# Patient Record
Sex: MALE | Race: WHITE | NOT HISPANIC OR LATINO | Employment: OTHER | URBAN - METROPOLITAN AREA
[De-identification: names, ages, dates, MRNs, and addresses within clinical notes are randomized per-mention and may not be internally consistent; named-entity substitution may affect disease eponyms.]

---

## 2017-01-18 ENCOUNTER — ALLSCRIPTS OFFICE VISIT (OUTPATIENT)
Dept: OTHER | Facility: OTHER | Age: 82
End: 2017-01-18

## 2017-01-18 DIAGNOSIS — R41.3 OTHER AMNESIA: ICD-10-CM

## 2017-01-18 DIAGNOSIS — E03.9 HYPOTHYROIDISM: ICD-10-CM

## 2017-04-04 DIAGNOSIS — I48.91 ATRIAL FIBRILLATION (HCC): ICD-10-CM

## 2017-06-21 ENCOUNTER — ALLSCRIPTS OFFICE VISIT (OUTPATIENT)
Dept: OTHER | Facility: OTHER | Age: 82
End: 2017-06-21

## 2017-10-11 DIAGNOSIS — Z95.1 PRESENCE OF AORTOCORONARY BYPASS GRAFT: ICD-10-CM

## 2017-10-11 DIAGNOSIS — I48.91 ATRIAL FIBRILLATION (HCC): ICD-10-CM

## 2017-10-11 DIAGNOSIS — Q23.1 CONGENITAL INSUFFICIENCY OF AORTIC VALVE: ICD-10-CM

## 2017-10-19 ENCOUNTER — GENERIC CONVERSION - ENCOUNTER (OUTPATIENT)
Dept: OTHER | Facility: OTHER | Age: 82
End: 2017-10-19

## 2017-10-19 ENCOUNTER — TRANSCRIBE ORDERS (OUTPATIENT)
Dept: ADMINISTRATIVE | Facility: HOSPITAL | Age: 82
End: 2017-10-19

## 2017-10-19 ENCOUNTER — ALLSCRIPTS OFFICE VISIT (OUTPATIENT)
Dept: OTHER | Facility: OTHER | Age: 82
End: 2017-10-19

## 2017-10-19 DIAGNOSIS — I48.91 ATRIAL FIBRILLATION, UNSPECIFIED TYPE (HCC): Primary | ICD-10-CM

## 2017-10-31 ENCOUNTER — GENERIC CONVERSION - ENCOUNTER (OUTPATIENT)
Dept: OTHER | Facility: OTHER | Age: 82
End: 2017-10-31

## 2017-10-31 ENCOUNTER — HOSPITAL ENCOUNTER (OUTPATIENT)
Dept: NON INVASIVE DIAGNOSTICS | Facility: CLINIC | Age: 82
Discharge: HOME | End: 2017-10-31
Payer: COMMERCIAL

## 2017-10-31 DIAGNOSIS — Q23.1 CONGENITAL INSUFFICIENCY OF AORTIC VALVE: ICD-10-CM

## 2017-10-31 PROCEDURE — 93306 TTE W/DOPPLER COMPLETE: CPT

## 2017-11-01 DIAGNOSIS — I10 ESSENTIAL (PRIMARY) HYPERTENSION: ICD-10-CM

## 2017-11-01 DIAGNOSIS — I48.91 ATRIAL FIBRILLATION (HCC): ICD-10-CM

## 2017-11-01 DIAGNOSIS — E03.9 HYPOTHYROIDISM: ICD-10-CM

## 2017-11-01 DIAGNOSIS — I50.9 HEART FAILURE (HCC): ICD-10-CM

## 2017-11-01 DIAGNOSIS — Z95.1 PRESENCE OF AORTOCORONARY BYPASS GRAFT: ICD-10-CM

## 2017-11-01 DIAGNOSIS — C66.1 MALIGNANT NEOPLASM OF RIGHT URETER (HCC): ICD-10-CM

## 2017-11-29 ENCOUNTER — GENERIC CONVERSION - ENCOUNTER (OUTPATIENT)
Dept: OTHER | Facility: OTHER | Age: 82
End: 2017-11-29

## 2017-11-29 ENCOUNTER — TRANSCRIBE ORDERS (OUTPATIENT)
Dept: ADMINISTRATIVE | Facility: HOSPITAL | Age: 82
End: 2017-11-29

## 2017-11-29 DIAGNOSIS — I11.0 BENIGN HYPERTENSIVE HEART DISEASE WITH CONGESTIVE HEART FAILURE (HCC): Primary | ICD-10-CM

## 2017-12-01 ENCOUNTER — GENERIC CONVERSION - ENCOUNTER (OUTPATIENT)
Dept: OTHER | Facility: OTHER | Age: 82
End: 2017-12-01

## 2017-12-07 ENCOUNTER — GENERIC CONVERSION - ENCOUNTER (OUTPATIENT)
Dept: CARDIOLOGY CLINIC | Facility: CLINIC | Age: 82
End: 2017-12-07

## 2017-12-07 ENCOUNTER — HOSPITAL ENCOUNTER (OUTPATIENT)
Dept: PULMONOLOGY | Facility: HOSPITAL | Age: 82
Discharge: HOME | End: 2017-12-07
Attending: INTERNAL MEDICINE
Payer: COMMERCIAL

## 2017-12-07 VITALS — OXYGEN SATURATION: 98 %

## 2017-12-07 DIAGNOSIS — I11.0 BENIGN HYPERTENSIVE HEART DISEASE WITH CONGESTIVE HEART FAILURE (HCC): ICD-10-CM

## 2017-12-07 PROCEDURE — 94729 DIFFUSING CAPACITY: CPT

## 2017-12-07 PROCEDURE — 94726 PLETHYSMOGRAPHY LUNG VOLUMES: CPT

## 2017-12-07 PROCEDURE — 94760 N-INVAS EAR/PLS OXIMETRY 1: CPT

## 2017-12-07 PROCEDURE — 94060 EVALUATION OF WHEEZING: CPT

## 2017-12-07 RX ORDER — ALBUTEROL SULFATE 2.5 MG/3ML
2.5 SOLUTION RESPIRATORY (INHALATION) ONCE
Status: COMPLETED | OUTPATIENT
Start: 2017-12-07 | End: 2017-12-07

## 2017-12-07 RX ADMIN — ALBUTEROL SULFATE 2.5 MG: 2.5 SOLUTION RESPIRATORY (INHALATION) at 13:59

## 2017-12-13 ENCOUNTER — ALLSCRIPTS OFFICE VISIT (OUTPATIENT)
Dept: OTHER | Facility: OTHER | Age: 82
End: 2017-12-13

## 2017-12-13 DIAGNOSIS — J98.4 OTHER DISORDERS OF LUNG (CODE): ICD-10-CM

## 2018-01-09 LAB
INR PPP: 2.3 (ref 0.86–1.16)

## 2018-01-12 VITALS — HEART RATE: 70 BPM | OXYGEN SATURATION: 96 % | WEIGHT: 228 LBS

## 2018-01-13 VITALS
WEIGHT: 228.03 LBS | HEIGHT: 71 IN | SYSTOLIC BLOOD PRESSURE: 132 MMHG | BODY MASS INDEX: 31.92 KG/M2 | RESPIRATION RATE: 16 BRPM | DIASTOLIC BLOOD PRESSURE: 70 MMHG | HEART RATE: 75 BPM | OXYGEN SATURATION: 97 %

## 2018-01-22 ENCOUNTER — ANTICOAG VISIT (OUTPATIENT)
Dept: INTERNAL MEDICINE CLINIC | Facility: CLINIC | Age: 83
End: 2018-01-22

## 2018-01-22 VITALS
DIASTOLIC BLOOD PRESSURE: 70 MMHG | HEART RATE: 62 BPM | BODY MASS INDEX: 32.06 KG/M2 | WEIGHT: 229 LBS | HEIGHT: 71 IN | SYSTOLIC BLOOD PRESSURE: 130 MMHG

## 2018-01-22 VITALS
WEIGHT: 228.5 LBS | SYSTOLIC BLOOD PRESSURE: 140 MMHG | BODY MASS INDEX: 31.99 KG/M2 | DIASTOLIC BLOOD PRESSURE: 90 MMHG | OXYGEN SATURATION: 97 % | HEIGHT: 71 IN | HEART RATE: 63 BPM

## 2018-01-22 VITALS
SYSTOLIC BLOOD PRESSURE: 144 MMHG | HEART RATE: 64 BPM | WEIGHT: 232.06 LBS | HEIGHT: 71 IN | OXYGEN SATURATION: 95 % | DIASTOLIC BLOOD PRESSURE: 72 MMHG | BODY MASS INDEX: 32.49 KG/M2

## 2018-01-22 VITALS
WEIGHT: 225 LBS | SYSTOLIC BLOOD PRESSURE: 116 MMHG | HEIGHT: 71 IN | OXYGEN SATURATION: 98 % | HEART RATE: 70 BPM | BODY MASS INDEX: 31.5 KG/M2 | DIASTOLIC BLOOD PRESSURE: 60 MMHG

## 2018-01-22 DIAGNOSIS — I48.91 ATRIAL FIBRILLATION, UNSPECIFIED TYPE (HCC): ICD-10-CM

## 2018-02-06 ENCOUNTER — ANTICOAG VISIT (OUTPATIENT)
Dept: INTERNAL MEDICINE CLINIC | Facility: CLINIC | Age: 83
End: 2018-02-06

## 2018-02-06 DIAGNOSIS — I48.91 ATRIAL FIBRILLATION, UNSPECIFIED TYPE (HCC): ICD-10-CM

## 2018-02-12 ENCOUNTER — ANTICOAG VISIT (OUTPATIENT)
Dept: INTERNAL MEDICINE CLINIC | Facility: CLINIC | Age: 83
End: 2018-02-12

## 2018-02-12 ENCOUNTER — TELEPHONE (OUTPATIENT)
Dept: INTERNAL MEDICINE CLINIC | Facility: CLINIC | Age: 83
End: 2018-02-12

## 2018-02-12 DIAGNOSIS — I48.91 ATRIAL FIBRILLATION, UNSPECIFIED TYPE (HCC): ICD-10-CM

## 2018-02-12 DIAGNOSIS — I48.91 ATRIAL FIBRILLATION, UNSPECIFIED TYPE (HCC): Primary | ICD-10-CM

## 2018-02-19 ENCOUNTER — ANTICOAG VISIT (OUTPATIENT)
Dept: INTERNAL MEDICINE CLINIC | Facility: CLINIC | Age: 83
End: 2018-02-19

## 2018-02-19 DIAGNOSIS — I48.91 ATRIAL FIBRILLATION, UNSPECIFIED TYPE (HCC): ICD-10-CM

## 2018-02-19 DIAGNOSIS — I48.91 ATRIAL FIBRILLATION, UNSPECIFIED TYPE (HCC): Primary | ICD-10-CM

## 2018-02-20 ENCOUNTER — ANTICOAG VISIT (OUTPATIENT)
Dept: INTERNAL MEDICINE CLINIC | Facility: CLINIC | Age: 83
End: 2018-02-20

## 2018-02-20 DIAGNOSIS — I48.91 ATRIAL FIBRILLATION, UNSPECIFIED TYPE (HCC): ICD-10-CM

## 2018-02-20 LAB — INR PPP: 2.8 (ref 0.86–1.16)

## 2018-03-01 DIAGNOSIS — I25.10 ATHEROSCLEROTIC HEART DISEASE OF NATIVE CORONARY ARTERY WITHOUT ANGINA PECTORIS: ICD-10-CM

## 2018-03-01 DIAGNOSIS — I10 ESSENTIAL (PRIMARY) HYPERTENSION: ICD-10-CM

## 2018-03-01 DIAGNOSIS — E03.9 HYPOTHYROIDISM: ICD-10-CM

## 2018-04-03 ENCOUNTER — ANTICOAG VISIT (OUTPATIENT)
Dept: INTERNAL MEDICINE CLINIC | Facility: CLINIC | Age: 83
End: 2018-04-03

## 2018-04-03 DIAGNOSIS — I48.91 ATRIAL FIBRILLATION, UNSPECIFIED TYPE (HCC): ICD-10-CM

## 2018-04-03 LAB — INR PPP: 2.8 (ref 0.86–1.16)

## 2018-04-10 ENCOUNTER — TELEPHONE (OUTPATIENT)
Dept: FAMILY MEDICINE | Facility: CLINIC | Age: 82
End: 2018-04-10

## 2018-05-15 ENCOUNTER — ANTICOAG VISIT (OUTPATIENT)
Dept: INTERNAL MEDICINE CLINIC | Facility: CLINIC | Age: 83
End: 2018-05-15

## 2018-05-15 DIAGNOSIS — I48.91 ATRIAL FIBRILLATION, UNSPECIFIED TYPE (HCC): ICD-10-CM

## 2018-05-15 LAB — INTERNATIONAL NORMALIZATION RATIO, POC: 2.4

## 2018-05-21 ENCOUNTER — TELEPHONE (OUTPATIENT)
Dept: INTERNAL MEDICINE CLINIC | Facility: CLINIC | Age: 83
End: 2018-05-21

## 2018-05-21 DIAGNOSIS — I25.10 CORONARY ARTERY DISEASE INVOLVING NATIVE CORONARY ARTERY OF NATIVE HEART WITHOUT ANGINA PECTORIS: Primary | ICD-10-CM

## 2018-05-21 RX ORDER — ATORVASTATIN CALCIUM 40 MG/1
40 TABLET, FILM COATED ORAL DAILY
Qty: 10 TABLET | Refills: 0 | Status: SHIPPED | OUTPATIENT
Start: 2018-05-21 | End: 2019-03-11 | Stop reason: SDUPTHER

## 2018-05-21 RX ORDER — ATORVASTATIN CALCIUM 40 MG/1
1 TABLET, FILM COATED ORAL DAILY
COMMUNITY
Start: 2016-10-12 | End: 2018-05-21 | Stop reason: SDUPTHER

## 2018-05-21 NOTE — TELEPHONE ENCOUNTER
Pt's son called in requesting a 10 day supply of atorvastatin/lipitor be sent to his retail pharmacy as they sent the request via [place] however they have not received it yet and he will run out    Once ordered please send back so we can call the son to let him know

## 2018-06-12 ENCOUNTER — ANTICOAG VISIT (OUTPATIENT)
Dept: INTERNAL MEDICINE CLINIC | Facility: CLINIC | Age: 83
End: 2018-06-12

## 2018-06-12 DIAGNOSIS — I48.91 ATRIAL FIBRILLATION, UNSPECIFIED TYPE (HCC): ICD-10-CM

## 2018-06-13 ENCOUNTER — OFFICE VISIT (OUTPATIENT)
Dept: INTERNAL MEDICINE CLINIC | Facility: SKILLED NURSING FACILITY | Age: 83
End: 2018-06-13
Payer: COMMERCIAL

## 2018-06-13 VITALS
HEART RATE: 66 BPM | DIASTOLIC BLOOD PRESSURE: 78 MMHG | BODY MASS INDEX: 33.56 KG/M2 | OXYGEN SATURATION: 97 % | SYSTOLIC BLOOD PRESSURE: 134 MMHG | WEIGHT: 240.6 LBS

## 2018-06-13 DIAGNOSIS — E03.9 HYPOTHYROIDISM, UNSPECIFIED TYPE: ICD-10-CM

## 2018-06-13 DIAGNOSIS — I50.9 CONGESTIVE HEART FAILURE, UNSPECIFIED HF CHRONICITY, UNSPECIFIED HEART FAILURE TYPE (HCC): Primary | ICD-10-CM

## 2018-06-13 DIAGNOSIS — I50.9 CHRONIC CONGESTIVE HEART FAILURE, UNSPECIFIED HEART FAILURE TYPE (HCC): Primary | ICD-10-CM

## 2018-06-13 DIAGNOSIS — I10 HYPERTENSION, ESSENTIAL, BENIGN: Primary | ICD-10-CM

## 2018-06-13 DIAGNOSIS — I48.91 ATRIAL FIBRILLATION, UNSPECIFIED TYPE (HCC): ICD-10-CM

## 2018-06-13 DIAGNOSIS — Q23.1 BICUSPID AORTIC VALVE: ICD-10-CM

## 2018-06-13 DIAGNOSIS — I25.10 CORONARY ARTERY DISEASE INVOLVING NATIVE CORONARY ARTERY OF NATIVE HEART WITHOUT ANGINA PECTORIS: ICD-10-CM

## 2018-06-13 DIAGNOSIS — I10 BENIGN ESSENTIAL HYPERTENSION: ICD-10-CM

## 2018-06-13 PROCEDURE — 99214 OFFICE O/P EST MOD 30 MIN: CPT | Performed by: INTERNAL MEDICINE

## 2018-06-13 PROCEDURE — 1101F PT FALLS ASSESS-DOCD LE1/YR: CPT | Performed by: INTERNAL MEDICINE

## 2018-06-13 RX ORDER — WARFARIN SODIUM 2.5 MG/1
TABLET ORAL
COMMUNITY
Start: 2016-10-12 | End: 2019-03-27 | Stop reason: SDUPTHER

## 2018-06-13 RX ORDER — LEVOTHYROXINE SODIUM 0.07 MG/1
1 TABLET ORAL
COMMUNITY
Start: 2016-10-12 | End: 2018-07-07 | Stop reason: SDUPTHER

## 2018-06-13 RX ORDER — CARVEDILOL 25 MG/1
1 TABLET ORAL 2 TIMES DAILY
COMMUNITY
Start: 2016-10-12 | End: 2019-03-11 | Stop reason: SDUPTHER

## 2018-06-13 RX ORDER — LATANOPROST 50 UG/ML
1 SOLUTION/ DROPS OPHTHALMIC
COMMUNITY
Start: 2016-10-12

## 2018-06-13 RX ORDER — POTASSIUM CHLORIDE 20 MEQ/1
TABLET, EXTENDED RELEASE ORAL
COMMUNITY
Start: 2017-10-19 | End: 2018-06-13 | Stop reason: SDUPTHER

## 2018-06-13 RX ORDER — BENAZEPRIL HYDROCHLORIDE 40 MG/1
TABLET, FILM COATED ORAL
Qty: 90 TABLET | Refills: 3 | Status: SHIPPED | OUTPATIENT
Start: 2018-06-13 | End: 2019-03-27 | Stop reason: SDUPTHER

## 2018-06-13 RX ORDER — TORSEMIDE 20 MG/1
1 TABLET ORAL EVERY OTHER DAY
COMMUNITY
Start: 2017-10-31 | End: 2018-08-08 | Stop reason: SDUPTHER

## 2018-06-13 RX ORDER — CARVEDILOL 25 MG/1
TABLET ORAL
Qty: 180 TABLET | Refills: 3 | Status: SHIPPED | OUTPATIENT
Start: 2018-06-13 | End: 2019-03-27 | Stop reason: SDUPTHER

## 2018-06-13 RX ORDER — AMLODIPINE BESYLATE 5 MG/1
1 TABLET ORAL DAILY
COMMUNITY
Start: 2016-10-12 | End: 2019-08-15 | Stop reason: SDUPTHER

## 2018-06-13 RX ORDER — WARFARIN SODIUM 2.5 MG/1
TABLET ORAL
Qty: 110 TABLET | Refills: 1 | Status: SHIPPED | OUTPATIENT
Start: 2018-06-13 | End: 2018-12-25 | Stop reason: SDUPTHER

## 2018-06-13 RX ORDER — BENAZEPRIL HYDROCHLORIDE 40 MG/1
1 TABLET, FILM COATED ORAL DAILY
COMMUNITY
Start: 2016-10-12 | End: 2019-03-11 | Stop reason: SDUPTHER

## 2018-06-13 RX ORDER — POTASSIUM CHLORIDE 20 MEQ/1
20 TABLET, EXTENDED RELEASE ORAL DAILY
Qty: 7 TABLET | Refills: 0 | Status: SHIPPED | OUTPATIENT
Start: 2018-06-13 | End: 2018-08-08 | Stop reason: SDUPTHER

## 2018-06-13 RX ORDER — FINASTERIDE 5 MG/1
1 TABLET, FILM COATED ORAL DAILY
COMMUNITY
Start: 2016-10-12 | End: 2020-06-02 | Stop reason: SDUPTHER

## 2018-06-13 NOTE — PROGRESS NOTES
Assessment/Plan:    Increase torsemide to daily given increase in his weight and SOB  Labs before next visit in 3 months  He will soak his ears with peroxide as he has done in the past       Problem List Items Addressed This Visit     Atrial fibrillation ([address] ) [I48 91]    Relevant Medications    carvedilol (COREG) 25 mg tablet    amLODIPine (NORVASC) 5 mg tablet    Coronary artery disease    Relevant Medications    carvedilol (COREG) 25 mg tablet    amLODIPine (NORVASC) 5 mg tablet    Benign essential hypertension    Relevant Medications    benazepril (LOTENSIN) 40 MG tablet    carvedilol (COREG) 25 mg tablet    amLODIPine (NORVASC) 5 mg tablet    torsemide (DEMADEX) 20 mg tablet    Other Relevant Orders    Basic metabolic panel    Bicuspid aortic valve    Relevant Medications    carvedilol (COREG) 25 mg tablet    amLODIPine (NORVASC) 5 mg tablet    CHF (congestive heart failure) (HCC) - Primary    Relevant Medications    carvedilol (COREG) 25 mg tablet    amLODIPine (NORVASC) 5 mg tablet    Other Relevant Orders    Basic metabolic panel    Hypothyroidism    Relevant Medications    carvedilol (COREG) 25 mg tablet    levothyroxine 75 mcg tablet    Other Relevant Orders    TSH, 3rd generation with Free T4 reflex            Subjective:      Patient ID: Jn Lenz is a 80 y o  male  HPI  Here with his daughter  She has noticed increased memory loss/forgetfulness  SOB seems worse as well  He is taking torsemide every other day  Weight at home 234lbs last night  Recent labs- INR has been therapeutic, cbc cmp lipids tsh normal  Issues with insurance so has been unable to see cardiology    The following portions of the patient's history were reviewed and updated as appropriate: allergies, current medications, past medical history, past social history, past surgical history and problem list     Review of Systems   Constitutional: Negative for fatigue, fever and unexpected weight change     HENT: Negative for ear pain, hearing loss, sinus pain, sinus pressure and sore throat  Respiratory: Positive for shortness of breath  Negative for cough and wheezing  Cardiovascular: Positive for leg swelling  Negative for chest pain and palpitations  Gastrointestinal: Negative for abdominal pain, constipation, diarrhea, nausea and vomiting  Musculoskeletal: Negative for arthralgias and myalgias  Neurological: Negative for dizziness and headaches  Poor memory         Objective:      /78   Pulse 66   Wt 109 kg (240 lb 9 6 oz)   SpO2 97%   BMI 33 56 kg/m²          Physical Exam   Constitutional: He is oriented to person, place, and time  He appears well-developed and well-nourished  HENT:   Head: Normocephalic and atraumatic  Right Ear: External ear normal    Left Ear: External ear normal    Mouth/Throat: Oropharynx is clear and moist    Cerumen AU   Eyes: Conjunctivae are normal    Neck: Neck supple  Cardiovascular: Normal rate, regular rhythm and normal heart sounds  No murmur heard  Pulmonary/Chest: Effort normal and breath sounds normal  No respiratory distress  He has no wheezes  He has no rales  Abdominal: Soft  Bowel sounds are normal  He exhibits no distension and no mass  There is no tenderness  There is no rebound and no guarding  Musculoskeletal: Normal range of motion  He exhibits edema (trace pretibial b/l right >left)  Neurological: He is alert and oriented to person, place, and time  Skin: Skin is warm and dry  Psychiatric: He has a normal mood and affect   His behavior is normal  Judgment and thought content normal

## 2018-06-18 ENCOUNTER — ANTICOAG VISIT (OUTPATIENT)
Dept: INTERNAL MEDICINE CLINIC | Facility: CLINIC | Age: 83
End: 2018-06-18

## 2018-06-18 DIAGNOSIS — I48.91 ATRIAL FIBRILLATION, UNSPECIFIED TYPE (HCC): ICD-10-CM

## 2018-06-25 ENCOUNTER — ANTICOAG VISIT (OUTPATIENT)
Dept: INTERNAL MEDICINE CLINIC | Facility: CLINIC | Age: 83
End: 2018-06-25

## 2018-06-25 DIAGNOSIS — I48.91 ATRIAL FIBRILLATION, UNSPECIFIED TYPE (HCC): ICD-10-CM

## 2018-06-26 LAB — INR PPP: 2.4 (ref 0.86–1.17)

## 2018-06-27 ENCOUNTER — ANTICOAG VISIT (OUTPATIENT)
Dept: INTERNAL MEDICINE CLINIC | Facility: CLINIC | Age: 83
End: 2018-06-27

## 2018-06-27 DIAGNOSIS — I48.91 ATRIAL FIBRILLATION, UNSPECIFIED TYPE (HCC): ICD-10-CM

## 2018-07-07 DIAGNOSIS — E03.9 HYPOTHYROIDISM, UNSPECIFIED TYPE: Primary | ICD-10-CM

## 2018-07-08 RX ORDER — LEVOTHYROXINE SODIUM 0.07 MG/1
TABLET ORAL
Qty: 90 TABLET | Refills: 3 | Status: SHIPPED | OUTPATIENT
Start: 2018-07-08 | End: 2019-03-11 | Stop reason: SDUPTHER

## 2018-07-24 ENCOUNTER — ANTICOAG VISIT (OUTPATIENT)
Dept: INTERNAL MEDICINE CLINIC | Facility: CLINIC | Age: 83
End: 2018-07-24

## 2018-07-24 DIAGNOSIS — I48.91 ATRIAL FIBRILLATION, UNSPECIFIED TYPE (HCC): ICD-10-CM

## 2018-07-31 ENCOUNTER — ANTICOAG VISIT (OUTPATIENT)
Dept: INTERNAL MEDICINE CLINIC | Facility: CLINIC | Age: 83
End: 2018-07-31

## 2018-07-31 DIAGNOSIS — I48.91 ATRIAL FIBRILLATION, UNSPECIFIED TYPE (HCC): ICD-10-CM

## 2018-08-08 ENCOUNTER — ANTICOAG VISIT (OUTPATIENT)
Dept: INTERNAL MEDICINE CLINIC | Facility: CLINIC | Age: 83
End: 2018-08-08

## 2018-08-08 DIAGNOSIS — I50.9 CHRONIC CONGESTIVE HEART FAILURE, UNSPECIFIED HEART FAILURE TYPE (HCC): ICD-10-CM

## 2018-08-08 DIAGNOSIS — I48.91 ATRIAL FIBRILLATION, UNSPECIFIED TYPE (HCC): ICD-10-CM

## 2018-08-08 LAB — INTERNATIONAL NORMALIZATION RATIO, POC: 3.3

## 2018-08-08 RX ORDER — POTASSIUM CHLORIDE 20 MEQ/1
20 TABLET, EXTENDED RELEASE ORAL DAILY
Qty: 90 TABLET | Refills: 1 | Status: SHIPPED | OUTPATIENT
Start: 2018-08-08 | End: 2019-03-11 | Stop reason: SDUPTHER

## 2018-08-08 RX ORDER — TORSEMIDE 20 MG/1
20 TABLET ORAL DAILY
Qty: 90 TABLET | Refills: 1 | Status: SHIPPED | OUTPATIENT
Start: 2018-08-08 | End: 2019-03-11 | Stop reason: SDUPTHER

## 2018-08-14 ENCOUNTER — ANTICOAG VISIT (OUTPATIENT)
Dept: INTERNAL MEDICINE CLINIC | Facility: CLINIC | Age: 83
End: 2018-08-14

## 2018-08-14 DIAGNOSIS — I48.91 ATRIAL FIBRILLATION, UNSPECIFIED TYPE (HCC): ICD-10-CM

## 2018-08-14 LAB — INTERNATIONAL NORMALIZATION RATIO, POC: 3.1

## 2018-08-27 ENCOUNTER — ANTICOAG VISIT (OUTPATIENT)
Dept: INTERNAL MEDICINE CLINIC | Facility: CLINIC | Age: 83
End: 2018-08-27

## 2018-08-27 DIAGNOSIS — I48.21 PERMANENT ATRIAL FIBRILLATION (HCC): ICD-10-CM

## 2018-08-27 LAB — INTERNATIONAL NORMALIZATION RATIO, POC: 2.9

## 2018-09-24 ENCOUNTER — ANTICOAG VISIT (OUTPATIENT)
Dept: INTERNAL MEDICINE CLINIC | Facility: CLINIC | Age: 83
End: 2018-09-24

## 2018-09-24 DIAGNOSIS — I48.21 PERMANENT ATRIAL FIBRILLATION (HCC): ICD-10-CM

## 2018-09-26 ENCOUNTER — OFFICE VISIT (OUTPATIENT)
Dept: INTERNAL MEDICINE CLINIC | Facility: SKILLED NURSING FACILITY | Age: 83
End: 2018-09-26
Payer: COMMERCIAL

## 2018-09-26 VITALS
HEART RATE: 69 BPM | OXYGEN SATURATION: 98 % | BODY MASS INDEX: 33.14 KG/M2 | WEIGHT: 237.6 LBS | SYSTOLIC BLOOD PRESSURE: 130 MMHG | DIASTOLIC BLOOD PRESSURE: 74 MMHG

## 2018-09-26 DIAGNOSIS — I10 BENIGN ESSENTIAL HYPERTENSION: ICD-10-CM

## 2018-09-26 DIAGNOSIS — Q23.1 BICUSPID AORTIC VALVE: ICD-10-CM

## 2018-09-26 DIAGNOSIS — I48.21 PERMANENT ATRIAL FIBRILLATION (HCC): ICD-10-CM

## 2018-09-26 DIAGNOSIS — I48.21 PERMANENT ATRIAL FIBRILLATION (HCC): Primary | ICD-10-CM

## 2018-09-26 DIAGNOSIS — E03.9 HYPOTHYROIDISM, UNSPECIFIED TYPE: ICD-10-CM

## 2018-09-26 DIAGNOSIS — I25.10 CORONARY ARTERY DISEASE INVOLVING NATIVE CORONARY ARTERY OF NATIVE HEART WITHOUT ANGINA PECTORIS: Primary | ICD-10-CM

## 2018-09-26 DIAGNOSIS — I50.9 CONGESTIVE HEART FAILURE, UNSPECIFIED HF CHRONICITY, UNSPECIFIED HEART FAILURE TYPE (HCC): ICD-10-CM

## 2018-09-26 PROCEDURE — 99214 OFFICE O/P EST MOD 30 MIN: CPT | Performed by: INTERNAL MEDICINE

## 2018-09-26 PROCEDURE — 1036F TOBACCO NON-USER: CPT | Performed by: INTERNAL MEDICINE

## 2018-09-26 PROCEDURE — 1160F RVW MEDS BY RX/DR IN RCRD: CPT | Performed by: INTERNAL MEDICINE

## 2018-09-26 NOTE — PROGRESS NOTES
Assessment/Plan:    Continue current medications  If he misses a day of the torsemide, he will take 2 the following day  Cont daily weights  Cont current meds  INR next week  Echo this year  He will schedule with Dr Cristela Mills (cardiology) whom he saw last year       Problem List Items Addressed This Visit        Endocrine    Hypothyroidism    Relevant Orders    Lipid panel    TSH, 3rd generation with Free T4 reflex       Cardiovascular and Mediastinum    Atrial fibrillation ([address] ) [I48 91]    Relevant Orders    Echo complete with contrast if indicated    Coronary artery disease - Primary    Relevant Orders    Comprehensive metabolic panel    Lipid panel    Benign essential hypertension    Relevant Orders    CBC and differential    Comprehensive metabolic panel    Bicuspid aortic valve    CHF (congestive heart failure) (HCC)    Relevant Orders    Echo complete with contrast if indicated            Subjective:      Patient ID: Darshana Medina is a 80 y o  male  HPI  Here with his daughter for a routine follow up  He is stable  He has no complaints  At last visit, he ws noted to have gained weight with increasing SOB  We increased his torsemide to daily and he takes it most days of the week  His daughter sees the difference in his breathing when he takes it   His weight at home has been around 230lbs  He denies SOB at rest, only with moderate exertion like walking down the hallways   Recent labs -normal BMP, slightly elevated tsh  Short term memory seems to be getting worse    The following portions of the patient's history were reviewed and updated as appropriate: allergies, past family history, past medical history, past social history, past surgical history and problem list     Review of Systems   Constitutional: Negative for fatigue, fever and unexpected weight change  HENT: Negative for ear pain, hearing loss, sinus pain, sinus pressure and sore throat      Respiratory: Positive for shortness of breath (with exertion)  Negative for cough and wheezing  Cardiovascular: Positive for leg swelling (stable)  Negative for chest pain and palpitations  Gastrointestinal: Negative for abdominal pain, constipation, diarrhea, nausea and vomiting  Neurological: Negative for dizziness and headaches  Objective:      /74   Pulse 69   Wt 108 kg (237 lb 9 6 oz)   SpO2 98%   BMI 33 14 kg/m²          Physical Exam   Constitutional: He is oriented to person, place, and time  He appears well-developed and well-nourished  HENT:   Head: Normocephalic and atraumatic  Right Ear: External ear normal    Left Ear: External ear normal    Mouth/Throat: Oropharynx is clear and moist    Eyes: Conjunctivae are normal    Neck: Neck supple  Cardiovascular: Normal rate and regular rhythm  Murmur heard  Systolic murmur is present with a grade of 2/6   Bilateral pretibial pitting gr 1 edema   Pulmonary/Chest: Effort normal and breath sounds normal  No respiratory distress  He has no wheezes  He has no rales  Abdominal: Soft  Bowel sounds are normal  He exhibits no distension and no mass  There is no tenderness  There is no rebound and no guarding  Musculoskeletal: Normal range of motion  Neurological: He is alert and oriented to person, place, and time  Poor short term memory, repetitive   Skin: Skin is warm and dry  Psychiatric: He has a normal mood and affect   His behavior is normal  Judgment and thought content normal

## 2018-09-27 ENCOUNTER — ANTICOAG VISIT (OUTPATIENT)
Dept: INTERNAL MEDICINE CLINIC | Facility: CLINIC | Age: 83
End: 2018-09-27

## 2018-09-27 DIAGNOSIS — I48.21 PERMANENT ATRIAL FIBRILLATION (HCC): ICD-10-CM

## 2018-10-01 ENCOUNTER — ANTICOAG VISIT (OUTPATIENT)
Dept: INTERNAL MEDICINE CLINIC | Facility: CLINIC | Age: 83
End: 2018-10-01

## 2018-10-01 DIAGNOSIS — I48.21 PERMANENT ATRIAL FIBRILLATION (HCC): ICD-10-CM

## 2018-10-03 ENCOUNTER — ANTICOAG VISIT (OUTPATIENT)
Dept: INTERNAL MEDICINE CLINIC | Facility: CLINIC | Age: 83
End: 2018-10-03

## 2018-10-03 DIAGNOSIS — I48.21 PERMANENT ATRIAL FIBRILLATION (HCC): ICD-10-CM

## 2018-10-09 ENCOUNTER — ANTICOAG VISIT (OUTPATIENT)
Dept: INTERNAL MEDICINE CLINIC | Facility: CLINIC | Age: 83
End: 2018-10-09

## 2018-10-09 DIAGNOSIS — I48.21 PERMANENT ATRIAL FIBRILLATION (HCC): ICD-10-CM

## 2018-10-15 ENCOUNTER — ANTICOAG VISIT (OUTPATIENT)
Dept: INTERNAL MEDICINE CLINIC | Facility: CLINIC | Age: 83
End: 2018-10-15

## 2018-10-15 DIAGNOSIS — I48.21 PERMANENT ATRIAL FIBRILLATION (HCC): ICD-10-CM

## 2018-10-22 ENCOUNTER — ANTICOAG VISIT (OUTPATIENT)
Dept: INTERNAL MEDICINE CLINIC | Facility: CLINIC | Age: 83
End: 2018-10-22

## 2018-10-22 DIAGNOSIS — I48.21 PERMANENT ATRIAL FIBRILLATION (HCC): ICD-10-CM

## 2018-10-30 ENCOUNTER — ANTICOAG VISIT (OUTPATIENT)
Dept: INTERNAL MEDICINE CLINIC | Facility: CLINIC | Age: 83
End: 2018-10-30

## 2018-10-30 DIAGNOSIS — I48.21 PERMANENT ATRIAL FIBRILLATION (HCC): ICD-10-CM

## 2018-11-05 ENCOUNTER — TELEPHONE (OUTPATIENT)
Dept: INTERNAL MEDICINE CLINIC | Facility: CLINIC | Age: 83
End: 2018-11-05

## 2018-11-05 DIAGNOSIS — G47.33 OBSTRUCTIVE SLEEP APNEA: Primary | ICD-10-CM

## 2018-11-06 ENCOUNTER — ANTICOAG VISIT (OUTPATIENT)
Dept: INTERNAL MEDICINE CLINIC | Facility: CLINIC | Age: 83
End: 2018-11-06

## 2018-11-06 DIAGNOSIS — I48.21 PERMANENT ATRIAL FIBRILLATION (HCC): ICD-10-CM

## 2018-11-13 ENCOUNTER — ANTICOAG VISIT (OUTPATIENT)
Dept: INTERNAL MEDICINE CLINIC | Facility: CLINIC | Age: 83
End: 2018-11-13

## 2018-11-13 DIAGNOSIS — I48.21 PERMANENT ATRIAL FIBRILLATION (HCC): ICD-10-CM

## 2018-11-20 ENCOUNTER — ANTICOAG VISIT (OUTPATIENT)
Dept: INTERNAL MEDICINE CLINIC | Facility: CLINIC | Age: 83
End: 2018-11-20

## 2018-11-20 DIAGNOSIS — I48.21 PERMANENT ATRIAL FIBRILLATION (HCC): ICD-10-CM

## 2018-11-21 ENCOUNTER — ANTICOAG VISIT (OUTPATIENT)
Dept: INTERNAL MEDICINE CLINIC | Facility: CLINIC | Age: 83
End: 2018-11-21

## 2018-11-21 DIAGNOSIS — I48.21 PERMANENT ATRIAL FIBRILLATION (HCC): ICD-10-CM

## 2018-11-26 ENCOUNTER — ANTICOAG VISIT (OUTPATIENT)
Dept: INTERNAL MEDICINE CLINIC | Facility: CLINIC | Age: 83
End: 2018-11-26

## 2018-11-26 DIAGNOSIS — I48.91 ATRIAL FIBRILLATION, UNSPECIFIED TYPE (HCC): ICD-10-CM

## 2018-11-26 LAB — INTERNATIONAL NORMALIZATION RATIO, POC: 3.3

## 2018-11-27 ENCOUNTER — TELEPHONE (OUTPATIENT)
Dept: INTERNAL MEDICINE CLINIC | Facility: CLINIC | Age: 83
End: 2018-11-27

## 2018-11-27 DIAGNOSIS — I48.91 ATRIAL FIBRILLATION, UNSPECIFIED TYPE (HCC): Primary | ICD-10-CM

## 2018-11-27 NOTE — TELEPHONE ENCOUNTER
[facility] network labs called to advise you of pt's INR results    Collected: 11/27 @ 9:10AM  PT- 30 2  INR- 3 0

## 2018-12-13 ENCOUNTER — ANTICOAG VISIT (OUTPATIENT)
Dept: INTERNAL MEDICINE CLINIC | Facility: CLINIC | Age: 83
End: 2018-12-13

## 2018-12-13 DIAGNOSIS — I48.91 ATRIAL FIBRILLATION, UNSPECIFIED TYPE (HCC): ICD-10-CM

## 2018-12-17 ENCOUNTER — ANTICOAG VISIT (OUTPATIENT)
Dept: INTERNAL MEDICINE CLINIC | Facility: CLINIC | Age: 83
End: 2018-12-17

## 2018-12-17 DIAGNOSIS — I48.91 ATRIAL FIBRILLATION, UNSPECIFIED TYPE (HCC): ICD-10-CM

## 2018-12-19 ENCOUNTER — ANTICOAG VISIT (OUTPATIENT)
Dept: INTERNAL MEDICINE CLINIC | Facility: CLINIC | Age: 83
End: 2018-12-19

## 2018-12-19 DIAGNOSIS — I48.91 ATRIAL FIBRILLATION, UNSPECIFIED TYPE (HCC): ICD-10-CM

## 2018-12-19 LAB — INTERNATIONAL NORMALIZATION RATIO, POC: 2.6

## 2018-12-25 DIAGNOSIS — I48.91 ATRIAL FIBRILLATION, UNSPECIFIED TYPE (HCC): ICD-10-CM

## 2018-12-25 RX ORDER — WARFARIN SODIUM 2.5 MG/1
TABLET ORAL
Qty: 110 TABLET | Refills: 1 | Status: SHIPPED | OUTPATIENT
Start: 2018-12-25 | End: 2018-12-27 | Stop reason: SDUPTHER

## 2018-12-27 ENCOUNTER — TELEPHONE (OUTPATIENT)
Dept: INTERNAL MEDICINE CLINIC | Facility: CLINIC | Age: 83
End: 2018-12-27

## 2018-12-27 DIAGNOSIS — I48.91 ATRIAL FIBRILLATION, UNSPECIFIED TYPE (HCC): ICD-10-CM

## 2018-12-27 RX ORDER — WARFARIN SODIUM 2.5 MG/1
TABLET ORAL
Qty: 20 TABLET | Refills: 0 | Status: SHIPPED | OUTPATIENT
Start: 2018-12-27 | End: 2019-03-22 | Stop reason: SDUPTHER

## 2019-01-18 ENCOUNTER — ANTICOAG VISIT (OUTPATIENT)
Dept: INTERNAL MEDICINE CLINIC | Facility: CLINIC | Age: 84
End: 2019-01-18

## 2019-01-18 DIAGNOSIS — I48.91 ATRIAL FIBRILLATION, UNSPECIFIED TYPE (HCC): ICD-10-CM

## 2019-01-21 ENCOUNTER — ANTICOAG VISIT (OUTPATIENT)
Dept: INTERNAL MEDICINE CLINIC | Facility: CLINIC | Age: 84
End: 2019-01-21

## 2019-01-21 DIAGNOSIS — I48.91 ATRIAL FIBRILLATION, UNSPECIFIED TYPE (HCC): ICD-10-CM

## 2019-01-23 ENCOUNTER — ANTICOAG VISIT (OUTPATIENT)
Dept: INTERNAL MEDICINE CLINIC | Facility: CLINIC | Age: 84
End: 2019-01-23

## 2019-01-23 DIAGNOSIS — I48.91 ATRIAL FIBRILLATION, UNSPECIFIED TYPE (HCC): ICD-10-CM

## 2019-01-24 LAB — INTERNATIONAL NORMALIZATION RATIO, POC: 2.8

## 2019-02-05 ENCOUNTER — ANTICOAG VISIT (OUTPATIENT)
Dept: INTERNAL MEDICINE CLINIC | Facility: CLINIC | Age: 84
End: 2019-02-05

## 2019-02-05 DIAGNOSIS — I48.91 ATRIAL FIBRILLATION, UNSPECIFIED TYPE (HCC): ICD-10-CM

## 2019-02-05 LAB — INTERNATIONAL NORMALIZATION RATIO, POC: 2.4

## 2019-02-25 ENCOUNTER — TELEPHONE (OUTPATIENT)
Dept: INTERNAL MEDICINE CLINIC | Facility: CLINIC | Age: 84
End: 2019-02-25

## 2019-02-25 DIAGNOSIS — Q23.1 BICUSPID AORTIC VALVE: Primary | ICD-10-CM

## 2019-03-05 ENCOUNTER — ANTICOAG VISIT (OUTPATIENT)
Dept: INTERNAL MEDICINE CLINIC | Facility: CLINIC | Age: 84
End: 2019-03-05

## 2019-03-05 DIAGNOSIS — I48.91 ATRIAL FIBRILLATION, UNSPECIFIED TYPE (HCC): ICD-10-CM

## 2019-03-05 LAB — INTERNATIONAL NORMALIZATION RATIO, POC: 2.7

## 2019-03-11 DIAGNOSIS — I50.9 CHRONIC CONGESTIVE HEART FAILURE, UNSPECIFIED HEART FAILURE TYPE (HCC): ICD-10-CM

## 2019-03-11 DIAGNOSIS — I10 BENIGN ESSENTIAL HYPERTENSION: Primary | ICD-10-CM

## 2019-03-11 DIAGNOSIS — I25.10 CORONARY ARTERY DISEASE INVOLVING NATIVE CORONARY ARTERY OF NATIVE HEART WITHOUT ANGINA PECTORIS: ICD-10-CM

## 2019-03-11 DIAGNOSIS — E03.9 HYPOTHYROIDISM, UNSPECIFIED TYPE: ICD-10-CM

## 2019-03-11 RX ORDER — BENAZEPRIL HYDROCHLORIDE 40 MG/1
40 TABLET, FILM COATED ORAL DAILY
Qty: 90 TABLET | Refills: 1 | Status: SHIPPED | OUTPATIENT
Start: 2019-03-11 | End: 2019-07-31 | Stop reason: SDUPTHER

## 2019-03-11 RX ORDER — LEVOTHYROXINE SODIUM 0.07 MG/1
75 TABLET ORAL
Qty: 90 TABLET | Refills: 1 | Status: SHIPPED | OUTPATIENT
Start: 2019-03-11 | End: 2019-08-21 | Stop reason: SDUPTHER

## 2019-03-11 RX ORDER — POTASSIUM CHLORIDE 20 MEQ/1
20 TABLET, EXTENDED RELEASE ORAL DAILY
Qty: 90 TABLET | Refills: 1 | Status: SHIPPED | OUTPATIENT
Start: 2019-03-11 | End: 2019-09-11 | Stop reason: SDUPTHER

## 2019-03-11 RX ORDER — CARVEDILOL 25 MG/1
25 TABLET ORAL 2 TIMES DAILY WITH MEALS
Qty: 180 TABLET | Refills: 1 | Status: SHIPPED | OUTPATIENT
Start: 2019-03-11 | End: 2019-09-06 | Stop reason: SDUPTHER

## 2019-03-11 RX ORDER — TORSEMIDE 20 MG/1
20 TABLET ORAL DAILY
Qty: 90 TABLET | Refills: 1 | Status: SHIPPED | OUTPATIENT
Start: 2019-03-11 | End: 2019-09-11 | Stop reason: SDUPTHER

## 2019-03-11 RX ORDER — ATORVASTATIN CALCIUM 40 MG/1
40 TABLET, FILM COATED ORAL DAILY
Qty: 90 TABLET | Refills: 1 | Status: SHIPPED | OUTPATIENT
Start: 2019-03-11 | End: 2019-09-11 | Stop reason: SDUPTHER

## 2019-03-22 DIAGNOSIS — I48.91 ATRIAL FIBRILLATION, UNSPECIFIED TYPE (HCC): ICD-10-CM

## 2019-03-22 RX ORDER — WARFARIN SODIUM 2.5 MG/1
TABLET ORAL
Qty: 20 TABLET | Refills: 10 | Status: SHIPPED | OUTPATIENT
Start: 2019-03-22 | End: 2019-03-25 | Stop reason: SDUPTHER

## 2019-03-25 ENCOUNTER — HOSPITAL ENCOUNTER (OUTPATIENT)
Dept: NON INVASIVE DIAGNOSTICS | Facility: CLINIC | Age: 84
Discharge: HOME | End: 2019-03-25
Payer: COMMERCIAL

## 2019-03-25 DIAGNOSIS — Q23.1 BICUSPID AORTIC VALVE: ICD-10-CM

## 2019-03-25 DIAGNOSIS — I48.91 ATRIAL FIBRILLATION, UNSPECIFIED TYPE (HCC): ICD-10-CM

## 2019-03-25 PROCEDURE — 93306 TTE W/DOPPLER COMPLETE: CPT | Performed by: INTERNAL MEDICINE

## 2019-03-25 PROCEDURE — 93306 TTE W/DOPPLER COMPLETE: CPT

## 2019-03-25 RX ORDER — WARFARIN SODIUM 2.5 MG/1
TABLET ORAL
Qty: 32 TABLET | Refills: 5 | Status: SHIPPED | OUTPATIENT
Start: 2019-03-25 | End: 2019-03-27 | Stop reason: SDUPTHER

## 2019-03-27 ENCOUNTER — OFFICE VISIT (OUTPATIENT)
Dept: INTERNAL MEDICINE CLINIC | Facility: SKILLED NURSING FACILITY | Age: 84
End: 2019-03-27
Payer: COMMERCIAL

## 2019-03-27 VITALS
BODY MASS INDEX: 33.61 KG/M2 | OXYGEN SATURATION: 97 % | TEMPERATURE: 98 F | WEIGHT: 241 LBS | HEART RATE: 60 BPM | SYSTOLIC BLOOD PRESSURE: 142 MMHG | DIASTOLIC BLOOD PRESSURE: 72 MMHG

## 2019-03-27 DIAGNOSIS — I48.91 ATRIAL FIBRILLATION, UNSPECIFIED TYPE (HCC): ICD-10-CM

## 2019-03-27 DIAGNOSIS — Q23.1 BICUSPID AORTIC VALVE: ICD-10-CM

## 2019-03-27 DIAGNOSIS — E03.9 HYPOTHYROIDISM, UNSPECIFIED TYPE: Primary | ICD-10-CM

## 2019-03-27 DIAGNOSIS — I50.9 CONGESTIVE HEART FAILURE, UNSPECIFIED HF CHRONICITY, UNSPECIFIED HEART FAILURE TYPE (HCC): ICD-10-CM

## 2019-03-27 DIAGNOSIS — I10 BENIGN ESSENTIAL HYPERTENSION: ICD-10-CM

## 2019-03-27 PROCEDURE — 99214 OFFICE O/P EST MOD 30 MIN: CPT | Performed by: INTERNAL MEDICINE

## 2019-03-27 PROCEDURE — 1036F TOBACCO NON-USER: CPT | Performed by: INTERNAL MEDICINE

## 2019-03-27 RX ORDER — WARFARIN SODIUM 2.5 MG/1
TABLET ORAL
Qty: 100 TABLET | Refills: 3 | Status: SHIPPED | OUTPATIENT
Start: 2019-03-27 | End: 2020-01-26

## 2019-03-27 NOTE — ASSESSMENT & PLAN NOTE
He has been taking the furosemide every other day  We have discussed that he take it daily  F/U with cardiology

## 2019-03-27 NOTE — PROGRESS NOTES
Assessment/Plan:    Hypothyroidism  He has been taking this at night before bed  He will not take any other pills with it and we will recheck in a few months    Atrial fibrillation ([facility] ) [I48 91]  Rate controlled  INR therapeutic    Benign essential hypertension  Controlled    Bicuspid aortic valve  No stenosis, regurgitation      CHF (congestive heart failure) ([facility] )  He has been taking the furosemide every other day  We have discussed that he take it daily  F/U with cardiology         Problem List Items Addressed This Visit        Endocrine    Hypothyroidism - Primary     He has been taking this at night before bed  He will not take any other pills with it and we will recheck in a few months         Relevant Orders    TSH, 3rd generation with Free T4 reflex    Basic metabolic panel       Cardiovascular and Mediastinum    Atrial fibrillation (HCC) [I48 91]     Rate controlled  INR therapeutic         Relevant Medications    warfarin (COUMADIN) 2 5 mg tablet    Benign essential hypertension     Controlled         Relevant Orders    Basic metabolic panel    Bicuspid aortic valve     No stenosis, regurgitation           CHF (congestive heart failure) ([facility] )     He has been taking the furosemide every other day  We have discussed that he take it daily  F/U with cardiology                 Subjective:      Patient ID: Trini Banuelos is a 80 y o  male  HPI  Recent labs reviewed- TSH slightly elevated  He takes the levothyroxine at night before bed   He sometimes takes it with his other evening meds [facility] CMP lipids normal  He eats breakfast soon after waking up, skips lunch then eats dinner     Daughter reports that his memory is poorer    The following portions of the patient's history were reviewed and updated as appropriate: allergies, past family history, past medical history, past social history, past surgical history and problem list     Review of Systems   Constitutional: Negative for fatigue, fever and unexpected weight change  HENT: Negative for ear pain, hearing loss, sinus pressure, sinus pain and sore throat  Respiratory: Negative for cough, shortness of breath and wheezing  Cardiovascular: Negative for chest pain, palpitations and leg swelling  Gastrointestinal: Negative for abdominal pain, constipation, diarrhea, nausea and vomiting  Neurological: Negative for dizziness and headaches  Forgetfulness         Objective:      /72   Pulse 60   Temp 98 °F (36 7 °C)   Wt 109 kg (241 lb)   SpO2 97%   BMI 33 61 kg/m²          Physical Exam   Constitutional: He is oriented to person, place, and time  He appears well-developed and well-nourished  HENT:   Head: Normocephalic and atraumatic  Right Ear: External ear normal    Left Ear: External ear normal    Mouth/Throat: Oropharynx is clear and moist    Eyes: Conjunctivae are normal    Neck: Neck supple  Cardiovascular: Normal rate, regular rhythm and normal heart sounds  No murmur heard  Right LE 1+ pitting edema   Pulmonary/Chest: Effort normal and breath sounds normal  No respiratory distress  He has no wheezes  He has no rales  Abdominal: Soft  Bowel sounds are normal  He exhibits no distension and no mass  There is no tenderness  There is no rebound and no guarding  Musculoskeletal: Normal range of motion  Neurological: He is alert and oriented to person, place, and time  Skin: Skin is warm and dry  Extensive seborrheic keratosis   Psychiatric: He has a normal mood and affect   His behavior is normal  Judgment and thought content normal

## 2019-03-27 NOTE — ASSESSMENT & PLAN NOTE
He has been taking this at night before bed   He will not take any other pills with it and we will recheck in a few months

## 2019-03-29 ENCOUNTER — ANTICOAG VISIT (OUTPATIENT)
Dept: INTERNAL MEDICINE CLINIC | Facility: CLINIC | Age: 84
End: 2019-03-29

## 2019-03-29 DIAGNOSIS — I48.91 ATRIAL FIBRILLATION, UNSPECIFIED TYPE (HCC): ICD-10-CM

## 2019-04-05 ENCOUNTER — ANTICOAG VISIT (OUTPATIENT)
Dept: INTERNAL MEDICINE CLINIC | Facility: CLINIC | Age: 84
End: 2019-04-05

## 2019-04-05 DIAGNOSIS — I48.91 ATRIAL FIBRILLATION, UNSPECIFIED TYPE (HCC): ICD-10-CM

## 2019-04-09 ENCOUNTER — ANTICOAG VISIT (OUTPATIENT)
Dept: INTERNAL MEDICINE CLINIC | Facility: CLINIC | Age: 84
End: 2019-04-09

## 2019-04-09 DIAGNOSIS — I48.91 ATRIAL FIBRILLATION, UNSPECIFIED TYPE (HCC): ICD-10-CM

## 2019-04-09 LAB — SL AMB POCT INR: 3.3

## 2019-04-15 ENCOUNTER — OFFICE VISIT (OUTPATIENT)
Dept: CARDIOLOGY CLINIC | Facility: CLINIC | Age: 84
End: 2019-04-15
Payer: COMMERCIAL

## 2019-04-15 VITALS
SYSTOLIC BLOOD PRESSURE: 130 MMHG | DIASTOLIC BLOOD PRESSURE: 92 MMHG | WEIGHT: 237 LBS | HEART RATE: 71 BPM | OXYGEN SATURATION: 98 % | BODY MASS INDEX: 33.05 KG/M2

## 2019-04-15 DIAGNOSIS — I50.9 CONGESTIVE HEART FAILURE, UNSPECIFIED HF CHRONICITY, UNSPECIFIED HEART FAILURE TYPE (HCC): Primary | ICD-10-CM

## 2019-04-15 PROCEDURE — 99214 OFFICE O/P EST MOD 30 MIN: CPT | Performed by: INTERNAL MEDICINE

## 2019-04-18 ENCOUNTER — ANTICOAG VISIT (OUTPATIENT)
Dept: INTERNAL MEDICINE CLINIC | Facility: CLINIC | Age: 84
End: 2019-04-18

## 2019-04-18 DIAGNOSIS — I48.91 ATRIAL FIBRILLATION, UNSPECIFIED TYPE (HCC): ICD-10-CM

## 2019-04-22 ENCOUNTER — ANTICOAG VISIT (OUTPATIENT)
Dept: INTERNAL MEDICINE CLINIC | Facility: CLINIC | Age: 84
End: 2019-04-22

## 2019-04-22 DIAGNOSIS — I48.91 ATRIAL FIBRILLATION, UNSPECIFIED TYPE (HCC): ICD-10-CM

## 2019-04-30 ENCOUNTER — ANTICOAG VISIT (OUTPATIENT)
Dept: INTERNAL MEDICINE CLINIC | Facility: CLINIC | Age: 84
End: 2019-04-30

## 2019-04-30 DIAGNOSIS — I48.91 ATRIAL FIBRILLATION, UNSPECIFIED TYPE (HCC): ICD-10-CM

## 2019-05-03 ENCOUNTER — ANTICOAG VISIT (OUTPATIENT)
Dept: INTERNAL MEDICINE CLINIC | Facility: CLINIC | Age: 84
End: 2019-05-03

## 2019-05-03 DIAGNOSIS — I48.91 ATRIAL FIBRILLATION, UNSPECIFIED TYPE (HCC): ICD-10-CM

## 2019-05-03 LAB — INR PPP: 2.8 (ref 0.86–1.17)

## 2019-05-28 ENCOUNTER — ANTICOAG VISIT (OUTPATIENT)
Dept: INTERNAL MEDICINE CLINIC | Facility: CLINIC | Age: 84
End: 2019-05-28

## 2019-05-28 DIAGNOSIS — I48.91 ATRIAL FIBRILLATION, UNSPECIFIED TYPE (HCC): ICD-10-CM

## 2019-06-03 ENCOUNTER — ANTICOAG VISIT (OUTPATIENT)
Dept: INTERNAL MEDICINE CLINIC | Facility: CLINIC | Age: 84
End: 2019-06-03

## 2019-06-03 DIAGNOSIS — I48.91 ATRIAL FIBRILLATION, UNSPECIFIED TYPE (HCC): ICD-10-CM

## 2019-06-05 ENCOUNTER — ANTICOAG VISIT (OUTPATIENT)
Dept: INTERNAL MEDICINE CLINIC | Facility: CLINIC | Age: 84
End: 2019-06-05

## 2019-06-05 DIAGNOSIS — I48.91 ATRIAL FIBRILLATION, UNSPECIFIED TYPE (HCC): ICD-10-CM

## 2019-06-07 ENCOUNTER — TELEPHONE (OUTPATIENT)
Dept: INTERNAL MEDICINE CLINIC | Facility: CLINIC | Age: 84
End: 2019-06-07

## 2019-06-07 DIAGNOSIS — G47.33 OBSTRUCTIVE SLEEP APNEA: Primary | ICD-10-CM

## 2019-06-10 LAB — SL AMB POCT INR: 3

## 2019-06-11 ENCOUNTER — ANTICOAG VISIT (OUTPATIENT)
Dept: INTERNAL MEDICINE CLINIC | Facility: CLINIC | Age: 84
End: 2019-06-11
Payer: COMMERCIAL

## 2019-06-11 ENCOUNTER — ANTICOAG VISIT (OUTPATIENT)
Dept: INTERNAL MEDICINE CLINIC | Facility: CLINIC | Age: 84
End: 2019-06-11

## 2019-06-11 DIAGNOSIS — I48.91 ATRIAL FIBRILLATION, UNSPECIFIED TYPE (HCC): ICD-10-CM

## 2019-06-11 PROCEDURE — 99211 OFF/OP EST MAY X REQ PHY/QHP: CPT

## 2019-07-09 ENCOUNTER — ANTICOAG VISIT (OUTPATIENT)
Dept: INTERNAL MEDICINE CLINIC | Facility: CLINIC | Age: 84
End: 2019-07-09

## 2019-07-09 DIAGNOSIS — I48.91 ATRIAL FIBRILLATION, UNSPECIFIED TYPE (HCC): ICD-10-CM

## 2019-07-18 ENCOUNTER — OFFICE VISIT (OUTPATIENT)
Dept: INTERNAL MEDICINE CLINIC | Facility: CLINIC | Age: 84
End: 2019-07-18
Payer: COMMERCIAL

## 2019-07-18 VITALS
DIASTOLIC BLOOD PRESSURE: 64 MMHG | SYSTOLIC BLOOD PRESSURE: 116 MMHG | OXYGEN SATURATION: 98 % | HEART RATE: 60 BPM | WEIGHT: 233.4 LBS | BODY MASS INDEX: 32.55 KG/M2

## 2019-07-18 DIAGNOSIS — L72.3 SEBACEOUS CYST: Primary | ICD-10-CM

## 2019-07-18 PROCEDURE — 99213 OFFICE O/P EST LOW 20 MIN: CPT | Performed by: INTERNAL MEDICINE

## 2019-07-18 NOTE — PROGRESS NOTES
Assessment/Plan:  Pus, blood manually expressed from the cyst which he tolerated well  Keep it clean, dry  Cover loosely with band aid  Stop at [facility] next week for wound check         Problem List Items Addressed This Visit     None      Visit Diagnoses     Sebaceous cyst    -  Primary            Subjective:      Patient ID: Keshawn King is a 80 y o  male  HPI  Here with his daughter who saw blood on his shirt from a lump on his back today  He denies having any pain in the are  He denies having any discomfort or scratching it    The following portions of the patient's history were reviewed and updated as appropriate: allergies, current medications, past family history, past social history, past surgical history and problem list     Review of Systems   Constitutional: Negative for chills and fever  Skin: Positive for wound           Objective:      /64   Pulse 60   Wt 106 kg (233 lb 6 4 oz)   SpO2 98%   BMI 32 55 kg/m²          Physical Exam   Skin:   Mass on the lower back with a small opening and pus, blood manually expressed from it

## 2019-07-30 LAB — INR PPP: 3 (ref 0.84–1.19)

## 2019-07-31 DIAGNOSIS — I10 BENIGN ESSENTIAL HYPERTENSION: ICD-10-CM

## 2019-08-01 RX ORDER — BENAZEPRIL HYDROCHLORIDE 40 MG/1
TABLET, FILM COATED ORAL
Qty: 90 TABLET | Refills: 1 | Status: SHIPPED | OUTPATIENT
Start: 2019-08-01 | End: 2020-02-12 | Stop reason: SDUPTHER

## 2019-08-13 ENCOUNTER — TELEPHONE (OUTPATIENT)
Dept: INTERNAL MEDICINE CLINIC | Facility: CLINIC | Age: 84
End: 2019-08-13

## 2019-08-14 ENCOUNTER — ANTICOAG VISIT (OUTPATIENT)
Dept: INTERNAL MEDICINE CLINIC | Facility: CLINIC | Age: 84
End: 2019-08-14

## 2019-08-14 DIAGNOSIS — I48.91 ATRIAL FIBRILLATION, UNSPECIFIED TYPE (HCC): ICD-10-CM

## 2019-08-14 LAB — INR PPP: 2.9 (ref 0.84–1.19)

## 2019-08-15 DIAGNOSIS — I10 BENIGN ESSENTIAL HYPERTENSION: Primary | ICD-10-CM

## 2019-08-15 RX ORDER — AMLODIPINE BESYLATE 5 MG/1
5 TABLET ORAL DAILY
Qty: 10 TABLET | Refills: 0 | Status: SHIPPED | OUTPATIENT
Start: 2019-08-15 | End: 2020-05-05

## 2019-08-21 ENCOUNTER — OFFICE VISIT (OUTPATIENT)
Dept: INTERNAL MEDICINE CLINIC | Facility: SKILLED NURSING FACILITY | Age: 84
End: 2019-08-21
Payer: COMMERCIAL

## 2019-08-21 VITALS
HEART RATE: 80 BPM | OXYGEN SATURATION: 94 % | TEMPERATURE: 98.7 F | WEIGHT: 234 LBS | BODY MASS INDEX: 32.64 KG/M2 | DIASTOLIC BLOOD PRESSURE: 76 MMHG | SYSTOLIC BLOOD PRESSURE: 132 MMHG

## 2019-08-21 DIAGNOSIS — F32.A DEPRESSION, UNSPECIFIED DEPRESSION TYPE: ICD-10-CM

## 2019-08-21 DIAGNOSIS — I50.9 CONGESTIVE HEART FAILURE, UNSPECIFIED HF CHRONICITY, UNSPECIFIED HEART FAILURE TYPE (HCC): ICD-10-CM

## 2019-08-21 DIAGNOSIS — E03.9 HYPOTHYROIDISM, UNSPECIFIED TYPE: Primary | ICD-10-CM

## 2019-08-21 DIAGNOSIS — Q23.1 BICUSPID AORTIC VALVE: ICD-10-CM

## 2019-08-21 DIAGNOSIS — I10 BENIGN ESSENTIAL HYPERTENSION: ICD-10-CM

## 2019-08-21 DIAGNOSIS — I25.10 CORONARY ARTERY DISEASE INVOLVING NATIVE CORONARY ARTERY OF NATIVE HEART WITHOUT ANGINA PECTORIS: ICD-10-CM

## 2019-08-21 DIAGNOSIS — I48.91 ATRIAL FIBRILLATION, UNSPECIFIED TYPE (HCC): ICD-10-CM

## 2019-08-21 PROCEDURE — 1160F RVW MEDS BY RX/DR IN RCRD: CPT | Performed by: INTERNAL MEDICINE

## 2019-08-21 PROCEDURE — 99214 OFFICE O/P EST MOD 30 MIN: CPT | Performed by: INTERNAL MEDICINE

## 2019-08-21 PROCEDURE — 1036F TOBACCO NON-USER: CPT | Performed by: INTERNAL MEDICINE

## 2019-08-21 RX ORDER — LEVOTHYROXINE SODIUM 0.1 MG/1
100 TABLET ORAL
Qty: 90 TABLET | Refills: 0 | Status: SHIPPED | OUTPATIENT
Start: 2019-08-21 | End: 2020-01-06 | Stop reason: SDUPTHER

## 2019-08-21 NOTE — PROGRESS NOTES
Assessment/Plan:    Increase levothyroxine  TSH with INR in 6 weeks  Declines treatment for depression at this time  Emphasized importance of taking his diuretics daily       Problem List Items Addressed This Visit        Endocrine    Hypothyroidism - Primary    Relevant Medications    levothyroxine 100 mcg tablet    Other Relevant Orders    TSH, 3rd generation with Free T4 reflex       Cardiovascular and Mediastinum    Coronary artery disease    Atrial fibrillation (HCC) [I48 91]    Benign essential hypertension    Bicuspid aortic valve    CHF (congestive heart failure) ([facility] [address] )      Other Visit Diagnoses     Depression, unspecified depression type                Subjective:      Patient ID: Madison Yates is a 80 y o  male  HPI  Missed diuretics for an unknown number of days  He resumed it when he saw his podiatrist in the [place] a few days ago and was noted to be swollen and SOB  This improved since he resumed the torsemide  His weight at home is consistently under 230lbs  Recent labs-TSH again slightly elevated with a normal T4  He takes it at night a few hours after dinner because he wakes up at 11am   Daughter reports that he is depressed, frustrated with his poor memory and sometimes irritable  He stays in his apartment mostly , plays bridge at times but no longer enjoys that    The following portions of the patient's history were reviewed and updated as appropriate: allergies, past family history, past medical history, past social history, past surgical history and problem list     Review of Systems   Constitutional: Negative for chills, fever and unexpected weight change  HENT: Negative for congestion and rhinorrhea  Respiratory: Negative for cough, shortness of breath and wheezing  Gastrointestinal: Negative for abdominal pain  Psychiatric/Behavioral: Positive for dysphoric mood           Objective:      /76   Pulse 80   Temp 98 7 °F (37 1 °C)   Wt 106 kg (234 lb)   SpO2 94%   BMI 32 64 kg/m²          Physical Exam   Constitutional: He appears well-developed and well-nourished  HENT:   Head: Normocephalic and atraumatic  Right Ear: External ear normal    Left Ear: External ear normal    Mouth/Throat: Oropharynx is clear and moist    Eyes: Conjunctivae are normal    Neck: Neck supple  Cardiovascular: Normal rate, regular rhythm and normal heart sounds  No murmur heard  Trace RLE edema, no edema on LLE   Pulmonary/Chest: Effort normal  No respiratory distress  He has no wheezes  He has rales (bases)  Abdominal: Soft  Bowel sounds are normal  He exhibits no distension and no mass  There is no tenderness  There is no rebound and no guarding  Neurological: He is alert  Skin: Skin is warm and dry  Psychiatric: He exhibits a depressed mood

## 2019-09-06 DIAGNOSIS — I10 BENIGN ESSENTIAL HYPERTENSION: ICD-10-CM

## 2019-09-06 DIAGNOSIS — I25.10 CORONARY ARTERY DISEASE INVOLVING NATIVE CORONARY ARTERY OF NATIVE HEART WITHOUT ANGINA PECTORIS: ICD-10-CM

## 2019-09-08 RX ORDER — CARVEDILOL 25 MG/1
TABLET ORAL
Qty: 180 TABLET | Refills: 1 | Status: SHIPPED | OUTPATIENT
Start: 2019-09-08 | End: 2020-03-13

## 2019-09-11 ENCOUNTER — ANTICOAG VISIT (OUTPATIENT)
Dept: INTERNAL MEDICINE CLINIC | Facility: CLINIC | Age: 84
End: 2019-09-11

## 2019-09-11 DIAGNOSIS — E03.9 HYPOTHYROIDISM, UNSPECIFIED TYPE: ICD-10-CM

## 2019-09-11 DIAGNOSIS — I48.91 ATRIAL FIBRILLATION, UNSPECIFIED TYPE (HCC): ICD-10-CM

## 2019-09-11 DIAGNOSIS — I50.9 CHRONIC CONGESTIVE HEART FAILURE, UNSPECIFIED HEART FAILURE TYPE (HCC): ICD-10-CM

## 2019-09-11 DIAGNOSIS — I25.10 CORONARY ARTERY DISEASE INVOLVING NATIVE CORONARY ARTERY OF NATIVE HEART WITHOUT ANGINA PECTORIS: ICD-10-CM

## 2019-09-13 RX ORDER — ATORVASTATIN CALCIUM 40 MG/1
TABLET, FILM COATED ORAL
Qty: 90 TABLET | Refills: 1 | Status: SHIPPED | OUTPATIENT
Start: 2019-09-13 | End: 2020-03-13

## 2019-09-13 RX ORDER — POTASSIUM CHLORIDE 1500 MG/1
TABLET, EXTENDED RELEASE ORAL
Qty: 90 TABLET | Refills: 1 | Status: SHIPPED | OUTPATIENT
Start: 2019-09-13 | End: 2020-09-08

## 2019-09-13 RX ORDER — LEVOTHYROXINE SODIUM 0.1 MG/1
100 TABLET ORAL
Qty: 90 TABLET | Refills: 1 | Status: SHIPPED | OUTPATIENT
Start: 2019-09-13 | End: 2020-05-09

## 2019-09-13 RX ORDER — TORSEMIDE 20 MG/1
TABLET ORAL
Qty: 90 TABLET | Refills: 1 | Status: SHIPPED | OUTPATIENT
Start: 2019-09-13 | End: 2020-02-28 | Stop reason: SDUPTHER

## 2019-09-16 ENCOUNTER — TELEPHONE (OUTPATIENT)
Dept: INTERNAL MEDICINE CLINIC | Facility: CLINIC | Age: 84
End: 2019-09-16

## 2019-09-16 DIAGNOSIS — E03.9 HYPOTHYROIDISM, UNSPECIFIED TYPE: Primary | ICD-10-CM

## 2019-09-16 DIAGNOSIS — I10 BENIGN ESSENTIAL HYPERTENSION: ICD-10-CM

## 2019-09-16 NOTE — TELEPHONE ENCOUNTER
We can recheck his TSH with other labs  He can get it done tomorrow  [facility] and have patient get it from her

## 2019-09-16 NOTE — TELEPHONE ENCOUNTER
Pt's daughter said that the pt's memory got worse in the last 3 weeks  She said it has gotten really bad  She said that his Levothyroxine was changed about that time and she is wondering if that could be affecting his memory   What do you suggest?

## 2019-09-19 ENCOUNTER — ANTICOAG VISIT (OUTPATIENT)
Dept: INTERNAL MEDICINE CLINIC | Facility: CLINIC | Age: 84
End: 2019-09-19

## 2019-09-19 DIAGNOSIS — I48.91 ATRIAL FIBRILLATION, UNSPECIFIED TYPE (HCC): ICD-10-CM

## 2019-09-20 ENCOUNTER — ANTICOAG VISIT (OUTPATIENT)
Dept: INTERNAL MEDICINE CLINIC | Facility: CLINIC | Age: 84
End: 2019-09-20

## 2019-09-20 DIAGNOSIS — I48.91 ATRIAL FIBRILLATION, UNSPECIFIED TYPE (HCC): ICD-10-CM

## 2019-09-20 LAB — INR PPP: 2.6 (ref 0.84–1.19)

## 2019-09-23 ENCOUNTER — TELEPHONE (OUTPATIENT)
Dept: INTERNAL MEDICINE CLINIC | Facility: CLINIC | Age: 84
End: 2019-09-23

## 2019-09-23 DIAGNOSIS — R41.82 ALTERED MENTAL STATUS, UNSPECIFIED ALTERED MENTAL STATUS TYPE: Primary | ICD-10-CM

## 2019-10-03 ENCOUNTER — TELEPHONE (OUTPATIENT)
Dept: CARDIOLOGY CLINIC | Facility: CLINIC | Age: 84
End: 2019-10-03

## 2019-10-03 NOTE — TELEPHONE ENCOUNTER
Pt to have dental procedure next week   Does he need to pre-med? If yes, what?         Please advise

## 2019-10-04 ENCOUNTER — TELEPHONE (OUTPATIENT)
Dept: CARDIOLOGY CLINIC | Facility: CLINIC | Age: 84
End: 2019-10-04

## 2019-10-21 ENCOUNTER — ANTICOAG VISIT (OUTPATIENT)
Dept: INTERNAL MEDICINE CLINIC | Facility: CLINIC | Age: 84
End: 2019-10-21

## 2019-10-21 DIAGNOSIS — I48.91 ATRIAL FIBRILLATION, UNSPECIFIED TYPE (HCC): ICD-10-CM

## 2019-10-28 ENCOUNTER — ANTICOAG VISIT (OUTPATIENT)
Dept: INTERNAL MEDICINE CLINIC | Facility: CLINIC | Age: 84
End: 2019-10-28

## 2019-10-28 DIAGNOSIS — I48.91 ATRIAL FIBRILLATION, UNSPECIFIED TYPE (HCC): ICD-10-CM

## 2019-11-04 ENCOUNTER — ANTICOAG VISIT (OUTPATIENT)
Dept: INTERNAL MEDICINE CLINIC | Facility: CLINIC | Age: 84
End: 2019-11-04

## 2019-11-04 DIAGNOSIS — I48.91 ATRIAL FIBRILLATION, UNSPECIFIED TYPE (HCC): ICD-10-CM

## 2019-11-05 ENCOUNTER — ANTICOAG VISIT (OUTPATIENT)
Dept: INTERNAL MEDICINE CLINIC | Facility: CLINIC | Age: 84
End: 2019-11-05

## 2019-11-05 DIAGNOSIS — I48.91 ATRIAL FIBRILLATION, UNSPECIFIED TYPE (HCC): ICD-10-CM

## 2019-11-05 LAB — SL AMB POCT INR: 2.9

## 2019-11-29 ENCOUNTER — ANTICOAG VISIT (OUTPATIENT)
Dept: INTERNAL MEDICINE CLINIC | Facility: CLINIC | Age: 84
End: 2019-11-29

## 2019-11-29 DIAGNOSIS — I48.91 ATRIAL FIBRILLATION, UNSPECIFIED TYPE (HCC): ICD-10-CM

## 2019-12-03 ENCOUNTER — ANTICOAG VISIT (OUTPATIENT)
Dept: INTERNAL MEDICINE CLINIC | Facility: CLINIC | Age: 84
End: 2019-12-03

## 2019-12-03 DIAGNOSIS — I48.91 ATRIAL FIBRILLATION, UNSPECIFIED TYPE (HCC): ICD-10-CM

## 2019-12-03 LAB — SL AMB POCT INR: 2.8

## 2020-01-06 ENCOUNTER — OFFICE VISIT (OUTPATIENT)
Dept: CARDIOLOGY CLINIC | Facility: CLINIC | Age: 85
End: 2020-01-06
Payer: COMMERCIAL

## 2020-01-06 VITALS
DIASTOLIC BLOOD PRESSURE: 52 MMHG | WEIGHT: 236 LBS | OXYGEN SATURATION: 96 % | BODY MASS INDEX: 32.92 KG/M2 | HEART RATE: 71 BPM | SYSTOLIC BLOOD PRESSURE: 106 MMHG

## 2020-01-06 DIAGNOSIS — J44.9 CHRONIC OBSTRUCTIVE PULMONARY DISEASE, UNSPECIFIED COPD TYPE (HCC): Primary | ICD-10-CM

## 2020-01-06 PROCEDURE — 99214 OFFICE O/P EST MOD 30 MIN: CPT | Performed by: INTERNAL MEDICINE

## 2020-01-06 PROCEDURE — 1160F RVW MEDS BY RX/DR IN RCRD: CPT | Performed by: INTERNAL MEDICINE

## 2020-01-06 NOTE — PROGRESS NOTES
Advanced Heart Failure/Pulmonary Hypertension Outpatient Progress Note - Mary Ann Day 80 y o  male MRN: [id number]    @ Encounter: [id number]  Assessment/Plan:    Patient Active Problem List    Diagnosis Date Noted    Atrial fibrillation ([facility] ) [I48 91] 01/22/2018    CHF (congestive heart failure) ([facility] ) 10/31/2017    Coronary artery disease 10/12/2016    Benign essential hypertension 10/12/2016    Bicuspid aortic valve 10/12/2016    Hypothyroidism 10/12/2016    Obstructive sleep apnea 10/12/2016     Plan:  --Pulmonary referral given PFT findings in 2017  --Increase torsemide to 20 mg PO QOD (was only taking prn) until weight down to ~220 lbs on home scale  Will take potassium chloride 20 mEq per pill of diuretic    Assessment:  # SOB: Most likely combination of volume overload w/ possible underlying COPD  Volume overloaded today  Some of the asymmetry is likely from vein harvesting in L leg  Less likely DVT as on coumadin  Home scale weight --222 lbs  Goal weight 219-220 lbs for now  --Increase torsemide to 20 mg PO QOD (was only taking prn) until weight down to ~220 lbs on home scale  Will take potassium chloride 20 mEq per pill of diuretic  --2 L fluid restriction    # Atrial Fibrillation on coumadin: INR followed by Dr Remy Proctor  INR therapeutic  --Continue coreg 25 mg PO BID  --Continue coumadin    # FILOMENA on CPAP: Sleeps well  No significant fatigue during day  --Will defer polysomnography as patient doing well and wants to minimize procedures  # Bicuspid AV w/o significant stenosis and mild aortic dilation: Asymptomatic per patient  TTE here shows bicuspid AV without significant stenosis  TTE 3/25/19: LVEF 60% w/ mod MARTINEZ, mild MR  Possible bicuspid AV w/ no clear e/o stenosis     --Continue to monitor    # HTN: BP controlled on amlodipine 5, benazepril 40, coreg 25  # HLD: continue lipitor 40  # Hypothyroidism: Continue levothyroxine per PMD  # Hx of R parietal CVA  # COPD: PFTs 12/7/17: FEV1 66%, FVC 64%, FEV1/FVC 71%, TLC 80%, RV 97%, DLCO 37%  Mod restriction w/ severe DLCO reduction  RTC in 1 month    HPI: Very pleasant 80 y o  gentleman w/ PMHx of bicuspid aortic valve (mild AS w/ mild aortic dilation), FILOMENA on CPAP, COPD, HTN, AFib on coumadin, hypothyroidism, CAD s/p 4v CABG in 2002, R parietal CVA in 2011 here to establish care  Previously followed @ [facility]  He denies any orthopnea, or PND, but does have mild LE edema  States he can walk about 1/2 a mile on a treadmill @ 2 mph pace  He denies CP, palpitations, presyncope, or syncope  He comes in for f/u, 10/31/2017, w/ TTE showing normal LVEF, mod to marked MARTINEZ, mild MR, bicuspid AV w/o narrowing and mild aortic root dilation at 4 cm  Weight has not changed with QOD lasix dosing  No change in symptoms  Today, 11/29/2017, he states he has lost weight  On our scale he has lost about 7 lbs  He states at rest he is not SOB and can speak in full sentences  He can walk about 1/2 mile on the treadmill 3x/week  Today, 4/15/2019, he returns for f/u  He states he feels well  Walks better with a cane  He notes sometimes he fills up with fluid  He has gained 12 lbs since his last visit, but that was 1 5 years ago  He has stopped walking on the treadmill  1/6/2020: He states he does not like to take his diuretic  He takes it intermittently  Past Medical History:   Diagnosis Date   [facility] Arithmetic disorder     [id number]    Myocardial infarct [facility])     [id number]  LAST ASSESSED     Review of Systems - 12 point ROS was done and is negative, except as noted above       No Known Allergies      Current Outpatient Medications:     atorvastatin (LIPITOR) 40 mg tablet, TAKE 1 TABLET BY MOUTH EVERY DAY, Disp: 90 tablet, Rfl: 1    benazepril (LOTENSIN) 40 MG tablet, TAKE 1 TABLET BY MOUTH EVERY DAY, Disp: 90 tablet, Rfl: 1    carvedilol (COREG) 25 mg tablet, TAKE 1 TABLET BY MOUTH TWICE A DAY WITH MEALS, Disp: 180 tablet, Rfl: 1    finasteride (PROSCAR) 5 mg tablet, Take 1 tablet by mouth daily, Disp: , Rfl:     KLOR-CON M20 20 MEQ tablet, TAKE 1 TABLET BY MOUTH EVERY DAY, Disp: 90 tablet, Rfl: 1    latanoprost (XALATAN) 0 005 % ophthalmic solution, Apply 1 drop to eye, Disp: , Rfl:     levothyroxine 100 mcg tablet, Take 1 tablet (100 mcg total) by mouth daily before breakfast Take on an empty stomach, Disp: 90 tablet, Rfl: 1    torsemide (DEMADEX) 20 mg tablet, TAKE 1 TABLET BY MOUTH EVERY DAY, Disp: 90 tablet, Rfl: 1    warfarin (COUMADIN) 2 5 mg tablet, 1 TAB DAILY EXCEPT ON WEDNESDAYS, TAKE 1+1/2 TABS, Disp: 100 tablet, Rfl: 3    amLODIPine (NORVASC) 5 mg tablet, Take 1 tablet (5 mg total) by mouth daily, Disp: 10 tablet, Rfl: 0    Social History     Socioeconomic History    Marital status: /Civil Union     Spouse name: Not on file    Number of children: 3    Years of education: Not on file    Highest education level: Not on file   Occupational History    Not on file   Social Needs    Financial resource strain: Not on file    Food insecurity:     Worry: Not on file     Inability: Not on file    Transportation needs:     Medical: Not on file     Non-medical: Not on file   Tobacco Use    Smoking status: Former Smoker    Smokeless tobacco: Never Used   Substance and Sexual Activity    Alcohol use: Yes     Comment: OCCASIONAL    Drug use: Not on file    Sexual activity: Not on file   Lifestyle    Physical activity:     Days per week: Not on file     Minutes per session: Not on file    Stress: Not on file   Relationships    Social connections:     Talks on phone: Not on file     Gets together: Not on file     Attends Yazidism service: Not on file     Active member of club or organization: Not on file     Attends meetings of clubs or organizations: Not on file     Relationship status: Not on file    Intimate partner violence:     Fear of current or ex partner: Not on file     Emotionally abused: Not on file     Physically abused: Not on file     Forced sexual activity: Not on file   Other Topics Concern    Not on file   Social History Narrative    ACTIVE ADVANCE DIRECTIVE ON FILE  LAST ASSESSED 23 JAN 2018    ALL SCRIPTS SAYS        Family History   Problem Relation Age of Onset    Breast cancer Family         [id number] LAST ASSESSED       Physical Exam:    Vitals: Blood pressure 106/52, pulse 71, weight 107 kg (236 lb), SpO2 96 %  , Body mass index is 32 92 kg/m² ,   Wt Readings from Last 3 Encounters:   01/06/20 107 kg (236 lb)   08/21/19 106 kg (234 lb)   07/18/19 106 kg (233 lb 6 4 oz)     Vitals:    01/06/20 1350   BP: 106/52   BP Location: Right arm   Patient Position: Sitting   Cuff Size: Standard   Pulse: 71   SpO2: 96%   Weight: 107 kg (236 lb)       GEN: Miguel Ángel Jorge appears well, alert and oriented x 3, pleasant and cooperative   HEENT: pupils equal, round, and reactive to light; extraocular muscles intact  NECK: supple, no carotid bruits   HEART: regular rhythm, normal S1 and S2, no murmurs, clicks, gallops or rubs, JVP is    LUNGS: clear to auscultation bilaterally; no wheezes, rales, or rhonchi   ABDOMEN: normal bowel sounds, soft, no tenderness, no distention  EXTREMITIES: peripheral pulses normal; no clubbing, cyanosis, or edema  NEURO: no focal findings   SKIN: normal without suspicious lesions on exposed skin    Labs & Results:  No results found for: WBC, HGB, HCT, MCV, PLT    Chemistry    No results found for: NA, K, CL, CO2, BUN, CREATININE No results found for: CALCIUM, ALKPHOS, AST, ALT, BILITOT     Lab Results   Component Value Date    INR 2 8 12/03/2019    INR 2 9 11/05/2019    INR 2 60 (A) 09/20/2019     EKG personally reviewed by Dean Menjivar MD      Counseling / Coordination of Care  Total floor / unit time spent today 40 minutes  Greater than 50% of total time was spent with the patient and / or family counseling and / or coordination of care    A description of the counseling / coordination of care: 20 min  Thank you for the opportunity to participate in the care of this patient      Leon Staley MD, PhD   Kong Deleon

## 2020-01-06 NOTE — PATIENT INSTRUCTIONS
1) Trial torsemide 20 mg every other day   Also do potassium every other day when you take the torsemide  2) Get bloodwork in 2 weeks

## 2020-01-14 ENCOUNTER — ANTICOAG VISIT (OUTPATIENT)
Dept: INTERNAL MEDICINE CLINIC | Facility: CLINIC | Age: 85
End: 2020-01-14

## 2020-01-14 DIAGNOSIS — I48.91 ATRIAL FIBRILLATION, UNSPECIFIED TYPE (HCC): ICD-10-CM

## 2020-01-14 LAB — SL AMB POCT INR: 3.5

## 2020-01-21 LAB — SL AMB POCT INR: 2.9

## 2020-01-25 DIAGNOSIS — I48.91 ATRIAL FIBRILLATION, UNSPECIFIED TYPE (HCC): ICD-10-CM

## 2020-01-26 RX ORDER — WARFARIN SODIUM 2.5 MG/1
TABLET ORAL
Qty: 96 TABLET | Refills: 0 | Status: SHIPPED | OUTPATIENT
Start: 2020-01-26 | End: 2020-04-03

## 2020-02-12 DIAGNOSIS — I10 BENIGN ESSENTIAL HYPERTENSION: ICD-10-CM

## 2020-02-12 RX ORDER — BENAZEPRIL HYDROCHLORIDE 40 MG/1
40 TABLET, FILM COATED ORAL DAILY
Qty: 90 TABLET | Refills: 0 | Status: SHIPPED | OUTPATIENT
Start: 2020-02-12 | End: 2020-05-05

## 2020-02-12 RX ORDER — BENAZEPRIL HYDROCHLORIDE 40 MG/1
TABLET, FILM COATED ORAL
Qty: 90 TABLET | Refills: 1 | OUTPATIENT
Start: 2020-02-12

## 2020-02-21 ENCOUNTER — OFFICE VISIT (OUTPATIENT)
Dept: INTERNAL MEDICINE CLINIC | Facility: CLINIC | Age: 85
End: 2020-02-21
Payer: COMMERCIAL

## 2020-02-21 VITALS
BODY MASS INDEX: 33.84 KG/M2 | HEART RATE: 60 BPM | WEIGHT: 242.6 LBS | DIASTOLIC BLOOD PRESSURE: 76 MMHG | OXYGEN SATURATION: 94 % | SYSTOLIC BLOOD PRESSURE: 138 MMHG

## 2020-02-21 DIAGNOSIS — E03.9 HYPOTHYROIDISM, UNSPECIFIED TYPE: ICD-10-CM

## 2020-02-21 DIAGNOSIS — I48.21 PERMANENT ATRIAL FIBRILLATION (HCC): ICD-10-CM

## 2020-02-21 DIAGNOSIS — I50.9 CONGESTIVE HEART FAILURE, UNSPECIFIED HF CHRONICITY, UNSPECIFIED HEART FAILURE TYPE (HCC): Primary | ICD-10-CM

## 2020-02-21 PROCEDURE — 3078F DIAST BP <80 MM HG: CPT | Performed by: INTERNAL MEDICINE

## 2020-02-21 PROCEDURE — 99214 OFFICE O/P EST MOD 30 MIN: CPT | Performed by: INTERNAL MEDICINE

## 2020-02-21 PROCEDURE — 3075F SYST BP GE 130 - 139MM HG: CPT | Performed by: INTERNAL MEDICINE

## 2020-02-21 PROCEDURE — 4040F PNEUMOC VAC/ADMIN/RCVD: CPT | Performed by: INTERNAL MEDICINE

## 2020-02-21 PROCEDURE — 1160F RVW MEDS BY RX/DR IN RCRD: CPT | Performed by: INTERNAL MEDICINE

## 2020-02-21 PROCEDURE — 1036F TOBACCO NON-USER: CPT | Performed by: INTERNAL MEDICINE

## 2020-02-21 NOTE — PROGRESS NOTES
Assessment/Plan:  Acute on chronic CHF  Start taking the torsemide with KCl daily  Daughter will take him for blood work today to r/o other causes of confusion but this may be purely related to his dementia  I will see him again in a week         Problem List Items Addressed This Visit        Endocrine    Hypothyroidism    Relevant Orders    TSH, 3rd generation with Free T4 reflex       Cardiovascular and Mediastinum    Atrial fibrillation ([facility] [address] ) [I48 91]    Relevant Orders    CBC and differential    Comprehensive metabolic panel    Protime-INR    CHF (congestive heart failure) ([facility] [address] ) - Primary            Subjective:      Patient ID: Uriah Bar is a 80 y o  male  HPI  Confusion, poor appetite reported by his daughter  He has been calling her much more frequently, memory is much worse  Mild cold symptoms, appears SOB  He denies having nay issues, says he feels fine  He has not been taking his torsemide even since he saw the cardiologist  He has told her that he does not take it because he does not want to go to urinate all the time  Denies chest pain, palpitations  Minimal cough  +LE edema  Denies eating a salty diet  He does not drink much water  Sleeps well with his CPAP, no paroxysmal nocturnal dyspnea    The following portions of the patient's history were reviewed and updated as appropriate: allergies, current medications, past family history, past social history, past surgical history and problem list     Review of Systems   Unable to perform ROS: Dementia         Objective:      /76   Pulse 60   Wt 110 kg (242 lb 9 6 oz)   SpO2 94%   BMI 33 84 kg/m²          Physical Exam   Constitutional:  Non-toxic appearance  He appears ill  Appears SOB when he speaks   Neck: Neck supple  Cardiovascular: An irregularly irregular rhythm present  Pulmonary/Chest: He has decreased breath sounds  He has rales in the right lower field and the left lower field  Abdominal: He exhibits distension  Musculoskeletal:        Right lower leg: He exhibits edema (grade 2)  Left lower leg: He exhibits edema (grade 2)  Neurological: He is alert  Psychiatric: Cognition and memory are impaired  He exhibits abnormal recent memory

## 2020-02-22 LAB
ALBUMIN SERPL-MCNC: 3.2 G/DL (ref 3.6–5.1)
ALBUMIN/GLOB SERPL: 1.5 (CALC) (ref 1–2.5)
ALP SERPL-CCNC: 112 U/L (ref 35–144)
ALT SERPL-CCNC: 23 U/L (ref 9–46)
AST SERPL-CCNC: 19 U/L (ref 10–35)
BASOPHILS # BLD AUTO: 23 CELLS/UL (ref 0–200)
BASOPHILS NFR BLD AUTO: 0.3 %
BILIRUB SERPL-MCNC: 1.5 MG/DL (ref 0.2–1.2)
BUN SERPL-MCNC: 18 MG/DL (ref 7–25)
BUN/CREAT SERPL: ABNORMAL (CALC) (ref 6–22)
CALCIUM SERPL-MCNC: 8.2 MG/DL (ref 8.6–10.3)
CHLORIDE SERPL-SCNC: 105 MMOL/L (ref 98–110)
CO2 SERPL-SCNC: 29 MMOL/L (ref 20–32)
CREAT SERPL-MCNC: 0.83 MG/DL (ref 0.7–1.11)
EOSINOPHIL # BLD AUTO: 98 CELLS/UL (ref 15–500)
EOSINOPHIL NFR BLD AUTO: 1.3 %
ERYTHROCYTE [DISTWIDTH] IN BLOOD BY AUTOMATED COUNT: 13.3 % (ref 11–15)
GLOBULIN SER CALC-MCNC: 2.2 G/DL (CALC) (ref 1.9–3.7)
GLUCOSE SERPL-MCNC: 111 MG/DL (ref 65–139)
HCT VFR BLD AUTO: 36.6 % (ref 38.5–50)
HGB BLD-MCNC: 12.2 G/DL (ref 13.2–17.1)
INR PPP: 4
LYMPHOCYTES # BLD AUTO: 465 CELLS/UL (ref 850–3900)
LYMPHOCYTES NFR BLD AUTO: 6.2 %
MCH RBC QN AUTO: 30.4 PG (ref 27–33)
MCHC RBC AUTO-ENTMCNC: 33.3 G/DL (ref 32–36)
MCV RBC AUTO: 91.3 FL (ref 80–100)
MONOCYTES # BLD AUTO: 885 CELLS/UL (ref 200–950)
MONOCYTES NFR BLD AUTO: 11.8 %
NEUTROPHILS # BLD AUTO: 6030 CELLS/UL (ref 1500–7800)
NEUTROPHILS NFR BLD AUTO: 80.4 %
PLATELET # BLD AUTO: 242 THOUSAND/UL (ref 140–400)
PMV BLD REES-ECKER: 10.2 FL (ref 7.5–12.5)
POTASSIUM SERPL-SCNC: 4.1 MMOL/L (ref 3.5–5.3)
PROT SERPL-MCNC: 5.4 G/DL (ref 6.1–8.1)
PROTHROMBIN TIME: 36.9 SEC (ref 9–11.5)
RBC # BLD AUTO: 4.01 MILLION/UL (ref 4.2–5.8)
SL AMB EGFR AFRICAN AMERICAN: 89 ML/MIN/1.73M2
SL AMB EGFR NON AFRICAN AMERICAN: 76 ML/MIN/1.73M2
SODIUM SERPL-SCNC: 139 MMOL/L (ref 135–146)
TSH SERPL-ACNC: 3.17 MIU/L (ref 0.4–4.5)
WBC # BLD AUTO: 7.5 THOUSAND/UL (ref 3.8–10.8)

## 2020-02-24 ENCOUNTER — ANTICOAG VISIT (OUTPATIENT)
Dept: INTERNAL MEDICINE CLINIC | Facility: CLINIC | Age: 85
End: 2020-02-24

## 2020-02-24 DIAGNOSIS — I48.21 PERMANENT ATRIAL FIBRILLATION (HCC): ICD-10-CM

## 2020-02-28 ENCOUNTER — TELEPHONE (OUTPATIENT)
Dept: OTHER | Facility: OTHER | Age: 85
End: 2020-02-28

## 2020-02-28 ENCOUNTER — OFFICE VISIT (OUTPATIENT)
Dept: INTERNAL MEDICINE CLINIC | Facility: CLINIC | Age: 85
End: 2020-02-28
Payer: COMMERCIAL

## 2020-02-28 ENCOUNTER — HOSPITAL ENCOUNTER (OUTPATIENT)
Dept: CT IMAGING | Facility: HOSPITAL | Age: 85
Discharge: HOME | End: 2020-02-28
Payer: COMMERCIAL

## 2020-02-28 VITALS
HEART RATE: 80 BPM | WEIGHT: 228 LBS | DIASTOLIC BLOOD PRESSURE: 68 MMHG | BODY MASS INDEX: 31.8 KG/M2 | OXYGEN SATURATION: 93 % | SYSTOLIC BLOOD PRESSURE: 128 MMHG

## 2020-02-28 DIAGNOSIS — R41.0 CONFUSION: Primary | ICD-10-CM

## 2020-02-28 DIAGNOSIS — G47.33 OBSTRUCTIVE SLEEP APNEA: ICD-10-CM

## 2020-02-28 DIAGNOSIS — R41.0 CONFUSION: ICD-10-CM

## 2020-02-28 DIAGNOSIS — E03.9 HYPOTHYROIDISM, UNSPECIFIED TYPE: ICD-10-CM

## 2020-02-28 DIAGNOSIS — I50.9 CHRONIC CONGESTIVE HEART FAILURE, UNSPECIFIED HEART FAILURE TYPE (HCC): ICD-10-CM

## 2020-02-28 DIAGNOSIS — I10 BENIGN ESSENTIAL HYPERTENSION: ICD-10-CM

## 2020-02-28 PROCEDURE — 3074F SYST BP LT 130 MM HG: CPT | Performed by: INTERNAL MEDICINE

## 2020-02-28 PROCEDURE — 1036F TOBACCO NON-USER: CPT | Performed by: INTERNAL MEDICINE

## 2020-02-28 PROCEDURE — 3288F FALL RISK ASSESSMENT DOCD: CPT | Performed by: INTERNAL MEDICINE

## 2020-02-28 PROCEDURE — 70450 CT HEAD/BRAIN W/O DYE: CPT

## 2020-02-28 PROCEDURE — 3078F DIAST BP <80 MM HG: CPT | Performed by: INTERNAL MEDICINE

## 2020-02-28 PROCEDURE — 1101F PT FALLS ASSESS-DOCD LE1/YR: CPT | Performed by: INTERNAL MEDICINE

## 2020-02-28 PROCEDURE — 99214 OFFICE O/P EST MOD 30 MIN: CPT | Performed by: INTERNAL MEDICINE

## 2020-02-28 PROCEDURE — 1160F RVW MEDS BY RX/DR IN RCRD: CPT | Performed by: INTERNAL MEDICINE

## 2020-02-28 PROCEDURE — 4040F PNEUMOC VAC/ADMIN/RCVD: CPT | Performed by: INTERNAL MEDICINE

## 2020-02-28 RX ORDER — TORSEMIDE 20 MG/1
30 TABLET ORAL DAILY
Qty: 90 TABLET | Refills: 1
Start: 2020-02-28 | End: 2020-09-08

## 2020-02-28 NOTE — ASSESSMENT & PLAN NOTE
Wt Readings from Last 3 Encounters:   02/28/20 103 kg (228 lb)   02/21/20 110 kg (242 lb 9 6 oz)   01/06/20 107 kg (236 lb)         Still with significant RLE edema and rales on the RLL field  I asked him to increase the torsemide to 30mg a day  BMP with INR on Tuesday

## 2020-02-28 NOTE — PROGRESS NOTES
Assessment/Plan:  Confusion I suspect is related to underlying dementia but since it has been increasing, will get a head CT today r/o a major stroke, bleed    Hypothyroidism  Continue levothyroxine 100mcg    Obstructive sleep apnea  Compliant with CPAP    Benign essential hypertension  Controlled    CHF (congestive heart failure) (HCC)  Wt Readings from Last 3 Encounters:   02/28/20 103 kg (228 lb)   02/21/20 110 kg (242 lb 9 6 oz)   01/06/20 107 kg (236 lb)         Still with significant RLE edema and rales on the RLL field  I asked him to increase the torsemide to 30mg a day  BMP with INR on Tuesday       Problem List Items Addressed This Visit        Endocrine    Hypothyroidism     Continue levothyroxine 100mcg            Respiratory    Obstructive sleep apnea     Compliant with CPAP            Cardiovascular and Mediastinum    Benign essential hypertension     Controlled         Relevant Medications    torsemide (DEMADEX) 20 mg tablet    CHF (congestive heart failure) (HCC)     Wt Readings from Last 3 Encounters:   02/28/20 103 kg (228 lb)   02/21/20 110 kg (242 lb 9 6 oz)   01/06/20 107 kg (236 lb)         Still with significant RLE edema and rales on the RLL field  I asked him to increase the torsemide to 30mg a day  BMP with INR on Tuesday         Relevant Medications    torsemide (DEMADEX) 20 mg tablet    Other Relevant Orders    Basic metabolic panel      Other Visit Diagnoses     Confusion    -  Primary    Relevant Orders    CT head wo contrast            Subjective:      Patient ID: Marii Celaya is a 80 y o  male  HPI  Dementia with increased confusion lately  Confused yesterday coming down for dinner  Vitals were normal  Just was not talking with any sense  He c/o LUQ pain that was severe he thought he was going to die   He vomited x 2 with urinary incontinence   He was able to eat today without problems  He does not recall any of these events and says he feels completely fine  Last week he was seen for acute CHF  He had significant LE edema (right worse than left) , rales  He was not taking torsemide so he was instructed to take it daily  LE and breathing seem better per daughter  Blood work TSH CMP CBC only showed mild anemia  INR was 4 so he held the coumadin is is supposed to resume today    The following portions of the patient's history were reviewed and updated as appropriate: allergies, past family history, past medical history, past social history, past surgical history and problem list     Review of Systems   Unable to perform ROS: Dementia         Objective:      /68   Pulse 80   Wt 103 kg (228 lb)   SpO2 93%   BMI 31 80 kg/m²          Physical Exam   Constitutional: No distress  HENT:   Right Ear: External ear normal    Left Ear: External ear normal    Cardiovascular: Normal rate, regular rhythm and normal heart sounds  No murmur heard  Pulmonary/Chest: Effort normal  No stridor  No respiratory distress  He has no wheezes  He has rales in the right lower field  Abdominal: Soft  There is no tenderness  Musculoskeletal: He exhibits edema (grade 2 foot, ankle, pretibial RLE, trace LLE)  Neurological: He is alert  Skin: He is not diaphoretic

## 2020-02-29 NOTE — TELEPHONE ENCOUNTER
Josefa Gaona from [facility]/ [phone]/ PT: Case Sawyer : [phone]/ Stat Results

## 2020-03-03 ENCOUNTER — ANTICOAG VISIT (OUTPATIENT)
Dept: INTERNAL MEDICINE CLINIC | Facility: CLINIC | Age: 85
End: 2020-03-03

## 2020-03-03 DIAGNOSIS — I48.21 PERMANENT ATRIAL FIBRILLATION (HCC): ICD-10-CM

## 2020-03-04 LAB — SL AMB POCT INR: 3.1

## 2020-03-05 ENCOUNTER — ANTICOAG VISIT (OUTPATIENT)
Dept: INTERNAL MEDICINE CLINIC | Facility: CLINIC | Age: 85
End: 2020-03-05

## 2020-03-10 LAB — SL AMB POCT INR: 3.2

## 2020-03-13 DIAGNOSIS — I10 BENIGN ESSENTIAL HYPERTENSION: ICD-10-CM

## 2020-03-13 DIAGNOSIS — I25.10 CORONARY ARTERY DISEASE INVOLVING NATIVE CORONARY ARTERY OF NATIVE HEART WITHOUT ANGINA PECTORIS: ICD-10-CM

## 2020-03-13 RX ORDER — ATORVASTATIN CALCIUM 40 MG/1
TABLET, FILM COATED ORAL
Qty: 90 TABLET | Refills: 1 | Status: SHIPPED | OUTPATIENT
Start: 2020-03-13 | End: 2020-09-05

## 2020-03-13 RX ORDER — CARVEDILOL 25 MG/1
TABLET ORAL
Qty: 180 TABLET | Refills: 1 | Status: SHIPPED | OUTPATIENT
Start: 2020-03-13 | End: 2020-09-05

## 2020-03-19 ENCOUNTER — ANTICOAG VISIT (OUTPATIENT)
Dept: INTERNAL MEDICINE CLINIC | Facility: CLINIC | Age: 85
End: 2020-03-19

## 2020-03-24 ENCOUNTER — TELEPHONE (OUTPATIENT)
Dept: INTERNAL MEDICINE CLINIC | Facility: CLINIC | Age: 85
End: 2020-03-24

## 2020-03-24 DIAGNOSIS — I48.21 PERMANENT ATRIAL FIBRILLATION (HCC): Primary | ICD-10-CM

## 2020-03-24 NOTE — TELEPHONE ENCOUNTER
Pt's daughter said the patient needs an update INR order put in  Please, fax to [facility] at [phone], att: Paige Cortes  She also asked us to fax her a copy at [phone]

## 2020-03-26 ENCOUNTER — ANTICOAG VISIT (OUTPATIENT)
Dept: INTERNAL MEDICINE CLINIC | Facility: CLINIC | Age: 85
End: 2020-03-26

## 2020-03-26 DIAGNOSIS — I48.21 PERMANENT ATRIAL FIBRILLATION (HCC): ICD-10-CM

## 2020-04-03 DIAGNOSIS — I48.91 ATRIAL FIBRILLATION, UNSPECIFIED TYPE (HCC): ICD-10-CM

## 2020-04-03 RX ORDER — WARFARIN SODIUM 2.5 MG/1
TABLET ORAL
Qty: 96 TABLET | Refills: 0 | Status: SHIPPED | OUTPATIENT
Start: 2020-04-03 | End: 2020-06-26

## 2020-04-08 ENCOUNTER — ANTICOAG VISIT (OUTPATIENT)
Dept: INTERNAL MEDICINE CLINIC | Facility: CLINIC | Age: 85
End: 2020-04-08

## 2020-04-08 DIAGNOSIS — I48.21 PERMANENT ATRIAL FIBRILLATION (HCC): ICD-10-CM

## 2020-04-08 LAB — SL AMB POCT INR: 1.9

## 2020-04-14 ENCOUNTER — TELEPHONE (OUTPATIENT)
Dept: GERIATRICS | Facility: CLINIC | Age: 85
End: 2020-04-14

## 2020-04-14 LAB — INR PPP: 2.3 (ref 0.84–1.19)

## 2020-04-15 ENCOUNTER — ANTICOAG VISIT (OUTPATIENT)
Dept: INTERNAL MEDICINE CLINIC | Facility: CLINIC | Age: 85
End: 2020-04-15

## 2020-04-15 DIAGNOSIS — I48.21 PERMANENT ATRIAL FIBRILLATION (HCC): ICD-10-CM

## 2020-04-28 LAB — INR PPP: 3.9 (ref 0.84–1.19)

## 2020-04-29 ENCOUNTER — ANTICOAG VISIT (OUTPATIENT)
Dept: INTERNAL MEDICINE CLINIC | Facility: CLINIC | Age: 85
End: 2020-04-29

## 2020-04-29 DIAGNOSIS — I48.21 PERMANENT ATRIAL FIBRILLATION (HCC): ICD-10-CM

## 2020-05-04 DIAGNOSIS — I10 BENIGN ESSENTIAL HYPERTENSION: ICD-10-CM

## 2020-05-05 ENCOUNTER — ANTICOAG VISIT (OUTPATIENT)
Dept: INTERNAL MEDICINE CLINIC | Facility: CLINIC | Age: 85
End: 2020-05-05

## 2020-05-05 DIAGNOSIS — I48.21 PERMANENT ATRIAL FIBRILLATION (HCC): ICD-10-CM

## 2020-05-05 DIAGNOSIS — I10 BENIGN ESSENTIAL HYPERTENSION: ICD-10-CM

## 2020-05-05 LAB — INR PPP: 1.6 (ref 0.84–1.19)

## 2020-05-05 RX ORDER — BENAZEPRIL HYDROCHLORIDE 40 MG/1
TABLET, FILM COATED ORAL
Qty: 90 TABLET | Refills: 0 | Status: SHIPPED | OUTPATIENT
Start: 2020-05-05 | End: 2020-08-02

## 2020-05-05 RX ORDER — AMLODIPINE BESYLATE 5 MG/1
TABLET ORAL
Qty: 30 TABLET | Refills: 3 | Status: SHIPPED | OUTPATIENT
Start: 2020-05-05 | End: 2020-08-30

## 2020-05-06 ENCOUNTER — ANTICOAG VISIT (OUTPATIENT)
Dept: INTERNAL MEDICINE CLINIC | Facility: CLINIC | Age: 85
End: 2020-05-06

## 2020-05-06 DIAGNOSIS — I48.21 PERMANENT ATRIAL FIBRILLATION (HCC): ICD-10-CM

## 2020-05-08 DIAGNOSIS — E03.9 HYPOTHYROIDISM, UNSPECIFIED TYPE: ICD-10-CM

## 2020-05-09 RX ORDER — LEVOTHYROXINE SODIUM 0.1 MG/1
TABLET ORAL
Qty: 90 TABLET | Refills: 1 | Status: SHIPPED | OUTPATIENT
Start: 2020-05-09 | End: 2020-10-30

## 2020-05-14 ENCOUNTER — ANTICOAG VISIT (OUTPATIENT)
Dept: INTERNAL MEDICINE CLINIC | Facility: CLINIC | Age: 85
End: 2020-05-14

## 2020-05-14 DIAGNOSIS — I48.21 PERMANENT ATRIAL FIBRILLATION (HCC): ICD-10-CM

## 2020-05-19 ENCOUNTER — ANTICOAG VISIT (OUTPATIENT)
Dept: INTERNAL MEDICINE CLINIC | Facility: CLINIC | Age: 85
End: 2020-05-19

## 2020-05-19 DIAGNOSIS — I48.21 PERMANENT ATRIAL FIBRILLATION (HCC): ICD-10-CM

## 2020-05-19 LAB — INR PPP: 2.4 (ref 0.84–1.19)

## 2020-06-02 ENCOUNTER — ANTICOAG VISIT (OUTPATIENT)
Dept: INTERNAL MEDICINE CLINIC | Facility: CLINIC | Age: 85
End: 2020-06-02

## 2020-06-02 DIAGNOSIS — N40.1 BENIGN PROSTATIC HYPERPLASIA WITH LOWER URINARY TRACT SYMPTOMS, SYMPTOM DETAILS UNSPECIFIED: Primary | ICD-10-CM

## 2020-06-02 DIAGNOSIS — I48.21 PERMANENT ATRIAL FIBRILLATION (HCC): ICD-10-CM

## 2020-06-02 LAB — SL AMB POCT INR: 2.3

## 2020-06-02 RX ORDER — FINASTERIDE 5 MG/1
5 TABLET, FILM COATED ORAL DAILY
Qty: 90 TABLET | Refills: 0 | Status: SHIPPED | OUTPATIENT
Start: 2020-06-02 | End: 2020-12-16

## 2020-06-03 ENCOUNTER — ANTICOAG VISIT (OUTPATIENT)
Dept: INTERNAL MEDICINE CLINIC | Facility: CLINIC | Age: 85
End: 2020-06-03

## 2020-06-03 DIAGNOSIS — I48.21 PERMANENT ATRIAL FIBRILLATION (HCC): ICD-10-CM

## 2020-06-16 ENCOUNTER — ANTICOAG VISIT (OUTPATIENT)
Dept: INTERNAL MEDICINE CLINIC | Facility: CLINIC | Age: 85
End: 2020-06-16

## 2020-06-16 DIAGNOSIS — I48.21 PERMANENT ATRIAL FIBRILLATION (HCC): ICD-10-CM

## 2020-06-16 LAB — INR PPP: 3.3 (ref 0.84–1.19)

## 2020-06-26 DIAGNOSIS — I48.91 ATRIAL FIBRILLATION, UNSPECIFIED TYPE (HCC): ICD-10-CM

## 2020-06-26 RX ORDER — WARFARIN SODIUM 2.5 MG/1
TABLET ORAL
Qty: 96 TABLET | Refills: 0 | Status: SHIPPED | OUTPATIENT
Start: 2020-06-26 | End: 2020-09-16

## 2020-07-01 LAB — INR PPP: 2.6 (ref 0.84–1.19)

## 2020-07-02 ENCOUNTER — ANTICOAG VISIT (OUTPATIENT)
Dept: INTERNAL MEDICINE CLINIC | Facility: CLINIC | Age: 85
End: 2020-07-02

## 2020-07-02 DIAGNOSIS — I48.21 PERMANENT ATRIAL FIBRILLATION (HCC): ICD-10-CM

## 2020-07-14 ENCOUNTER — ANTICOAG VISIT (OUTPATIENT)
Dept: INTERNAL MEDICINE CLINIC | Facility: CLINIC | Age: 85
End: 2020-07-14

## 2020-07-14 DIAGNOSIS — I48.21 PERMANENT ATRIAL FIBRILLATION (HCC): ICD-10-CM

## 2020-07-15 ENCOUNTER — ANTICOAG VISIT (OUTPATIENT)
Dept: INTERNAL MEDICINE CLINIC | Facility: CLINIC | Age: 85
End: 2020-07-15

## 2020-07-15 DIAGNOSIS — I48.20 CHRONIC ATRIAL FIBRILLATION (HCC): ICD-10-CM

## 2020-07-15 LAB — INR PPP: 2.8 (ref 0.84–1.19)

## 2020-07-28 ENCOUNTER — ANTICOAG VISIT (OUTPATIENT)
Dept: INTERNAL MEDICINE CLINIC | Facility: CLINIC | Age: 85
End: 2020-07-28

## 2020-07-28 DIAGNOSIS — I48.20 CHRONIC ATRIAL FIBRILLATION (HCC): ICD-10-CM

## 2020-07-28 LAB — SL AMB POCT INR: 3.2

## 2020-07-29 ENCOUNTER — ANTICOAG VISIT (OUTPATIENT)
Dept: INTERNAL MEDICINE CLINIC | Facility: CLINIC | Age: 85
End: 2020-07-29

## 2020-07-29 DIAGNOSIS — I48.20 CHRONIC ATRIAL FIBRILLATION (HCC): ICD-10-CM

## 2020-07-31 DIAGNOSIS — I10 BENIGN ESSENTIAL HYPERTENSION: ICD-10-CM

## 2020-08-02 RX ORDER — BENAZEPRIL HYDROCHLORIDE 40 MG/1
TABLET, FILM COATED ORAL
Qty: 90 TABLET | Refills: 0 | Status: SHIPPED | OUTPATIENT
Start: 2020-08-02 | End: 2020-10-26

## 2020-08-12 ENCOUNTER — ANTICOAG VISIT (OUTPATIENT)
Dept: INTERNAL MEDICINE CLINIC | Facility: CLINIC | Age: 85
End: 2020-08-12

## 2020-08-12 DIAGNOSIS — I48.20 CHRONIC ATRIAL FIBRILLATION (HCC): ICD-10-CM

## 2020-08-27 LAB — SL AMB POCT INR: 2.9

## 2020-08-30 DIAGNOSIS — I10 BENIGN ESSENTIAL HYPERTENSION: ICD-10-CM

## 2020-08-30 RX ORDER — AMLODIPINE BESYLATE 5 MG/1
TABLET ORAL
Qty: 90 TABLET | Refills: 1 | Status: SHIPPED | OUTPATIENT
Start: 2020-08-30 | End: 2020-09-14

## 2020-09-05 DIAGNOSIS — I10 BENIGN ESSENTIAL HYPERTENSION: ICD-10-CM

## 2020-09-05 DIAGNOSIS — I25.10 CORONARY ARTERY DISEASE INVOLVING NATIVE CORONARY ARTERY OF NATIVE HEART WITHOUT ANGINA PECTORIS: ICD-10-CM

## 2020-09-05 RX ORDER — CARVEDILOL 25 MG/1
TABLET ORAL
Qty: 180 TABLET | Refills: 1 | Status: SHIPPED | OUTPATIENT
Start: 2020-09-05 | End: 2021-03-04

## 2020-09-05 RX ORDER — ATORVASTATIN CALCIUM 40 MG/1
TABLET, FILM COATED ORAL
Qty: 90 TABLET | Refills: 1 | Status: SHIPPED | OUTPATIENT
Start: 2020-09-05 | End: 2021-03-04

## 2020-09-06 DIAGNOSIS — I50.9 CHRONIC CONGESTIVE HEART FAILURE, UNSPECIFIED HEART FAILURE TYPE (HCC): ICD-10-CM

## 2020-09-08 RX ORDER — POTASSIUM CHLORIDE 1500 MG/1
TABLET, EXTENDED RELEASE ORAL
Qty: 90 TABLET | Refills: 1 | Status: SHIPPED | OUTPATIENT
Start: 2020-09-08 | End: 2021-03-07

## 2020-09-08 RX ORDER — TORSEMIDE 20 MG/1
30 TABLET ORAL DAILY
Qty: 135 TABLET | Refills: 1 | Status: SHIPPED | OUTPATIENT
Start: 2020-09-08 | End: 2021-03-04

## 2020-09-10 ENCOUNTER — ANTICOAG VISIT (OUTPATIENT)
Dept: INTERNAL MEDICINE CLINIC | Facility: CLINIC | Age: 85
End: 2020-09-10

## 2020-09-10 DIAGNOSIS — I48.20 CHRONIC ATRIAL FIBRILLATION (HCC): ICD-10-CM

## 2020-09-14 ENCOUNTER — ANTICOAG VISIT (OUTPATIENT)
Dept: INTERNAL MEDICINE CLINIC | Facility: CLINIC | Age: 85
End: 2020-09-14

## 2020-09-14 ENCOUNTER — OFFICE VISIT (OUTPATIENT)
Dept: GERIATRICS | Facility: CLINIC | Age: 85
End: 2020-09-14
Payer: COMMERCIAL

## 2020-09-14 VITALS
SYSTOLIC BLOOD PRESSURE: 108 MMHG | DIASTOLIC BLOOD PRESSURE: 50 MMHG | RESPIRATION RATE: 16 BRPM | HEART RATE: 62 BPM | TEMPERATURE: 98.1 F | BODY MASS INDEX: 35.01 KG/M2 | OXYGEN SATURATION: 96 % | HEIGHT: 69 IN | WEIGHT: 236.38 LBS

## 2020-09-14 DIAGNOSIS — Z79.01 CHRONIC ANTICOAGULATION: ICD-10-CM

## 2020-09-14 DIAGNOSIS — I25.10 CORONARY ARTERY DISEASE INVOLVING NATIVE CORONARY ARTERY OF NATIVE HEART WITHOUT ANGINA PECTORIS: ICD-10-CM

## 2020-09-14 DIAGNOSIS — I48.91 ATRIAL FIBRILLATION, UNSPECIFIED TYPE (HCC): ICD-10-CM

## 2020-09-14 DIAGNOSIS — I50.32 CHRONIC DIASTOLIC CONGESTIVE HEART FAILURE (HCC): ICD-10-CM

## 2020-09-14 DIAGNOSIS — E03.9 HYPOTHYROIDISM, UNSPECIFIED TYPE: Primary | ICD-10-CM

## 2020-09-14 DIAGNOSIS — I48.20 CHRONIC ATRIAL FIBRILLATION (HCC): ICD-10-CM

## 2020-09-14 DIAGNOSIS — G47.33 OBSTRUCTIVE SLEEP APNEA: ICD-10-CM

## 2020-09-14 DIAGNOSIS — R41.89 COGNITIVE DECLINE: ICD-10-CM

## 2020-09-14 DIAGNOSIS — I10 BENIGN ESSENTIAL HYPERTENSION: ICD-10-CM

## 2020-09-14 LAB — SL AMB POCT INR: 3.6

## 2020-09-14 PROCEDURE — 99205 OFFICE O/P NEW HI 60 MIN: CPT | Performed by: FAMILY MEDICINE

## 2020-09-14 NOTE — PROGRESS NOTES
[facility]    NAME: Dominique Farrar  AGE: 80 y o  SEX: male    DATE OF ENCOUNTER: 9/14/20    Assessment and Plan     Benign essential hypertension  Goal <140-150/90  D/c amlodipine  Continue torsemide, benazepril, coreg  CMP ordered- adjust medications based on results/BP measurements  Avoid hypotension    Chronic diastolic congestive heart failure (HCC)  Wt Readings from Last 3 Encounters:   09/14/20 107 kg (236 lb 6 oz)   02/28/20 103 kg (228 lb)   02/21/20 110 kg (242 lb 9 6 oz)   Examines euvolemic at time of office visit 9/14  Continue torsemide, coreg, benazepril  CMP ordered  Monitor weights    Atrial fibrillation ([facility] [address] ) [I48 91]  Goal HR<100; rate controlled; continues on coreg  Coumadin management as outlined below    Chronic anticoagulation  Goal INR 2-3 in setting of Afib  PT/INR ordered- will take over coumadin management with next lab draw  CBC w/diff, CMP ordered    Hypothyroidism  Continue levothyroxine  Order TSH, free T4    Obstructive sleep apnea  Continue CPAP QHS    Coronary artery disease  Continue atorvastatin    Cognitive decline  Plan to administer MoCA testing at follow up appointment  In setting of advanced age, hearing impairment; suspect vascular component in setting of CVD  Primarily short term memory loss  Reviewed importance of correction of hearing  Reviewed medication management strategies      Orders Placed This Encounter   Procedures    Comprehensive metabolic panel    CBC and Platelet    TSH, 3rd generation    T4, free    Protime-INR       Chief Complaint     Chief Complaint   Patient presents with   [facility] Patient Visit     Patient is here to establish care  History of Present Illness     80year old male presents to establish care  He is accompanied by his daughter, Khai Tejada, who provides additional history  Past medical records reviewed in detail in [place]   Patient also receives care from the [place] (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[employer] ; 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[employer]) and "[place] [facility]" through his insurance "to keep him out of the ED" per daughter  -Afib on coumadin- is on carvedilol; coumadin previously managed by Dr Madelin Jauregui; had INR yesterday with orders given per daughter; repeat in 2 weeks per daughter  No frequent falls reported  HR 60s in office today  -Hypothyroidism- continues on levothyroxine; due for thyroid labs  -Cognitive impairment with short term memory difficulties- MMSE 27/30 in 2017  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)  -Chronic diastolic CHF- on coreg, torsemide, benazepril; weight +8lb since February  -FILOMENA on CPAP- compliant, wears nightly without difficulty; tolerates well, sleeps well with minimal nighttime awakening  -Hearing impairment- has bilateral hearing aids, partially complaint- does not note improvement with them in      The following portions of the patient's history were reviewed and updated as appropriate: allergies, current medications, past family history, past medical history, past social history, past surgical history and problem list     Review of Systems     Review of Systems   Constitutional: Positive for activity change (decreased due to COVID, does not like to wear mask so does not leave apartment much; previously active in community activities)  Negative for appetite change, fatigue, fever and unexpected weight change  HENT: Positive for hearing loss  Negative for ear pain  Eyes: Negative for visual disturbance  Respiratory: Negative for cough and shortness of breath  Cardiovascular: Negative for chest pain and leg swelling  Gastrointestinal: Negative for abdominal pain, constipation and diarrhea  Genitourinary: Negative for difficulty urinating, dysuria and frequency  Musculoskeletal: Negative for arthralgias  Skin: Negative for color change and rash          Multiple skin lesions throughout, none with recent change, none bothersome   Neurological: Negative for dizziness and headaches  Hematological: Does not bruise/bleed easily  Psychiatric/Behavioral: Positive for confusion (short term memory loss)  Negative for sleep disturbance  All other systems reviewed and are negative  Active Problem List     Patient Active Problem List   Diagnosis    Atrial fibrillation ([facility] [address] ) [I48 91]    Coronary artery disease    Benign essential hypertension    Bicuspid aortic valve    Chronic diastolic congestive heart failure (HCC)    Hypothyroidism    Obstructive sleep apnea    Chronic anticoagulation    Cognitive decline       Objective     /50 (BP Location: Left arm, Patient Position: Sitting, Cuff Size: Standard)   Pulse 62   Temp 98 1 °F (36 7 °C) (Temporal)   Resp 16   Ht 5' 9" (1 753 m) Comment: with shoes  Wt 107 kg (236 lb 6 oz) Comment: with shoes  SpO2 96%   BMI 34 91 kg/m²     Physical Exam  Vitals signs and nursing note reviewed  Exam conducted with a chaperone present (Daughter)  Constitutional:       General: He is not in acute distress  Appearance: Normal appearance  He is not ill-appearing, toxic-appearing or diaphoretic  HENT:      Head: Normocephalic and atraumatic  Right Ear: External ear normal  No drainage  There is impacted cerumen  Left Ear: External ear normal  No drainage  There is impacted cerumen  Mouth/Throat:      Mouth: Mucous membranes are moist    Eyes:      General: No scleral icterus  Right eye: No discharge  Left eye: No discharge  Conjunctiva/sclera: Conjunctivae normal    Neck:      Musculoskeletal: Normal range of motion and neck supple  No neck rigidity  Cardiovascular:      Rate and Rhythm: Normal rate  Rhythm irregular  Heart sounds: Murmur (2/6 systolic) present  Pulmonary:      Effort: Pulmonary effort is normal  No respiratory distress  Breath sounds: Wheezing (LLL end expiratory) present  No rhonchi or rales  Comments: Decreased breath sounds throughout  Abdominal:      General: Bowel sounds are normal  There is no distension  Palpations: Abdomen is soft  Tenderness: There is no abdominal tenderness  Musculoskeletal:         General: No deformity  Right lower leg: Edema (trace distal pretibial) present  Left lower leg: Edema (trace distal pretibial) present  Lymphadenopathy:      Cervical: No cervical adenopathy  Skin:     General: Skin is warm and dry  Capillary Refill: Capillary refill takes less than 2 seconds  Comments: Multiple waxy SK lesions noted on b/l arms   Neurological:      General: No focal deficit present  Mental Status: He is alert  Cranial Nerves: No cranial nerve deficit (except [place])     Psychiatric:         Mood and Affect: Mood normal          Pertinent Laboratory/Diagnostic Studies:  BMP (3/2020): Na 142 K 4 0 Cl 106 CO2 27 BUN 25 Creat 1 12 Glc 82 Ca 8 1    Current Medications     Current Outpatient Medications:     atorvastatin (LIPITOR) 40 mg tablet, TAKE 1 TABLET BY MOUTH EVERY DAY, Disp: 90 tablet, Rfl: 1    benazepril (LOTENSIN) 40 MG tablet, TAKE 1 TABLET BY MOUTH EVERY DAY, Disp: 90 tablet, Rfl: 0    carvedilol (COREG) 25 mg tablet, TAKE 1 TABLET BY MOUTH TWICE A DAY WITH MEALS, Disp: 180 tablet, Rfl: 1    finasteride (Proscar) 5 mg tablet, Take 1 tablet (5 mg total) by mouth daily, Disp: 90 tablet, Rfl: 0    Klor-Con M20 20 MEQ tablet, TAKE 1 TABLET BY MOUTH EVERY DAY, Disp: 90 tablet, Rfl: 1    latanoprost (XALATAN) 0 005 % ophthalmic solution, Apply 1 drop to eye, Disp: , Rfl:     levothyroxine 100 mcg tablet, TAKE 1 TABLET BY MOUTH DAILY BEFORE BREAKFAST TAKE ON AN EMPTY STOMACH, Disp: 90 tablet, Rfl: 1    torsemide (DEMADEX) 20 mg tablet, Take 1 5 tablets (30 mg total) by mouth daily, Disp: 135 tablet, Rfl: 1    warfarin (COUMADIN) 2 5 mg tablet, 1 TAB DAILY EXCEPT ON WEDNESDAYS, TAKE 1+1/2 TABS OR AS DIRECTED, Disp: 96 tablet, Rfl: 0    Health Maintenance     Immunization History   Administered Date(s) Administered    INFLUENZA 10/04/2018    Influenza TIV (IM) 11/04/1927, 08/30/2011, 08/30/2011, 10/04/2017    Pneumococcal Conjugate PCV 7 02/18/2015    Pneumococcal Polysaccharide PPV23 10/01/2012    Td (adult), adsorbed 01/13/2003    Tdap 01/17/2013    Zoster 01/10/2013       Mray Barriga DO  9/14/20

## 2020-09-15 PROBLEM — Z79.01 CHRONIC ANTICOAGULATION: Status: ACTIVE | Noted: 2020-09-15

## 2020-09-15 PROBLEM — I50.32 CHRONIC DIASTOLIC CONGESTIVE HEART FAILURE (HCC): Status: ACTIVE | Noted: 2017-10-31

## 2020-09-15 PROBLEM — R41.89 COGNITIVE DECLINE: Status: ACTIVE | Noted: 2020-09-15

## 2020-09-15 NOTE — ASSESSMENT & PLAN NOTE
Plan to administer MoCA testing at follow up appointment  In setting of advanced age, hearing impairment; suspect vascular component in setting of CVD  Primarily short term memory loss  Reviewed importance of correction of hearing  Reviewed medication management strategies

## 2020-09-15 NOTE — ASSESSMENT & PLAN NOTE
Goal <140-150/90  D/c amlodipine  Continue torsemide, benazepril, coreg  CMP ordered- adjust medications based on results/BP measurements  Avoid hypotension

## 2020-09-15 NOTE — ASSESSMENT & PLAN NOTE
Wt Readings from Last 3 Encounters:   09/14/20 107 kg (236 lb 6 oz)   02/28/20 103 kg (228 lb)   02/21/20 110 kg (242 lb 9 6 oz)   Examines euvolemic at time of office visit 9/14  Continue torsemide, coreg, benazepril  CMP ordered  Monitor weights

## 2020-09-15 NOTE — ASSESSMENT & PLAN NOTE
Goal INR 2-3 in setting of Afib  PT/INR ordered- will take over coumadin management with next lab draw  CBC w/diff, CMP ordered

## 2020-09-16 DIAGNOSIS — I48.91 ATRIAL FIBRILLATION, UNSPECIFIED TYPE (HCC): ICD-10-CM

## 2020-09-16 RX ORDER — WARFARIN SODIUM 2.5 MG/1
TABLET ORAL
Qty: 96 TABLET | Refills: 0 | Status: SHIPPED | OUTPATIENT
Start: 2020-09-16 | End: 2020-12-09

## 2020-09-23 ENCOUNTER — ANTICOAG VISIT (OUTPATIENT)
Dept: INTERNAL MEDICINE CLINIC | Facility: CLINIC | Age: 85
End: 2020-09-23

## 2020-09-23 DIAGNOSIS — I48.20 CHRONIC ATRIAL FIBRILLATION (HCC): ICD-10-CM

## 2020-09-23 NOTE — PROGRESS NOTES
INR 3 9  Please call patient and advise him to hold coumadin today, 9/23 and tomorrow, 9/24    Recheck blood work for PT/INR on Friday 9/24

## 2020-09-29 ENCOUNTER — ANTICOAG VISIT (OUTPATIENT)
Dept: INTERNAL MEDICINE CLINIC | Facility: CLINIC | Age: 85
End: 2020-09-29

## 2020-09-30 ENCOUNTER — TELEPHONE (OUTPATIENT)
Dept: GERIATRICS | Age: 85
End: 2020-09-30

## 2020-09-30 NOTE — TELEPHONE ENCOUNTER
Daughter called asking about what orders for coumadin are since last blood draw  Reviewed chart and see Dr Sukhdeep Smith order was resulted through to Dr Oleg Easton  Called [facility] to find out if there were any newer INR draws since 9/24/2020  Spoke with daughter, gave directions from Dr Danika Matos  Called Dr Danika Matos to confirm she will order another INR draw for Friday 10/2/2020  Left message with orders as follows for daughter: hold coumadin today 9/30/2020  Start 2 5 mg tomorrow 10/1 and have INR drawn 10/2 for more orders

## 2020-10-05 ENCOUNTER — OFFICE VISIT (OUTPATIENT)
Dept: GERIATRICS | Facility: CLINIC | Age: 85
End: 2020-10-05
Payer: COMMERCIAL

## 2020-10-05 VITALS
DIASTOLIC BLOOD PRESSURE: 56 MMHG | SYSTOLIC BLOOD PRESSURE: 126 MMHG | HEART RATE: 56 BPM | OXYGEN SATURATION: 97 % | HEIGHT: 69 IN | BODY MASS INDEX: 34.83 KG/M2 | TEMPERATURE: 97.7 F | WEIGHT: 235.2 LBS

## 2020-10-05 DIAGNOSIS — I50.32 CHRONIC DIASTOLIC CONGESTIVE HEART FAILURE (HCC): ICD-10-CM

## 2020-10-05 DIAGNOSIS — Z79.01 CHRONIC ANTICOAGULATION: ICD-10-CM

## 2020-10-05 DIAGNOSIS — R41.89 COGNITIVE DECLINE: ICD-10-CM

## 2020-10-05 DIAGNOSIS — I48.91 ATRIAL FIBRILLATION, UNSPECIFIED TYPE (HCC): Primary | ICD-10-CM

## 2020-10-05 DIAGNOSIS — E66.9 CLASS 1 OBESITY WITH SERIOUS COMORBIDITY AND BODY MASS INDEX (BMI) OF 34.0 TO 34.9 IN ADULT, UNSPECIFIED OBESITY TYPE: ICD-10-CM

## 2020-10-05 DIAGNOSIS — C67.9 MALIGNANT NEOPLASM OF URINARY BLADDER, UNSPECIFIED SITE (HCC): ICD-10-CM

## 2020-10-05 DIAGNOSIS — I10 BENIGN ESSENTIAL HYPERTENSION: ICD-10-CM

## 2020-10-05 DIAGNOSIS — E03.9 HYPOTHYROIDISM, UNSPECIFIED TYPE: ICD-10-CM

## 2020-10-05 PROCEDURE — 1036F TOBACCO NON-USER: CPT | Performed by: FAMILY MEDICINE

## 2020-10-05 PROCEDURE — 99215 OFFICE O/P EST HI 40 MIN: CPT | Performed by: FAMILY MEDICINE

## 2020-10-05 PROCEDURE — 1160F RVW MEDS BY RX/DR IN RCRD: CPT | Performed by: FAMILY MEDICINE

## 2020-10-06 PROBLEM — E66.811 CLASS 1 OBESITY WITH SERIOUS COMORBIDITY IN ADULT: Status: ACTIVE | Noted: 2020-10-06

## 2020-10-06 PROBLEM — E66.9 CLASS 1 OBESITY WITH SERIOUS COMORBIDITY IN ADULT: Status: ACTIVE | Noted: 2020-10-06

## 2020-10-06 PROBLEM — C67.9 MALIGNANT NEOPLASM OF URINARY BLADDER (HCC): Status: ACTIVE | Noted: 2020-10-06

## 2020-10-15 ENCOUNTER — TELEPHONE (OUTPATIENT)
Dept: GERIATRICS | Age: 85
End: 2020-10-15

## 2020-10-16 ENCOUNTER — TELEPHONE (OUTPATIENT)
Dept: GERIATRICS | Age: 85
End: 2020-10-16

## 2020-10-16 ENCOUNTER — TELEPHONE (OUTPATIENT)
Dept: GERIATRICS | Facility: OTHER | Age: 85
End: 2020-10-16

## 2020-10-25 DIAGNOSIS — I10 BENIGN ESSENTIAL HYPERTENSION: ICD-10-CM

## 2020-10-26 RX ORDER — BENAZEPRIL HYDROCHLORIDE 40 MG/1
TABLET, FILM COATED ORAL
Qty: 90 TABLET | Refills: 0 | Status: SHIPPED | OUTPATIENT
Start: 2020-10-26 | End: 2020-11-02

## 2020-10-30 ENCOUNTER — TELEPHONE (OUTPATIENT)
Dept: GERIATRICS | Age: 85
End: 2020-10-30

## 2020-10-30 DIAGNOSIS — E03.9 HYPOTHYROIDISM, UNSPECIFIED TYPE: ICD-10-CM

## 2020-10-30 RX ORDER — LEVOTHYROXINE SODIUM 0.1 MG/1
TABLET ORAL
Qty: 90 TABLET | Refills: 1 | Status: SHIPPED | OUTPATIENT
Start: 2020-10-30 | End: 2021-05-03 | Stop reason: SDUPTHER

## 2020-11-02 ENCOUNTER — TELEPHONE (OUTPATIENT)
Dept: GERIATRICS | Facility: CLINIC | Age: 85
End: 2020-11-02

## 2020-11-02 DIAGNOSIS — I10 BENIGN ESSENTIAL HYPERTENSION: ICD-10-CM

## 2020-11-02 RX ORDER — BENAZEPRIL HYDROCHLORIDE 40 MG/1
TABLET, FILM COATED ORAL
Qty: 90 TABLET | Refills: 0 | Status: SHIPPED | OUTPATIENT
Start: 2020-11-02 | End: 2021-03-28

## 2020-11-03 LAB
INR PPP: 2.5 (ref 0.84–1.19)
INTERNATIONAL NORMALIZATION RATIO, POC: 2.5

## 2020-11-04 ENCOUNTER — TELEPHONE (OUTPATIENT)
Dept: GERIATRICS | Facility: CLINIC | Age: 85
End: 2020-11-04

## 2020-11-04 ENCOUNTER — ANTICOAG VISIT (OUTPATIENT)
Dept: GERIATRICS | Facility: CLINIC | Age: 85
End: 2020-11-04

## 2020-11-04 DIAGNOSIS — I50.9 CONGESTIVE HEART FAILURE, UNSPECIFIED HF CHRONICITY, UNSPECIFIED HEART FAILURE TYPE (HCC): Primary | ICD-10-CM

## 2020-11-17 LAB — INTERNATIONAL NORMALIZATION RATIO, POC: 2.9

## 2020-11-18 ENCOUNTER — ANTICOAG VISIT (OUTPATIENT)
Dept: GERIATRICS | Facility: CLINIC | Age: 85
End: 2020-11-18

## 2020-11-18 LAB — INR PPP: 2.9 (ref 0.84–1.19)

## 2020-12-08 ENCOUNTER — TELEPHONE (OUTPATIENT)
Dept: GERIATRICS | Facility: CLINIC | Age: 85
End: 2020-12-08

## 2020-12-09 ENCOUNTER — OFFICE VISIT (OUTPATIENT)
Dept: GERIATRICS | Facility: CLINIC | Age: 85
End: 2020-12-09
Payer: COMMERCIAL

## 2020-12-09 VITALS
RESPIRATION RATE: 16 BRPM | TEMPERATURE: 98.1 F | OXYGEN SATURATION: 97 % | DIASTOLIC BLOOD PRESSURE: 66 MMHG | BODY MASS INDEX: 35.94 KG/M2 | HEART RATE: 67 BPM | HEIGHT: 68 IN | SYSTOLIC BLOOD PRESSURE: 156 MMHG | WEIGHT: 237.13 LBS

## 2020-12-09 DIAGNOSIS — I10 BENIGN ESSENTIAL HYPERTENSION: ICD-10-CM

## 2020-12-09 DIAGNOSIS — G31.84 MILD COGNITIVE IMPAIRMENT WITH MEMORY LOSS: ICD-10-CM

## 2020-12-09 DIAGNOSIS — E03.9 HYPOTHYROIDISM, UNSPECIFIED TYPE: ICD-10-CM

## 2020-12-09 DIAGNOSIS — C67.9 MALIGNANT NEOPLASM OF URINARY BLADDER, UNSPECIFIED SITE (HCC): ICD-10-CM

## 2020-12-09 DIAGNOSIS — Z79.01 CHRONIC ANTICOAGULATION: Primary | ICD-10-CM

## 2020-12-09 DIAGNOSIS — I50.32 CHRONIC DIASTOLIC CONGESTIVE HEART FAILURE (HCC): ICD-10-CM

## 2020-12-09 DIAGNOSIS — G47.33 OBSTRUCTIVE SLEEP APNEA: ICD-10-CM

## 2020-12-09 DIAGNOSIS — I48.91 ATRIAL FIBRILLATION, UNSPECIFIED TYPE (HCC): ICD-10-CM

## 2020-12-09 PROCEDURE — 1036F TOBACCO NON-USER: CPT | Performed by: FAMILY MEDICINE

## 2020-12-09 PROCEDURE — 1160F RVW MEDS BY RX/DR IN RCRD: CPT | Performed by: FAMILY MEDICINE

## 2020-12-09 PROCEDURE — T1015 CLINIC SERVICE: HCPCS | Performed by: FAMILY MEDICINE

## 2020-12-09 PROCEDURE — 99215 OFFICE O/P EST HI 40 MIN: CPT | Performed by: FAMILY MEDICINE

## 2020-12-09 RX ORDER — DABIGATRAN ETEXILATE 150 MG/1
150 CAPSULE, COATED PELLETS ORAL 2 TIMES DAILY
Qty: 60 CAPSULE | Refills: 2 | Status: SHIPPED | OUTPATIENT
Start: 2020-12-09 | End: 2020-12-11

## 2020-12-10 PROBLEM — G31.84 MILD COGNITIVE IMPAIRMENT WITH MEMORY LOSS: Status: ACTIVE | Noted: 2020-09-15

## 2020-12-11 ENCOUNTER — TELEPHONE (OUTPATIENT)
Dept: GERIATRICS | Age: 85
End: 2020-12-11

## 2020-12-11 DIAGNOSIS — I48.91 ATRIAL FIBRILLATION, UNSPECIFIED TYPE (HCC): Primary | ICD-10-CM

## 2020-12-15 DIAGNOSIS — N40.1 BENIGN PROSTATIC HYPERPLASIA WITH LOWER URINARY TRACT SYMPTOMS, SYMPTOM DETAILS UNSPECIFIED: ICD-10-CM

## 2020-12-16 ENCOUNTER — TELEPHONE (OUTPATIENT)
Dept: GERIATRICS | Age: 85
End: 2020-12-16

## 2020-12-16 RX ORDER — FINASTERIDE 5 MG/1
TABLET, FILM COATED ORAL
Qty: 90 TABLET | Refills: 0 | Status: SHIPPED | OUTPATIENT
Start: 2020-12-16 | End: 2021-04-02

## 2021-02-24 ENCOUNTER — TELEPHONE (OUTPATIENT)
Dept: GERIATRICS | Facility: CLINIC | Age: 86
End: 2021-02-24

## 2021-03-01 ENCOUNTER — TELEPHONE (OUTPATIENT)
Dept: GERIATRICS | Age: 86
End: 2021-03-01

## 2021-03-03 DIAGNOSIS — I50.9 CHRONIC CONGESTIVE HEART FAILURE, UNSPECIFIED HEART FAILURE TYPE (HCC): ICD-10-CM

## 2021-03-03 PROBLEM — E66.812 CLASS 2 SEVERE OBESITY WITH SERIOUS COMORBIDITY IN ADULT (HCC): Status: ACTIVE | Noted: 2020-10-06

## 2021-03-03 PROBLEM — E66.01 CLASS 2 SEVERE OBESITY WITH SERIOUS COMORBIDITY IN ADULT (HCC): Status: ACTIVE | Noted: 2020-10-06

## 2021-03-04 DIAGNOSIS — I10 BENIGN ESSENTIAL HYPERTENSION: ICD-10-CM

## 2021-03-04 DIAGNOSIS — I25.10 CORONARY ARTERY DISEASE INVOLVING NATIVE CORONARY ARTERY OF NATIVE HEART WITHOUT ANGINA PECTORIS: ICD-10-CM

## 2021-03-04 RX ORDER — TORSEMIDE 20 MG/1
TABLET ORAL
Qty: 135 TABLET | Refills: 1 | Status: SHIPPED | OUTPATIENT
Start: 2021-03-04 | End: 2021-08-26

## 2021-03-04 RX ORDER — ATORVASTATIN CALCIUM 40 MG/1
TABLET, FILM COATED ORAL
Qty: 90 TABLET | Refills: 1 | Status: SHIPPED | OUTPATIENT
Start: 2021-03-04 | End: 2021-08-26

## 2021-03-04 RX ORDER — CARVEDILOL 25 MG/1
TABLET ORAL
Qty: 180 TABLET | Refills: 1 | Status: SHIPPED | OUTPATIENT
Start: 2021-03-04 | End: 2021-08-06

## 2021-03-07 DIAGNOSIS — I50.9 CHRONIC CONGESTIVE HEART FAILURE, UNSPECIFIED HEART FAILURE TYPE (HCC): ICD-10-CM

## 2021-03-07 RX ORDER — POTASSIUM CHLORIDE 1500 MG/1
TABLET, EXTENDED RELEASE ORAL
Qty: 90 TABLET | Refills: 1 | Status: SHIPPED | OUTPATIENT
Start: 2021-03-07 | End: 2021-08-06

## 2021-03-10 ENCOUNTER — OFFICE VISIT (OUTPATIENT)
Dept: GERIATRICS | Facility: CLINIC | Age: 86
End: 2021-03-10
Payer: COMMERCIAL

## 2021-03-10 VITALS
TEMPERATURE: 97.5 F | RESPIRATION RATE: 16 BRPM | OXYGEN SATURATION: 99 % | SYSTOLIC BLOOD PRESSURE: 138 MMHG | HEART RATE: 67 BPM | BODY MASS INDEX: 36.05 KG/M2 | DIASTOLIC BLOOD PRESSURE: 64 MMHG | HEIGHT: 68 IN

## 2021-03-10 DIAGNOSIS — I48.91 ATRIAL FIBRILLATION, UNSPECIFIED TYPE (HCC): Primary | ICD-10-CM

## 2021-03-10 DIAGNOSIS — E03.9 HYPOTHYROIDISM, UNSPECIFIED TYPE: ICD-10-CM

## 2021-03-10 DIAGNOSIS — I50.32 CHRONIC DIASTOLIC CONGESTIVE HEART FAILURE (HCC): ICD-10-CM

## 2021-03-10 PROCEDURE — 1160F RVW MEDS BY RX/DR IN RCRD: CPT | Performed by: FAMILY MEDICINE

## 2021-03-10 PROCEDURE — 99214 OFFICE O/P EST MOD 30 MIN: CPT | Performed by: FAMILY MEDICINE

## 2021-03-10 PROCEDURE — 1036F TOBACCO NON-USER: CPT | Performed by: FAMILY MEDICINE

## 2021-03-10 NOTE — PROGRESS NOTES
[facility]    NAME: Shar Albert  AGE: 80 y o  SEX: male    DATE OF ENCOUNTER: 3/10/21    Assessment and Plan     Atrial fibrillation ([facility] ) [I48 91]  Goal HR<100; rate controlled; continues on coreg  Continue anticoagulation with eliquis    Chronic diastolic congestive heart failure (HCC)  Examines euvolemic  Continue torsemide, coreg, benazepril  Recheck BMP in 3 months prior to next routine visit    Hypothyroidism  Continue levothyroxine 100mcg daily  Recheck TSH and Free T4 in 3 months prior to next visit      Orders Placed This Encounter   Procedures    CBC and differential    Comprehensive metabolic panel    TSH, 3rd generation    T4, free       Chief Complaint     Chief Complaint   Patient presents with    Follow-up     3 MONTHS       History of Present Illness     80year old male evaluated for routine follow up of chronic medical conditions  Has transitioned to anticoagulation with eliquis in setting of Afib; tolerating well without bleeding or bruising noted  Continues on levothyroxine for hypothyroidism; benazepril, carvedilol, torsemide for chronic diastolic CHF  Patient without acute complaints or concerns for visit today  The following portions of the patient's history were reviewed and updated as appropriate: allergies, current medications, past family history, past medical history, past social history, past surgical history and problem list     Review of Systems     Review of Systems   Constitutional: Negative for chills and fever  HENT: Positive for hearing loss (chronic)  Respiratory: Negative for cough and shortness of breath  Cardiovascular: Negative for leg swelling  Musculoskeletal: Negative for arthralgias  All other systems reviewed and are negative        Active Problem List     Patient Active Problem List   Diagnosis    Atrial fibrillation ([facility] [address] ) [I48 91]    Coronary artery disease    Benign essential hypertension    Bicuspid aortic valve    Chronic diastolic congestive heart failure ([address] )    Hypothyroidism    Obstructive sleep apnea    Chronic anticoagulation    Mild cognitive impairment with memory loss    Malignant neoplasm of urinary bladder (HCC)    Class 2 severe obesity with serious comorbidity in adult (HCC)       Objective     /64 (BP Location: Left arm, Patient Position: Sitting, Cuff Size: Standard)   Pulse 67   Temp 97 5 °F (36 4 °C) (Temporal)   Resp 16   Ht 5' 8" (1 727 m)   SpO2 99%   BMI 36 05 kg/m²     Physical Exam  Vitals signs and nursing note reviewed  Constitutional:       General: He is awake  He is not in acute distress  Appearance: He is well-developed  He is not ill-appearing, toxic-appearing or diaphoretic  HENT:      Head: Normocephalic and atraumatic  Right Ear: External ear normal       Left Ear: External ear normal       Nose:      Comments: Mask covering nose and mouth  Eyes:      General: No scleral icterus  Right eye: No discharge  Left eye: No discharge  Conjunctiva/sclera: Conjunctivae normal    Neck:      Musculoskeletal: Neck supple  No neck rigidity  Cardiovascular:      Rate and Rhythm: Normal rate  Rhythm irregular  Heart sounds: S1 normal and S2 normal    Pulmonary:      Effort: Pulmonary effort is normal  No respiratory distress  Breath sounds: No wheezing  Comments: Decreased breath sounds throughout  Abdominal:      General: Bowel sounds are normal  There is no distension  Palpations: Abdomen is soft  Tenderness: There is no abdominal tenderness  Musculoskeletal:         General: No tenderness or deformity  Right lower leg: No edema  Left lower leg: No edema  Skin:     General: Skin is warm and dry  Coloration: Skin is not jaundiced or pale  Neurological:      General: No focal deficit present  Mental Status: He is alert  Mental status is at baseline        Cranial Nerves: No cranial nerve deficit (except [place])  Psychiatric:         Attention and Perception: Attention normal          Mood and Affect: Mood normal  Affect is flat  Speech: Speech normal          Behavior: Behavior is cooperative  Cognition and Memory: He exhibits impaired recent memory           Pertinent Laboratory/Diagnostic Studies:  (12/1/20)- see results in Epic chart    Current Medications     Current Outpatient Medications:     apixaban (ELIQUIS) 5 mg, Take 1 tablet (5 mg total) by mouth 2 (two) times a day, Disp: 60 tablet, Rfl: 3    atorvastatin (LIPITOR) 40 mg tablet, TAKE 1 TABLET BY MOUTH EVERY DAY, Disp: 90 tablet, Rfl: 1    benazepril (LOTENSIN) 40 MG tablet, TAKE 1 TABLET BY MOUTH EVERY DAY, Disp: 90 tablet, Rfl: 0    carvedilol (COREG) 25 mg tablet, TAKE 1 TABLET BY MOUTH TWICE A DAY WITH MEALS, Disp: 180 tablet, Rfl: 1    finasteride (PROSCAR) 5 mg tablet, TAKE 1 TABLET BY MOUTH EVERY DAY, Disp: 90 tablet, Rfl: 0    Klor-Con M20 20 MEQ tablet, TAKE 1 TABLET BY MOUTH EVERY DAY, Disp: 90 tablet, Rfl: 1    latanoprost (XALATAN) 0 005 % ophthalmic solution, Apply 1 drop to eye, Disp: , Rfl:     levothyroxine 100 mcg tablet, TAKE 1 TABLET BY MOUTH DAILY BEFORE BREAKFAST TAKE ON AN EMPTY STOMACH, Disp: 90 tablet, Rfl: 1    torsemide (DEMADEX) 20 mg tablet, TAKE 1 AND 1/2 TABLETS DAILY BY MOUTH, Disp: 135 tablet, Rfl: 1    Health Maintenance     Immunization History   Administered Date(s) Administered    INFLUENZA 10/01/2013, 09/22/2014, 01/02/2015, 10/04/2018, 10/15/2019, 10/01/2020    Influenza Quadrivalent Preservative Free 3 years and older IM 10/15/2019    Influenza, high dose seasonal 0 7 mL 10/22/2020    Influenza, injectable, quadrivalent, preservative free 0 5 mL 10/15/2019    Influenza, seasonal, injectable 11/04/1927, 08/30/2011, 08/30/2011, 10/04/2017    Pneumococcal 11/01/2005    Pneumococcal Conjugate 13-Valent 02/18/2015, 02/08/2016    Pneumococcal Conjugate PCV 7 02/18/2015    Pneumococcal Polysaccharide PPV23 11/01/2005, 10/01/2012    Td (adult), Unspecified 01/13/2003    Td (adult), adsorbed 01/13/2003    Tdap 01/17/2013    Zoster 01/10/2013       Mary Barriga,   3/10/21

## 2021-03-10 NOTE — PATIENT INSTRUCTIONS
-Follow up in 3 months  -Have blood work drawn at least one week prior to next appointment: thyroid, blood count, kidney and liver function

## 2021-03-12 NOTE — ASSESSMENT & PLAN NOTE
Examines euvolemic  Continue torsemide, coreg, benazepril  Recheck BMP in 3 months prior to next routine visit

## 2021-03-24 DIAGNOSIS — I48.91 ATRIAL FIBRILLATION, UNSPECIFIED TYPE (HCC): ICD-10-CM

## 2021-03-24 RX ORDER — APIXABAN 5 MG/1
TABLET, FILM COATED ORAL
Qty: 60 TABLET | Refills: 3 | Status: SHIPPED | OUTPATIENT
Start: 2021-03-24 | End: 2021-07-26

## 2021-03-27 DIAGNOSIS — I10 BENIGN ESSENTIAL HYPERTENSION: ICD-10-CM

## 2021-03-28 RX ORDER — BENAZEPRIL HYDROCHLORIDE 40 MG/1
TABLET, FILM COATED ORAL
Qty: 90 TABLET | Refills: 0 | Status: SHIPPED | OUTPATIENT
Start: 2021-03-28 | End: 2021-05-28 | Stop reason: SDUPTHER

## 2021-04-02 DIAGNOSIS — N40.1 BENIGN PROSTATIC HYPERPLASIA WITH LOWER URINARY TRACT SYMPTOMS, SYMPTOM DETAILS UNSPECIFIED: ICD-10-CM

## 2021-04-02 RX ORDER — FINASTERIDE 5 MG/1
TABLET, FILM COATED ORAL
Qty: 90 TABLET | Refills: 3 | Status: SHIPPED | OUTPATIENT
Start: 2021-04-02 | End: 2022-02-21

## 2021-04-03 DIAGNOSIS — E03.9 HYPOTHYROIDISM, UNSPECIFIED TYPE: ICD-10-CM

## 2021-04-05 RX ORDER — LEVOTHYROXINE SODIUM 0.1 MG/1
TABLET ORAL
Qty: 90 TABLET | Refills: 1 | OUTPATIENT
Start: 2021-04-05

## 2021-05-02 DIAGNOSIS — E03.9 HYPOTHYROIDISM, UNSPECIFIED TYPE: ICD-10-CM

## 2021-05-03 DIAGNOSIS — E03.9 HYPOTHYROIDISM, UNSPECIFIED TYPE: ICD-10-CM

## 2021-05-03 RX ORDER — LEVOTHYROXINE SODIUM 0.1 MG/1
100 TABLET ORAL DAILY
Qty: 90 TABLET | Refills: 1 | Status: SHIPPED | OUTPATIENT
Start: 2021-05-03 | End: 2021-09-27

## 2021-05-03 RX ORDER — LEVOTHYROXINE SODIUM 0.1 MG/1
TABLET ORAL
Qty: 90 TABLET | Refills: 1 | OUTPATIENT
Start: 2021-05-03

## 2021-05-03 NOTE — TELEPHONE ENCOUNTER
Patient's daughter contacted office to relay patient is completely out of Levothyroxine 100 mcg tablet  Routed order to Dr Osman Barboza  Relayed to daughter to call [facility] to inform that patient is no longer under the care of their providers  Currently Levothyroxine has a pending reorder from [facility]

## 2021-05-28 ENCOUNTER — TELEPHONE (OUTPATIENT)
Dept: GERIATRICS | Age: 86
End: 2021-05-28

## 2021-05-28 DIAGNOSIS — I10 BENIGN ESSENTIAL HYPERTENSION: ICD-10-CM

## 2021-05-28 NOTE — TELEPHONE ENCOUNTER
Nasreen from [address] called to verify if Raysa Shaikh is still on two medications and the correct dose      Benazepril and torsemide      [facility]   [phone]

## 2021-05-28 NOTE — TELEPHONE ENCOUNTER
[address] moreno relay that the pt is still taking both prescription of   Benazepril   Torsemide   Need refill of Benazepril for pt

## 2021-06-01 RX ORDER — BENAZEPRIL HYDROCHLORIDE 40 MG/1
40 TABLET, FILM COATED ORAL DAILY
Qty: 90 TABLET | Refills: 0 | Status: SHIPPED | OUTPATIENT
Start: 2021-06-01 | End: 2021-08-26

## 2021-06-02 ENCOUNTER — TELEPHONE (OUTPATIENT)
Dept: GERIATRICS | Age: 86
End: 2021-06-02

## 2021-06-02 NOTE — TELEPHONE ENCOUNTER
Spoke with Nasreen directly and confirm current instruction to take 1 5mg for a total of 30mg  Thank you  Jamel Medina

## 2021-06-02 NOTE — TELEPHONE ENCOUNTER
Pharmacy rep ( Nasreen) called to verify the instruction on the torsemide 20mg  Sandra at the facility stated that the patient should take one tablet daily  They have direction on file for 1 and 1/2 tablet daily  Please confirm instruction  Thanks

## 2021-06-08 NOTE — PROGRESS NOTES
Assessment & Plan:     E03 9  Hypothyroidism  I have evaluated the patient and found the condition to be: Stable  I have evaluated the patient and: Recommended continue with same treatment plan    G47 33   Obstructive sleep apnea  I have evaluated the patient and found the condition to be: Stable  I have evaluated the patient and: Recommended continue with same treatment plan    I25 10  Coronary artery disease  I have evaluated the patient and found the condition to be: Stable  I have evaluated the patient and: Recommended continue with same treatment plan    X72 53  Chronic diastolic congestive heart failure ([facility] [address] )  I have evaluated the patient and found the condition to be: Stable  I have evaluated the patient and: Recommended continue with same treatment plan    I10  Benign essential hypertension  I have evaluated the patient and found the condition to be: Stable  I have evaluated the patient and: Recommended continue with same treatment plan    I48 91  Atrial fibrillation ([facility] [address] ) (I48 91)  I have evaluated the patient and found the condition to be: Stable  I have evaluated the patient and: Recommended continue with same treatment plan    C67 9  Malignant neoplasm of urinary bladder ([facility] [address] )  I have evaluated the patient and found the condition to be: Resolved  I have evaluated the patient and: Recommended continue with same treatment plan    G31 84  Mild cognitive impairment with memory loss  I have evaluated the patient and found the condition to be: Worsening  I have evaluated the patient and: Ordered additional services/ studies    E66 01  Class 2 severe obesity with serious comorbidity in [facility])  I have evaluated the patient and found the condition to be: Stable  I have evaluated the patient and: Recommended continue with same treatment plan    Z79 01  Chronic anticoagulation  I have evaluated the patient and found the condition to be:  Stable  I have evaluated the patient and: Recommended continue with same treatment plan    BMI Counseling: Body mass index is 35 58 kg/m²  The BMI is above normal  Nutrition recommendations include encouraging healthy choices of fruits and vegetables  Exercise recommendations include exercising 3-5 times per week             Subjective:     Patient ID: Sarika Horner is a 80 y o  male     Chief Complaint   Patient presents with    Follow-up     3 months and lab results      HPI- see separate progress note from this encounter    ROS- see separate progress note from this encounter    Objective:      /90 (BP Location: Left arm, Patient Position: Sitting, Cuff Size: Standard)   Pulse 82   Temp (!) 96 6 °F (35 9 °C) (Temporal)   Resp 20   Ht 5' 8" (1 727 m) Comment: as per last vital  Wt 106 kg (234 lb) Comment: w shoes  SpO2 97%   BMI 35 58 kg/m²     Problem List Items Addressed This Visit        Endocrine    Hypothyroidism - Primary     Continue levothyroxine 100mcg daily  TSH and free T4 recently checked and WNL- recheck in approx 6 months            Respiratory    Restrictive lung disease     Per most recent PFTs; with likely obstructive component as well with previous smoking history  Patient worked as an  and was in the [employer] respiratory symptoms closely; can trial albuterol PRN if shortness of breath or wheezing become an issue/are bothersome to patient            Cardiovascular and Mediastinum    Atrial fibrillation ([address] ) [I48 91]     Goal HR<100; rate controlled; continues on coreg  Continue anticoagulation with eliquis         Coronary artery disease     S/p CABG x3  Continue atorvastatin         Benign essential hypertension     At goal <140-150/90  Continue torsemide, benazepril, coreg  Amlodipine previously discontinued with improvement in relative hypotension and pedal edema  BMP reviewed/stable- recheck in 6 months         Chronic diastolic congestive heart failure ([address] )     Wt Readings from Last 3 Encounters:   12/09/20 108 kg (237 lb 2 oz)   10/05/20 107 kg (235 lb 3 2 oz)   09/14/20 107 kg (236 lb 6 oz)     Examines euvolemic; weight stable within 3lb over past 3 months  Continue torsemide, coreg, benazepril  BMP reviewed and stable- recheck in 6 months            Nervous and Auditory    Mild cognitive impairment with memory loss     With short term memory loss- reviewed importance of use of hearing aides for memory  Continue assistance with medication management to ensure pills taken at appropriate times           Other Visit Diagnoses     Vitamin D deficiency        Relevant Medications    cholecalciferol (VITAMIN D3) 1,000 units tablet          Physical Exam- see separate progress note from this encounter

## 2021-06-14 ENCOUNTER — OFFICE VISIT (OUTPATIENT)
Dept: GERIATRICS | Facility: CLINIC | Age: 86
End: 2021-06-14
Payer: COMMERCIAL

## 2021-06-14 VITALS
TEMPERATURE: 96.6 F | DIASTOLIC BLOOD PRESSURE: 90 MMHG | BODY MASS INDEX: 35.46 KG/M2 | WEIGHT: 234 LBS | RESPIRATION RATE: 20 BRPM | SYSTOLIC BLOOD PRESSURE: 164 MMHG | HEIGHT: 68 IN | HEART RATE: 82 BPM | OXYGEN SATURATION: 97 %

## 2021-06-14 DIAGNOSIS — I25.10 CORONARY ARTERY DISEASE INVOLVING NATIVE CORONARY ARTERY OF NATIVE HEART WITHOUT ANGINA PECTORIS: ICD-10-CM

## 2021-06-14 DIAGNOSIS — G31.84 MILD COGNITIVE IMPAIRMENT WITH MEMORY LOSS: ICD-10-CM

## 2021-06-14 DIAGNOSIS — I10 BENIGN ESSENTIAL HYPERTENSION: ICD-10-CM

## 2021-06-14 DIAGNOSIS — E55.9 VITAMIN D DEFICIENCY: ICD-10-CM

## 2021-06-14 DIAGNOSIS — J98.4 RESTRICTIVE LUNG DISEASE: ICD-10-CM

## 2021-06-14 DIAGNOSIS — E03.9 HYPOTHYROIDISM, UNSPECIFIED TYPE: Primary | ICD-10-CM

## 2021-06-14 DIAGNOSIS — I50.32 CHRONIC DIASTOLIC CONGESTIVE HEART FAILURE (HCC): ICD-10-CM

## 2021-06-14 DIAGNOSIS — I48.91 ATRIAL FIBRILLATION, UNSPECIFIED TYPE (HCC): ICD-10-CM

## 2021-06-14 PROCEDURE — 99214 OFFICE O/P EST MOD 30 MIN: CPT | Performed by: FAMILY MEDICINE

## 2021-06-14 PROCEDURE — 1036F TOBACCO NON-USER: CPT | Performed by: FAMILY MEDICINE

## 2021-06-14 PROCEDURE — 1160F RVW MEDS BY RX/DR IN RCRD: CPT | Performed by: FAMILY MEDICINE

## 2021-06-14 PROCEDURE — T1015 CLINIC SERVICE: HCPCS | Performed by: FAMILY MEDICINE

## 2021-06-14 RX ORDER — MELATONIN
1000 DAILY
Qty: 30 TABLET | Refills: 5 | Status: SHIPPED | OUTPATIENT
Start: 2021-06-14 | End: 2021-11-24

## 2021-06-14 NOTE — PATIENT INSTRUCTIONS
-Follow up in 3 months  -Labs reviewed and stable- will recheck labs in approx 6 months (will order at next visit)  -Increase activity: consider going back to exercise classes; increase walking within your apartment and [facility] and take a few deep breaths every hour  -Try playing your harmonica  - I recommend wearing your hearing aides more often; hearing is very important for your memory   Change your hearing aide batteries daily

## 2021-06-14 NOTE — ASSESSMENT & PLAN NOTE
Per most recent PFTs; with likely obstructive component as well with previous smoking history  Patient worked as an  and was in the [email or web address] respiratory symptoms closely; can trial albuterol PRN if shortness of breath or wheezing become an issue/are bothersome to patient

## 2021-06-14 NOTE — ASSESSMENT & PLAN NOTE
Continue levothyroxine 100mcg daily  TSH and free T4 recently checked and WNL- recheck in approx 6 months

## 2021-06-14 NOTE — ASSESSMENT & PLAN NOTE
At goal <140-150/90  Continue torsemide, benazepril, coreg  Amlodipine previously discontinued with improvement in relative hypotension and pedal edema  BMP reviewed/stable- recheck in 6 months

## 2021-06-14 NOTE — ASSESSMENT & PLAN NOTE
With short term memory loss- reviewed importance of use of hearing aides for memory  Continue assistance with medication management to ensure pills taken at appropriate times

## 2021-06-14 NOTE — ASSESSMENT & PLAN NOTE
Wt Readings from Last 3 Encounters:   12/09/20 108 kg (237 lb 2 oz)   10/05/20 107 kg (235 lb 3 2 oz)   09/14/20 107 kg (236 lb 6 oz)     Examines euvolemic; weight stable within 3lb over past 3 months  Continue torsemide, coreg, benazepril  BMP reviewed and stable- recheck in 6 months

## 2021-06-14 NOTE — PROGRESS NOTES
[address] [facility]    NAME: René Michelle  AGE: 80 y o  SEX: male    DATE OF ENCOUNTER: 6/14/21    Assessment and Plan     Hypothyroidism  Continue levothyroxine 100mcg daily  TSH and free T4 recently checked and WNL- recheck in approx 6 months    Chronic diastolic congestive heart failure (HCC)  Wt Readings from Last 3 Encounters:   12/09/20 108 kg (237 lb 2 oz)   10/05/20 107 kg (235 lb 3 2 oz)   09/14/20 107 kg (236 lb 6 oz)     Examines euvolemic; weight stable within 3lb over past 3 months  Continue torsemide, coreg, benazepril  BMP reviewed and stable- recheck in 6 months    Atrial fibrillation (HCC) [I48 91]  Goal HR<100; rate controlled; continues on coreg  Continue anticoagulation with eliquis    Coronary artery disease  S/p CABG x3  Continue atorvastatin    Benign essential hypertension  At goal <140-150/90  Continue torsemide, benazepril, coreg  Amlodipine previously discontinued with improvement in relative hypotension and pedal edema  BMP reviewed/stable- recheck in 6 months    Mild cognitive impairment with memory loss  With short term memory loss- reviewed importance of use of hearing aides for memory  Continue assistance with medication management to ensure pills taken at appropriate times    Restrictive lung disease  Per most recent PFTs; with likely obstructive component as well with previous smoking history  Patient worked as an  and was in the [employer] respiratory symptoms closely; can trial albuterol PRN if shortness of breath or wheezing become an issue/are bothersome to patient      Chief Complaint     Chief Complaint   Patient presents with    Follow-up     3 months and lab results       History of Present Illness     80year old male presents for routine follow up of chronic medical conditions  Presents with his daughter who provides additional history  Recent labs reviewed (see below) and all stable   Continues on levothyroxine for hypothyroidism  Weight -3lb over past 3 months; in setting of chronic diastolic CHF on torsemide + KCl along with carvedilol; most recent EF 60% in 2019  No lower extremity edema  Daughter does note patient with cough and noisy breathing at times; PFTs from 2017 reviewed showing restrictive lung disease; patient does not use any inhalers at baseline  Continues on eliquis for anticoagulation in setting of Afib  The following portions of the patient's history were reviewed and updated as appropriate: allergies, current medications, past family history, past medical history, past social history, past surgical history and problem list     Review of Systems     Review of Systems   Constitutional: Negative for activity change, appetite change, chills, fever and unexpected weight change  HENT: Positive for hearing loss (chronic, patient does not consistently wear hearing aides)  Negative for congestion and postnasal drip  Respiratory: Positive for cough and shortness of breath (with exertion)  Cardiovascular: Negative for chest pain, palpitations and leg swelling  Musculoskeletal: Negative for arthralgias  Psychiatric/Behavioral: Negative for sleep disturbance  All other systems reviewed and are negative        Active Problem List     Patient Active Problem List   Diagnosis    Atrial fibrillation ([address] ) [I48 91]    Coronary artery disease    Benign essential hypertension    Bicuspid aortic valve    Chronic diastolic congestive heart failure ([address] )    Hypothyroidism    Obstructive sleep apnea    Chronic anticoagulation    Mild cognitive impairment with memory loss    Malignant neoplasm of urinary bladder (HCC)    Class 2 severe obesity with serious comorbidity in adult [facility])    Restrictive lung disease       Objective     /90 (BP Location: Left arm, Patient Position: Sitting, Cuff Size: Standard)   Pulse 82   Temp (!) 96 6 °F (35 9 °C) (Temporal)   Resp 20   Ht 5' 8" (1 727 m) Comment: as per last vital  Wt 106 kg (234 lb) Comment: w shoes  SpO2 97%   BMI 35 58 kg/m²     Physical Exam  Vitals and nursing note reviewed  Constitutional:       General: He is awake  He is not in acute distress  Appearance: He is well-developed, well-groomed and overweight  He is not ill-appearing, toxic-appearing or diaphoretic  HENT:      Head: Normocephalic and atraumatic  Comments: Hearing aids in place b/l     Right Ear: External ear normal       Left Ear: External ear normal       Nose: No rhinorrhea  Mouth/Throat:      Mouth: Mucous membranes are moist       Pharynx: Oropharynx is clear  Eyes:      General: No scleral icterus  Right eye: No discharge  Left eye: No discharge  Conjunctiva/sclera: Conjunctivae normal    Cardiovascular:      Rate and Rhythm: Normal rate  Rhythm irregular  Heart sounds: S1 normal and S2 normal    Pulmonary:      Effort: Pulmonary effort is normal  No respiratory distress  Breath sounds: No wheezing, rhonchi or rales  Comments: Decreased breath sounds throughout  Prolonged expiratory phase  Abdominal:      General: There is no distension  Palpations: Abdomen is soft  Tenderness: There is no abdominal tenderness  Musculoskeletal:         General: No deformity  Cervical back: No rigidity  Right lower leg: No edema  Left lower leg: No edema  Skin:     General: Skin is warm and dry  Coloration: Skin is not jaundiced or pale  Neurological:      General: No focal deficit present  Mental Status: He is alert  Mental status is at baseline  Cranial Nerves: No cranial nerve deficit (except [place])  Psychiatric:         Attention and Perception: Perception normal          Mood and Affect: Mood and affect normal          Speech: Speech normal          Behavior: Behavior normal  Behavior is cooperative  Cognition and Memory: He exhibits impaired recent memory  Pertinent Laboratory/Diagnostic Studies:  CMP, TSH, free T4, CBC- 6/4/21- see results in chart    Current Medications     Current Outpatient Medications:     atorvastatin (LIPITOR) 40 mg tablet, TAKE 1 TABLET BY MOUTH EVERY DAY, Disp: 90 tablet, Rfl: 1    benazepril (LOTENSIN) 40 MG tablet, Take 1 tablet (40 mg total) by mouth daily, Disp: 90 tablet, Rfl: 0    carvedilol (COREG) 25 mg tablet, TAKE 1 TABLET BY MOUTH TWICE A DAY WITH MEALS, Disp: 180 tablet, Rfl: 1    cholecalciferol (VITAMIN D3) 1,000 units tablet, Take 1 tablet (1,000 Units total) by mouth daily, Disp: 30 tablet, Rfl: 5    Eliquis 5 MG, TAKE 1 TABLET BY MOUTH TWICE A DAY, Disp: 60 tablet, Rfl: 3    finasteride (PROSCAR) 5 mg tablet, TAKE 1 TABLET BY MOUTH EVERY DAY, Disp: 90 tablet, Rfl: 3    Klor-Con M20 20 MEQ tablet, TAKE 1 TABLET BY MOUTH EVERY DAY, Disp: 90 tablet, Rfl: 1    latanoprost (XALATAN) 0 005 % ophthalmic solution, Apply 1 drop to eye, Disp: , Rfl:     levothyroxine 100 mcg tablet, Take 1 tablet (100 mcg total) by mouth daily, Disp: 90 tablet, Rfl: 1    torsemide (DEMADEX) 20 mg tablet, TAKE 1 AND 1/2 TABLETS DAILY BY MOUTH, Disp: 135 tablet, Rfl: 1    Health Maintenance     Immunization History   Administered Date(s) Administered    INFLUENZA 10/01/2013, 09/22/2014, 01/02/2015, 10/04/2018, 10/15/2019, 10/01/2020    Influenza Quadrivalent Preservative Free 3 years and older IM 10/15/2019    Influenza, high dose seasonal 0 7 mL 10/22/2020    Influenza, injectable, quadrivalent, preservative free 0 5 mL 10/15/2019    Influenza, seasonal, injectable 11/04/1927, 08/30/2011, 08/30/2011, 10/04/2017    Pneumococcal 11/01/2005    Pneumococcal Conjugate 13-Valent 02/18/2015, 02/08/2016    Pneumococcal Conjugate PCV 7 02/18/2015    Pneumococcal Polysaccharide PPV23 11/01/2005, 10/01/2012    Td (adult), Unspecified 01/13/2003    Td (adult), adsorbed 01/13/2003    Tdap 01/17/2013    Zoster 01/10/2013       Maida Parnell [address] ,   6/14/21

## 2021-07-23 ENCOUNTER — ANTICOAG VISIT (OUTPATIENT)
Dept: GERIATRICS | Facility: CLINIC | Age: 86
End: 2021-07-23

## 2021-07-25 DIAGNOSIS — I48.91 ATRIAL FIBRILLATION, UNSPECIFIED TYPE (HCC): ICD-10-CM

## 2021-07-26 RX ORDER — APIXABAN 5 MG/1
TABLET, FILM COATED ORAL
Qty: 60 TABLET | Refills: 1 | Status: SHIPPED | OUTPATIENT
Start: 2021-07-26 | End: 2021-09-27

## 2021-08-06 DIAGNOSIS — I25.10 CORONARY ARTERY DISEASE INVOLVING NATIVE CORONARY ARTERY OF NATIVE HEART WITHOUT ANGINA PECTORIS: ICD-10-CM

## 2021-08-06 DIAGNOSIS — I50.9 CHRONIC CONGESTIVE HEART FAILURE, UNSPECIFIED HEART FAILURE TYPE (HCC): ICD-10-CM

## 2021-08-06 DIAGNOSIS — I10 BENIGN ESSENTIAL HYPERTENSION: ICD-10-CM

## 2021-08-06 RX ORDER — POTASSIUM CHLORIDE 20 MEQ/1
TABLET, EXTENDED RELEASE ORAL
Qty: 90 TABLET | Refills: 1 | Status: SHIPPED | OUTPATIENT
Start: 2021-08-06 | End: 2022-02-03

## 2021-08-06 RX ORDER — CARVEDILOL 25 MG/1
TABLET ORAL
Qty: 180 TABLET | Refills: 0 | Status: SHIPPED | OUTPATIENT
Start: 2021-08-06 | End: 2021-11-08

## 2021-08-26 DIAGNOSIS — I50.9 CHRONIC CONGESTIVE HEART FAILURE, UNSPECIFIED HEART FAILURE TYPE (HCC): ICD-10-CM

## 2021-08-26 DIAGNOSIS — I25.10 CORONARY ARTERY DISEASE INVOLVING NATIVE CORONARY ARTERY OF NATIVE HEART WITHOUT ANGINA PECTORIS: ICD-10-CM

## 2021-08-26 DIAGNOSIS — I10 BENIGN ESSENTIAL HYPERTENSION: ICD-10-CM

## 2021-08-26 RX ORDER — BENAZEPRIL HYDROCHLORIDE 40 MG/1
TABLET, FILM COATED ORAL
Qty: 90 TABLET | Refills: 0 | Status: SHIPPED | OUTPATIENT
Start: 2021-08-26 | End: 2021-12-08

## 2021-08-26 RX ORDER — ATORVASTATIN CALCIUM 40 MG/1
TABLET, FILM COATED ORAL
Qty: 90 TABLET | Refills: 1 | Status: SHIPPED | OUTPATIENT
Start: 2021-08-26 | End: 2022-02-21

## 2021-08-26 RX ORDER — TORSEMIDE 20 MG/1
TABLET ORAL
Qty: 135 TABLET | Refills: 1 | Status: SHIPPED | OUTPATIENT
Start: 2021-08-26 | End: 2022-02-21

## 2021-09-13 ENCOUNTER — OFFICE VISIT (OUTPATIENT)
Dept: GERIATRICS | Facility: CLINIC | Age: 86
End: 2021-09-13
Payer: COMMERCIAL

## 2021-09-13 VITALS
BODY MASS INDEX: 35.24 KG/M2 | SYSTOLIC BLOOD PRESSURE: 126 MMHG | HEIGHT: 68 IN | HEART RATE: 65 BPM | DIASTOLIC BLOOD PRESSURE: 58 MMHG | OXYGEN SATURATION: 98 % | WEIGHT: 232.5 LBS | TEMPERATURE: 98 F | RESPIRATION RATE: 16 BRPM

## 2021-09-13 DIAGNOSIS — I48.91 ATRIAL FIBRILLATION, UNSPECIFIED TYPE (HCC): ICD-10-CM

## 2021-09-13 DIAGNOSIS — J30.2 SEASONAL ALLERGIC RHINITIS, UNSPECIFIED TRIGGER: ICD-10-CM

## 2021-09-13 DIAGNOSIS — I50.32 CHRONIC DIASTOLIC CONGESTIVE HEART FAILURE (HCC): ICD-10-CM

## 2021-09-13 DIAGNOSIS — E03.9 HYPOTHYROIDISM, UNSPECIFIED TYPE: Primary | ICD-10-CM

## 2021-09-13 PROCEDURE — 1036F TOBACCO NON-USER: CPT | Performed by: FAMILY MEDICINE

## 2021-09-13 PROCEDURE — 1160F RVW MEDS BY RX/DR IN RCRD: CPT | Performed by: FAMILY MEDICINE

## 2021-09-13 PROCEDURE — 99214 OFFICE O/P EST MOD 30 MIN: CPT | Performed by: FAMILY MEDICINE

## 2021-09-13 RX ORDER — FLUTICASONE PROPIONATE 50 MCG
1 SPRAY, SUSPENSION (ML) NASAL DAILY
Start: 2021-09-13

## 2021-09-13 NOTE — ASSESSMENT & PLAN NOTE
Wt Readings from Last 3 Encounters:   09/13/21 105 kg (232 lb 8 oz)   06/14/21 106 kg (234 lb)   12/09/20 108 kg (237 lb 2 oz)     Examines euvolemic; weight stable within 2lb over past 3 months  Continue torsemide, coreg, benazepril  CMP ordered to be drawn approx 12/7

## 2021-09-13 NOTE — ASSESSMENT & PLAN NOTE
Continue levothyroxine 100mcg daily  TSH and free T4 recently WNL June 2021- recheck approx 12/7 (6 months recheck)

## 2021-09-13 NOTE — ASSESSMENT & PLAN NOTE
Goal HR<100; remains rate controlled; continues on coreg  Continue anticoagulation with eliquis  CBC and CMP ordered to be drawn approx 12/7 (6 month recheck)

## 2021-09-13 NOTE — PROGRESS NOTES
[address] [facility]    NAME: Keshawn King  AGE: 80 y o  SEX: male    DATE OF ENCOUNTER: 9/13/21    Assessment and Plan     Hypothyroidism  Continue levothyroxine 100mcg daily  TSH and free T4 recently WNL June 2021- recheck approx 12/7 (6 months recheck)    Atrial fibrillation ([facility] [address] ) [I48 91]  Goal HR<100; remains rate controlled; continues on coreg  Continue anticoagulation with eliquis  CBC and CMP ordered to be drawn approx 12/7 (6 month recheck)    Seasonal allergic rhinitis  Start flonase one spray each nostril daily x2 weeks, then daily PRN    Chronic diastolic congestive heart failure ([facility] [address] )  Wt Readings from Last 3 Encounters:   09/13/21 105 kg (232 lb 8 oz)   06/14/21 106 kg (234 lb)   12/09/20 108 kg (237 lb 2 oz)     Examines euvolemic; weight stable within 2lb over past 3 months  Continue torsemide, coreg, benazepril  CMP ordered to be drawn approx 12/7    Orders Placed This Encounter   Procedures    TSH, 3rd generation    T4, free    CBC and differential    Comprehensive metabolic panel       Chief Complaint     Chief Complaint   Patient presents with    Follow-up     Patient is here for 3 month F/U       History of Present Illness     80year old male presents for routine follow up of chronic medical conditions  Presents with daughter who provides additional history  Labs from June 2021 reviewed and at baseline  Continues on levothyroxine for hypothyroidism  For atrial fibrillation, patient continues on carvedilol and anticoagulation with eliquis; due for CBC and CMP prior to next visit  Additionally with chronic diastolic CHF; weight stable within 2lb  Current regimen includes benazepril 40mg daily, carvedilol 25mg BID, torsemide 30mg daily  Patient notes rhinorrhea, clear, along with nasal congeston- occurs on an annual basis; no sore throat, post nasal drip, fever or chills   Has been going to the dining room for dinner and evening activities; using hearing aids more  MV is now helping with medications  The following portions of the patient's history were reviewed and updated as appropriate: allergies, current medications, past family history, past medical history, past social history, past surgical history and problem list     Review of Systems     Review of Systems   Constitutional: Negative for activity change, appetite change, chills, fever and unexpected weight change  HENT: Positive for congestion and rhinorrhea  Negative for sinus pressure and sinus pain  Respiratory: Negative for cough and shortness of breath  Cardiovascular: Negative for leg swelling  Genitourinary: Negative for difficulty urinating  Musculoskeletal: Negative for arthralgias  Neurological: Negative for dizziness and light-headedness  All other systems reviewed and are negative  Active Problem List     Patient Active Problem List   Diagnosis    Atrial fibrillation ([facility] [address] ) [I48 91]    Coronary artery disease    Benign essential hypertension    Bicuspid aortic valve    Chronic diastolic congestive heart failure ([facility] [address] )    Hypothyroidism    Obstructive sleep apnea    Chronic anticoagulation    Mild cognitive impairment with memory loss    Malignant neoplasm of urinary bladder (HCC)    Class 2 severe obesity with serious comorbidity in adult [facility])    Restrictive lung disease    Seasonal allergic rhinitis       Objective     /58 (BP Location: Left arm, Patient Position: Sitting, Cuff Size: Standard)   Pulse 65   Temp 98 °F (36 7 °C) (Temporal)   Resp 16   Ht 5' 8" (1 727 m)   Wt 105 kg (232 lb 8 oz)   SpO2 98%   BMI 35 35 kg/m²     Physical Exam  Vitals and nursing note reviewed  Constitutional:       General: He is awake  He is not in acute distress  Appearance: He is well-developed and well-groomed  He is obese  He is not ill-appearing, toxic-appearing or diaphoretic  HENT:      Head: Normocephalic and atraumatic        Right Ear: External ear normal       Left Ear: External ear normal       Ears:      Comments: Hearing aids b/l     Nose: Congestion and rhinorrhea (clear) present  Comments: Mask covering nose and mouth     Mouth/Throat:      Mouth: Mucous membranes are moist       Pharynx: Oropharynx is clear  Eyes:      General: No scleral icterus  Right eye: No discharge  Left eye: No discharge  Conjunctiva/sclera: Conjunctivae normal    Cardiovascular:      Rate and Rhythm: Normal rate  Rhythm irregular  Heart sounds: S1 normal and S2 normal    Pulmonary:      Effort: Pulmonary effort is normal  No respiratory distress  Breath sounds: No wheezing, rhonchi or rales  Comments: Decreased breath sounds b/l bases  Abdominal:      General: Bowel sounds are normal  There is no distension  Palpations: Abdomen is soft  Tenderness: There is no abdominal tenderness  Musculoskeletal:         General: No deformity  Cervical back: No rigidity  Right lower leg: No edema  Left lower leg: No edema  Lymphadenopathy:      Cervical: No cervical adenopathy  Upper Body:      Right upper body: No supraclavicular adenopathy  Left upper body: No supraclavicular adenopathy  Skin:     General: Skin is warm and dry  Capillary Refill: Capillary refill takes less than 2 seconds  Coloration: Skin is not jaundiced or pale  Comments: Multiple SK lesions throughout   Neurological:      Mental Status: He is alert  Mental status is at baseline  Cranial Nerves: No dysarthria or facial asymmetry  Psychiatric:         Attention and Perception: Attention and perception normal          Mood and Affect: Mood and affect normal          Speech: Speech normal          Behavior: Behavior is cooperative           Pertinent Laboratory/Diagnostic Studies:  CMP, TSH, T4, CBC (6/7/21)- see results in Epic chart    Current Medications     Current Outpatient Medications:     atorvastatin (LIPITOR) 40 mg tablet, TAKE ONE TABLET BY MOUTH AT BEDTIME, Disp: 90 tablet, Rfl: 1    benazepril (LOTENSIN) 40 MG tablet, TAKE ONE TABLET BY MOUTH EVERY DAY WITH LUNCH, Disp: 90 tablet, Rfl: 0    carvedilol (COREG) 25 mg tablet, TAKE ONE TABLET BY MOUTH TWICE DAILY AT NOON AND BEDTIME, Disp: 180 tablet, Rfl: 0    cholecalciferol (VITAMIN D3) 1,000 units tablet, Take 1 tablet (1,000 Units total) by mouth daily, Disp: 30 tablet, Rfl: 5    Eliquis 5 MG, TAKE ONE TABLET BY MOUTH TWICE DAILY AT NOON AND BEDTIME, Disp: 60 tablet, Rfl: 1    finasteride (PROSCAR) 5 mg tablet, TAKE 1 TABLET BY MOUTH EVERY DAY, Disp: 90 tablet, Rfl: 3    fluticasone (FLONASE) 50 mcg/act nasal spray, 1 spray into each nostril daily, Disp: , Rfl:     latanoprost (XALATAN) 0 005 % ophthalmic solution, Apply 1 drop to eye, Disp: , Rfl:     levothyroxine 100 mcg tablet, Take 1 tablet (100 mcg total) by mouth daily, Disp: 90 tablet, Rfl: 1    potassium chloride (K-DUR,KLOR-CON) 20 mEq tablet, TAKE ONE TABLET BY MOUTH EVERY DAY WITH LUNCH, Disp: 90 tablet, Rfl: 1    torsemide (DEMADEX) 20 mg tablet, TAKE 1 AND 1/2 TABLETS BY MOUTH EVERY DAY WITH LUNCH, Disp: 135 tablet, Rfl: 1    Health Maintenance     Immunization History   Administered Date(s) Administered    INFLUENZA 10/01/2013, 09/22/2014, 01/02/2015, 10/04/2018, 10/15/2019, 10/01/2020    Influenza Quadrivalent Preservative Free 3 years and older IM 10/15/2019    Influenza, high dose seasonal 0 7 mL 10/22/2020    Influenza, injectable, quadrivalent, preservative free 0 5 mL 10/15/2019    Influenza, seasonal, injectable 11/04/1927, 08/30/2011, 08/30/2011, 10/04/2017    Pneumococcal 11/01/2005    Pneumococcal Conjugate 13-Valent 02/18/2015, 02/08/2016    Pneumococcal Conjugate PCV 7 02/18/2015    Pneumococcal Polysaccharide PPV23 11/01/2005, 10/01/2012    Td (adult), Unspecified 01/13/2003    Td (adult), adsorbed 01/13/2003    Tdap 01/17/2013    Zoster 01/10/2013 [id number],   9/13/21

## 2021-09-13 NOTE — PATIENT INSTRUCTIONS
-Follow up in 3 months  -Have blood work drawn one week prior to next appointment (approx Tuesday December 7th)  -Will try flonase one spray each nostril once daily; try for two weeks, then can use daily as needed

## 2021-09-25 DIAGNOSIS — E03.9 HYPOTHYROIDISM, UNSPECIFIED TYPE: ICD-10-CM

## 2021-09-25 DIAGNOSIS — I48.91 ATRIAL FIBRILLATION, UNSPECIFIED TYPE (HCC): ICD-10-CM

## 2021-09-27 RX ORDER — LEVOTHYROXINE SODIUM 0.1 MG/1
TABLET ORAL
Qty: 90 TABLET | Refills: 1 | Status: SHIPPED | OUTPATIENT
Start: 2021-09-27 | End: 2022-03-28

## 2021-09-27 RX ORDER — APIXABAN 5 MG/1
TABLET, FILM COATED ORAL
Qty: 60 TABLET | Refills: 1 | Status: SHIPPED | OUTPATIENT
Start: 2021-09-27 | End: 2021-11-24

## 2021-11-05 DIAGNOSIS — I25.10 CORONARY ARTERY DISEASE INVOLVING NATIVE CORONARY ARTERY OF NATIVE HEART WITHOUT ANGINA PECTORIS: ICD-10-CM

## 2021-11-05 DIAGNOSIS — I10 BENIGN ESSENTIAL HYPERTENSION: ICD-10-CM

## 2021-11-08 RX ORDER — CARVEDILOL 25 MG/1
TABLET ORAL
Qty: 180 TABLET | Refills: 0 | Status: SHIPPED | OUTPATIENT
Start: 2021-11-08 | End: 2022-02-03

## 2021-11-23 DIAGNOSIS — I48.91 ATRIAL FIBRILLATION, UNSPECIFIED TYPE (HCC): ICD-10-CM

## 2021-11-23 DIAGNOSIS — E55.9 VITAMIN D DEFICIENCY: ICD-10-CM

## 2021-11-24 RX ORDER — APIXABAN 5 MG/1
TABLET, FILM COATED ORAL
Qty: 60 TABLET | Refills: 1 | Status: SHIPPED | OUTPATIENT
Start: 2021-11-24 | End: 2022-02-03

## 2021-11-24 RX ORDER — MELATONIN
Qty: 30 TABLET | Refills: 5 | Status: SHIPPED | OUTPATIENT
Start: 2021-11-24 | End: 2022-05-17

## 2021-12-06 DIAGNOSIS — I10 BENIGN ESSENTIAL HYPERTENSION: ICD-10-CM

## 2021-12-08 RX ORDER — BENAZEPRIL HYDROCHLORIDE 40 MG/1
TABLET, FILM COATED ORAL
Qty: 90 TABLET | Refills: 0 | Status: SHIPPED | OUTPATIENT
Start: 2021-12-08 | End: 2022-03-28

## 2021-12-13 ENCOUNTER — OFFICE VISIT (OUTPATIENT)
Dept: GERIATRICS | Facility: CLINIC | Age: 86
End: 2021-12-13
Payer: COMMERCIAL

## 2021-12-13 VITALS
SYSTOLIC BLOOD PRESSURE: 140 MMHG | BODY MASS INDEX: 33.64 KG/M2 | OXYGEN SATURATION: 99 % | WEIGHT: 235 LBS | DIASTOLIC BLOOD PRESSURE: 76 MMHG | TEMPERATURE: 97.8 F | HEIGHT: 70 IN | RESPIRATION RATE: 16 BRPM | HEART RATE: 65 BPM

## 2021-12-13 DIAGNOSIS — E03.9 HYPOTHYROIDISM, UNSPECIFIED TYPE: ICD-10-CM

## 2021-12-13 DIAGNOSIS — I25.10 CORONARY ARTERY DISEASE INVOLVING NATIVE CORONARY ARTERY OF NATIVE HEART WITHOUT ANGINA PECTORIS: ICD-10-CM

## 2021-12-13 DIAGNOSIS — J30.2 SEASONAL ALLERGIC RHINITIS, UNSPECIFIED TRIGGER: ICD-10-CM

## 2021-12-13 DIAGNOSIS — I10 BENIGN ESSENTIAL HYPERTENSION: ICD-10-CM

## 2021-12-13 DIAGNOSIS — J98.4 RESTRICTIVE LUNG DISEASE: ICD-10-CM

## 2021-12-13 DIAGNOSIS — I48.91 ATRIAL FIBRILLATION, UNSPECIFIED TYPE (HCC): ICD-10-CM

## 2021-12-13 DIAGNOSIS — I50.32 CHRONIC DIASTOLIC CONGESTIVE HEART FAILURE (HCC): ICD-10-CM

## 2021-12-13 DIAGNOSIS — G47.33 OBSTRUCTIVE SLEEP APNEA: ICD-10-CM

## 2021-12-13 DIAGNOSIS — G31.84 MILD COGNITIVE IMPAIRMENT WITH MEMORY LOSS: Primary | ICD-10-CM

## 2021-12-13 PROCEDURE — 99214 OFFICE O/P EST MOD 30 MIN: CPT | Performed by: NURSE PRACTITIONER

## 2022-01-04 ENCOUNTER — TELEPHONE (OUTPATIENT)
Dept: GERIATRICS | Age: 87
End: 2022-01-04

## 2022-01-04 NOTE — TELEPHONE ENCOUNTER
Patient's daughter, Symone Tellez ([phone]) called to inquire about lab results from blood drawn prior to the patient's appointment on 12/13/21  Results are not documented in patient's EPIC chart  Called also noted we are to be expecting a fax from [facility] for the patient's CPAP supplies

## 2022-01-04 NOTE — TELEPHONE ENCOUNTER
Received faxed lab results from [facility] today from specimen collected 12/10/21  Media scanned results into patient's EPIC chart

## 2022-01-04 NOTE — TELEPHONE ENCOUNTER
Patient has [facility] orders in Epic dating back as far as 7/2/2021 that is shown up as Active-Needs to be collected  As well as lab orders for CBC and differential, T4, free, TSH, 3rd generation that has not been collected   The last time patient had labs(CMP, T4,TSH,CBC) completed was on [email or web address]

## 2022-01-04 NOTE — TELEPHONE ENCOUNTER
Thank you  I called the daughter and left a voicemail informing her that patient told me that he had it done but actually did not  He is forgetful

## 2022-01-26 ENCOUNTER — OFFICE VISIT (OUTPATIENT)
Dept: GERIATRICS | Facility: CLINIC | Age: 87
End: 2022-01-26
Payer: COMMERCIAL

## 2022-01-26 VITALS
WEIGHT: 230.38 LBS | RESPIRATION RATE: 16 BRPM | HEART RATE: 64 BPM | TEMPERATURE: 97.2 F | DIASTOLIC BLOOD PRESSURE: 68 MMHG | BODY MASS INDEX: 33.53 KG/M2 | OXYGEN SATURATION: 97 % | SYSTOLIC BLOOD PRESSURE: 142 MMHG

## 2022-01-26 DIAGNOSIS — D22.9 CHANGE IN MOLE: Primary | ICD-10-CM

## 2022-01-26 DIAGNOSIS — G31.84 MILD COGNITIVE IMPAIRMENT WITH MEMORY LOSS: ICD-10-CM

## 2022-01-26 PROBLEM — L81.9 PIGMENTED SKIN LESION SUSPICIOUS FOR MALIGNANT NEOPLASM: Status: RESOLVED | Noted: 2022-01-26 | Resolved: 2022-01-26

## 2022-01-26 PROBLEM — L81.9 PIGMENTED SKIN LESION SUSPICIOUS FOR MALIGNANT NEOPLASM: Status: ACTIVE | Noted: 2022-01-26

## 2022-01-26 PROCEDURE — 1160F RVW MEDS BY RX/DR IN RCRD: CPT | Performed by: NURSE PRACTITIONER

## 2022-01-26 PROCEDURE — 99213 OFFICE O/P EST LOW 20 MIN: CPT | Performed by: NURSE PRACTITIONER

## 2022-01-26 NOTE — ASSESSMENT & PLAN NOTE
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

## 2022-01-26 NOTE — PROGRESS NOTES
[address]  POS: [facility]     NAME: Sarika Horner  AGE: 80 y o  SEX: male    DATE OF ENCOUNTER: 1/26/2022    Assessment and Plan     80year-old male with:    Change in mole  Patient has a dark brown, raised, irregularly shaped, and rough mole noted left upper posterior arm  Patient stated that he scratched it  Surrounding skin reveals signs of some trauma from patient scratching but with signs of epithelialization and no signs of infection noted  Spoke to patient's daughter via phone regarding finding and recommended that patient see a dermatologist   Patient's daughter stated that she will be making the appointment    Mild cognitive impairment with memory loss  With short-term memory loss  Patient's long-term memory is intact  He enjoys talking about what he did for living and his passion for computers  Continue personal care assistance with medication management      Chief Complaint     Chief Complaint   Patient presents with    Laceration     Patient has a mole that was bleeding on the upper back part of his left arm  History of Present Illness     80year-old male who presents in the office today for evaluation of mole left upper arm  Patient stated that he scratched the area  Patient denies having pain or discomfort or itching to left upper extremity  Patient has multiple moles throughout body  A suspicious looking mole measuring approximately 0 5 centimeter noted left upper posterior arm  Patient denies itching, pain or discomfort at site  No signs of infection noted  Spoke to patient's daughter via phone regarding plan of care  Patient's daughter will be making the appointment for patient to see Dermatology      The following portions of the patient's history were reviewed and updated as appropriate: allergies, current medications, past family history, past medical history, past social history, past surgical history and problem list     Review of Systems     Review of Systems   Constitutional: Negative for appetite change and chills  HENT: Positive for hearing loss  Negative for congestion  Eyes: Positive for visual disturbance  Respiratory: Negative for cough and shortness of breath  Cardiovascular: Negative for chest pain  Gastrointestinal: Negative for abdominal pain  Genitourinary: Negative for difficulty urinating  Musculoskeletal: Positive for gait problem  Neurological:        Memory loss   Psychiatric/Behavioral: Negative for behavioral problems and dysphoric mood  The patient is not nervous/anxious  Active Problem List     Patient Active Problem List   Diagnosis    [facility] [I48 91]    Coronary artery disease    Benign essential hypertension    Bicuspid aortic valve    Chronic diastolic congestive heart failure ([facility]    Hypothyroidism    Obstructive sleep apnea    Chronic anticoagulation    Mild cognitive impairment with memory loss    Malignant neoplasm of urinary bladder (HCC)    Class 2 severe obesity with serious comorbidity in adult [facility])    Restrictive lung disease    Seasonal allergic rhinitis    Change in mole       Objective     /68 (BP Location: Right arm, Patient Position: Sitting, Cuff Size: Standard)   Pulse 64   Temp (!) 97 2 °F (36 2 °C) (Temporal)   Resp 16   Wt 104 kg (230 lb 6 oz) Comment: with shoes  SpO2 97%   BMI 33 53 kg/m²     Physical Exam  Vitals and nursing note reviewed  Constitutional:       General: He is not in acute distress  Appearance: He is not ill-appearing, toxic-appearing or diaphoretic  Comments: Elderly male who appears in no distress  HENT:      Head: Normocephalic  Ears:      Comments: Wears bilateral hearing aids  Nose: No congestion or rhinorrhea  Mouth/Throat:      Mouth: Mucous membranes are moist       Pharynx: No oropharyngeal exudate     Eyes:      General: No scleral icterus  Right eye: No discharge  Left eye: No discharge  Extraocular Movements: Extraocular movements intact  Conjunctiva/sclera: Conjunctivae normal       Pupils: Pupils are equal, round, and reactive to light  Comments: Wears glasses  Cardiovascular:      Rate and Rhythm: Normal rate and regular rhythm  Pulses: Normal pulses  Pulmonary:      Effort: Pulmonary effort is normal  No respiratory distress  Breath sounds: Normal breath sounds  No wheezing, rhonchi or rales  Abdominal:      General: Bowel sounds are normal  There is no distension  Palpations: Abdomen is soft  Tenderness: There is no abdominal tenderness  There is no guarding  Musculoskeletal:      Cervical back: Neck supple  No rigidity  Right lower leg: No edema  Left lower leg: No edema  Comments: Moves all 4 extremities  Uses a walker to ambulate  Lymphadenopathy:      Cervical: No cervical adenopathy  Skin:     General: Skin is warm and dry  Capillary Refill: Capillary refill takes less than 2 seconds  Findings: Lesion present  Comments: Suspicious mole left upper arm  Neurological:      Mental Status: He is alert  Mental status is at baseline  Gait: Gait abnormal       Comments: Alert and oriented to self and place  With short-term memory loss  Psychiatric:         Mood and Affect: Mood normal          Behavior: Behavior normal          Thought Content:  Thought content normal        Pertinent Laboratory/Diagnostic Studies:    Current Medications     Current Outpatient Medications:     atorvastatin (LIPITOR) 40 mg tablet, TAKE ONE TABLET BY MOUTH AT BEDTIME, Disp: 90 tablet, Rfl: 1    benazepril (LOTENSIN) 40 MG tablet, TAKE ONE TABLET BY MOUTH EVERY DAY WITH LUNCH, Disp: 90 tablet, Rfl: 0    carvedilol (COREG) 25 mg tablet, TAKE ONE TABLET BY MOUTH TWICE DAILY AT NOON AND BEDTIME, Disp: 180 tablet, Rfl: 0    cholecalciferol (VITAMIN D3) 1,000 units tablet, TAKE ONE TABLET BY MOUTH EVERY DAY WITH LUNCH, Disp: 30 tablet, Rfl: 5    Eliquis 5 MG, TAKE ONE TABLET BY MOUTH TWICE DAILY AT NOON AND BEDTIME, Disp: 60 tablet, Rfl: 1    finasteride (PROSCAR) 5 mg tablet, TAKE 1 TABLET BY MOUTH EVERY DAY, Disp: 90 tablet, Rfl: 3    fluticasone (FLONASE) 50 mcg/act nasal spray, 1 spray into each nostril daily, Disp: , Rfl:     latanoprost (XALATAN) 0 005 % ophthalmic solution, Apply 1 drop to eye, Disp: , Rfl:     levothyroxine 100 mcg tablet, TAKE ONE TABLET BY MOUTH AT BEDTIME, Disp: 90 tablet, Rfl: 1    potassium chloride (K-DUR,KLOR-CON) 20 mEq tablet, TAKE ONE TABLET BY MOUTH EVERY DAY WITH LUNCH, Disp: 90 tablet, Rfl: 1    torsemide (DEMADEX) 20 mg tablet, TAKE 1 AND 1/2 TABLETS BY MOUTH EVERY DAY WITH LUNCH, Disp: 135 tablet, Rfl: 1    Health Maintenance     Health Maintenance   Topic Date Due    Medicare Annual Wellness Visit (AWV)  Never done    Fall Risk  02/28/2021    Depression Screening  02/28/2021    Influenza Vaccine (1) 09/01/2021    BMI: Followup Plan  06/14/2022    BMI: Adult  12/13/2022    DTaP,Tdap,and Td Vaccines (2 - Td or Tdap) 01/17/2023    Pneumococcal Vaccine: 65+ Years  Completed    COVID-19 Vaccine  Completed    HIB Vaccine  Aged Out    Hepatitis B Vaccine  Aged Out    IPV Vaccine  Aged Out    Hepatitis A Vaccine  Aged Out    Meningococcal ACWY Vaccine  Aged Out    HPV Vaccine  Aged Out     Immunization History   Administered Date(s) Administered    COVID-19 MODERNA VACC 0 5 ML IM 01/21/2021, 02/23/2021, 11/16/2021    INFLUENZA 10/01/2013, 09/22/2014, 01/02/2015, 10/04/2018, 10/15/2019, 10/01/2020    Influenza Quadrivalent Preservative Free 3 years and older IM 10/15/2019    Influenza, high dose seasonal 0 7 mL 10/22/2020    Influenza, injectable, quadrivalent, preservative free 0 5 mL 10/15/2019    Influenza, seasonal, injectable 11/04/1927, 08/30/2011, 08/30/2011, 10/04/2017    Pneumococcal 11/01/2005    Pneumococcal Conjugate 13-Valent 02/18/2015, 02/08/2016    Pneumococcal Conjugate PCV 7 02/18/2015    Pneumococcal Polysaccharide PPV23 11/01/2005, 10/01/2012    Td (adult), Unspecified 01/13/2003    Td (adult), adsorbed 01/13/2003    Tdap 01/17/2013    Zoster 01/10/2013     This note was completed in part utilizing [employer] direct voice recognition software  Grammatical errors, random word insertion, spelling mistakes, and incomplete sentences may be an occasional consequence of the system secondary to software limitations, ambient noise and hardware issues  At the time of dictation, efforts were made to edit, clarify and/or correct errors  Please read the chart carefully and recognize, using context, where substitutions have occurred  If you have any questions or concerns about the context, text or information contained within the body of this dictation, please contact myself, the provider, for further clarification      Nelson Jacob [address], [address] Jon[address] Velazco  1/26/2022 12:36 PM

## 2022-01-26 NOTE — ASSESSMENT & PLAN NOTE
Patient has a dark brown, raised, irregularly shaped, and rough mole noted left upper posterior arm  Patient stated that he scratched it    Surrounding skin reveals signs of some trauma from patient scratching but with signs of epithelialization and no signs of infection noted  Spoke to patient's daughter via phone regarding finding and recommended that patient see a dermatologist   Patient's daughter stated that she will be making the appointment

## 2022-01-26 NOTE — PATIENT INSTRUCTIONS
You were here for evaluation of mole on left upper arm  The mole is dark in color, irregularly shaped, and rough surface which is suspicious for skin cancer  Recommend that you see Dermatology ASAP    Spoke to your daughter Marilin Tee via phone and she stated that she will be making the appointment for you  Return to office for routine follow-up as scheduled

## 2022-02-03 DIAGNOSIS — I10 BENIGN ESSENTIAL HYPERTENSION: ICD-10-CM

## 2022-02-03 DIAGNOSIS — I25.10 CORONARY ARTERY DISEASE INVOLVING NATIVE CORONARY ARTERY OF NATIVE HEART WITHOUT ANGINA PECTORIS: ICD-10-CM

## 2022-02-03 DIAGNOSIS — I48.91 ATRIAL FIBRILLATION, UNSPECIFIED TYPE (HCC): ICD-10-CM

## 2022-02-03 DIAGNOSIS — I50.9 CHRONIC CONGESTIVE HEART FAILURE, UNSPECIFIED HEART FAILURE TYPE (HCC): ICD-10-CM

## 2022-02-03 RX ORDER — APIXABAN 5 MG/1
TABLET, FILM COATED ORAL
Qty: 60 TABLET | Refills: 1 | Status: SHIPPED | OUTPATIENT
Start: 2022-02-03 | End: 2022-03-28

## 2022-02-03 RX ORDER — CARVEDILOL 25 MG/1
TABLET ORAL
Qty: 180 TABLET | Refills: 1 | Status: SHIPPED | OUTPATIENT
Start: 2022-02-03 | End: 2022-07-25

## 2022-02-03 RX ORDER — POTASSIUM CHLORIDE 20 MEQ/1
TABLET, EXTENDED RELEASE ORAL
Qty: 90 TABLET | Refills: 1 | Status: SHIPPED | OUTPATIENT
Start: 2022-02-03 | End: 2022-07-25

## 2022-02-09 ENCOUNTER — OFFICE VISIT (OUTPATIENT)
Dept: DERMATOLOGY | Facility: CLINIC | Age: 87
End: 2022-02-09
Payer: COMMERCIAL

## 2022-02-09 VITALS — WEIGHT: 231 LBS | HEIGHT: 71 IN | BODY MASS INDEX: 32.34 KG/M2 | TEMPERATURE: 98.8 F

## 2022-02-09 DIAGNOSIS — L82.1 SEBORRHEIC KERATOSIS: Primary | ICD-10-CM

## 2022-02-09 DIAGNOSIS — D48.5 NEOPLASM OF UNCERTAIN BEHAVIOR OF SKIN: ICD-10-CM

## 2022-02-09 DIAGNOSIS — L57.8 ACTINIC SKIN DAMAGE: ICD-10-CM

## 2022-02-09 PROCEDURE — 88305 TISSUE EXAM BY PATHOLOGIST: CPT | Performed by: STUDENT IN AN ORGANIZED HEALTH CARE EDUCATION/TRAINING PROGRAM

## 2022-02-09 PROCEDURE — 99204 OFFICE O/P NEW MOD 45 MIN: CPT | Performed by: DERMATOLOGY

## 2022-02-09 PROCEDURE — 11102 TANGNTL BX SKIN SINGLE LES: CPT | Performed by: DERMATOLOGY

## 2022-02-09 PROCEDURE — 1036F TOBACCO NON-USER: CPT | Performed by: DERMATOLOGY

## 2022-02-09 PROCEDURE — 1160F RVW MEDS BY RX/DR IN RCRD: CPT | Performed by: DERMATOLOGY

## 2022-02-09 NOTE — PATIENT INSTRUCTIONS
o     1  SEBORRHEIC KERATOSIS; NON-INFLAMED    Physical Exam:   Anatomic Location Affected:  Scalp, trunk and extremities   Morphological Description:  Flat and raised, waxy, smooth to warty textured, yellow to brownish-grey to dark brown to blackish, discrete, "stuck-on" appearing papules  Greater than 1000 SKs      Additional History of Present Condition:  Patient reports new bumps on the skin  Denies itch, burn, pain, bleeding or ulceration  Present constantly; nothing seems to make it worse or better  No prior treatment  Assessment and Plan:  Based on a thorough discussion of this condition and the management approach to it (including a comprehensive discussion of the known risks, side effects and potential benefits of treatment), the patient (family) agrees to implement the following specific plan:   Reassure benign    Seborrheic Keratosis  A seborrheic keratosis is a harmless warty spot that appears during adult life as a common sign of skin aging  Seborrheic keratoses can arise on any area of skin, covered or uncovered, with the usual exception of the palms and soles  They do not arise from mucous membranes  Seborrheic keratoses can have highly variable appearance  Seborrheic keratoses are extremely common  It has been estimated that over 90% of adults over the age of 61 years have one or more of them  They occur in males and females of all races, typically beginning to erupt in the 35s or 45s  They are uncommon under the age of 21 years  The precise cause of seborrhoeic keratoses is not known  Seborrhoeic keratoses are considered degenerative in nature  As time goes by, seborrheic keratoses tend to become more numerous  Some people inherit a tendency to develop a very large number of them; some people may have hundreds of them      The name "seborrheic keratosis" is misleading, because these lesions are not limited to a seborrhoeic distribution (scalp, mid-face, chest, upper back), nor are they formed from sebaceous glands, nor are they associated with sebum -- which is greasy  Seborrheic keratosis may also be called "SK," "Seb K," "basal cell papilloma," "senile wart," or "barnacle "      Researchers have noted:   Eruptive seborrhoeic keratoses can follow sunburn or dermatitis   Skin friction may be the reason they appear in body folds   Viral cause (e g , human papillomavirus) seems unlikely   Stable and clonal mutations or activation of FRFR3, PIK3CA, BRANDEE, AKT1 and EGFR genes are found in seborrhoeic keratoses   Seborrhoeic keratosis can arise from solar lentigo   FRFR3 mutations also arise in solar lentigines  These mutations are associated with increased age and location on the head and neck, suggesting a role of ultraviolet radiation in these lesions   Seborrheic keratoses do not harbour tumour suppressor gene mutations   Epidermal growth factor receptor inhibitors, which are used to treat some cancers, often result in an increase in verrucal (warty) keratoses  There is no easy way to remove multiple lesions on a single occasion  Unless a specific lesion is "inflamed" and is causing pain or stinging/burning or is bleeding, most insurance companies do not authorize treatment      2  ACTINIC DAMAGE (Chronic Ultraviolet Radiation Exposure)    Physical Exam:   Anatomic Location Affected:  Scalp   Morphological Description:  Mottled (hyper- and hypo-pigmented), slightly atrophic skin with overlying telangiectasia   Pertinent Positives:   Pertinent Negatives:No regional lymphadenopathy    Assessment and Plan:  Based on a thorough discussion of this condition and the management approach to it (including a comprehensive discussion of the known risks, side effects and potential benefits of treatment), the patient (family) agrees to implement the following specific plan:   Recommend sun protection SPF 30 or higher     Photo-aging and actinic damage of skin is common on sites repeatedly exposed to the sun, especially the backs of the hands and the face, most often affecting the ears, nose, cheeks, upper lip, vermilion of the lower lip, temples, forehead and balding scalp  In severely chronically sun-exposed individuals, this condition may also be found on the upper trunk, upper and lower limbs, and dorsum of feet  Photo-aging induces cutaneous changes that vary among individuals, reflecting inherent differences in vulnerability to sun exposure and repair capacity  We discussed further steps to minimize or avoid UV exposure:     Be aware of daily UV index levels  In the [facility], this index is often reported on the [address]   Avoid outdoor activities during the middle of the day   Wear sun-protective clothing (e g , UPF-rated, broad-brimmed hats, long sleeves, and trousers or skirts)   Apply a high sun protection factor (60+) broad-spectrum sunscreen moisturizer at least three times a day to affected areas, year-round  I recommended Neutrogena Daily Defense or CeraVe AM or Aveeno   Do not smoke, and where possible, avoid exposure to pollutants   Get plenty of exercise -- active people appear younger than inactive people   Eat fruit and vegetables daily   Many oral supplements with antioxidant and anti-inflammatory properties have been advocated to mitigate skin aging and to improve skin health  These include carotenoids; polyphenols; chlorophyll; aloe vera; vitamins B, C, and E; red ginseng; squalene; and omega-3 fatty acids  Their role in combatting skin aging is unclear  3 NEOPLASM OF UNCERTAIN BEHAVIOR OF SKIN    Physical Exam:   (Anatomic Location); (Size and Morphological Description); (Differential Diagnosis):  o Left upper arm; 1 cm crusted nodule; Differential diagnosis: Seborrheic keratosis vs  Squamous cell carcinoma        III   POST-PROCEDURAL CARE (WHAT YOU WILL NEED TO DO "AFTER THE BIOPSY" TO OPTIMIZE HEALING)     Keep the area clean and dry   Try NOT to remove the bandage or get it wet for the first 24 hours   Gently clean the area and apply surgical ointment (such as Vaseline petrolatum ointment, which is available "over the counter" and not a prescription) to the biopsy site for up to 2 weeks straight  This acts to protect the wound from the outside world   Sutures are not usually placed in this procedure  If any sutures were placed, return for suture removal as instructed (generally 1 week for the face, 2 weeks for the body)   Take Acetaminophen (Tylenol) for discomfort, if no contraindications  Ibuprofen or aspirin could make bleeding worse   Call our office immediately for signs of infection: fever, chills, increased redness, warmth, tenderness, discomfort/pain, or pus or foul smell coming from the wound  WHAT TO DO IF THERE IS ANY BLEEDING? If a small amount of bleeding is noticed, place a clean cloth over the area and apply firm pressure for ten minutes  Check the wound after 10 minutes of direct pressure  If bleeding persists, try one more time for an additional 10 minutes of direct pressure on the area  If the bleeding becomes heavier or does not stop after the second attempt, or if you have any other questions about this procedure, then please call your [facility]'s Dermatologist by calling [phone] (SKIN)

## 2022-02-09 NOTE — PROGRESS NOTES
[address] [facility] Note     Patient Name: Lizeth Clements  Encounter Date: 02/09/2022     Have you been cared for by a [facility] Dermatologist in the last 3 years and, if so, which one? No    · Have you traveled outside of the [address] [place] [address] in the past 3 months or outside of the [facility] area in the last 2 weeks? No     May we call your Preferred Phone number to discuss your specific medical information? Yes     May we leave a detailed message that includes your specific medical information? Yes      Today's Chief Concerns:   Concern #1:  Skin check   Concern #2:  Lesion on left upper arm    Past Medical History:  Have you personally ever had or currently have any of the following? · Skin cancer (such as Melanoma, Basal Cell Carcinoma, Squamous Cell Carcinoma? (If Yes, please provide more detail)- No  · Eczema: No  · Psoriasis: No  · HIV/AIDS: No  · Hepatitis B or C: No  · Tuberculosis: No  · Systemic Immunosuppression such as Diabetes, Biologic or Immunotherapy, Chemotherapy, Organ Transplantation, Bone Marrow Transplantation (If YES, please provide more detail): No  · Radiation Treatment (If YES, please provide more detail): YES, for bladder cancer  · Any other major medical conditions/concerns? (If Yes, which types)- No    Social History:     What is/was your primary occupation? retired     What are your hobbies/past-times? Family History:  Have any of your "first degree relatives" (parent, brother, sister, or child) had any of the following       · Skin cancer such as Melanoma or Merkel Cell Carcinoma or Pancreatic Cancer? No  · Eczema, Asthma, Hay Fever or Seasonal Allergies: No  · Psoriasis or Psoriatic Arthritis: No  · Do any other medical conditions seem to run in your family? If Yes, what condition and which relatives?   No    Current Medications:   (please update all dermatological medications before printing patient's AVS!)      Current Outpatient Medications:     atorvastatin (LIPITOR) 40 mg tablet, TAKE ONE TABLET BY MOUTH AT BEDTIME, Disp: 90 tablet, Rfl: 1    benazepril (LOTENSIN) 40 MG tablet, TAKE ONE TABLET BY MOUTH EVERY DAY WITH LUNCH, Disp: 90 tablet, Rfl: 0    carvedilol (COREG) 25 mg tablet, TAKE ONE TABLET BY MOUTH TWICE DAILY AT NOON AND BEDTIME, Disp: 180 tablet, Rfl: 1    cholecalciferol (VITAMIN D3) 1,000 units tablet, TAKE ONE TABLET BY MOUTH EVERY DAY WITH LUNCH, Disp: 30 tablet, Rfl: 5    Eliquis 5 MG, TAKE ONE TABLET BY MOUTH TWICE DAILY AT NOON AND BEDTIME, Disp: 60 tablet, Rfl: 1    finasteride (PROSCAR) 5 mg tablet, TAKE 1 TABLET BY MOUTH EVERY DAY, Disp: 90 tablet, Rfl: 3    fluticasone (FLONASE) 50 mcg/act nasal spray, 1 spray into each nostril daily, Disp: , Rfl:     latanoprost (XALATAN) 0 005 % ophthalmic solution, Apply 1 drop to eye, Disp: , Rfl:     levothyroxine 100 mcg tablet, TAKE ONE TABLET BY MOUTH AT BEDTIME, Disp: 90 tablet, Rfl: 1    potassium chloride (K-DUR,KLOR-CON) 20 mEq tablet, TAKE ONE TABLET BY MOUTH EVERY DAY WITH LUNCH, Disp: 90 tablet, Rfl: 1    torsemide (DEMADEX) 20 mg tablet, TAKE 1 AND 1/2 TABLETS BY MOUTH EVERY DAY WITH LUNCH, Disp: 135 tablet, Rfl: 1      Review of Systems:  Have you recently had or currently have any of the following? If YES, what are you doing for the problem? · Fever, chills or unintended weight loss: No  · Sudden loss or change in your vision: No  · Nausea, vomiting or blood in your stool: No  · Painful or swollen joints: No  · Wheezing or cough: YES, slight cough and mild wheezing  · Changing mole or non-healing wound: YES, Left upper arm  · Nosebleeds: No  · Excessive sweating: No  · Easy or prolonged bleeding? YES, patient is on eliquis  · Over the last 2 weeks, how often have you been bothered by the following problems?   · Taking little interest or pleasure in doing things: 1 - Not at All  · Feeling down, depressed, or hopeless: 1 - Not at All  · Rapid heartbeat with epinephrine:  No    · FEMALES ONLY:    · Are you pregnant or planning to become pregnant? N/A  · Are you currently or planning to be nursing or breast feeding? N/A    · Any known allergies? · No Known Allergies      Physical Exam:     Was a chaperone (Derm Clinical Assistant) present throughout the entire Physical Exam? Yes     Did the Dermatology Team specifically  the patient on the importance of a Full Skin Exam to be sure that nothing is missed clinically? Yes}  o Did the patient ultimately request or accept a Full Skin Exam?  Yes  o Did the patient specifically refuse to have the areas "under-the-underwear" examined by the Dermatologist? No    CONSTITUTIONAL:   Vitals:    02/09/22 0843   Temp: 98 8 °F (37 1 °C)   TempSrc: Temporal   Weight: 105 kg (231 lb)   Height: 5' 11" (1 803 m)       PSYCH: Normal mood and affect  EYES: Normal conjunctiva  ENT: Normal lips and oral mucosa  CARDIOVASCULAR: No edema  RESPIRATORY: Normal respirations  HEME/LYMPH/IMMUNO:  No regional lymphadenopathy except as noted below in "ASSESSMENT AND PLAN BY DIAGNOSIS"    SKIN:  FULL ORGAN SYSTEM EXAM   Hair, Scalp, Ears, Face Normal except as noted below in Assessment   Neck, Cervical Chain Nodes Normal except as noted below in Assessment   Right Arm/Hand/Fingers Normal except as noted below in Assessment   Left Arm/Hand/Fingers Normal except as noted below in Assessment   Chest/Breasts/Axillae Viewed areas Normal except as noted below in Assessment   Abdomen, Umbilicus Normal except as noted below in Assessment   Back/Spine Normal except as noted below in Assessment       Right Leg, Foot, Toes Normal except as noted below in Assessment   Left Leg, Foot, Toes Normal except as noted below in Assessment        Assessment and Plan by Diagnosis:    History of Present Condition:     Duration:  How long has this been an issue for you?    o  Unsure   Location Affected:  Where on the body is this affecting you? o  Left upper arm   Quality:  Is there any bleeding, pain, itch, burning/irritation, or redness associated with the skin lesion?    o  Denies   Severity:  Describe any bleeding, pain, itch, burning/irritation, or redness on a scale of 1 to 10 (with 10 being the worst)  o  1   Timing:  Does this condition seem to be there pretty constantly or do you notice it more at specific times throughout the day?    o  Intermittently   Context:  Have you ever noticed that this condition seems to be associated with specific activities you do?    o  Denies   Modifying Factors:    o Anything that seems to make the condition worse?    -  Denies  o What have you tried to do to make the condition better? -  Denies   Associated Signs and Symptoms:  Does this skin lesion seem to be associated with any of the following:  o  SL AMB DERM SIGNS AND SYMPTOMS: Skin color changes     1  SEBORRHEIC KERATOSIS; NON-INFLAMED    Physical Exam:   Anatomic Location Affected:  Scalp, trunk and extremities   Morphological Description:  Flat and raised, waxy, smooth to warty textured, yellow to brownish-grey to dark brown to blackish, discrete, "stuck-on" appearing papules  Greater than 1000 SKs      Additional History of Present Condition:  Patient reports new bumps on the skin  Denies itch, burn, pain, bleeding or ulceration  Present constantly; nothing seems to make it worse or better  No prior treatment  Assessment and Plan:  Based on a thorough discussion of this condition and the management approach to it (including a comprehensive discussion of the known risks, side effects and potential benefits of treatment), the patient (family) agrees to implement the following specific plan:   Reassure benign    Seborrheic Keratosis  A seborrheic keratosis is a harmless warty spot that appears during adult life as a common sign of skin aging    Seborrheic keratoses can arise on any area of skin, covered or uncovered, with the usual exception of the palms and soles  They do not arise from mucous membranes  Seborrheic keratoses can have highly variable appearance  Seborrheic keratoses are extremely common  It has been estimated that over 90% of adults over the age of 61 years have one or more of them  They occur in males and females of all races, typically beginning to erupt in the 35s or 45s  They are uncommon under the age of 21 years  The precise cause of seborrhoeic keratoses is not known  Seborrhoeic keratoses are considered degenerative in nature  As time goes by, seborrheic keratoses tend to become more numerous  Some people inherit a tendency to develop a very large number of them; some people may have hundreds of them  The name "seborrheic keratosis" is misleading, because these lesions are not limited to a seborrhoeic distribution (scalp, mid-face, chest, upper back), nor are they formed from sebaceous glands, nor are they associated with sebum -- which is greasy  Seborrheic keratosis may also be called "SK," "Seb K," "basal cell papilloma," "senile wart," or "barnacle "      Researchers have noted:   Eruptive seborrhoeic keratoses can follow sunburn or dermatitis   Skin friction may be the reason they appear in body folds   Viral cause (e g , human papillomavirus) seems unlikely   Stable and clonal mutations or activation of FRFR3, PIK3CA, BRANDEE, AKT1 and EGFR genes are found in seborrhoeic keratoses   Seborrhoeic keratosis can arise from solar lentigo   FRFR3 mutations also arise in solar lentigines  These mutations are associated with increased age and location on the head and neck, suggesting a role of ultraviolet radiation in these lesions   Seborrheic keratoses do not harbour tumour suppressor gene mutations   Epidermal growth factor receptor inhibitors, which are used to treat some cancers, often result in an increase in verrucal (warty) keratoses      There is no easy way to remove multiple lesions on a single occasion  Unless a specific lesion is "inflamed" and is causing pain or stinging/burning or is bleeding, most insurance companies do not authorize treatment  2  ACTINIC DAMAGE (Chronic Ultraviolet Radiation Exposure)    Physical Exam:   Anatomic Location Affected:  Scalp   Morphological Description:  Mottled (hyper- and hypo-pigmented), slightly atrophic skin with overlying telangiectasia   Pertinent Positives:   Pertinent Negatives:No regional lymphadenopathy    Assessment and Plan:  Based on a thorough discussion of this condition and the management approach to it (including a comprehensive discussion of the known risks, side effects and potential benefits of treatment), the patient (family) agrees to implement the following specific plan:   Recommend sun protection SPF 30 or higher     Photo-aging and actinic damage of skin is common on sites repeatedly exposed to the sun, especially the backs of the hands and the face, most often affecting the ears, nose, cheeks, upper lip, vermilion of the lower lip, temples, forehead and balding scalp  In severely chronically sun-exposed individuals, this condition may also be found on the upper trunk, upper and lower limbs, and dorsum of feet  Photo-aging induces cutaneous changes that vary among individuals, reflecting inherent differences in vulnerability to sun exposure and repair capacity  We discussed further steps to minimize or avoid UV exposure:     Be aware of daily UV index levels  In the [facility], this index is often reported on the [address]   Avoid outdoor activities during the middle of the day   Wear sun-protective clothing (e g , UPF-rated, broad-brimmed hats, long sleeves, and trousers or skirts)   Apply a high sun protection factor (60+) broad-spectrum sunscreen moisturizer at least three times a day to affected areas, year-round  I recommended Neutrogena Daily Defense or CeraVe AM or Aveeno     Do not smoke, and where possible, avoid exposure to pollutants   Get plenty of exercise -- active people appear younger than inactive people   Eat fruit and vegetables daily   Many oral supplements with antioxidant and anti-inflammatory properties have been advocated to mitigate skin aging and to improve skin health  These include carotenoids; polyphenols; chlorophyll; aloe vera; vitamins B, C, and E; red ginseng; squalene; and omega-3 fatty acids  Their role in combatting skin aging is unclear  3 NEOPLASM OF UNCERTAIN BEHAVIOR OF SKIN    Physical Exam:   (Anatomic Location); (Size and Morphological Description); (Differential Diagnosis):  o Left upper arm; 1 cm crusted nodule; Differential diagnosis: Seborrheic keratosis vs  Squamous cell carcinoma          Additional History of Present Condition:  Lesion became irritated about 5-6 weeks ago    Assessment and Plan:   I have discussed with the patient that a sample of skin via a "skin biopsy would be potentially helpful to further make a specific diagnosis under the microscope   Based on a thorough discussion of this condition and the management approach to it (including a comprehensive discussion of the known risks, side effects and potential benefits of treatment), the patient (family) agrees to implement the following specific plan:    o Procedure:  Skin Biopsy  After a thorough discussion of treatment options and risk/benefits/alternatives (including but not limited to local pain, scarring, dyspigmentation, blistering, possible superinfection, and inability to confirm a diagnosis via histopathology), verbal and written consent were obtained and portion of the rash was biopsied for tissue sample  See below for consent that was obtained from patient and subsequent Procedure Note      PROCEDURE SHAVE BIOPSY NOTE:     Performing Physician: Dr English   Anatomic Location; Clinical Description with size (cm); Pre-Op Diagnosis:   o Left upper arm; 1 cm crusted nodule; Differential diagnosis: Seborrheic keratosis vs  Squamous cell carcinoma   Post-op diagnosis: Same      Local anesthesia: 1% xylocaine with epi       Topical anesthesia: None     Hemostasis: Electrocautery       After obtaining informed consent  at which time there was a discussion about the purpose of biopsy  and low risks of infection and bleeding  The area was prepped and draped in the usual fashion  Anesthesia was obtained with 1% lidocaine with epinephrine  A shave biopsy to an appropriate sampling depth was obtained with a sterile blade (such as a 15-blade or DermaBlade)  The resulting wound was covered with surgical ointment and bandaged appropriately  The patient tolerated the procedure well without complications and was without signs of functional compromise  Specimen has been sent for review by Dermatopathology  Standard post-procedure care has been explained and has been included in written form within the patient's copy of Informed Consent  INFORMED CONSENT DISCUSSION AND POST-OPERATIVE INSTRUCTIONS FOR PATIENT    I   RATIONALE FOR PROCEDURE  I understand that a skin biopsy allows the Dermatologist to test a lesion or rash under the microscope to obtain a diagnosis  It usually involves numbing the area with numbing medication and removing a small piece of skin; sometimes the area will be closed with sutures  In this specific procedure, sutures are not usually needed  If any sutures are placed, then they are usually need to be removed in 2 weeks or less  I understand that my Dermatologist recommends that a skin "shave" biopsy be performed today  A local anesthetic, similar to the kind that a dentist uses when filling a cavity, will be injected with a very small needle into the skin area to be sampled  The injected skin and tissue underneath "will go to sleep and become numb so no pain should be felt afterwards    An instrument shaped like a tiny "razor blade" (shave biopsy instrument) will be used to cut a small piece of tissue and skin from the area so that a sample of tissue can be taken and examined more closely under the microscope  A slight amount of bleeding will occur, but it will be stopped with direct pressure and a pressure bandage and any other appropriate methods  I understands that a scar will form where the wound was created  Surgical ointment will be applied to help protect the wound  Sutures are not usually needed  II   RISKS AND POTENTIAL COMPLICATIONS   I understand the risks and potential complications of a skin biopsy include but are not limited to the following:   Bleeding   Infection   Pain   Scar/keloid   Skin discoloration   Incomplete Removal   Recurrence   Nerve Damage/Numbness/Loss of Function   Allergic Reaction to Anesthesia   Biopsies are diagnostic procedures and based on findings additional treatment or evaluation may be required   Loss or destruction of specimen resulting in no additional findings    My Dermatologist has explained to me the nature of the condition, the nature of the procedure, and the benefits to be reasonably expected compared with alternative approaches  My Dermatologist has discussed the likelihood of major risks or complications of this procedure including the specific risks listed above, such as bleeding, infection, and scarring/keloid  I understand that a scar is expected after this procedure  I understand that my physician cannot predict if the scar will form a "keloid," which extends beyond the borders of the wound that is created  A keloid is a thick, painful, and bumpy scar  A keloid can be difficult to treat, as it does not always respond well to therapy, which includes injecting cortisone directly into the keloid every few weeks  While this usually reduces the pain and size of the scar, it does not eliminate it  I understand that photographs may be taken before and after the procedure    These will be maintained as part of the medical providers confidential records and may not be made available to me  I further authorize the medical provider to use the photographs for teaching purposes or to illustrate scientific papers, books, or lectures if in his/her judgment, medical research, education, or science may benefit from its use  I have had an opportunity to fully inquire about the risks and benefits of this procedure and its alternatives  I have been given ample time and opportunity to ask questions and to seek a second opinion if I wished to do so  I acknowledge that there have specifically been no guarantees as to the cosmetic results from the procedure  I am aware that with any procedure there is always the possibility of an unexpected complication  III  POST-PROCEDURAL CARE (WHAT YOU WILL NEED TO DO "AFTER THE BIOPSY" TO OPTIMIZE HEALING)     Keep the area clean and dry  Try NOT to remove the bandage or get it wet for the first 24 hours   Gently clean the area and apply surgical ointment (such as Vaseline petrolatum ointment, which is available "over the counter" and not a prescription) to the biopsy site for up to 2 weeks straight  This acts to protect the wound from the outside world   Sutures are not usually placed in this procedure  If any sutures were placed, return for suture removal as instructed (generally 1 week for the face, 2 weeks for the body)   Take Acetaminophen (Tylenol) for discomfort, if no contraindications  Ibuprofen or aspirin could make bleeding worse   Call our office immediately for signs of infection: fever, chills, increased redness, warmth, tenderness, discomfort/pain, or pus or foul smell coming from the wound  WHAT TO DO IF THERE IS ANY BLEEDING? If a small amount of bleeding is noticed, place a clean cloth over the area and apply firm pressure for ten minutes  Check the wound after 10 minutes of direct pressure    If bleeding persists, try one more time for an additional 10 minutes of direct pressure on the area  If the bleeding becomes heavier or does not stop after the second attempt, or if you have any other questions about this procedure, then please call your [facility] Dermatologist by calling [phone] (SKIN)  I hereby acknowledge that I have reviewed and verified the site with my Dermatologist and have requested and authorized my Dermatologist to proceed with the procedure          Scribe Attestation    I,:  Manisha Allen MA am acting as a scribe while in the presence of the attending physician :       I,:  Royal Green MD personally performed the services described in this documentation    as scribed in my presence :

## 2022-02-15 NOTE — RESULT ENCOUNTER NOTE
DERMATOPATHOLOGY RESULT NOTE    Results reviewed by ordering physician  Called patient to personally discuss results  Discussed results with patient  Instructions for Clinical Derm Team:   (remember to route Result Note to appropriate staff):    None    Result & Plan by Specimen:    Specimen A: benign  Plan: reassured, benign       Final Diagnosis  A  Skin, left upper arm, shave biopsy:     Consistent with VERRUCOUS KERATOSIS, irritated and inflamed

## 2022-02-21 DIAGNOSIS — I25.10 CORONARY ARTERY DISEASE INVOLVING NATIVE CORONARY ARTERY OF NATIVE HEART WITHOUT ANGINA PECTORIS: ICD-10-CM

## 2022-02-21 DIAGNOSIS — N40.1 BENIGN PROSTATIC HYPERPLASIA WITH LOWER URINARY TRACT SYMPTOMS, SYMPTOM DETAILS UNSPECIFIED: ICD-10-CM

## 2022-02-21 DIAGNOSIS — I50.9 CHRONIC CONGESTIVE HEART FAILURE, UNSPECIFIED HEART FAILURE TYPE (HCC): ICD-10-CM

## 2022-02-21 RX ORDER — TORSEMIDE 20 MG/1
TABLET ORAL
Qty: 135 TABLET | Refills: 1 | Status: SHIPPED | OUTPATIENT
Start: 2022-02-21

## 2022-02-21 RX ORDER — ATORVASTATIN CALCIUM 40 MG/1
TABLET, FILM COATED ORAL
Qty: 90 TABLET | Refills: 1 | Status: SHIPPED | OUTPATIENT
Start: 2022-02-21

## 2022-02-21 RX ORDER — FINASTERIDE 5 MG/1
TABLET, FILM COATED ORAL
Qty: 90 TABLET | Refills: 2 | Status: SHIPPED | OUTPATIENT
Start: 2022-02-21

## 2022-03-21 ENCOUNTER — OFFICE VISIT (OUTPATIENT)
Dept: GERIATRICS | Facility: CLINIC | Age: 87
End: 2022-03-21
Payer: COMMERCIAL

## 2022-03-21 VITALS
HEART RATE: 63 BPM | TEMPERATURE: 98.2 F | OXYGEN SATURATION: 99 % | WEIGHT: 236.5 LBS | DIASTOLIC BLOOD PRESSURE: 50 MMHG | BODY MASS INDEX: 35.03 KG/M2 | SYSTOLIC BLOOD PRESSURE: 122 MMHG | RESPIRATION RATE: 16 BRPM | HEIGHT: 69 IN

## 2022-03-21 DIAGNOSIS — I50.32 CHRONIC DIASTOLIC CONGESTIVE HEART FAILURE (HCC): ICD-10-CM

## 2022-03-21 DIAGNOSIS — I48.91 ATRIAL FIBRILLATION, UNSPECIFIED TYPE (HCC): Primary | ICD-10-CM

## 2022-03-21 DIAGNOSIS — Z79.01 CHRONIC ANTICOAGULATION: ICD-10-CM

## 2022-03-21 DIAGNOSIS — J98.4 RESTRICTIVE LUNG DISEASE: ICD-10-CM

## 2022-03-21 DIAGNOSIS — I10 BENIGN ESSENTIAL HYPERTENSION: ICD-10-CM

## 2022-03-21 DIAGNOSIS — E55.9 VITAMIN D DEFICIENCY: ICD-10-CM

## 2022-03-21 DIAGNOSIS — G31.84 MILD COGNITIVE IMPAIRMENT WITH MEMORY LOSS: ICD-10-CM

## 2022-03-21 PROCEDURE — G0439 PPPS, SUBSEQ VISIT: HCPCS | Performed by: NURSE PRACTITIONER

## 2022-03-21 NOTE — ASSESSMENT & PLAN NOTE
Respiratory status is stable on room air  Decreased breath sounds noted bibasilar  No reports of any respiratory distress episodes from patient  Patient is currently not on any inhalers  Follow-up with pulmonology as needed

## 2022-03-21 NOTE — ASSESSMENT & PLAN NOTE
With short-term memory loss    Patient's long-term memory is intact  Recommend MoCA eval  Continue personal care assistance with medication management

## 2022-03-21 NOTE — PATIENT INSTRUCTIONS
You were here for a medicare wellness visit today  Discussed MoCA test (recommending study at next visit) and disease process of dementia  Recommend that patient weigh himself at least twice a week and record   Report to provider/cardiology of weight gain of >5 lbs in 1 week  Routine bloodwork ordered: CBC, CMP, lipid profile, vitamin D3  Return to office for MoCA evaluation

## 2022-03-21 NOTE — ASSESSMENT & PLAN NOTE
Heart rate stable  Continue anticoagulation with Eliquis; no signs of active bleeding noted  Most recent CBC stable

## 2022-03-21 NOTE — ASSESSMENT & PLAN NOTE
Wt Readings from Last 3 Encounters:   03/21/22 107 kg (236 lb 8 oz)   02/09/22 105 kg (231 lb)   01/26/22 104 kg (230 lb 6 oz)   Appears euvolemic on exam  Patient states that he has been weighing himself but not recording his weights  Advised patient to continue weighing himself and record weights    Notify provider/Cardiology for weight gain >5 lb in 1 week  Continue torsemide, Coreg, and benazepril  Follow-up with Cardiology

## 2022-03-28 DIAGNOSIS — E03.9 HYPOTHYROIDISM, UNSPECIFIED TYPE: ICD-10-CM

## 2022-03-28 DIAGNOSIS — I48.91 ATRIAL FIBRILLATION, UNSPECIFIED TYPE (HCC): ICD-10-CM

## 2022-03-28 DIAGNOSIS — I10 BENIGN ESSENTIAL HYPERTENSION: ICD-10-CM

## 2022-03-28 RX ORDER — BENAZEPRIL HYDROCHLORIDE 40 MG/1
TABLET, FILM COATED ORAL
Qty: 30 TABLET | Refills: 1 | Status: SHIPPED | OUTPATIENT
Start: 2022-03-28 | End: 2022-05-17

## 2022-03-28 RX ORDER — APIXABAN 5 MG/1
TABLET, FILM COATED ORAL
Qty: 60 TABLET | Refills: 1 | Status: SHIPPED | OUTPATIENT
Start: 2022-03-28 | End: 2022-05-17

## 2022-03-28 RX ORDER — LEVOTHYROXINE SODIUM 0.1 MG/1
TABLET ORAL
Qty: 90 TABLET | Refills: 1 | Status: SHIPPED | OUTPATIENT
Start: 2022-03-28

## 2022-05-10 ENCOUNTER — TELEPHONE (OUTPATIENT)
Dept: GERIATRICS | Age: 87
End: 2022-05-10

## 2022-05-10 NOTE — TELEPHONE ENCOUNTER
Patient's daughter, Ty Ramirez ([phone]) called to say patient's insurance has changed and it is not covering his prescription for Eliquis  Is there an alternative drug that is less expensive or perhaps a medication that the [place] would be able to provide

## 2022-05-17 DIAGNOSIS — E55.9 VITAMIN D DEFICIENCY: ICD-10-CM

## 2022-05-17 DIAGNOSIS — I10 BENIGN ESSENTIAL HYPERTENSION: ICD-10-CM

## 2022-05-17 DIAGNOSIS — I48.91 ATRIAL FIBRILLATION, UNSPECIFIED TYPE (HCC): ICD-10-CM

## 2022-05-17 RX ORDER — BENAZEPRIL HYDROCHLORIDE 40 MG/1
TABLET, FILM COATED ORAL
Qty: 30 TABLET | Refills: 1 | Status: SHIPPED | OUTPATIENT
Start: 2022-05-17 | End: 2022-07-25

## 2022-05-17 RX ORDER — MELATONIN
Qty: 30 TABLET | Refills: 1 | Status: SHIPPED | OUTPATIENT
Start: 2022-05-17 | End: 2022-07-25

## 2022-05-17 RX ORDER — APIXABAN 5 MG/1
TABLET, FILM COATED ORAL
Qty: 60 TABLET | Refills: 1 | Status: SHIPPED | OUTPATIENT
Start: 2022-05-17 | End: 2022-07-25

## 2022-05-18 ENCOUNTER — TELEPHONE (OUTPATIENT)
Dept: GERIATRICS | Facility: CLINIC | Age: 87
End: 2022-05-18

## 2022-05-19 ENCOUNTER — TELEPHONE (OUTPATIENT)
Dept: GERIATRICS | Facility: CLINIC | Age: 87
End: 2022-05-19

## 2022-05-19 DIAGNOSIS — K59.01 SLOW TRANSIT CONSTIPATION: Primary | ICD-10-CM

## 2022-05-19 RX ORDER — POLYETHYLENE GLYCOL 3350 17 G/17G
17 POWDER, FOR SOLUTION ORAL DAILY
Qty: 120 G | Refills: 3 | Status: SHIPPED | OUTPATIENT
Start: 2022-05-19

## 2022-05-19 RX ORDER — DOCUSATE SODIUM 100 MG/1
100 CAPSULE, LIQUID FILLED ORAL DAILY
Qty: 100 CAPSULE | Refills: 3 | Status: SHIPPED | OUTPATIENT
Start: 2022-05-19

## 2022-05-19 RX ORDER — POLYETHYLENE GLYCOL 3350 17 G/17G
17 POWDER, FOR SOLUTION ORAL DAILY
COMMUNITY
End: 2022-05-19 | Stop reason: SDUPTHER

## 2022-05-19 RX ORDER — DOCUSATE SODIUM 100 MG/1
100 CAPSULE, LIQUID FILLED ORAL DAILY
COMMUNITY
End: 2022-05-19 | Stop reason: SDUPTHER

## 2022-05-19 NOTE — TELEPHONE ENCOUNTER
I called patient's daughter Moshe Townsend to inform her of her father's recent blood work results  Discussed elevated sodium level most likely due to poor oral fluid intake  She reports that he is constipated  An order for MiraLax and Colace sent to [facility] and personal care manager made aware of orders  Patient's daughter stated that she will call him throughout the day to remind him to drink fluids

## 2022-06-06 ENCOUNTER — OFFICE VISIT (OUTPATIENT)
Dept: GERIATRICS | Facility: CLINIC | Age: 87
End: 2022-06-06
Payer: COMMERCIAL

## 2022-06-06 VITALS
SYSTOLIC BLOOD PRESSURE: 110 MMHG | DIASTOLIC BLOOD PRESSURE: 70 MMHG | HEIGHT: 69 IN | BODY MASS INDEX: 35.4 KG/M2 | TEMPERATURE: 97.7 F | HEART RATE: 57 BPM | OXYGEN SATURATION: 97 % | RESPIRATION RATE: 15 BRPM | WEIGHT: 239 LBS

## 2022-06-06 DIAGNOSIS — I50.32 CHRONIC DIASTOLIC CONGESTIVE HEART FAILURE (HCC): ICD-10-CM

## 2022-06-06 DIAGNOSIS — I25.10 CORONARY ARTERY DISEASE INVOLVING NATIVE CORONARY ARTERY OF NATIVE HEART WITHOUT ANGINA PECTORIS: ICD-10-CM

## 2022-06-06 DIAGNOSIS — G47.33 OBSTRUCTIVE SLEEP APNEA: ICD-10-CM

## 2022-06-06 DIAGNOSIS — G31.84 MILD COGNITIVE IMPAIRMENT WITH MEMORY LOSS: ICD-10-CM

## 2022-06-06 DIAGNOSIS — Z79.01 CHRONIC ANTICOAGULATION: ICD-10-CM

## 2022-06-06 DIAGNOSIS — I48.91 ATRIAL FIBRILLATION, UNSPECIFIED TYPE (HCC): ICD-10-CM

## 2022-06-06 DIAGNOSIS — E87.0 HYPERNATREMIA: Primary | ICD-10-CM

## 2022-06-06 DIAGNOSIS — R26.2 AMBULATORY DYSFUNCTION: ICD-10-CM

## 2022-06-06 DIAGNOSIS — J98.4 RESTRICTIVE LUNG DISEASE: ICD-10-CM

## 2022-06-06 DIAGNOSIS — E03.9 HYPOTHYROIDISM, UNSPECIFIED TYPE: ICD-10-CM

## 2022-06-06 PROCEDURE — 99214 OFFICE O/P EST MOD 30 MIN: CPT | Performed by: NURSE PRACTITIONER

## 2022-06-06 NOTE — ASSESSMENT & PLAN NOTE
Wt Readings from Last 3 Encounters:   06/06/22 108 kg (239 lb)   03/21/22 107 kg (236 lb 8 oz)   02/09/22 105 kg (231 lb)   Patient appears euvolemic on exam   He has no edema  Patient has gained 1 kilograms since last office visit  Advised patient to continue weighing himself and record weights  Continue torsemide, Coreg, and benazepril  Follow-up with cardiology

## 2022-06-06 NOTE — PROGRESS NOTES
[address] [facility]  POS: [facility] [employer]     NAME: Corinne Sprawls  AGE: 80 y o  SEX: male    DATE OF ENCOUNTER: 6/6/2022    Assessment and Plan     80year-old male with:    Mild cognitive impairment with memory loss  MoCA assessment performed today  MoCA score 22/30 which indicates mild cognitive impairment  Patient is aware that he has short-term memory impairment and his daughters very supportive  Continue personal care assistance for medication management    Hypernatremia  Most recent sodium level 150 most likely due to poor oral fluid intake and patient is also on torsemide  Repeat BMP  Patient's daughter is aware and will remind patient to get blood work done    Chronic diastolic congestive heart failure ([facility] [address] )  Wt Readings from Last 3 Encounters:   06/06/22 108 kg (239 lb)   03/21/22 107 kg (236 lb 8 oz)   02/09/22 105 kg (231 lb)   Patient appears euvolemic on exam   He has no edema  Patient has gained 1 kilograms since last office visit  Advised patient to continue weighing himself and record weights  Continue torsemide, Coreg, and benazepril  Follow-up with cardiology    Hypothyroidism  Free T4 1 2 for December 2021  TSH ordered be done with repeat BMP   Patient is currently on levothyroxine 100 micrograms daily    Restrictive lung disease  Respiratory status stable on room air  No SO2 of respiratory distress reported  Patient is currently not on any inhalers  Follow-up with pulmonology as needed    Obstructive sleep apnea  Continue CPAP q h s  Follow-up with pulmonology at least yearly    Coronary artery disease  S/p CABG x3  Stable without symptoms  Continue atorvastatin, carvedilol    Atrial fibrillation ([facility] [address] ) [I48 91]  Heart rate remains stable  Continue anticoagulation with Eliquis  Most recent CBC stable    Chronic anticoagulation  Continue Eliquis 5 milligrams b i d    No signs of active bleeding noted    Ambulatory dysfunction  Patient uses a walker  Continue fall precautions    Orders Placed This Encounter   Procedures    Basic metabolic panel    TSH, 3rd generation with Free T4 reflex     Regarding this visit:  - Counseling Documentation: patient was counseled regarding: diagnostic results, instructions for management, patient and family education and importance of compliance with treatment  - Medication Side Effects: Adverse side effects of medications were reviewed with the patient/guardian today  Chief Complaint     Chief Complaint   Patient presents with    Follow-up     2 month and MOCA       History of Present Illness     79-year-old male who presents in office today for routine follow-up of chronic medical conditions  He is accompanied by his daughter who is very supportive  Patient is very forgetful of present events so she reminds him of important events and to drink water as recent sodium level was elevated at 150  Will recheck BMP this week  Reviewed most recent lab results with patient and daughter and plan of care  MoCA assessment performed today patient scored 22/30 indicating mild cognitive impairment which patient and daughter is a ready well aware of  Personal care staff manages his medications due to memory impairment  He denies shortness of breath, chest pain, headache, dizziness, abdominal pain, nausea, vomiting, or diarrhea  He is currently on MiraLax and Colace for constipation  He is not complaining of constipation today  The following portions of the patient's history were reviewed and updated as appropriate: allergies, current medications, past family history, past medical history, past social history, past surgical history and problem list     Review of Systems     Review of Systems   Constitutional: Negative for appetite change, chills and diaphoresis  HENT: Positive for hearing loss  Negative for congestion and sore throat  Eyes: Positive for visual disturbance  Gastrointestinal: Positive for constipation  Negative for abdominal pain  Genitourinary: Negative for difficulty urinating  Musculoskeletal: Positive for gait problem (Ambulates with walker)  Negative for back pain  Neurological: Negative for dizziness and headaches  Psychiatric/Behavioral: Negative for behavioral problems and dysphoric mood  The patient is not nervous/anxious  Active Problem List     Patient Active Problem List   Diagnosis    Atrial fibrillation ([facility] [address] ) [I48 91]    Coronary artery disease    Benign essential hypertension    Bicuspid aortic valve    Chronic diastolic congestive heart failure ([facility] [address] )    Hypothyroidism    Obstructive sleep apnea    Chronic anticoagulation    Mild cognitive impairment with memory loss    Malignant neoplasm of urinary bladder (HCC)    Class 2 severe obesity with serious comorbidity in adult [facility])    Restrictive lung disease    Seasonal allergic rhinitis    Change in mole    Hypernatremia       Objective     /70 (BP Location: Left arm, Patient Position: Sitting, Cuff Size: Standard)   Pulse 57   Temp 97 7 °F (36 5 °C) (Tympanic)   Resp 15   Ht 5' 8 5" (1 74 m) Comment: AS PER LAST VITAL  Wt 108 kg (239 lb) Comment: W SHOES  SpO2 97%   BMI 35 81 kg/m²     Physical Exam  Vitals reviewed  Exam conducted with a chaperone present (Patient's daughter present)  Constitutional:       General: He is not in acute distress  Appearance: He is not toxic-appearing or diaphoretic  HENT:      Head: Normocephalic  Ears:      Comments: [place]     Nose: No congestion or rhinorrhea  Mouth/Throat:      Mouth: Mucous membranes are moist       Pharynx: No oropharyngeal exudate  Eyes:      General: No scleral icterus  Right eye: No discharge  Left eye: No discharge  Extraocular Movements: Extraocular movements intact        Conjunctiva/sclera: Conjunctivae normal       Pupils: Pupils are equal, round, and reactive to light       Comments: Wears glasses  Cardiovascular:      Rate and Rhythm: Normal rate and regular rhythm  Pulses: Normal pulses  Pulmonary:      Effort: Pulmonary effort is normal  No respiratory distress  Breath sounds: Normal breath sounds  No wheezing, rhonchi or rales  Abdominal:      General: Bowel sounds are normal  There is no distension  Palpations: Abdomen is soft  Tenderness: There is no abdominal tenderness  There is no guarding  Musculoskeletal:      Cervical back: Neck supple  No rigidity  Right lower leg: No edema  Left lower leg: No edema  Comments: Moves all 4 extremities  Ambulatory with walker  Lymphadenopathy:      Cervical: No cervical adenopathy  Skin:     General: Skin is warm and dry  Capillary Refill: Capillary refill takes less than 2 seconds  Neurological:      Mental Status: He is alert  Mental status is at baseline  Comments: With ST memory loss  Patient does not keep track of month, day, year  Psychiatric:         Mood and Affect: Mood normal          Behavior: Behavior normal          Thought Content: Thought content normal          Pertinent Laboratory/Diagnostic Studies: CBC, CMP, Vitamin D, lipid panel    Current Medications     Current Outpatient Medications:     docusate sodium (COLACE) 100 mg capsule, Take 1 capsule (100 mg total) by mouth in the morning, Disp: 100 capsule, Rfl: 3    polyethylene glycol (MIRALAX) 17 g packet, Take 17 g by mouth in the morning  Hold for loose stools  , Disp: 120 g, Rfl: 3    atorvastatin (LIPITOR) 40 mg tablet, TAKE ONE TABLET BY MOUTH AT BEDTIME, Disp: 90 tablet, Rfl: 1    benazepril (LOTENSIN) 40 MG tablet, TAKE ONE TABLET BY MOUTH EVERY DAY WITH LUNCH, Disp: 30 tablet, Rfl: 1    carvedilol (COREG) 25 mg tablet, TAKE ONE TABLET BY MOUTH TWICE DAILY AT NOON AND BEDTIME, Disp: 180 tablet, Rfl: 1    cholecalciferol (VITAMIN D3) 1,000 units tablet, TAKE ONE TABLET BY MOUTH EVERY DAY WITH LUNCH, Disp: 30 tablet, Rfl: 1    Eliquis 5 MG, TAKE ONE TABLET BY MOUTH TWICE DAILY AT NOON AND BEDTIME, Disp: 60 tablet, Rfl: 1    finasteride (PROSCAR) 5 mg tablet, TAKE ONE TABLET BY MOUTH EVERY DAY WITH LUNCH, Disp: 90 tablet, Rfl: 2    fluticasone (FLONASE) 50 mcg/act nasal spray, 1 spray into each nostril daily (Patient not taking: Reported on 3/21/2022 ), Disp: , Rfl:     latanoprost (XALATAN) 0 005 % ophthalmic solution, Apply 1 drop to eye, Disp: , Rfl:     levothyroxine 100 mcg tablet, TAKE ONE TABLET BY MOUTH AT BEDTIME FOR HYPOTHYROID, Disp: 90 tablet, Rfl: 1    potassium chloride (K-DUR,KLOR-CON) 20 mEq tablet, TAKE ONE TABLET BY MOUTH EVERY DAY WITH LUNCH, Disp: 90 tablet, Rfl: 1    torsemide (DEMADEX) 20 mg tablet, TAKE 1 AND 1/2 TABLETS BY MOUTH EVERY DAY WITH LUNCH, Disp: 135 tablet, Rfl: 1    Health Maintenance     Health Maintenance   Topic Date Due    COVID-19 Vaccine (4 - Booster for Moderna series) 03/16/2022    BMI: Followup Plan  06/14/2022    Influenza Vaccine (Season Ended) 09/01/2022    DTaP,Tdap,and Td Vaccines (2 - Td or Tdap) 01/17/2023    Fall Risk  03/21/2023    Depression Screening  03/21/2023    Medicare Annual Wellness Visit (AWV)  03/21/2023    BMI: Adult  06/06/2023    Pneumococcal Vaccine: 65+ Years  Completed    HIB Vaccine  Aged Out    Hepatitis B Vaccine  Aged Out    IPV Vaccine  Aged Out    Hepatitis A Vaccine  Aged Out    Meningococcal ACWY Vaccine  Aged Out    HPV Vaccine  Aged [employer] History   Administered Date(s) Administered    COVID-19 MODERNA VACC 0 5 ML IM 01/21/2021, 02/23/2021, 11/16/2021    INFLUENZA 10/01/2013, 09/22/2014, 01/02/2015, 10/04/2018, 10/15/2019, 10/01/2020    Influenza Quadrivalent Preservative Free 3 years and older IM 10/15/2019    Influenza, high dose seasonal 0 7 mL 10/22/2020    Influenza, injectable, quadrivalent, preservative free 0 5 mL 10/15/2019    Influenza, seasonal, injectable 11/04/1927, 08/30/2011, 08/30/2011, 10/04/2017    Pneumococcal 11/01/2005    Pneumococcal Conjugate 13-Valent 02/18/2015, 02/08/2016    Pneumococcal Conjugate PCV 7 02/18/2015    Pneumococcal Polysaccharide PPV23 11/01/2005, 10/01/2012    Td (adult), Unspecified 01/13/2003    Td (adult), adsorbed 01/13/2003    Tdap 01/17/2013    Zoster 01/10/2013     This note was completed in part utilizing [employer] direct voice recognition software  Grammatical errors, random word insertion, spelling mistakes, and incomplete sentences may be an occasional consequence of the system secondary to software limitations, ambient noise and hardware issues  At the time of dictation, efforts were made to edit, clarify and/or correct errors  Please read the chart carefully and recognize, using context, where substitutions have occurred  If you have any questions or concerns about the context, text or information contained within the body of this dictation, please contact myself, the provider, for further clarification      [address], [address] Jonas Velazco  6/6/2022 5:13 PM

## 2022-06-06 NOTE — ASSESSMENT & PLAN NOTE
MoCA assessment performed today   MoCA score 22/30 which indicates mild cognitive impairment  Patient is aware that he has short-term memory impairment and his daughters very supportive  Continue personal care assistance for medication management

## 2022-06-06 NOTE — ASSESSMENT & PLAN NOTE
Most recent sodium level 150 most likely due to poor oral fluid intake and patient is also on torsemide  Repeat BMP    Patient's daughter is aware and will remind patient to get blood work done

## 2022-06-06 NOTE — PATIENT INSTRUCTIONS
You were here for routine follow up today  Your sodium level was elevated most likely due to dehydration  Advised to stay hydrated  MoCA test performed in office today: score 22/30 which shows mild cognitive impairment  Repeat BMP  Return to office in 3 months or sooner if needed

## 2022-06-06 NOTE — ASSESSMENT & PLAN NOTE
Free T4 1 2 for December 2021  TSH ordered be done with repeat BMP   Patient is currently on levothyroxine 100 micrograms daily

## 2022-06-06 NOTE — ASSESSMENT & PLAN NOTE
Respiratory status stable on room air  No SO2 of respiratory distress reported  Patient is currently not on any inhalers  Follow-up with pulmonology as needed

## 2022-07-24 DIAGNOSIS — I10 BENIGN ESSENTIAL HYPERTENSION: ICD-10-CM

## 2022-07-24 DIAGNOSIS — I48.91 ATRIAL FIBRILLATION, UNSPECIFIED TYPE (HCC): ICD-10-CM

## 2022-07-24 DIAGNOSIS — I50.9 CHRONIC CONGESTIVE HEART FAILURE, UNSPECIFIED HEART FAILURE TYPE (HCC): ICD-10-CM

## 2022-07-24 DIAGNOSIS — I25.10 CORONARY ARTERY DISEASE INVOLVING NATIVE CORONARY ARTERY OF NATIVE HEART WITHOUT ANGINA PECTORIS: ICD-10-CM

## 2022-07-24 DIAGNOSIS — E55.9 VITAMIN D DEFICIENCY: ICD-10-CM

## 2022-07-25 RX ORDER — BENAZEPRIL HYDROCHLORIDE 40 MG/1
TABLET, FILM COATED ORAL
Qty: 30 TABLET | Refills: 1 | Status: SHIPPED | OUTPATIENT
Start: 2022-07-25 | End: 2022-09-27

## 2022-07-25 RX ORDER — POTASSIUM CHLORIDE 20 MEQ/1
TABLET, EXTENDED RELEASE ORAL
Qty: 90 TABLET | Refills: 1 | Status: SHIPPED | OUTPATIENT
Start: 2022-07-25

## 2022-07-25 RX ORDER — APIXABAN 5 MG/1
TABLET, FILM COATED ORAL
Qty: 60 TABLET | Refills: 1 | Status: SHIPPED | OUTPATIENT
Start: 2022-07-25 | End: 2022-09-27

## 2022-07-25 RX ORDER — MELATONIN
Qty: 30 TABLET | Refills: 1 | Status: SHIPPED | OUTPATIENT
Start: 2022-07-25 | End: 2022-09-27

## 2022-07-25 RX ORDER — CARVEDILOL 25 MG/1
TABLET ORAL
Qty: 180 TABLET | Refills: 1 | Status: SHIPPED | OUTPATIENT
Start: 2022-07-25

## 2022-08-20 DIAGNOSIS — K59.01 SLOW TRANSIT CONSTIPATION: ICD-10-CM

## 2022-08-22 DIAGNOSIS — I25.10 CORONARY ARTERY DISEASE INVOLVING NATIVE CORONARY ARTERY OF NATIVE HEART WITHOUT ANGINA PECTORIS: ICD-10-CM

## 2022-08-22 DIAGNOSIS — I50.9 CHRONIC CONGESTIVE HEART FAILURE, UNSPECIFIED HEART FAILURE TYPE (HCC): ICD-10-CM

## 2022-08-22 RX ORDER — ATORVASTATIN CALCIUM 40 MG/1
TABLET, FILM COATED ORAL
Qty: 90 TABLET | Refills: 1 | Status: SHIPPED | OUTPATIENT
Start: 2022-08-22

## 2022-08-22 RX ORDER — POLYETHYLENE GLYCOL 3350 17 G/17G
POWDER, FOR SOLUTION ORAL
Qty: 510 G | Refills: 3 | Status: SHIPPED | OUTPATIENT
Start: 2022-08-22

## 2022-08-22 RX ORDER — TORSEMIDE 20 MG/1
TABLET ORAL
Qty: 135 TABLET | Refills: 1 | Status: SHIPPED | OUTPATIENT
Start: 2022-08-22

## 2022-09-21 ENCOUNTER — OFFICE VISIT (OUTPATIENT)
Dept: GERIATRICS | Facility: CLINIC | Age: 87
End: 2022-09-21
Payer: COMMERCIAL

## 2022-09-21 VITALS
OXYGEN SATURATION: 99 % | BODY MASS INDEX: 35.66 KG/M2 | HEIGHT: 69 IN | DIASTOLIC BLOOD PRESSURE: 80 MMHG | WEIGHT: 240.8 LBS | RESPIRATION RATE: 16 BRPM | TEMPERATURE: 97.9 F | SYSTOLIC BLOOD PRESSURE: 140 MMHG | HEART RATE: 58 BPM

## 2022-09-21 DIAGNOSIS — I48.91 ATRIAL FIBRILLATION, UNSPECIFIED TYPE (HCC): ICD-10-CM

## 2022-09-21 DIAGNOSIS — R26.2 AMBULATORY DYSFUNCTION: ICD-10-CM

## 2022-09-21 DIAGNOSIS — G31.84 MILD COGNITIVE IMPAIRMENT WITH MEMORY LOSS: ICD-10-CM

## 2022-09-21 DIAGNOSIS — I50.32 CHRONIC DIASTOLIC CONGESTIVE HEART FAILURE (HCC): ICD-10-CM

## 2022-09-21 DIAGNOSIS — H90.3 SENSORINEURAL HEARING LOSS (SNHL) OF BOTH EARS: ICD-10-CM

## 2022-09-21 DIAGNOSIS — E03.9 HYPOTHYROIDISM, UNSPECIFIED TYPE: ICD-10-CM

## 2022-09-21 DIAGNOSIS — I25.10 CORONARY ARTERY DISEASE INVOLVING NATIVE CORONARY ARTERY OF NATIVE HEART WITHOUT ANGINA PECTORIS: ICD-10-CM

## 2022-09-21 DIAGNOSIS — E87.0 HYPERNATREMIA: ICD-10-CM

## 2022-09-21 DIAGNOSIS — J98.4 RESTRICTIVE LUNG DISEASE: Primary | ICD-10-CM

## 2022-09-21 PROCEDURE — 99214 OFFICE O/P EST MOD 30 MIN: CPT | Performed by: NURSE PRACTITIONER

## 2022-09-21 NOTE — PATIENT INSTRUCTIONS
You were here for a routine follow up visit today  Recommend PFT at the [place] or with pulmonary  Continue medications as ordered  Stay hydrated  Recent lab results reviewed; no acute concerns  Next labs due in March 2023  Return to office end of December for routine follow up

## 2022-09-21 NOTE — PROGRESS NOTES
[address] [facility]  POS: [facility]     NAME: Sarika Horner  AGE: 80 y o  SEX: male    DATE OF ENCOUNTER: 9/21/2022    Assessment and Plan     80-year-old male with:    Restrictive lung disease  Patient cannot recall if he has had any episodes of shortness of breath  Mild exertional and conversational dyspnea noted  Decreased breath sounds noted throughout bilateral lower lung fields  Recommend that patient have a PFT study done either at the [facility] office or pulmonology  Patient's daughter was present and aware of recommendation  Discussed rescue inhalers with patient's daughter and she does not wanted ordered at this time    Atrial fibrillation ([address] ) [I48 91]  Heart rate stable  Continue Coreg and Eliquis    Coronary artery disease  History of CABG x3  Stable without symptoms  Continue atorvastatin and carvedilol    Hypernatremia  Most recent sodium level 142 on 09/13/2022  Euvolemic on exam  Patient is now drinking sufficient amount of water as per daughter    Chronic diastolic congestive heart failure ([address] )  Wt Readings from Last 3 Encounters:   09/21/22 109 kg (240 lb 12 8 oz)   06/06/22 108 kg (239 lb)   03/21/22 107 kg (236 lb 8 oz)   Euvolemic on exam  Continue torsemide, carvedilol, and benazepril    Hypothyroidism  Most recent TSH 1 56 on 09/13/2022  Continue levothyroxine 100 mcg daily    Mild cognitive impairment with memory loss  Patient is forgetful of present events  Patient's long-term memory is intact  Pleasant and cooperative  Patient has new hearing aids in both ears    Discussed the importance of wearing hearing aids  Delirium precautions  Avoid delirium genic medications  Encourage good sleep hygiene  Encourage participation in group activities when appropriate    Sensorineural hearing loss (SNHL) of both ears  Patient recently received new hearing aids and wearing  He will need to be reminded to wear hearing aids every day by personal care staff    Ambulatory dysfunction  Patient uses RW for ambulation  Fall precautions    - Counseling Documentation: patient was counseled regarding: diagnostic results, instructions for management and importance of compliance with treatment  - Medication Side Effects: Adverse side effects of medications were reviewed with the patient/guardian today  Chief Complaint     Chief Complaint   Patient presents with    Follow-up     3 month       History of Present Illness     70-year-old male who presents in office today for routine follow-up of chronic medical conditions  He is accompanied by his daughter who provides additional medical history  Patient does have dementia and forgetful of present events  His long-term memory is intact  He has no complaints  Patient's daughter expressed concern regarding his pulmonary diagnosis  Recommend that patient follow-up with the [place] or pulmonology for PFT  Mild conversational and exertional dyspnea noted  O2 sat was 99% on room air  Patient recently had his eyes checked at the [place]  patient receives all medications through the [place]  He recently received new bilateral hearing aids  Reviewed lab results with patient and his daughter; no acute concerns  The following portions of the patient's history were reviewed and updated as appropriate: allergies, current medications, past family history, past medical history, past social history, past surgical history and problem list     Review of Systems     Review of Systems   Constitutional: Negative for appetite change, chills and diaphoresis  HENT: Positive for hearing loss  Eyes: Positive for visual disturbance  Respiratory: Negative for cough and shortness of breath  Cardiovascular: Negative for chest pain  Gastrointestinal: Negative for abdominal pain, constipation and nausea  Genitourinary: Negative for difficulty urinating  Musculoskeletal: Positive for gait problem     Neurological: Negative for dizziness, syncope, speech difficulty, light-headedness, numbness and headaches  Psychiatric/Behavioral: Negative for behavioral problems and dysphoric mood  The patient is not nervous/anxious  All other systems reviewed and are negative  Active Problem List     Patient Active Problem List   Diagnosis    Atrial fibrillation ([facility] [address] ) [I48 91]    Coronary artery disease    Benign essential hypertension    Bicuspid aortic valve    Chronic diastolic congestive heart failure ([address] )    Hypothyroidism    Obstructive sleep apnea    Chronic anticoagulation    Mild cognitive impairment with memory loss    Malignant neoplasm of urinary bladder (HCC)    Class 2 severe obesity with serious comorbidity in adult [facility])    Restrictive lung disease    Seasonal allergic rhinitis    Change in mole    Hypernatremia    Ambulatory dysfunction    Sensorineural hearing loss (SNHL) of both ears       Objective     /80 (BP Location: Left arm, Patient Position: Sitting, Cuff Size: Standard)   Pulse 58   Temp 97 9 °F (36 6 °C) (Tympanic)   Resp 16   Ht 5' 8 5" (1 74 m) Comment: as per last vital  Wt 109 kg (240 lb 12 8 oz) Comment: w shoes  SpO2 99%   BMI 36 08 kg/m²     Physical Exam  Vitals and nursing note reviewed  Constitutional:       General: He is not in acute distress  Appearance: He is not toxic-appearing or diaphoretic  Comments: Elderly male who appears with chronic illness  In no distress  HENT:      Head: Normocephalic  Nose: No congestion or rhinorrhea  Mouth/Throat:      Mouth: Mucous membranes are moist       Pharynx: No oropharyngeal exudate  Eyes:      General: No scleral icterus  Right eye: No discharge  Left eye: No discharge  Extraocular Movements: Extraocular movements intact  Conjunctiva/sclera: Conjunctivae normal       Pupils: Pupils are equal, round, and reactive to light     Cardiovascular:      Rate and Rhythm: Normal rate and regular rhythm  Pulses: Normal pulses  Pulmonary:      Effort: Pulmonary effort is normal  No respiratory distress  Breath sounds: Normal breath sounds  No wheezing, rhonchi or rales  Abdominal:      General: Bowel sounds are normal  There is no distension  Palpations: Abdomen is soft  Tenderness: There is no abdominal tenderness  There is no guarding  Musculoskeletal:      Cervical back: Neck supple  No rigidity  Right lower leg: No edema  Left lower leg: No edema  Comments: Moves all 4 extremities  Lymphadenopathy:      Cervical: No cervical adenopathy  Skin:     General: Skin is warm and dry  Capillary Refill: Capillary refill takes less than 2 seconds  Neurological:      Mental Status: He is alert  Mental status is at baseline  Comments: With impaired recent memory   Psychiatric:         Mood and Affect: Mood normal          Behavior: Behavior normal          Thought Content:  Thought content normal        Pertinent Laboratory/Diagnostic Studies:  BMP  Glucose: 100   BUN: 26   creatinine:  1 11   sodium: 142   potassium: 3 9   sodium:  142  calcium: 9 0  GFR: 62    TSH: 1 56    Current Medications     Current Outpatient Medications:     docusate sodium (COLACE) 100 mg capsule, Take 1 capsule (100 mg total) by mouth in the morning, Disp: 100 capsule, Rfl: 3    atorvastatin (LIPITOR) 40 mg tablet, TAKE ONE TABLET BY MOUTH AT BEDTIME, Disp: 90 tablet, Rfl: 1    benazepril (LOTENSIN) 40 MG tablet, TAKE ONE TABLET BY MOUTH EVERY DAY WITH LUNCH, Disp: 30 tablet, Rfl: 1    carvedilol (COREG) 25 mg tablet, TAKE ONE TABLET BY MOUTH TWICE DAILY AT NOON AND BEDTIME, Disp: 180 tablet, Rfl: 1    cholecalciferol (VITAMIN D3) 1,000 units tablet, TAKE ONE TABLET BY MOUTH EVERY DAY WITH LUNCH, Disp: 30 tablet, Rfl: 1    Eliquis 5 MG, TAKE ONE TABLET BY MOUTH TWICE DAILY AT NOON AND BEDTIME, Disp: 60 tablet, Rfl: 1    finasteride (PROSCAR) 5 mg tablet, TAKE ONE TABLET BY MOUTH EVERY DAY WITH LUNCH, Disp: 90 tablet, Rfl: 2    fluticasone (FLONASE) 50 mcg/act nasal spray, 1 spray into each nostril daily (Patient not taking: Reported on 3/21/2022 ), Disp: , Rfl:     latanoprost (XALATAN) 0 005 % ophthalmic solution, Apply 1 drop to eye, Disp: , Rfl:     levothyroxine 100 mcg tablet, TAKE ONE TABLET BY MOUTH AT BEDTIME FOR HYPOTHYROID, Disp: 90 tablet, Rfl: 1    polyethylene glycol (GLYCOLAX) 17 GM/SCOOP powder, MIX 17 GRAMS (1 CAPFUL) OF POWDER IN 8 OUNCES OF LIQUID AND DRINK ONCE DAILY FOR CONSTIPATON - HOLD FOR LOOSE STOOLS, Disp: 510 g, Rfl: 3    potassium chloride (K-DUR,KLOR-CON) 20 mEq tablet, TAKE ONE TABLET BY MOUTH EVERY DAY WITH LUNCH, Disp: 90 tablet, Rfl: 1    torsemide (DEMADEX) 20 mg tablet, TAKE 1 AND 1/2 TABLETS BY MOUTH EVERY DAY WITH LUNCH, Disp: 135 tablet, Rfl: 1    Health Maintenance     Health Maintenance   Topic Date Due    COVID-19 Vaccine (4 - Booster for Moderna series) 03/16/2022    BMI: Followup Plan  06/14/2022    Influenza Vaccine (1) 09/01/2022    Depression Screening  03/21/2023    Fall Risk  03/21/2023    Medicare Annual Wellness Visit (AWV)  03/21/2023    BMI: Adult  09/21/2023    Pneumococcal Vaccine: 65+ Years  Completed    HIB Vaccine  Aged Out    Hepatitis B Vaccine  Aged Out    IPV Vaccine  Aged Out    Hepatitis A Vaccine  Aged Out    Meningococcal ACWY Vaccine  Aged Out    HPV Vaccine  Aged [employer] History   Administered Date(s) Administered    COVID-19 MODERNA VACC 0 5 ML IM 01/21/2021, 02/23/2021, 11/16/2021    INFLUENZA 10/01/2013, 09/22/2014, 01/02/2015, 10/04/2018, 10/15/2019, 10/01/2020    Influenza Quadrivalent Preservative Free 3 years and older IM 10/15/2019    Influenza, high dose seasonal 0 7 mL 10/22/2020    Influenza, injectable, quadrivalent, preservative free 0 5 mL 10/15/2019    Influenza, seasonal, injectable 11/04/1927, 08/30/2011, 08/30/2011, 10/04/2017    Pneumococcal 11/01/2005    Pneumococcal Conjugate 13-Valent 02/18/2015, 02/08/2016    Pneumococcal Conjugate PCV 7 02/18/2015    Pneumococcal Polysaccharide PPV23 11/01/2005, 10/01/2012    Td (adult), Unspecified 01/13/2003    Td (adult), adsorbed 01/13/2003    Tdap 01/17/2013    Zoster 01/10/2013     This note was completed in part utilizing [employer] direct voice recognition software  Grammatical errors, random word insertion, spelling mistakes, and incomplete sentences may be an occasional consequence of the system secondary to software limitations, ambient noise and hardware issues  At the time of dictation, efforts were made to edit, clarify and/or correct errors  Please read the chart carefully and recognize, using context, where substitutions have occurred  If you have any questions or concerns about the context, text or information contained within the body of this dictation, please contact myself, the provider, for further clarification      Yogi Cote [place]  9/21/2022 11:15 AM

## 2022-09-21 NOTE — ASSESSMENT & PLAN NOTE
Patient is forgetful of present events  Patient's long-term memory is intact  Pleasant and cooperative  Patient has new hearing aids in both ears    Discussed the importance of wearing hearing aids  Delirium precautions  Avoid delirium genic medications  Encourage good sleep hygiene  Encourage participation in group activities when appropriate

## 2022-09-21 NOTE — ASSESSMENT & PLAN NOTE
Wt Readings from Last 3 Encounters:   09/21/22 109 kg (240 lb 12 8 oz)   06/06/22 108 kg (239 lb)   03/21/22 107 kg (236 lb 8 oz)   Euvolemic on exam  Continue torsemide, carvedilol, and benazepril

## 2022-09-21 NOTE — ASSESSMENT & PLAN NOTE
Patient recently received new hearing aids and wearing  He will need to be reminded to wear hearing aids every day by personal care staff

## 2022-09-21 NOTE — ASSESSMENT & PLAN NOTE
Patient cannot recall if he has had any episodes of shortness of breath  Mild exertional and conversational dyspnea noted  Decreased breath sounds noted throughout bilateral lower lung fields  Recommend that patient have a PFT study done either at the [place] office or pulmonology  Patient's daughter was present and aware of recommendation    Discussed rescue inhalers with patient's daughter and she does not wanted ordered at this time

## 2022-09-21 NOTE — ASSESSMENT & PLAN NOTE
Most recent sodium level 142 on 09/13/2022  Euvolemic on exam  Patient is now drinking sufficient amount of water as per daughter

## 2022-09-27 DIAGNOSIS — E55.9 VITAMIN D DEFICIENCY: ICD-10-CM

## 2022-09-27 DIAGNOSIS — I10 BENIGN ESSENTIAL HYPERTENSION: ICD-10-CM

## 2022-09-27 DIAGNOSIS — E03.9 HYPOTHYROIDISM, UNSPECIFIED TYPE: ICD-10-CM

## 2022-09-27 DIAGNOSIS — I48.91 ATRIAL FIBRILLATION, UNSPECIFIED TYPE (HCC): ICD-10-CM

## 2022-09-27 RX ORDER — LEVOTHYROXINE SODIUM 0.1 MG/1
TABLET ORAL
Qty: 90 TABLET | Refills: 1 | Status: SHIPPED | OUTPATIENT
Start: 2022-09-27

## 2022-09-27 RX ORDER — APIXABAN 5 MG/1
TABLET, FILM COATED ORAL
Qty: 60 TABLET | Refills: 1 | Status: SHIPPED | OUTPATIENT
Start: 2022-09-27

## 2022-09-27 RX ORDER — BENAZEPRIL HYDROCHLORIDE 40 MG/1
TABLET, FILM COATED ORAL
Qty: 30 TABLET | Refills: 1 | Status: SHIPPED | OUTPATIENT
Start: 2022-09-27

## 2022-09-27 RX ORDER — MELATONIN
Qty: 30 TABLET | Refills: 1 | Status: SHIPPED | OUTPATIENT
Start: 2022-09-27

## 2022-11-21 DIAGNOSIS — N40.1 BENIGN PROSTATIC HYPERPLASIA WITH LOWER URINARY TRACT SYMPTOMS, SYMPTOM DETAILS UNSPECIFIED: ICD-10-CM

## 2022-11-21 DIAGNOSIS — I48.91 ATRIAL FIBRILLATION, UNSPECIFIED TYPE (HCC): ICD-10-CM

## 2022-11-21 DIAGNOSIS — E55.9 VITAMIN D DEFICIENCY: ICD-10-CM

## 2022-11-21 DIAGNOSIS — I10 BENIGN ESSENTIAL HYPERTENSION: ICD-10-CM

## 2022-11-21 RX ORDER — BENAZEPRIL HYDROCHLORIDE 40 MG/1
TABLET, FILM COATED ORAL
Qty: 30 TABLET | Refills: 2 | Status: SHIPPED | OUTPATIENT
Start: 2022-11-21

## 2022-11-21 RX ORDER — APIXABAN 5 MG/1
TABLET, FILM COATED ORAL
Qty: 60 TABLET | Refills: 2 | Status: SHIPPED | OUTPATIENT
Start: 2022-11-21

## 2022-11-21 RX ORDER — MELATONIN
Qty: 30 TABLET | Refills: 2 | Status: SHIPPED | OUTPATIENT
Start: 2022-11-21

## 2022-11-21 RX ORDER — FINASTERIDE 5 MG/1
TABLET, FILM COATED ORAL
Qty: 90 TABLET | Refills: 2 | Status: SHIPPED | OUTPATIENT
Start: 2022-11-21

## 2022-12-21 ENCOUNTER — OFFICE VISIT (OUTPATIENT)
Dept: GERIATRICS | Facility: CLINIC | Age: 87
End: 2022-12-21

## 2022-12-21 VITALS
HEIGHT: 69 IN | OXYGEN SATURATION: 95 % | RESPIRATION RATE: 16 BRPM | HEART RATE: 61 BPM | SYSTOLIC BLOOD PRESSURE: 132 MMHG | TEMPERATURE: 97.9 F | DIASTOLIC BLOOD PRESSURE: 70 MMHG | WEIGHT: 241.4 LBS | BODY MASS INDEX: 35.76 KG/M2

## 2022-12-21 DIAGNOSIS — N18.2 CKD (CHRONIC KIDNEY DISEASE) STAGE 2, GFR 60-89 ML/MIN: ICD-10-CM

## 2022-12-21 DIAGNOSIS — G47.33 OBSTRUCTIVE SLEEP APNEA: ICD-10-CM

## 2022-12-21 DIAGNOSIS — I10 BENIGN ESSENTIAL HYPERTENSION: ICD-10-CM

## 2022-12-21 DIAGNOSIS — I50.32 CHRONIC DIASTOLIC CONGESTIVE HEART FAILURE (HCC): Primary | ICD-10-CM

## 2022-12-21 DIAGNOSIS — K59.01 SLOW TRANSIT CONSTIPATION: ICD-10-CM

## 2022-12-21 DIAGNOSIS — E03.8 OTHER SPECIFIED HYPOTHYROIDISM: ICD-10-CM

## 2022-12-21 DIAGNOSIS — H90.3 SENSORINEURAL HEARING LOSS (SNHL) OF BOTH EARS: ICD-10-CM

## 2022-12-21 DIAGNOSIS — I48.91 ATRIAL FIBRILLATION, UNSPECIFIED TYPE (HCC): ICD-10-CM

## 2022-12-21 DIAGNOSIS — E55.9 VITAMIN D DEFICIENCY: ICD-10-CM

## 2022-12-21 DIAGNOSIS — J44.9 CHRONIC OBSTRUCTIVE PULMONARY DISEASE, UNSPECIFIED COPD TYPE (HCC): ICD-10-CM

## 2022-12-21 RX ORDER — MELATONIN
1000 DAILY
Qty: 30 TABLET | Refills: 2 | Status: SHIPPED | OUTPATIENT
Start: 2022-12-21

## 2022-12-21 RX ORDER — SENNOSIDES 8.6 MG
8.6 TABLET ORAL
Qty: 30 TABLET | Refills: 3 | Status: SHIPPED | OUTPATIENT
Start: 2022-12-21 | End: 2022-12-21 | Stop reason: CLARIF

## 2022-12-21 NOTE — ASSESSMENT & PLAN NOTE
At goal <150/90  Continue torsemide, benazepril, coreg  Amlodipine previously discontinued with improvement in relative hypotension and pedal edema

## 2022-12-21 NOTE — ASSESSMENT & PLAN NOTE
Wt Readings from Last 3 Encounters:   09/21/22 109 kg (240 lb 12 8 oz)   06/06/22 108 kg (239 lb)   03/21/22 107 kg (236 lb 8 oz)     Examines euvolemic  Continue torsemide, coreg, benazepril  Recheck BMP in 3 months prior to next routine visit  Order placed today

## 2022-12-21 NOTE — PROGRESS NOTES
[facility]  [address], [address] [place] [address] FOR OLDER ADULTS      NAME: Michael Mullen  AGE: 80 y o  SEX: male    DATE OF ENCOUNTER: 12/22/2022    Assessment and Plan     Hypothyroidism  Stable  TSH 1 56 on 09/13/2022  Continue levothyroxine 100 mcg daily    Obstructive sleep apnea  Continue CPAP QHS    Atrial fibrillation (HCC) [I48 91]  Heart rate stable, RRR 61 bpm  Continue Coreg and Eliquis    Benign essential hypertension  At goal <150/90  Continue torsemide, benazepril, coreg  Amlodipine previously discontinued with improvement in relative hypotension and pedal edema    Chronic diastolic congestive heart failure (HCC)  Wt Readings from Last 3 Encounters:   09/21/22 109 kg (240 lb 12 8 oz)   06/06/22 108 kg (239 lb)   03/21/22 107 kg (236 lb 8 oz)     Examines euvolemic  Continue torsemide, coreg, benazepril  Recheck BMP in 3 months prior to next routine visit  Order placed today  Ambulatory dysfunction  Patient uses RW for ambulation  Fall precautions      Orders Placed This Encounter   Procedures   • Basic metabolic panel       - Counseling Documentation: patient was counseled regarding: instructions for management, patient and family education and importance of compliance with treatment    Chief Complaint     Chief Complaint   Patient presents with   • Follow-up     3 months       History of Present Illness     HPI  Patient presents to the office today with his daughter for FU his chronic conditions  He denies any pain, palpitation, or SOB  Her daughter inquires whether OTC miralax or vitamin D3 covered by his insurance  Order sent to pharmacy today  She will call insurance customer service to verify       The following portions of the patient's history were reviewed and updated as appropriate: allergies, current medications, past family history, past medical history, past social history, past surgical history and problem list     Review of Systems     Review of Systems   Constitutional: Negative for chills and fever  HENT: Negative for ear pain and sore throat  Eyes: Negative for pain and visual disturbance  Respiratory: Negative for cough and shortness of breath  Cardiovascular: Negative for chest pain and palpitations  Gastrointestinal: Negative for abdominal pain and vomiting  Genitourinary: Negative for dysuria and hematuria  Musculoskeletal: Negative for arthralgias and back pain  Skin: Negative for color change and rash  Neurological: Negative for seizures and syncope  Psychiatric/Behavioral: Negative for agitation  All other systems reviewed and are negative  Active Problem List     Patient Active Problem List   Diagnosis   • Atrial fibrillation (HCC) [I48 91]   • Coronary artery disease   • Benign essential hypertension   • Bicuspid aortic valve   • Chronic diastolic congestive heart failure (HCC)   • Hypothyroidism   • Obstructive sleep apnea   • Chronic anticoagulation   • Mild cognitive impairment with memory loss   • Malignant neoplasm of urinary bladder (HCC)   • Class 2 severe obesity with serious comorbidity in adult [facility])   • Restrictive lung disease   • Seasonal allergic rhinitis   • Change in mole   • Hypernatremia   • Ambulatory dysfunction   • Sensorineural hearing loss (SNHL) of both ears       Objective     /70 (BP Location: Left arm, Patient Position: Sitting, Cuff Size: Standard)   Pulse 61   Temp 97 9 °F (36 6 °C) (Temporal)   Resp 16   Ht 5' 8 5" (1 74 m) Comment: w shoes  Wt 109 kg (241 lb 6 4 oz) Comment: w shoes  SpO2 95%   BMI 36 17 kg/m²     Physical Exam  Constitutional:       General: He is not in acute distress  Appearance: He is well-developed  HENT:      Head: Normocephalic        Right Ear: External ear normal       Left Ear: External ear normal       Nose: Nose normal       Mouth/Throat:      Mouth: Mucous membranes are moist  Eyes:      Conjunctiva/sclera: Conjunctivae normal    Cardiovascular:      Rate and Rhythm: Normal rate and regular rhythm  Pulses: Normal pulses  Heart sounds: Normal heart sounds  No murmur heard  Pulmonary:      Effort: Pulmonary effort is normal       Breath sounds: Normal breath sounds  No wheezing, rhonchi or rales  Abdominal:      General: Bowel sounds are normal  There is no distension  Palpations: Abdomen is soft  Tenderness: There is no abdominal tenderness  Musculoskeletal:      Cervical back: Neck supple  Right lower leg: No edema  Left lower leg: No edema  Comments: Ambulates with rolling walker   Skin:     General: Skin is warm  Capillary Refill: Capillary refill takes less than 2 seconds  Neurological:      Mental Status: He is alert and oriented to person, place, and time     Psychiatric:         Behavior: Behavior normal          Pertinent Laboratory/Diagnostic Studies:    Current Medications     Current Outpatient Medications:   •  cholecalciferol (VITAMIN D3) 1,000 units tablet, Take 1 tablet (1,000 Units total) by mouth daily, Disp: 30 tablet, Rfl: 2  •  docusate sodium (COLACE) 100 mg capsule, Take 1 capsule (100 mg total) by mouth in the morning, Disp: 100 capsule, Rfl: 3  •  atorvastatin (LIPITOR) 40 mg tablet, TAKE ONE TABLET BY MOUTH AT BEDTIME, Disp: 90 tablet, Rfl: 1  •  benazepril (LOTENSIN) 40 MG tablet, TAKE ONE TABLET BY MOUTH EVERY DAY WITH LUNCH, Disp: 30 tablet, Rfl: 2  •  carvedilol (COREG) 25 mg tablet, TAKE ONE TABLET BY MOUTH TWICE DAILY AT NOON AND BEDTIME, Disp: 180 tablet, Rfl: 1  •  Eliquis 5 MG, TAKE ONE TABLET BY MOUTH TWICE DAILY AT NOON AND BEDTIME, Disp: 60 tablet, Rfl: 2  •  finasteride (PROSCAR) 5 mg tablet, TAKE ONE TABLET BY MOUTH EVERY DAY WITH LUNCH, Disp: 90 tablet, Rfl: 2  •  fluticasone (FLONASE) 50 mcg/act nasal spray, 1 spray into each nostril daily (Patient not taking: Reported on 3/21/2022 ), Disp: , Rfl:   • latanoprost (XALATAN) 0 005 % ophthalmic solution, Apply 1 drop to eye, Disp: , Rfl:   •  levothyroxine 100 mcg tablet, TAKE ONE TABLET BY MOUTH AT BEDTIME FOR HYPOTHYROID, Disp: 90 tablet, Rfl: 1  •  polyethylene glycol (GLYCOLAX) 17 GM/SCOOP powder, MIX 17 GRAMS (1 CAPFUL) OF POWDER IN 8 OUNCES OF LIQUID AND DRINK ONCE DAILY FOR CONSTIPATON - HOLD FOR LOOSE STOOLS, Disp: 510 g, Rfl: 3  •  potassium chloride (K-DUR,KLOR-CON) 20 mEq tablet, TAKE ONE TABLET BY MOUTH EVERY DAY WITH LUNCH, Disp: 90 tablet, Rfl: 1  •  torsemide (DEMADEX) 20 mg tablet, TAKE 1 AND 1/2 TABLETS BY MOUTH EVERY DAY WITH LUNCH, Disp: 135 tablet, Rfl: 1    Health Maintenance     Health Maintenance   Topic Date Due   • Hepatitis B Vaccine (1 of 3 - 3-dose series) Never done   • COVID-19 Vaccine (4 - Booster for Moderna series) 01/11/2022   • BMI: Followup Plan  06/14/2022   • Influenza Vaccine (1) 09/01/2022   • Fall Risk  03/21/2023   • Depression Screening  03/21/2023   • Medicare Annual Wellness Visit (AWV)  03/21/2023   • BMI: Adult  12/21/2023   • Pneumococcal Vaccine: 65+ Years  Completed   • HIB Vaccine  Aged Out   • IPV Vaccine  Aged Out   • Hepatitis A Vaccine  Aged Out   • Meningococcal ACWY Vaccine  Aged Out   • HPV Vaccine  Aged Out     Immunization History   Administered Date(s) Administered   • COVID-19 MODERNA VACC 0 5 ML IM 01/21/2021, 02/23/2021, 11/16/2021   • INFLUENZA 10/01/2013, 09/22/2014, 01/02/2015, 10/04/2018, 10/15/2019, 10/01/2020   • Influenza Quadrivalent Preservative Free 3 years and older IM 10/15/2019   • Influenza, high dose seasonal 0 7 mL 10/22/2020   • Influenza, injectable, quadrivalent, preservative free 0 5 mL 10/15/2019   • Influenza, seasonal, injectable 11/04/1927, 08/30/2011, 08/30/2011, 10/04/2017   • Pneumococcal 11/01/2005   • Pneumococcal Conjugate 13-Valent 02/18/2015, 02/08/2016   • Pneumococcal Conjugate PCV 7 02/18/2015   • Pneumococcal Polysaccharide PPV23 11/01/2005, 10/01/2012   • Td (adult), Unspecified 01/13/2003   • Td (adult), adsorbed 01/13/2003   • Tdap 01/17/2013   • Zoster 01/10/2013

## 2022-12-23 PROBLEM — N18.2 CKD (CHRONIC KIDNEY DISEASE) STAGE 2, GFR 60-89 ML/MIN: Status: ACTIVE | Noted: 2022-12-23

## 2023-01-01 NOTE — PATIENT INSTRUCTIONS
-Stop amlodipine  -Have blood work done in 2 weeks with next coumadin blood work (PT/INR)  -Follow up in 3 weeks for repeat blood pressure assessment, medication review and blood work review
7

## 2023-01-21 DIAGNOSIS — I50.9 CHRONIC CONGESTIVE HEART FAILURE, UNSPECIFIED HEART FAILURE TYPE (HCC): ICD-10-CM

## 2023-01-21 DIAGNOSIS — I10 BENIGN ESSENTIAL HYPERTENSION: ICD-10-CM

## 2023-01-21 DIAGNOSIS — I25.10 CORONARY ARTERY DISEASE INVOLVING NATIVE CORONARY ARTERY OF NATIVE HEART WITHOUT ANGINA PECTORIS: ICD-10-CM

## 2023-01-21 RX ORDER — CARVEDILOL 25 MG/1
TABLET ORAL
Qty: 180 TABLET | Refills: 1 | Status: SHIPPED | OUTPATIENT
Start: 2023-01-21

## 2023-01-21 RX ORDER — POTASSIUM CHLORIDE 20 MEQ/1
TABLET, EXTENDED RELEASE ORAL
Qty: 90 TABLET | Refills: 1 | Status: SHIPPED | OUTPATIENT
Start: 2023-01-21

## 2023-02-10 ENCOUNTER — NURSE TRIAGE (OUTPATIENT)
Dept: OTHER | Facility: OTHER | Age: 88
End: 2023-02-10

## 2023-02-11 NOTE — TELEPHONE ENCOUNTER
Daughter is suspicious of a UTI and requesting a UA/culture  he is also positive for COVID with some mild confusion (apparently more than baseline) I suggested that if he more confused than normal with suspicion of UTI and has covid he may want to be evaluated in the ed  she refused and would like a urine test  Will forward to office  Family made aware  Additional Information  • [1] Caller is not with the adult (patient) AND [2] reporting urgent symptoms    Answer Assessment - Initial Assessment Questions  1  SYMPTOM: "What's the main symptom you're concerned about?" (e g , frequency, incontinence)      Daughter concerned for UTI   3  PAIN: "Is there any pain?" If Yes, ask: "How bad is it?" (Scale: 1-10; mild, moderate, severe)      Denies   4  CAUSE: "What do you think is causing the symptoms?"     Unknown   5   OTHER SYMPTOMS: "Do you have any other symptoms?" (e g , fever, flank pain, blood in urine, pain with urination)      Pt also has COVID    Protocols used: INFORMATION ONLY CALL-ADULT-, URINARY SYMPTOMS-ADULT-

## 2023-02-11 NOTE — TELEPHONE ENCOUNTER
Regarding: possible UTI, diarrhea  ----- Message from Ben Clark sent at 2/10/2023  7:04 PM EST -----  "he just test positive for COVID, [place] [facility] [place] concerned he might have a UTI   Also he does have diarrhea  "

## 2023-02-15 NOTE — TELEPHONE ENCOUNTER
DO Nabil Souza; [address] [facility]; [address] [place] [address] Clinical  Caller: Unspecified (5 days ago,  7:03 PM)  Can we please touch base with the patient/ daughter today to see if he is currently having any symptoms of a UTI including burning, blood in urine, lower abdominal pain, fever or flank/back pain? Please encourage patient to drink additional 2- 8oz beverages (water preferable) per day  Thanks!      LEFT MESSAGE WITH DAUGHTER TO CALL BACK AND REVIEW PROVIDERS MESSAGE (see above)

## 2023-02-15 NOTE — TELEPHONE ENCOUNTER
Spoke with daughter in regards to provider message  She will check in with her dad and his providers and let us know if there are any of the mentioned UTI symptoms  Daughter stated that his confusion is of his normal baseline and that he also is positive for covid

## 2023-02-16 DIAGNOSIS — K59.01 SLOW TRANSIT CONSTIPATION: ICD-10-CM

## 2023-02-16 RX ORDER — POLYETHYLENE GLYCOL 3350 17 G/17G
POWDER, FOR SOLUTION ORAL
Qty: 510 G | Refills: 3 | Status: SHIPPED | OUTPATIENT
Start: 2023-02-16

## 2023-02-17 DIAGNOSIS — I50.9 CHRONIC CONGESTIVE HEART FAILURE, UNSPECIFIED HEART FAILURE TYPE (HCC): ICD-10-CM

## 2023-02-17 DIAGNOSIS — I10 BENIGN ESSENTIAL HYPERTENSION: ICD-10-CM

## 2023-02-17 DIAGNOSIS — I25.10 CORONARY ARTERY DISEASE INVOLVING NATIVE CORONARY ARTERY OF NATIVE HEART WITHOUT ANGINA PECTORIS: ICD-10-CM

## 2023-02-17 DIAGNOSIS — I48.91 ATRIAL FIBRILLATION, UNSPECIFIED TYPE (HCC): ICD-10-CM

## 2023-02-17 RX ORDER — TORSEMIDE 20 MG/1
TABLET ORAL
Qty: 135 TABLET | Refills: 1 | Status: SHIPPED | OUTPATIENT
Start: 2023-02-17

## 2023-02-17 RX ORDER — BENAZEPRIL HYDROCHLORIDE 40 MG/1
TABLET, FILM COATED ORAL
Qty: 30 TABLET | Refills: 1 | Status: SHIPPED | OUTPATIENT
Start: 2023-02-17

## 2023-02-17 RX ORDER — APIXABAN 5 MG/1
TABLET, FILM COATED ORAL
Qty: 60 TABLET | Refills: 1 | Status: SHIPPED | OUTPATIENT
Start: 2023-02-17

## 2023-02-17 RX ORDER — ATORVASTATIN CALCIUM 40 MG/1
TABLET, FILM COATED ORAL
Qty: 90 TABLET | Refills: 1 | Status: SHIPPED | OUTPATIENT
Start: 2023-02-17

## 2023-03-19 DIAGNOSIS — E55.9 VITAMIN D DEFICIENCY: ICD-10-CM

## 2023-03-20 RX ORDER — MELATONIN
Qty: 30 TABLET | Refills: 3 | Status: SHIPPED | OUTPATIENT
Start: 2023-03-20

## 2023-03-22 ENCOUNTER — OFFICE VISIT (OUTPATIENT)
Dept: GERIATRICS | Facility: CLINIC | Age: 88
End: 2023-03-22

## 2023-03-22 VITALS
TEMPERATURE: 96.6 F | DIASTOLIC BLOOD PRESSURE: 72 MMHG | SYSTOLIC BLOOD PRESSURE: 118 MMHG | WEIGHT: 238.4 LBS | HEART RATE: 61 BPM | BODY MASS INDEX: 35.72 KG/M2 | OXYGEN SATURATION: 98 %

## 2023-03-22 DIAGNOSIS — J44.9 CHRONIC OBSTRUCTIVE PULMONARY DISEASE, UNSPECIFIED COPD TYPE (HCC): ICD-10-CM

## 2023-03-22 DIAGNOSIS — E03.9 HYPOTHYROIDISM, UNSPECIFIED TYPE: ICD-10-CM

## 2023-03-22 DIAGNOSIS — I48.91 ATRIAL FIBRILLATION, UNSPECIFIED TYPE (HCC): ICD-10-CM

## 2023-03-22 DIAGNOSIS — G31.84 MILD COGNITIVE IMPAIRMENT WITH MEMORY LOSS: Primary | ICD-10-CM

## 2023-03-22 DIAGNOSIS — J98.4 RESTRICTIVE LUNG DISEASE: ICD-10-CM

## 2023-03-22 DIAGNOSIS — H90.3 SENSORINEURAL HEARING LOSS (SNHL) OF BOTH EARS: ICD-10-CM

## 2023-03-22 DIAGNOSIS — E03.8 OTHER SPECIFIED HYPOTHYROIDISM: ICD-10-CM

## 2023-03-22 DIAGNOSIS — G47.33 OBSTRUCTIVE SLEEP APNEA: ICD-10-CM

## 2023-03-22 DIAGNOSIS — E66.01 CLASS 2 SEVERE OBESITY WITH SERIOUS COMORBIDITY AND BODY MASS INDEX (BMI) OF 35.0 TO 35.9 IN ADULT, UNSPECIFIED OBESITY TYPE (HCC): ICD-10-CM

## 2023-03-22 RX ORDER — LEVOTHYROXINE SODIUM 0.1 MG/1
TABLET ORAL
Qty: 90 TABLET | Refills: 1 | Status: SHIPPED | OUTPATIENT
Start: 2023-03-22

## 2023-03-22 NOTE — PROGRESS NOTES
[place] [id number] FOR OLDER ADULTS      NAME: Irlanda Mullen  AGE: 80 y o  SEX: male    DATE OF ENCOUNTER: 3/22/2023    Assessment and Plan     Problem List Items Addressed This Visit        Endocrine    Hypothyroidism     TSH was NL 1 56 9/13/22  Continue levothyroxine 100 mcg daily  Due for 6-month recheck  Order placed today  Relevant Orders    TSH, 3rd generation with Free T4 reflex       Respiratory    Obstructive sleep apnea     Uses CPAP at night  Daughter requested new supplies for CPAP  I advised to follow up with pulm given his lost follow-up for years  However, daughter denies patient seeing pulm nor sleep medicine  Restrictive lung disease     Respiratory status is stable on room air, SpO2 98% today  Decreased breath sounds bilaterally  No reports of any respiratory distress episodes from patient  Patient is currently not on any inhalers  Does not follow pulmonology for years  PFT in 2017 revealed - Moderate restrictive airflow defect   - Partial response to the administration to bronchodilator per ATS Standards  FVC increased by 220ml but only 8%  - Borderline normal lung volumes with normal residual volume  - Severe diffusion defect   - Flattening of expiratory limb of flow volume loops; clinically correlate  Concern for variable intrathoracic obstruction  Recommend to follow-up with pulm for appropriate treatment           Chronic obstructive pulmonary disease, unspecified COPD type ([address] )       Cardiovascular and Mediastinum    Atrial fibrillation ([address] ) [I48 91]     Heart rate stable, RRR 61 bpm  Continue Coreg and Eliquis            Nervous and Auditory    Mild cognitive impairment with memory loss - Primary     Last MOCA assessment performed on 06/06/2022 with a score of 22/30  Patient is forgetful and needs assistance with ADLs, medication management and continue personal care assistant  Discussed on redirection, reorientation, distraction techniques  Fall Precautions  Encourage Hydration/ Nutrition  Encourage participation in group activities when appropriate   OT referral placed today         Relevant Orders    Ambulatory Referral to Occupational Therapy    Sensorineural hearing loss (SNHL) of both ears     Encouraged to use hearing aids at all appropriate time            Other    Class 2 severe obesity with serious comorbidity in adult [facility])       Orders Placed This Encounter   Procedures   • TSH, 3rd generation with Free T4 reflex   • Ambulatory Referral to Occupational Therapy       - Counseling Documentation: patient was counseled regarding: instructions for management, patient and family education and importance of compliance with treatment  - Counseling Time: counseling time more than 50% of visit: 20 minutes  I have spent a total time of 80 minutes on 03/22/23 in caring for this patient including Diagnostic results, Prognosis, Risks and benefits of tx options, Instructions for management, Patient and family education, Importance of tx compliance, Counseling / Coordination of care, Documenting in the medical record, Reviewing / ordering tests, medicine, procedures   and Obtaining or reviewing history    Chief Complaint     Chief Complaint   Patient presents with   • Follow-up     3 mo fu       History of Present Illness     Patient presents to the office today with his daughter for FU his chronic medical conditions  He takes levothyroxine 100 mcg daily  FILOMENA, CPAP nightly, and wants the new order for mask and tube  Afib on Coreg and Eliquis  Daughter reports sundown syndrome at the end of the day  Asks why he is in the hotel, not remember what he had for breakfast    He lives alone in the apartment  Sleeps until noon  The aide comes to give medication daily  He makes his breakfast and does some cleaning         The following portions of the patient's history were reviewed and updated as appropriate: allergies, current medications, past family history, past medical history, past social history, past surgical history and problem list     Review of Systems     Review of Systems   As per pertinent findings in HPI    Active Problem List     Patient Active Problem List   Diagnosis   • Atrial fibrillation ([facility] ) [I48 91]   • Coronary artery disease   • Benign essential hypertension   • Bicuspid aortic valve   • Chronic diastolic congestive heart failure ([facility] )   • Hypothyroidism   • Obstructive sleep apnea   • Chronic anticoagulation   • Mild cognitive impairment with memory loss   • Malignant neoplasm of urinary bladder (HCC)   • Class 2 severe obesity with serious comorbidity in adult [facility])   • Restrictive lung disease   • Seasonal allergic rhinitis   • Change in mole   • Hypernatremia   • Ambulatory dysfunction   • Sensorineural hearing loss (SNHL) of both ears   • Chronic obstructive pulmonary disease, unspecified COPD type ([facility] )   • CKD (chronic kidney disease) stage 2, GFR 60-89 ml/min       Objective     /72 (BP Location: Left arm, Patient Position: Sitting, Cuff Size: Standard)   Pulse 61   Temp (!) 96 6 °F (35 9 °C) (Temporal)   Wt 108 kg (238 lb 6 4 oz)   SpO2 98%   BMI 35 72 kg/m²     Physical Exam  Vitals reviewed  Constitutional:       General: He is not in acute distress  Appearance: He is well-developed  He is obese  HENT:      Head: Normocephalic and atraumatic  Right Ear: External ear normal       Left Ear: External ear normal       Nose: Nose normal    Eyes:      Conjunctiva/sclera: Conjunctivae normal    Cardiovascular:      Rate and Rhythm: Normal rate and regular rhythm  Heart sounds: Normal heart sounds  No murmur heard  Pulmonary:      Effort: Pulmonary effort is normal  No respiratory distress  Breath sounds: No wheezing, rhonchi or rales        Comments: Decreased breath sounds b/l, no wheeze  Abdominal:      General: Bowel sounds are normal  There is no distension  Palpations: Abdomen is soft  Tenderness: There is no abdominal tenderness  Musculoskeletal:      Cervical back: Neck supple  Neurological:      Mental Status: He is alert  Comments: Oriented to person and place only, not time  Pleasant, cooperative   Follows commands   Psychiatric:         Behavior: Behavior normal          Current Medications     Current Outpatient Medications:   •  atorvastatin (LIPITOR) 40 mg tablet, TAKE ONE TABLET BY MOUTH AT BEDTIME, Disp: 90 tablet, Rfl: 1  •  benazepril (LOTENSIN) 40 MG tablet, TAKE ONE TABLET BY MOUTH EVERY DAY WITH LUNCH, Disp: 30 tablet, Rfl: 1  •  carvedilol (COREG) 25 mg tablet, TAKE ONE TABLET BY MOUTH TWICE DAILY AT NOON AND BEDTIME, Disp: 180 tablet, Rfl: 1  •  cholecalciferol (VITAMIN D3) 1,000 units tablet, TAKE ONE TABLET BY MOUTH AT NOON WITH LUNCH FOR VITAMIN D DEFICIENCY, Disp: 30 tablet, Rfl: 3  •  Eliquis 5 MG, TAKE ONE TABLET BY MOUTH TWICE DAILY AT NOON AND BEDTIME, Disp: 60 tablet, Rfl: 1  •  finasteride (PROSCAR) 5 mg tablet, TAKE ONE TABLET BY MOUTH EVERY DAY WITH LUNCH, Disp: 90 tablet, Rfl: 2  •  fluticasone (FLONASE) 50 mcg/act nasal spray, 1 spray into each nostril daily, Disp: , Rfl:   •  latanoprost (XALATAN) 0 005 % ophthalmic solution, Apply 1 drop to eye, Disp: , Rfl:   •  potassium chloride (K-DUR,KLOR-CON) 20 mEq tablet, TAKE ONE TABLET BY MOUTH EVERY DAY WITH LUNCH, Disp: 90 tablet, Rfl: 1  •  torsemide (DEMADEX) 20 mg tablet, TAKE 1 AND 1/2 TABLETS BY MOUTH EVERY DAY WITH LUNCH, Disp: 135 tablet, Rfl: 1  •  levothyroxine 100 mcg tablet, TAKE ONE TABLET BY MOUTH AT BEDTIME FOR HYPOTHYROID, Disp: 90 tablet, Rfl: 1    Health Maintenance     Health Maintenance   Topic Date Due   • COVID-19 Vaccine (4 - Booster for Moderna series) 01/11/2022   • BMI: Followup Plan  06/14/2022   • Influenza Vaccine (1) 09/01/2022   • Fall Risk  03/21/2023   • Depression Screening  03/21/2023   • Medicare Annual Wellness Visit (AWV)  03/21/2023   • BMI: Adult  03/22/2024   • Pneumococcal Vaccine: 65+ Years  Completed   • HIB Vaccine  Aged Out   • IPV Vaccine  Aged Out   • Hepatitis A Vaccine  Aged Out   • Meningococcal ACWY Vaccine  Aged Out   • HPV Vaccine  Aged Out     Immunization History   Administered Date(s) Administered   • COVID-19 MODERNA VACC 0 5 ML IM 01/21/2021, 02/23/2021, 11/16/2021   • INFLUENZA 10/01/2013, 09/22/2014, 01/02/2015, 10/04/2018, 10/15/2019, 10/01/2020   • Influenza Quadrivalent Preservative Free 3 years and older IM 10/15/2019   • Influenza, high dose seasonal 0 7 mL 10/22/2020   • Influenza, injectable, quadrivalent, preservative free 0 5 mL 10/15/2019   • Influenza, seasonal, injectable 11/04/1927, 08/30/2011, 08/30/2011, 10/04/2017   • Pneumococcal 11/01/2005   • Pneumococcal Conjugate 13-Valent 02/18/2015, 02/08/2016   • Pneumococcal Conjugate PCV 7 02/18/2015   • Pneumococcal Polysaccharide PPV23 11/01/2005, 10/01/2012   • Td (adult), Unspecified 01/13/2003   • Td (adult), adsorbed 01/13/2003   • Tdap 01/17/2013   • Zoster 01/10/2013

## 2023-03-23 NOTE — ASSESSMENT & PLAN NOTE
Last MOCA assessment performed on 06/06/2022 with a score of 22/30  Patient is forgetful and needs assistance with ADLs, medication management and continue personal care assistant  Discussed on redirection, reorientation, distraction techniques  Fall Precautions  Encourage Hydration/ Nutrition  Encourage participation in group activities when appropriate   OT referral placed today

## 2023-03-23 NOTE — ASSESSMENT & PLAN NOTE
Respiratory status is stable on room air, SpO2 98% today  Decreased breath sounds bilaterally  No reports of any respiratory distress episodes from patient  Patient is currently not on any inhalers  Does not follow pulmonology for years  PFT in 2017 revealed - Moderate restrictive airflow defect   - Partial response to the administration to bronchodilator per ATS Standards  FVC increased by 220ml but only 8%  - Borderline normal lung volumes with normal residual volume  - Severe diffusion defect   - Flattening of expiratory limb of flow volume loops; clinically correlate  Concern for variable intrathoracic obstruction  Recommend to follow-up with pulm for appropriate treatment

## 2023-03-23 NOTE — ASSESSMENT & PLAN NOTE
TSH was NL 1 56 9/13/22  Continue levothyroxine 100 mcg daily  Due for 6-month recheck  Order placed today

## 2023-03-23 NOTE — ASSESSMENT & PLAN NOTE
Uses CPAP at night  Daughter requested new supplies for CPAP  I advised to follow up with pulm given his lost follow-up for years  However, daughter denies patient seeing pulm nor sleep medicine

## 2023-04-27 DIAGNOSIS — I48.91 ATRIAL FIBRILLATION, UNSPECIFIED TYPE (HCC): ICD-10-CM

## 2023-04-27 DIAGNOSIS — I10 BENIGN ESSENTIAL HYPERTENSION: ICD-10-CM

## 2023-04-28 RX ORDER — APIXABAN 5 MG/1
TABLET, FILM COATED ORAL
Qty: 60 TABLET | Refills: 1 | Status: SHIPPED | OUTPATIENT
Start: 2023-04-28

## 2023-04-28 RX ORDER — BENAZEPRIL HYDROCHLORIDE 40 MG/1
TABLET, FILM COATED ORAL
Qty: 30 TABLET | Refills: 1 | Status: SHIPPED | OUTPATIENT
Start: 2023-04-28

## 2023-06-12 ENCOUNTER — OFFICE VISIT (OUTPATIENT)
Dept: GERIATRICS | Facility: CLINIC | Age: 88
End: 2023-06-12
Payer: COMMERCIAL

## 2023-06-12 VITALS
BODY MASS INDEX: 36.17 KG/M2 | OXYGEN SATURATION: 93 % | DIASTOLIC BLOOD PRESSURE: 70 MMHG | WEIGHT: 244.2 LBS | HEIGHT: 69 IN | TEMPERATURE: 97.2 F | SYSTOLIC BLOOD PRESSURE: 112 MMHG | HEART RATE: 57 BPM | RESPIRATION RATE: 18 BRPM

## 2023-06-12 DIAGNOSIS — E03.8 OTHER SPECIFIED HYPOTHYROIDISM: ICD-10-CM

## 2023-06-12 DIAGNOSIS — H04.123 DRY EYES: ICD-10-CM

## 2023-06-12 DIAGNOSIS — G31.84 MILD COGNITIVE IMPAIRMENT WITH MEMORY LOSS: ICD-10-CM

## 2023-06-12 DIAGNOSIS — G47.33 OBSTRUCTIVE SLEEP APNEA: ICD-10-CM

## 2023-06-12 DIAGNOSIS — J44.9 CHRONIC OBSTRUCTIVE PULMONARY DISEASE, UNSPECIFIED COPD TYPE (HCC): ICD-10-CM

## 2023-06-12 DIAGNOSIS — I50.32 CHRONIC DIASTOLIC CONGESTIVE HEART FAILURE (HCC): Primary | ICD-10-CM

## 2023-06-12 DIAGNOSIS — H90.3 SENSORINEURAL HEARING LOSS (SNHL) OF BOTH EARS: ICD-10-CM

## 2023-06-12 DIAGNOSIS — H61.23 BILATERAL IMPACTED CERUMEN: ICD-10-CM

## 2023-06-12 DIAGNOSIS — H04.123 BILATERAL DRY EYES: ICD-10-CM

## 2023-06-12 DIAGNOSIS — I48.91 ATRIAL FIBRILLATION, UNSPECIFIED TYPE (HCC): ICD-10-CM

## 2023-06-12 DIAGNOSIS — I10 BENIGN ESSENTIAL HYPERTENSION: ICD-10-CM

## 2023-06-12 PROCEDURE — 99214 OFFICE O/P EST MOD 30 MIN: CPT | Performed by: NURSE PRACTITIONER

## 2023-06-12 RX ORDER — CARBOXYMETHYLCELLULOSE SODIUM 5 MG/ML
1 SOLUTION/ DROPS OPHTHALMIC 2 TIMES DAILY
Qty: 6 ML | Refills: 3 | Status: SHIPPED | OUTPATIENT
Start: 2023-06-12

## 2023-06-12 NOTE — ASSESSMENT & PLAN NOTE
Patient is forgetful of present events  Continue personal care assistance at [facility] independent living for ADLs/medication management  Encourage patient to participate in activities provided at [facility] when appropriate

## 2023-06-12 NOTE — ASSESSMENT & PLAN NOTE
Wt Readings from Last 3 Encounters:   06/12/23 111 kg (244 lb 3 2 oz)   03/22/23 108 kg (238 lb 6 4 oz)   12/21/22 109 kg (241 lb 6 4 oz)   Patient has had weight loss of 3 pounds from December to March 2023 and now weight gain of 6 pounds from March to June 2023  He does have +1 pitting edema right lower extremity and trace edema left lower extremity    No signs or symptoms of fluid overload  Respirations are chronically labored due to restrictive lung disease  He does not require oxygen  Recent [facility] March 2023 stable  Check [facility] September 2023

## 2023-06-12 NOTE — PROGRESS NOTES
[facility]  POS: [facility]  AGE: 80 y o  SEX: male    DATE OF ENCOUNTER: 6/12/2023    Assessment and Plan     80year-old male with:    Chronic diastolic congestive heart failure (HCC)  Wt Readings from Last 3 Encounters:   06/12/23 111 kg (244 lb 3 2 oz)   03/22/23 108 kg (238 lb 6 4 oz)   12/21/22 109 kg (241 lb 6 4 oz)   Patient has had weight loss of 3 pounds from December to March 2023 and now weight gain of 6 pounds from March to June 2023  He does have +1 pitting edema right lower extremity and trace edema left lower extremity  No signs or symptoms of fluid overload  Respirations are chronically labored due to restrictive lung disease  He does not require oxygen  Continue torsemide and potassium supplement; potassium level 4 4 March 17, 2023  Recent BMP March 2023 stable  Check CMP September 2023    Chronic obstructive pulmonary disease, unspecified COPD type ([facility] [address] )  PFTs 12/17/2017: FEV1 66%, FVC 64%, FEV1/FVC 71%, TLC 80%, RV 97%, DLCO 37%  Moderate restriction with severe DLCO reduction  Patient does not follow with pulmonology outpatient and currently oxygenating well on room air   With chronic mild labored respirations using abdominal muscle  Refer patient to pulmonology if any changes to respiratory status    Atrial fibrillation ([address] ) [I48 91]  HR 57-60 in office today  Patient is currently on Coreg 25 mg BID at noon and bedtime  Consider decreasing Coreg dose to 12 5 mg twice daily if HR consistently<60   Continue Eliquis for anticoagulation/stroke prevention  We will reassess heart rate at 6/26 appt    Benign essential hypertension  BP at goal <150/90  Continue benazepril, torsemide, and Coreg    Hypothyroidism  TSH WNL on 6/6/2023  Continue levothyroxine 100 mcg daily    Bilateral impacted cerumen  Bilateral cerumen impaction noted on exam today   Order Debrox eardrops to each ear twice daily x5 days  Plan to flush ears on 6/26/2023    Sensorineural hearing loss (SNHL) of both ears  Encourage patient to wear hearing aids, but not while on eardrops to prevent damage to hearing aids    Dry eyes  Order natural tears eyedrops twice daily    Mild cognitive impairment with memory loss  Patient is forgetful of present events  Continue personal care assistance at [facility]    The following portions of the patient's history were reviewed and updated as appropriate: allergies, current medications, past family history, past medical history, past social history, past surgical history and problem list     Review of Systems     Review of Systems   Constitutional: Negative for chills, diaphoresis and fatigue  HENT: Positive for hearing loss  Negative for trouble swallowing  Respiratory: Positive for shortness of breath (On exertion)  Negative for cough  Cardiovascular: Positive for leg swelling  Negative for chest pain and palpitations  Gastrointestinal: Negative for abdominal pain, constipation, diarrhea, nausea and vomiting  Genitourinary: Negative for difficulty urinating  Musculoskeletal: Positive for gait problem  Neurological: Negative for dizziness, syncope, speech difficulty, light-headedness, numbness and headaches  Forgetful   Psychiatric/Behavioral: Negative for behavioral problems, dysphoric mood, hallucinations and suicidal ideas  All other systems reviewed and are negative        Active Problem List     Patient Active Problem List   Diagnosis   • Atrial fibrillation (HCC) [I48 91]   • Coronary artery disease   • Benign essential hypertension   • Bicuspid aortic valve   • Chronic diastolic congestive heart failure (HCC)   • Hypothyroidism   • Obstructive sleep apnea   • Chronic anticoagulation   • Mild cognitive impairment with memory loss   • Malignant neoplasm of urinary bladder (HCC)   • Class 2 severe obesity with serious comorbidity in adult [facility])   • "Restrictive lung disease   • Seasonal allergic rhinitis   • Change in mole   • Hypernatremia   • Ambulatory dysfunction   • Sensorineural hearing loss (SNHL) of both ears   • Chronic obstructive pulmonary disease, unspecified COPD type (HCC)   • CKD (chronic kidney disease) stage 2, GFR 60-89 ml/min   • Bilateral impacted cerumen   • Dry eyes       Chief complaint: Routine 3 month follow up     History of present illness: 72-year-old male who presents in office today accompanied by his daughter for routine follow-up of chronic medical conditions  Patient is alert and oriented to self, pleasant and cooperative  He appears with chronic illness but in no distress  He denies having pain or discomfort  See A/p for additional information  Objective     /70 (BP Location: Left arm, Patient Position: Sitting, Cuff Size: Adult)   Pulse 57   Temp (!) 97 2 °F (36 2 °C) (Temporal)   Resp 18   Ht 5' 8 5\" (1 74 m)   Wt 111 kg (244 lb 3 2 oz)   SpO2 93%   BMI 36 59 kg/m²     Physical Exam  Vitals and nursing note reviewed  Constitutional:       General: He is not in acute distress  Appearance: He is not ill-appearing, toxic-appearing or diaphoretic  HENT:      Head: Normocephalic  Right Ear: There is impacted cerumen  Left Ear: There is impacted cerumen  Ears:      Comments: Very [place]  Not wearing hearing aides today  Nose: No congestion or rhinorrhea  Mouth/Throat:      Mouth: Mucous membranes are moist       Pharynx: No oropharyngeal exudate  Eyes:      General:         Right eye: No discharge  Left eye: No discharge  Extraocular Movements: Extraocular movements intact  Conjunctiva/sclera: Conjunctivae normal       Pupils: Pupils are equal, round, and reactive to light  Comments: Wears glasses  Cardiovascular:      Rate and Rhythm: Normal rate  Pulses: Normal pulses  Pulmonary:      Effort: Pulmonary effort is normal  No respiratory distress       " Breath sounds: No wheezing or rhonchi  Comments: Decreased breath sounds bibasilar  With chronic mild labored respirations in setting of COPD  Abdominal:      General: Bowel sounds are normal  There is no distension  Palpations: Abdomen is soft  Tenderness: There is no abdominal tenderness  There is no guarding  Comments: Large round firm abdomen  Musculoskeletal:      Cervical back: Neck supple  No rigidity  Right lower leg: Edema (+1 pitting edema) present  Left lower leg: Edema (Trace pitting) present  Comments: Moves all 4 extremities  Ambulatory with walker  Lymphadenopathy:      Cervical: No cervical adenopathy  Skin:     General: Skin is warm and dry  Capillary Refill: Capillary refill takes less than 2 seconds  Neurological:      Mental Status: He is alert  Mental status is at baseline  Comments: With short-term memory loss  Psychiatric:         Mood and Affect: Mood normal          Behavior: Behavior normal          Thought Content:  Thought content normal        Pertinent Laboratory/Diagnostic Studies reviewed with patient and daughter: Reviewed TSH 6/6/2023, BMP March 17, 2023      Current Medications     Current Outpatient Medications:   •  atorvastatin (LIPITOR) 40 mg tablet, TAKE ONE TABLET BY MOUTH AT BEDTIME, Disp: 90 tablet, Rfl: 1  •  benazepril (LOTENSIN) 40 MG tablet, TAKE ONE TABLET BY MOUTH EVERY DAY WITH LUNCH, Disp: 30 tablet, Rfl: 1  •  carbamide peroxide (DEBROX) 6 5 % otic solution, Administer 5 drops into both ears 2 (two) times a day For 5 days, Disp: 15 mL, Rfl: 1  •  carboxymethylcellulose 0 5 % SOLN, Administer 1 drop to both eyes 2 (two) times a day, Disp: 6 mL, Rfl: 3  •  carvedilol (COREG) 25 mg tablet, TAKE ONE TABLET BY MOUTH TWICE DAILY AT NOON AND BEDTIME, Disp: 180 tablet, Rfl: 1  •  cholecalciferol (VITAMIN D3) 1,000 units tablet, TAKE ONE TABLET BY MOUTH AT NOON WITH LUNCH FOR VITAMIN D DEFICIENCY, Disp: 30 tablet, Rfl: 3  •  Eliquis 5 MG, TAKE ONE TABLET BY MOUTH TWICE DAILY AT NOON AND BEDTIME, Disp: 60 tablet, Rfl: 1  •  finasteride (PROSCAR) 5 mg tablet, TAKE ONE TABLET BY MOUTH EVERY DAY WITH LUNCH, Disp: 90 tablet, Rfl: 2  •  fluticasone (FLONASE) 50 mcg/act nasal spray, 1 spray into each nostril daily, Disp: , Rfl:   •  latanoprost (XALATAN) 0 005 % ophthalmic solution, Apply 1 drop to eye, Disp: , Rfl:   •  levothyroxine 100 mcg tablet, TAKE ONE TABLET BY MOUTH AT BEDTIME FOR HYPOTHYROID, Disp: 90 tablet, Rfl: 1  •  potassium chloride (K-DUR,KLOR-CON) 20 mEq tablet, TAKE ONE TABLET BY MOUTH EVERY DAY WITH LUNCH, Disp: 90 tablet, Rfl: 1  •  torsemide (DEMADEX) 20 mg tablet, TAKE 1 AND 1/2 TABLETS BY MOUTH EVERY DAY WITH LUNCH, Disp: 135 tablet, Rfl: 1    Health Maintenance     Health Maintenance   Topic Date Due   • COVID-19 Vaccine (4 - Moderna series) 01/11/2022   • BMI: Followup Plan  06/14/2022   • Fall Risk  03/21/2023   • Depression Screening  03/21/2023   • Medicare Annual Wellness Visit (AWV)  03/21/2023   • Influenza Vaccine (Season Ended) 09/01/2023   • BMI: Adult  06/12/2024   • Pneumococcal Vaccine: 65+ Years  Completed   • HIB Vaccine  Aged Out   • IPV Vaccine  Aged Out   • Hepatitis A Vaccine  Aged Out   • Meningococcal ACWY Vaccine  Aged Out   • HPV Vaccine  Aged Out     Immunization History   Administered Date(s) Administered   • COVID-19 MODERNA VACC 0 5 ML IM 01/21/2021, 02/23/2021, 11/16/2021   • INFLUENZA 10/01/2013, 09/22/2014, 01/02/2015, 10/04/2018, 10/15/2019, 10/01/2020   • Influenza Quadrivalent Preservative Free 3 years and older IM 10/15/2019   • Influenza, high dose seasonal 0 7 mL 10/22/2020   • Influenza, injectable, quadrivalent, preservative free 0 5 mL 10/15/2019   • Influenza, seasonal, injectable 11/04/1927, 08/30/2011, 08/30/2011, 10/04/2017   • Pneumococcal 11/01/2005   • Pneumococcal Conjugate 13-Valent 02/18/2015, 02/08/2016   • Pneumococcal Conjugate PCV 7 02/18/2015   • Pneumococcal Polysaccharide PPV23 11/01/2005, 10/01/2012   • Td (adult), Unspecified 01/13/2003   • Td (adult), adsorbed 01/13/2003   • Tdap 01/17/2013   • Zoster 01/10/2013     This note was completed in part utilizing m-modal fluency direct voice recognition software  Grammatical errors, random word insertion, spelling mistakes, and incomplete sentences may be an occasional consequence of the system secondary to software limitations, ambient noise and hardware issues  At the time of dictation, efforts were made to edit, clarify and/or correct errors  Please read the chart carefully and recognize, using context, where substitutions have occurred  If you have any questions or concerns about the context, text or information contained within the body of this dictation, please contact myself, the provider, for further clarification      [address]az[address]  6/12/2023 6:21 PM

## 2023-06-12 NOTE — ASSESSMENT & PLAN NOTE
Bilateral cerumen impaction noted on exam today   Order Debrox eardrops to each ear twice daily x5 days  Plan to flush ears on 6/26/2023

## 2023-06-12 NOTE — PATIENT INSTRUCTIONS
You were here for a routine visit  You have ear wax in both ears  Start using debrox ear drops ear canal for 5 days; apply cotton ball to each ear after debrox application  I will flush ears out on June 26th  Routine bloodwork ordered for September 2023; call [facility]'s mobile lab for an appt

## 2023-06-12 NOTE — ASSESSMENT & PLAN NOTE
PFTs 12/17/2017: FEV1 66%, FVC 64%, FEV1/FVC 71%, TLC 80%, RV 97%, DLCO 37%  Moderate restriction with severe DLCO reduction  Patient does not follow with pulmonology outpatient and currently oxygenating well on room air   With chronic mild labored respirations using abdominal muscle  Refer patient to pulmonology if any changes to respiratory status

## 2023-06-19 DIAGNOSIS — I48.91 ATRIAL FIBRILLATION, UNSPECIFIED TYPE (HCC): ICD-10-CM

## 2023-06-19 DIAGNOSIS — I10 BENIGN ESSENTIAL HYPERTENSION: ICD-10-CM

## 2023-06-20 RX ORDER — APIXABAN 5 MG/1
TABLET, FILM COATED ORAL
Qty: 60 TABLET | Refills: 1 | Status: SHIPPED | OUTPATIENT
Start: 2023-06-20

## 2023-06-20 RX ORDER — BENAZEPRIL HYDROCHLORIDE 40 MG/1
TABLET, FILM COATED ORAL
Qty: 30 TABLET | Refills: 1 | Status: SHIPPED | OUTPATIENT
Start: 2023-06-20

## 2023-06-27 ENCOUNTER — IN HOME VISIT (OUTPATIENT)
Dept: GERIATRICS | Facility: CLINIC | Age: 88
End: 2023-06-27
Payer: COMMERCIAL

## 2023-06-27 DIAGNOSIS — H61.23 BILATERAL IMPACTED CERUMEN: Primary | ICD-10-CM

## 2023-06-27 PROCEDURE — 69210 REMOVE IMPACTED EAR WAX UNI: CPT | Performed by: NURSE PRACTITIONER

## 2023-06-27 NOTE — PROGRESS NOTES
"[facility] visit  Procedure note  Kim Farias  BD: [birth year]    Ear cerumen removal    Date/Time: 6/27/2023 10:00 AM    Performed by: Bertina Babinski, CRNP  Authorized by: Bertina Babinski, CRNP  Universal Protocol:  Consent: Verbal consent obtained  Written consent not obtained  Risks and benefits: risks, benefits and alternatives were discussed  Consent given by: patient (Daughter during last office visit )  Time out: Immediately prior to procedure a \"time out\" was called to verify the correct patient, procedure, equipment, support staff and site/side marked as required  Timeout called at: 6/27/2023 10:45 AM   Patient understanding: patient states understanding of the procedure being performed  Patient consent: the patient's understanding of the procedure matches consent given  Patient identity confirmed: verbally with patient and provided demographic data      Patient location:  Other (comment) (Home visit)  Indications / Diagnosis:  Cerumen impaction bilateral ears  Procedure details:     Local anesthetic:  None    Location:  R ear and L ear    Procedure type: irrigation with instrumentation      Instrumentation: curette      Approach:  External    Visualization (free text): Soft ear wax noted bilateral auditory canal and unable to visualize TM  Equipment used:  Ear Waterpik and curette  Post-procedure details:     Complication:  None    Hearing quality:  Normal (Patient is extremely hard of hearing at baseline)    Patient tolerance of procedure: Tolerated well, no immediate complications  Comments:      Successful ear irrigation of bilateral ear canals  Able to visualize bilateral TM postprocedure  Spoke to patient's daughter via phone regarding procedure and patient tolerated well          "

## 2023-06-27 NOTE — PATIENT INSTRUCTIONS
You had procedure ear irrigation done today to both ear canals which was successful  You tolerated procedure well without any complaints  Please call our office if you develop any pain in your ears, bleeding, drainage

## 2023-07-13 DIAGNOSIS — I50.9 CHRONIC CONGESTIVE HEART FAILURE, UNSPECIFIED HEART FAILURE TYPE (HCC): ICD-10-CM

## 2023-07-13 RX ORDER — POTASSIUM CHLORIDE 20 MEQ/1
TABLET, EXTENDED RELEASE ORAL
Qty: 90 TABLET | Refills: 1 | Status: SHIPPED | OUTPATIENT
Start: 2023-07-13

## 2023-07-17 DIAGNOSIS — E55.9 VITAMIN D DEFICIENCY: ICD-10-CM

## 2023-07-17 DIAGNOSIS — I25.10 CORONARY ARTERY DISEASE INVOLVING NATIVE CORONARY ARTERY OF NATIVE HEART WITHOUT ANGINA PECTORIS: ICD-10-CM

## 2023-07-17 DIAGNOSIS — I10 BENIGN ESSENTIAL HYPERTENSION: ICD-10-CM

## 2023-07-24 RX ORDER — CARVEDILOL 25 MG/1
TABLET ORAL
Qty: 180 TABLET | Refills: 1 | Status: SHIPPED | OUTPATIENT
Start: 2023-07-24

## 2023-07-31 RX ORDER — MELATONIN
Qty: 30 TABLET | Refills: 3 | Status: SHIPPED | OUTPATIENT
Start: 2023-07-31

## 2023-08-11 ENCOUNTER — HOSPITAL ENCOUNTER (EMERGENCY)
Facility: HOSPITAL | Age: 88
Discharge: HOME | End: 2023-08-11
Attending: EMERGENCY MEDICINE
Payer: COMMERCIAL

## 2023-08-11 ENCOUNTER — APPOINTMENT (EMERGENCY)
Dept: RADIOLOGY | Facility: HOSPITAL | Age: 88
End: 2023-08-11
Payer: COMMERCIAL

## 2023-08-11 VITALS
HEART RATE: 77 BPM | SYSTOLIC BLOOD PRESSURE: 136 MMHG | OXYGEN SATURATION: 98 % | RESPIRATION RATE: 24 BRPM | DIASTOLIC BLOOD PRESSURE: 76 MMHG | TEMPERATURE: 97.9 F

## 2023-08-11 DIAGNOSIS — R06.2 WHEEZING: Primary | ICD-10-CM

## 2023-08-11 DIAGNOSIS — R42 DIZZINESS: ICD-10-CM

## 2023-08-11 PROBLEM — H61.23 BILATERAL IMPACTED CERUMEN: Status: RESOLVED | Noted: 2023-06-12 | Resolved: 2023-08-11

## 2023-08-11 PROCEDURE — 99285 EMERGENCY DEPT VISIT HI MDM: CPT

## 2023-08-11 PROCEDURE — 99285 EMERGENCY DEPT VISIT HI MDM: CPT | Performed by: EMERGENCY MEDICINE

## 2023-08-11 PROCEDURE — 93005 ELECTROCARDIOGRAM TRACING: CPT

## 2023-08-11 PROCEDURE — 71046 X-RAY EXAM CHEST 2 VIEWS: CPT

## 2023-08-11 RX ORDER — ACETAMINOPHEN 160 MG/5ML
1 SUSPENSION, ORAL (FINAL DOSE FORM) ORAL ONCE
Status: COMPLETED | OUTPATIENT
Start: 2023-08-11 | End: 2023-08-11

## 2023-08-11 NOTE — ED PROVIDER NOTES
History  Chief Complaint   Patient presents with   • Shortness of Breath     Per nursing home report, patient was eating dinner at his facility when he had an episode of "wheezing". Patient denies complaints upon arrival to ED. HPI    Prior to Admission Medications   Prescriptions Last Dose Informant Patient Reported? Taking?    Eliquis 5 MG   No No   Sig: TAKE ONE TABLET BY MOUTH TWICE DAILY AT NOON AND BEDTIME   atorvastatin (LIPITOR) 40 mg tablet   No No   Sig: TAKE ONE TABLET BY MOUTH AT BEDTIME   benazepril (LOTENSIN) 40 MG tablet   No No   Sig: TAKE ONE TABLET BY MOUTH EVERY DAY WITH LUNCH   carbamide peroxide (DEBROX) 6.5 % otic solution   No No   Sig: Administer 5 drops into both ears 2 (two) times a day For 5 days   carboxymethylcellulose 0.5 % SOLN   No No   Sig: Administer 1 drop to both eyes 2 (two) times a day   carvedilol (COREG) 25 mg tablet   No No   Sig: TAKE ONE TABLET BY MOUTH TWICE DAILY AT NOON AND BEDTIME FOR heart disease   cholecalciferol (VITAMIN D3) 1,000 units tablet   No No   Sig: TAKE ONE TABLET BY MOUTH AT NOON WITH LUNCH FOR VITAMIN D DEFICIENCY   finasteride (PROSCAR) 5 mg tablet   No No   Sig: TAKE ONE TABLET BY MOUTH EVERY DAY WITH LUNCH   fluticasone (FLONASE) 50 mcg/act nasal spray   No No   Si spray into each nostril daily   latanoprost (XALATAN) 0.005 % ophthalmic solution   Yes No   Sig: Apply 1 drop to eye   levothyroxine 100 mcg tablet   No No   Sig: TAKE ONE TABLET BY MOUTH AT BEDTIME FOR HYPOTHYROID   potassium chloride (K-DUR,KLOR-CON) 20 mEq tablet   No No   Sig: TAKE ONE TABLET BY MOUTH EVERY DAY WITH LUNCH FOR heart failure   torsemide (DEMADEX) 20 mg tablet   No No   Sig: TAKE 1 AND 1/2 TABLETS BY MOUTH EVERY DAY WITH LUNCH      Facility-Administered Medications: None       Past Medical History:   Diagnosis Date   • Arithmetic disorder     [id number]   • Myocardial infarct 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     E-Cigarette/Vaping   • E-Cigarette Use Never User      E-Cigarette/Vaping Substances   • Nicotine No    • THC No    • CBD No    • Flavoring No    • Other No      Social History     Tobacco Use   • Smoking status: Former   • Smokeless tobacco: Never   Vaping Use   • Vaping Use: Never used   Substance Use Topics   • Alcohol use: Yes     Comment: OCCASIONAL        Review of Systems    Physical Exam  ED Triage Vitals [08/11/23 1827]   Temperature Pulse Respirations Blood Pressure SpO2   97.9 °F (36.6 °C) 77 (!) 24 136/76 98 %      Temp Source Heart Rate Source Patient Position - Orthostatic VS BP Location FiO2 (%)   Oral Monitor -- Left arm --      Pain Score       --             Orthostatic Vital Signs  Vitals:    08/11/23 1827   BP: 136/76   Pulse: 77       Physical Exam    ED Medications  Medications   acetaminophen (FOR EMS ONLY) (TYLENOL) oral suspension 650 mg (0 mg Does not apply Given to EMS 8/11/23 1823)       Diagnostic Studies  Results Reviewed     None                 No orders to display         Procedures  ECG 12 Lead Documentation Only    Date/Time: 8/11/2023 11:24 PM    Performed by: Donavon Ramírez MD  Authorized by: Donavon Ramírez MD    Indications / Diagnosis:  Dizziness  ECG reviewed by me, the ED Provider: yes    Patient location:  ED  Previous ECG:     Previous ECG:  Compared to current    Similarity:  No change    Comparison to cardiac monitor: Yes    Interpretation:     Interpretation: non-specific    Rate:     ECG rate:  65    ECG rate assessment: normal    Rhythm:     Rhythm: atrial fibrillation    Ectopy:     Ectopy: none    QRS:     QRS axis:  Normal    QRS intervals: Wide  Conduction:     Conduction: abnormal      Abnormal conduction: incomplete RBBB    ST segments:     ST segments:  Normal  T waves:     T waves: normal            ED Course               Identification of Seniors at Risk    Flowsheet Row Most Recent Value   (ISAR) Identification of Seniors at Risk    Before the illness or injury that brought you to the Emergency, did you need someone to help you on a regular basis? 1 Filed at: 08/11/2023 1836   In the last 24 hours, have you needed more help than usual? 1 Filed at: 08/11/2023 1836   Have you been hospitalized for one or more nights during the past 6 months? --   In general, do you see well? 1 Filed at: 08/11/2023 1836   In general, do you have serious problems with your memory? 1 Filed at: 08/11/2023 1836   Do you take more than three different medications every day? 1 Filed at: 08/11/2023 1836   ISAR Score 5 Filed at: 08/11/2023 1836                              MDM      Disposition  Final diagnoses:   None     ED Disposition     None      Follow-up Information    None         Patient's Medications   Discharge Prescriptions    No medications on file     No discharge procedures on file. PDMP Review     None           ED Provider  Attending physically available and evaluated Hussain Augustin. I managed the patient along with the ED Attending.     Electronically Signed by Walter Egan MD (08/11 2028)   The CXR was interpreted by me independently. On my read, it appears without acute abnormalities:  - The  cardiomediastinal  silhouette  is  unremarkable.    - The  lungs  are  clear. - No  pleural  effusions.  - No  pneumothorax. - The  pulmonary  vasculature  is  within  normal  limits.    - The  trachea  is  midline.    - Bony  thorax  is  unremarkable.      - Similar to previous CXR       Final Result by Bandar Lau MD (08/12 5886)      No acute cardiopulmonary disease. Workstation performed: [id number]               Procedures  ECG 12 Lead Documentation Only    Date/Time: 8/11/2023 11:24 PM    Performed by: Nicholas Balbuena MD  Authorized by: Nicholas Balbuena MD    Indications / Diagnosis:  Dizziness  ECG reviewed by me, the ED Provider: yes    Patient location:  ED  Previous ECG:     Previous ECG:  Compared to current    Similarity:  No change    Comparison to cardiac monitor: Yes    Interpretation:     Interpretation: non-specific    Rate:     ECG rate:  65    ECG rate assessment: normal    Rhythm:     Rhythm: atrial fibrillation    Ectopy:     Ectopy: none    QRS:     QRS axis:  Normal    QRS intervals: Wide  Conduction:     Conduction: abnormal      Abnormal conduction: incomplete RBBB    ST segments:     ST segments:  Normal  T waves:     T waves: normal            ED Course               Identification of Seniors at Risk    Flowsheet Row Most Recent Value   (ISAR) Identification of Seniors at Risk    Before the illness or injury that brought you to the Emergency, did you need someone to help you on a regular basis? 1 Filed at: 08/11/2023 1836   In the last 24 hours, have you needed more help than usual? 1 Filed at: 08/11/2023 1836   Have you been hospitalized for one or more nights during the past 6 months? --   In general, do you see well? 1 Filed at: 08/11/2023 1836   In general, do you have serious problems with your memory?  1 Filed at: 08/11/2023 1836   Do you take more than three different medications every day? 1 Filed at: 08/11/2023 1836   ISAR Score 5 Filed at: 08/11/2023 1836                              Medical Decision Making  28-year-old well-appearing male sent in from assisted living facility for observed shortness of breath during dinner. Facility was unable to be reached via phone. Patient denies any symptoms. No significant findings on physical exam.  At this time low suspicion for ACS, pneumonia, pneumothorax, acute bacterial fraction, or any other acute pathology. Plan: EKG to rule out arrhythmia, chest x-ray to rule out any acute pathology, patient's son is present who does not have any additional concerns about what happened today despite limited history. Both patient and patient's son feel comfortable being discharged at this time. Dizziness: self-limited or minor problem  Wheezing: self-limited or minor problem  Amount and/or Complexity of Data Reviewed  Radiology: ordered and independent interpretation performed. ECG/medicine tests: ordered and independent interpretation performed. Risk  OTC drugs. Disposition  Final diagnoses:   Wheezing   Dizziness     Time reflects when diagnosis was documented in both MDM as applicable and the Disposition within this note     Time User Action Codes Description Comment    8/11/2023  8:28 PM Cristiane Jacques [R06.2] Wheezing     8/11/2023  8:29 PM Mansi Plasencia [R42] Dizziness       ED Disposition     ED Disposition   Discharge    Condition   Stable    Date/Time   Fri Aug 11, 2023  8:29 PM    [address] discharge to home/self care.                Follow-up Information     Follow up With Specialties Details Why Contact Adelita Krishnan, DO Geriatric Medicine Call  If symptoms worsen [facility] Walden  [address] [address]  [phone]            Discharge Medication List as of 8/11/2023  8:30 PM      CONTINUE these medications which have NOT CHANGED    Details   carbamide peroxide (DEBROX) 6.5 % otic solution Administer 5 drops into both ears 2 (two) times a day For 5 days, Starting Mon 6/12/2023, Normal      carboxymethylcellulose 0.5 % SOLN Administer 1 drop to both eyes 2 (two) times a day, Starting Mon 6/12/2023, Normal      carvedilol (COREG) 25 mg tablet TAKE ONE TABLET BY MOUTH TWICE DAILY AT NOON AND BEDTIME FOR heart disease, Normal      cholecalciferol (VITAMIN D3) 1,000 units tablet TAKE ONE TABLET BY MOUTH AT NOON WITH LUNCH FOR VITAMIN D DEFICIENCY, Normal      fluticasone (FLONASE) 50 mcg/act nasal spray 1 spray into each nostril daily, Starting Mon 9/13/2021, No Print      latanoprost (XALATAN) 0.005 % ophthalmic solution Apply 1 drop to eye, Starting Wed 10/12/2016, Historical Med      levothyroxine 100 mcg tablet TAKE ONE TABLET BY MOUTH AT BEDTIME FOR HYPOTHYROID, Normal      potassium chloride (K-DUR,KLOR-CON) 20 mEq tablet TAKE ONE TABLET BY MOUTH EVERY DAY WITH LUNCH FOR heart failure, Normal      atorvastatin (LIPITOR) 40 mg tablet TAKE ONE TABLET BY MOUTH AT BEDTIME, Normal      benazepril (LOTENSIN) 40 MG tablet TAKE ONE TABLET BY MOUTH EVERY DAY WITH LUNCH, Normal      Eliquis 5 MG TAKE ONE TABLET BY MOUTH TWICE DAILY AT NOON AND BEDTIME, Normal      finasteride (PROSCAR) 5 mg tablet TAKE ONE TABLET BY MOUTH EVERY DAY WITH LUNCH, Normal      torsemide (DEMADEX) 20 mg tablet TAKE 1 AND 1/2 TABLETS BY MOUTH EVERY DAY WITH LUNCH, Normal           No discharge procedures on file. PDMP Review     None           ED Provider  Attending physically available and evaluated Brittny Matthews. I managed the patient along with the ED Attending.     Electronically Signed by         Jamil Grider MD  08/18/23 0776

## 2023-08-12 LAB
ATRIAL RATE: 300 BPM
QRS AXIS: -11 DEGREES
QRSD INTERVAL: 122 MS
QT INTERVAL: 416 MS
QTC INTERVAL: 432 MS
T WAVE AXIS: 0 DEGREES
VENTRICULAR RATE: 65 BPM

## 2023-08-12 PROCEDURE — 93010 ELECTROCARDIOGRAM REPORT: CPT | Performed by: INTERNAL MEDICINE

## 2023-08-12 NOTE — DISCHARGE INSTRUCTIONS
Your x-ray today did not show any signs of infection or extra fluid in your lungs. Call your primary care doctor if you continue to have symptoms. Return to ER for any shortness of breath, chest pain, fever, or abdominal pain.

## 2023-08-13 ENCOUNTER — TELEPHONE (OUTPATIENT)
Dept: OTHER | Facility: OTHER | Age: 88
End: 2023-08-13

## 2023-08-13 NOTE — TELEPHONE ENCOUNTER
Pt daughter would like to know if the doctor that comes in to see the the pts can see her father in the morning     Please give her a call back

## 2023-08-14 ENCOUNTER — TELEPHONE (OUTPATIENT)
Age: 88
End: 2023-08-14

## 2023-08-14 DIAGNOSIS — I48.91 ATRIAL FIBRILLATION, UNSPECIFIED TYPE (HCC): ICD-10-CM

## 2023-08-14 DIAGNOSIS — I10 BENIGN ESSENTIAL HYPERTENSION: ICD-10-CM

## 2023-08-14 DIAGNOSIS — N40.1 BENIGN PROSTATIC HYPERPLASIA WITH LOWER URINARY TRACT SYMPTOMS, SYMPTOM DETAILS UNSPECIFIED: ICD-10-CM

## 2023-08-14 DIAGNOSIS — I50.9 CHRONIC CONGESTIVE HEART FAILURE, UNSPECIFIED HEART FAILURE TYPE (HCC): ICD-10-CM

## 2023-08-14 DIAGNOSIS — I25.10 CORONARY ARTERY DISEASE INVOLVING NATIVE CORONARY ARTERY OF NATIVE HEART WITHOUT ANGINA PECTORIS: ICD-10-CM

## 2023-08-14 NOTE — TELEPHONE ENCOUNTER
Called pt's daughter and advise that there is no available appt until August 21st. Unfortunately daughter would be out of town for that date. Daughter would like to keep an eye on the pt to see if there is any changes before scheduling appt. Pt has phlegm (white/clear) since his d/c of the ER. Daughter would like to know if mucinex is okay to give as well as Flonase to help clear out the phlegm. Please advise.

## 2023-08-14 NOTE — TELEPHONE ENCOUNTER
Patient's daughter, Debbi Sylvester ([phone]) called and would like to have patient seen for a follow up from ER visit on Friday night.

## 2023-08-14 NOTE — TELEPHONE ENCOUNTER
Per Dr. Cameron Aragon- Mucinex 600mg Q12h and flonase 1-2 times per day are ok to use for mucous. Advise that patient is scheduled for next available appointment and provider can contact daughter via telephone with recommendations at that time. Thanks!

## 2023-08-14 NOTE — TELEPHONE ENCOUNTER
Called pt's daughter and relayed Dr. Elizabeth Pardo message and recommendation. Pt is scheduled next Monday August 21st at 3pm with daughter via telephone.

## 2023-08-15 ENCOUNTER — TELEPHONE (OUTPATIENT)
Age: 88
End: 2023-08-15

## 2023-08-15 NOTE — TELEPHONE ENCOUNTER
Patient's daughter, Alireza Villalpando ([phone]) called with concerns regarding patient having a lot of mucus in his throat. No Temp. Patient indicated he couldn't breath last night. Patricia Yuen is wondering if there is a pill stuck in patient's throat. Please contact Patricia Yuen.

## 2023-08-15 NOTE — ED ATTENDING ATTESTATION
8/11/2023   Carole GARZON MD, saw and evaluated the patient. I have discussed the patient with the resident/non-physician practitioner and agree with the resident's/non-physician practitioner's findings, Plan of Care, and MDM as documented in the resident's/non-physician practitioner's note, except where noted. All available labs and Radiology studies were reviewed. I was present for key portions of any procedure(s) performed by the resident/non-physician practitioner and I was immediately available to provide assistance. At this point I agree with the current assessment done in the Emergency Department. I have conducted an independent evaluation of this patient a history and physical is as follows:    Unit/Bed#: [facility] Encounter: [id number]    Chief Complaint   Patient presents with   • Shortness of Breath     Per nursing home report, patient was eating dinner at his facility when he had an episode of "wheezing". Patient denies complaints upon arrival to ED. Physical Exam  /76 (BP Location: Left arm)   Pulse 77   Temp 97.9 °F (36.6 °C) (Oral)   Resp (!) 24   SpO2 98%      Vital signs and nursing notes reviewed    ** IF YOU ARE READING THIS, THE EXAM TEMPLATE BELOW HAS NOT BEEN UPDATED**    CONSTITUTIONAL: male appearing stated age resting in bed, in no acute distress  HEENT: atraumatic, normocephalic. Sclera anicteric, conjunctiva are not injected. Moist oral mucosa  CARDIOVASCULAR/CHEST: RRR, no M/R/G. 2+ radial pulses  PULMONARY: Breathing comfortably on RA. Breath sounds are equal and clear to auscultation  ABDOMEN: non-distended. BS present, normoactive. Non-tender  MSK: moves all extremities, no deformities, no peripheral edema, no calf asymmetry  NEURO: Awake, alert, and oriented x 3. Face symmetric. Moves all extremities spontaneously.  No focal neurologic deficits  SKIN: Warm, appears well-perfused  MENTAL STATUS: Normal affect      Labs and Imaging  Labs Reviewed - No data to display    XR chest 2 views   ED Interpretation   The CXR was interpreted by me independently. On my read, it appears without acute abnormalities:  - The  cardiomediastinal  silhouette  is  unremarkable.    - The  lungs  are  clear. - No  pleural  effusions.  - No  pneumothorax. - The  pulmonary  vasculature  is  within  normal  limits.    - The  trachea  is  midline.    - Bony  thorax  is  unremarkable.      - Similar to previous CXR       Final Result      No acute cardiopulmonary disease.                   Workstation performed: [id number]               Procedures  Procedures        ED Course  Medications   acetaminophen (FOR EMS ONLY) (TYLENOL) oral suspension 650 mg (0 mg Does not apply Given to EMS 8/11/23 1627)

## 2023-08-15 NOTE — TELEPHONE ENCOUNTER
Spoke to [employer]. Relayed Dr. Clarita tyler verbatim and she (Admin) stated they will do it and will call Dr. Dilan Pierce with the results directly.

## 2023-08-15 NOTE — PROGRESS NOTES
Assessment and Plan:     68-year-old male with:    Chronic diastolic congestive heart failure (HCC)  Wt Readings from Last 3 Encounters:   03/21/22 107 kg (236 lb 8 oz)   02/09/22 105 kg (231 lb)   01/26/22 104 kg (230 lb 6 oz)   Appears euvolemic on exam  Patient states that he has been weighing himself but not recording his weights  Advised patient to continue weighing himself and record weights  Notify provider/Cardiology for weight gain >5 lb in 1 week  Continue torsemide, Coreg, and benazepril  Follow-up with Cardiology    Atrial fibrillation ([facility] [address] ) [I48 91]  Heart rate stable  Continue anticoagulation with Eliquis; no signs of active bleeding noted  Most recent CBC stable    Chronic anticoagulation  Continue Eliquis 5 mg b i d  Benign essential hypertension  /50 today  Continue torsemide, benazepril, and Coreg  Continue to monitor  Most recent CMP stable    Restrictive lung disease  Respiratory status is stable on room air  Decreased breath sounds noted bibasilar  No reports of any respiratory distress episodes from patient  Patient is currently not on any inhalers  Follow-up with pulmonology as needed    Mild cognitive impairment with memory loss  With short-term memory loss  Patient's long-term memory is intact  Recommend MoCA eval which will be done at next visit  Continue personal care assistance with medication management       Preventive health issues were discussed with patient, and age appropriate screening tests were ordered as noted in patient's After Visit Summary  Personalized health advice and appropriate referrals for health education or preventive services given if needed, as noted in patient's After Visit Summary  History of Present Illness:     [facility]       Patient Care Team:  Jacek Rodgers DO as PCP - General (Geriatric Medicine)  Merlinda Abraham, MD as PCP - PCP-[facility] Attributed-Michael Wilkes MD     Review of I apologize for the delay. Labs were fine. Systems:     Review of Systems   Constitutional: Negative for appetite change, chills, diaphoresis and fever  HENT: Positive for hearing loss (Wears bilateral hearing aids)  Negative for congestion and trouble swallowing  Eyes: Positive for visual disturbance  Respiratory: Negative for cough and shortness of breath  Cardiovascular: Negative  Gastrointestinal: Negative for abdominal pain  Endocrine: Negative  Genitourinary: Negative for difficulty urinating  Musculoskeletal: Positive for gait problem  Neurological: Negative for dizziness, syncope, speech difficulty, light-headedness, numbness and headaches  STR memory loss  Psychiatric/Behavioral: Negative for dysphoric mood, hallucinations and sleep disturbance  The patient is not nervous/anxious  All other systems reviewed and are negative       Problem List:     Patient Active Problem List   Diagnosis    Atrial fibrillation ([facility] ) [I48 91]    Coronary artery disease    Benign essential hypertension    Bicuspid aortic valve    Chronic diastolic congestive heart failure ([facility] )    Hypothyroidism    Obstructive sleep apnea    Chronic anticoagulation    Mild cognitive impairment with memory loss    Malignant neoplasm of urinary bladder (HCC)    Class 2 severe obesity with serious comorbidity in adult [facility])    Restrictive lung disease    Seasonal allergic rhinitis    Change in mole      Past Medical and Surgical History:     Past Medical History:   Diagnosis Date    Arithmetic disorder     [id number]    Myocardial infarct [facility])     [id number]  LAST ASSESSED     Past Surgical History:   Procedure Laterality Date    BLADDER SURGERY      LAST ASSESSED [id number]      CORONARY ARTERY BYPASS GRAFT      [id number] RESOLVED    SKIN BIOPSY        Family History:     Family History   Problem Relation Age of Onset    Breast cancer Family         [id number] LAST ASSESSED      Social History:     Social History     Socioeconomic History  Marital status: /Civil Union     Spouse name: Not on file    Number of children: 3    Years of education: Not on file    Highest education level: Not on file   Occupational History    Not on file   Tobacco Use    Smoking status: Former Smoker    Smokeless tobacco: Never Used   Vaping Use    Vaping Use: Never used   Substance and Sexual Activity    Alcohol use: Yes     Comment: OCCASIONAL    Drug use: Not on file    Sexual activity: Not on file   Other Topics Concern    Not on file   Social History Narrative    ACTIVE ADVANCE DIRECTIVE ON FILE  LAST ASSESSED 23 JAN 2018    ALL SCRIPTS SAYS      Social Determinants of Health     Financial Resource Strain: Not on file   Food Insecurity: Not on file   Transportation Needs: Not on file   Physical Activity: Not on file   Stress: Not on file   Social Connections: Not on file   Intimate Partner Violence: Not on file   Housing Stability: Not on file      Medications and Allergies:     Current Outpatient Medications   Medication Sig Dispense Refill    atorvastatin (LIPITOR) 40 mg tablet TAKE ONE TABLET BY MOUTH AT BEDTIME 90 tablet 1    benazepril (LOTENSIN) 40 MG tablet TAKE ONE TABLET BY MOUTH EVERY DAY WITH LUNCH 90 tablet 0    carvedilol (COREG) 25 mg tablet TAKE ONE TABLET BY MOUTH TWICE DAILY AT NOON AND BEDTIME 180 tablet 1    cholecalciferol (VITAMIN D3) 1,000 units tablet TAKE ONE TABLET BY MOUTH EVERY DAY WITH LUNCH 30 tablet 5    Eliquis 5 MG TAKE ONE TABLET BY MOUTH TWICE DAILY AT NOON AND BEDTIME 60 tablet 1    finasteride (PROSCAR) 5 mg tablet TAKE ONE TABLET BY MOUTH EVERY DAY WITH LUNCH 90 tablet 2    latanoprost (XALATAN) 0 005 % ophthalmic solution Apply 1 drop to eye      levothyroxine 100 mcg tablet TAKE ONE TABLET BY MOUTH AT BEDTIME 90 tablet 1    potassium chloride (K-DUR,KLOR-CON) 20 mEq tablet TAKE ONE TABLET BY MOUTH EVERY DAY WITH LUNCH 90 tablet 1    torsemide (DEMADEX) 20 mg tablet TAKE 1 AND 1/2 TABLETS BY MOUTH EVERY DAY WITH LUNCH 135 tablet 1    fluticasone (FLONASE) 50 mcg/act nasal spray 1 spray into each nostril daily (Patient not taking: Reported on 3/21/2022 )       No current facility-administered medications for this visit  No Known Allergies   Immunizations:     Immunization History   Administered Date(s) Administered    COVID-19 MODERNA VACC 0 5 ML IM 01/21/2021, 02/23/2021, 11/16/2021    INFLUENZA 10/01/2013, 09/22/2014, 01/02/2015, 10/04/2018, 10/15/2019, 10/01/2020    Influenza Quadrivalent Preservative Free 3 years and older IM 10/15/2019    Influenza, high dose seasonal 0 7 mL 10/22/2020    Influenza, injectable, quadrivalent, preservative free 0 5 mL 10/15/2019    Influenza, seasonal, injectable 11/04/1927, 08/30/2011, 08/30/2011, 10/04/2017    Pneumococcal 11/01/2005    Pneumococcal Conjugate 13-Valent 02/18/2015, 02/08/2016    Pneumococcal Conjugate PCV 7 02/18/2015    Pneumococcal Polysaccharide PPV23 11/01/2005, 10/01/2012    Td (adult), Unspecified 01/13/2003    Td (adult), adsorbed 01/13/2003    Tdap 01/17/2013    Zoster 01/10/2013      Health Maintenance: There are no preventive care reminders to display for this patient  Topic Date Due    Influenza Vaccine (1) 09/01/2021      Medicare Screening Tests and Risk Assessments:     Monae Corona is here for his Subsequent Wellness visit  Health Risk Assessment:   Patient rates overall health as very good  Patient feels that their physical health rating is same  Patient is satisfied with their life  Eyesight was rated as same  Hearing was rated as same  Patient feels that their emotional and mental health rating is same  Patients states they are never, rarely angry  Patient states they are never, rarely unusually tired/fatigued  Pain experienced in the last 7 days has been none  Patient states that he has experienced no weight loss or gain in last 6 months       Depression Screening:   PHQ-2 Score: 0      Fall Risk Screening: In the past year, patient has experienced: no history of falling in past year      Home Safety:  Patient does not have trouble with stairs inside or outside of their home  Patient has working smoke alarms and has working carbon monoxide detector  Home safety hazards include: none  Nutrition:   Current diet is Regular  Medications:   Patient is not currently taking any over-the-counter supplements  Patient is not able to manage medications  Activities of Daily Living (ADLs)/Instrumental Activities of Daily Living (IADLs):   Walk and transfer into and out of bed and chair?: Yes  Dress and groom yourself?: Yes    Bathe or shower yourself?: Yes    Feed yourself? Yes  Do your laundry/housekeeping?: Yes  Manage your money, pay your bills and track your expenses?: No  Make your own meals?: No    Do your own shopping?: No    Previous Hospitalizations:   Any hospitalizations or ED visits within the last 12 months?: No      Advance Care Planning:   Living will: Yes    Durable POA for healthcare: Yes    Advanced directive: Yes    Advanced directive counseling given: Yes      PREVENTIVE SCREENINGS        Colorectal Cancer Screening:     General: Screening Not Indicated      Prostate Cancer Screening:    General: Screening Not Indicated      Abdominal Aortic Aneurysm (AAA) Screening:    Risk factors include: tobacco use        Lung Cancer Screening:     General: Screening Not Indicated    Screening, Brief Intervention, and Referral to Treatment (SBIRT)    Screening  Typical number of drinks in a day: 0  Typical number of drinks in a week: 0  Interpretation: Low risk drinking behavior      Single Item Drug Screening:  How often have you used an illegal drug (including marijuana) or a prescription medication for non-medical reasons in the past year? never    Single Item Drug Screen Score: 0  Interpretation: Negative screen for possible drug use disorder    No exam data present     Physical Exam:     BP 122/50 (BP Location: Left arm, Patient Position: Sitting, Cuff Size: Standard)   Pulse 63   Temp 98 2 °F (36 8 °C) (Temporal)   Resp 16   Ht 5' 8 5" (1 74 m) Comment: with shoes  Wt 107 kg (236 lb 8 oz) Comment: with shoes  SpO2 99%   BMI 35 44 kg/m²     Physical Exam  Vitals and nursing note reviewed  Constitutional:       General: He is not in acute distress  Appearance: He is well-developed  He is not ill-appearing, toxic-appearing or diaphoretic  Comments: Elderly male who appears in no distress but with chronic illness  HENT:      Head: Normocephalic and atraumatic  Ears:      Comments: Wears bilateral hearing aids  Nose: No congestion  Eyes:      Conjunctiva/sclera: Conjunctivae normal       Comments: Wears glasses  Cardiovascular:      Rate and Rhythm: Normal rate and regular rhythm  Heart sounds: No murmur heard  Pulmonary:      Effort: Pulmonary effort is normal  No respiratory distress  Comments: Decreased breath sounds bibasilar  Abdominal:      Palpations: Abdomen is soft  Tenderness: There is no abdominal tenderness  Musculoskeletal:      Cervical back: Neck supple  Right lower leg: No edema  Left lower leg: No edema  Comments: Ambulatory with walker  Skin:     General: Skin is warm and dry  Capillary Refill: Capillary refill takes less than 2 seconds  Comments: Multiple age spots on body and along with actinic keratosis  Neurological:      General: No focal deficit present  Mental Status: He is alert and oriented to person, place, and time  Cranial Nerves: No cranial nerve deficit  Motor: Weakness present  Comments: Patient states that he cannot remember non essential things such as what he had for dinner   Psychiatric:         Mood and Affect: Mood normal          Behavior: Behavior normal          Thought Content:  Thought content normal         Luna Mayers

## 2023-08-15 NOTE — TELEPHONE ENCOUNTER
[address] [facility] Office. LM to call office back to relay Dr. Cheli Earl message regarding Pt     "Please have [facility] office assess patient and obtain a full set of vital signs including oxygen saturation. Please also obtain a weight and report on status of lower extremity edema.  Thanks! "

## 2023-08-15 NOTE — ED ATTENDING ATTESTATION
8/11/2023   IDevon MD, saw and evaluated the patient. I have discussed the patient with the resident/non-physician practitioner and agree with the resident's/non-physician practitioner's findings, Plan of Care, and MDM as documented in the resident's/non-physician practitioner's note, except where noted. All available labs and Radiology studies were reviewed. I was present for key portions of any procedure(s) performed by the resident/non-physician practitioner and I was immediately available to provide assistance. At this point I agree with the current assessment done in the Emergency Department. I have conducted an independent evaluation of this patient a history and physical is as follows:    Unit/Bed#: [facility] Encounter: [id number]    Chief Complaint   Patient presents with   • Shortness of Breath     Per nursing home report, patient was eating dinner at his facility when he had an episode of "wheezing". Patient denies complaints upon arrival to ED. Physical Exam  /76 (BP Location: Left arm)   Pulse 77   Temp 97.9 °F (36.6 °C) (Oral)   Resp (!) 24   SpO2 98%      Vital signs and nursing notes reviewed    ** IF YOU ARE READING THIS, THE EXAM TEMPLATE BELOW HAS NOT BEEN UPDATED**    CONSTITUTIONAL: male appearing stated age resting in bed, in no acute distress  HEENT: atraumatic, normocephalic. Sclera anicteric, conjunctiva are not injected. Moist oral mucosa  CARDIOVASCULAR/CHEST: RRR, no M/R/G. 2+ radial pulses  PULMONARY: Breathing comfortably on RA. Breath sounds are equal and clear to auscultation  ABDOMEN: non-distended. BS present, normoactive. Non-tender  MSK: moves all extremities, no deformities, no peripheral edema, no calf asymmetry  NEURO: Awake, alert, and oriented x 3. Face symmetric. Moves all extremities spontaneously.  No focal neurologic deficits  SKIN: Warm, appears well-perfused  MENTAL STATUS: Normal affect      Labs and Imaging  Labs Reviewed - No data to display    XR chest 2 views   ED Interpretation   The CXR was interpreted by me independently. On my read, it appears without acute abnormalities:  - The  cardiomediastinal  silhouette  is  unremarkable.    - The  lungs  are  clear. - No  pleural  effusions.  - No  pneumothorax. - The  pulmonary  vasculature  is  within  normal  limits.    - The  trachea  is  midline.    - Bony  thorax  is  unremarkable.      - Similar to previous CXR       Final Result      No acute cardiopulmonary disease.                   Workstation performed: [id number]               Procedures  Procedures        ED Course  Medications   acetaminophen (FOR EMS ONLY) (TYLENOL) oral suspension 650 mg (0 mg Does not apply Given to EMS 8/11/23 1871) interpretation is with atrial fibrillation, does not appear to be significantly changed from prior EKG. Patient is deemed safe to be transferred back to his nursing home. Patient discharged recommendations for follow-up with primary care provider.

## 2023-08-15 NOTE — TELEPHONE ENCOUNTER
MARY Harrison with [facility] called with patient's vitals taken at [phone] as follows:    Wt. 240.8#; Temp. 96.8F; /70; pulse 71; resp. 16; O2 94 room air. Low extremity edema; no change; no pitting; right slightly larger than left.

## 2023-08-16 RX ORDER — APIXABAN 5 MG/1
TABLET, FILM COATED ORAL
Qty: 60 TABLET | Refills: 1 | Status: SHIPPED | OUTPATIENT
Start: 2023-08-16

## 2023-08-16 RX ORDER — FINASTERIDE 5 MG/1
TABLET, FILM COATED ORAL
Qty: 90 TABLET | Refills: 1 | Status: SHIPPED | OUTPATIENT
Start: 2023-08-16

## 2023-08-16 RX ORDER — TORSEMIDE 20 MG/1
TABLET ORAL
Qty: 135 TABLET | Refills: 1 | Status: SHIPPED | OUTPATIENT
Start: 2023-08-16

## 2023-08-16 RX ORDER — ATORVASTATIN CALCIUM 40 MG/1
TABLET, FILM COATED ORAL
Qty: 90 TABLET | Refills: 1 | Status: SHIPPED | OUTPATIENT
Start: 2023-08-16

## 2023-08-16 RX ORDER — BENAZEPRIL HYDROCHLORIDE 40 MG/1
TABLET, FILM COATED ORAL
Qty: 30 TABLET | Refills: 1 | Status: SHIPPED | OUTPATIENT
Start: 2023-08-16

## 2023-08-21 ENCOUNTER — TELEPHONE (OUTPATIENT)
Dept: LAB | Facility: HOSPITAL | Age: 88
End: 2023-08-21

## 2023-08-21 ENCOUNTER — OFFICE VISIT (OUTPATIENT)
Dept: GERIATRICS | Facility: CLINIC | Age: 88
End: 2023-08-21
Payer: COMMERCIAL

## 2023-08-21 VITALS
RESPIRATION RATE: 18 BRPM | HEIGHT: 69 IN | BODY MASS INDEX: 35.66 KG/M2 | HEART RATE: 68 BPM | WEIGHT: 240.8 LBS | DIASTOLIC BLOOD PRESSURE: 78 MMHG | SYSTOLIC BLOOD PRESSURE: 120 MMHG | TEMPERATURE: 97.3 F | OXYGEN SATURATION: 98 %

## 2023-08-21 DIAGNOSIS — R06.02 SHORTNESS OF BREATH: ICD-10-CM

## 2023-08-21 DIAGNOSIS — J98.4 RESTRICTIVE LUNG DISEASE: Primary | ICD-10-CM

## 2023-08-21 DIAGNOSIS — L81.9 PIGMENTED SKIN LESION: ICD-10-CM

## 2023-08-21 DIAGNOSIS — J44.9 CHRONIC OBSTRUCTIVE PULMONARY DISEASE, UNSPECIFIED COPD TYPE (HCC): ICD-10-CM

## 2023-08-21 PROCEDURE — 99214 OFFICE O/P EST MOD 30 MIN: CPT | Performed by: NURSE PRACTITIONER

## 2023-08-21 RX ORDER — ALBUTEROL SULFATE 90 UG/1
2 AEROSOL, METERED RESPIRATORY (INHALATION) EVERY 4 HOURS PRN
COMMUNITY
End: 2023-08-21 | Stop reason: SDUPTHER

## 2023-08-21 RX ORDER — ALBUTEROL SULFATE 90 UG/1
2 AEROSOL, METERED RESPIRATORY (INHALATION) EVERY 4 HOURS PRN
Qty: 6.7 G | Refills: 3 | Status: SHIPPED | OUTPATIENT
Start: 2023-08-21

## 2023-08-21 NOTE — ASSESSMENT & PLAN NOTE
Last PFT study noted from December 2017  O2 sat stable on room air  Decreased breath sounds throughout lung scan chronic labored respirations  Recommend follow-up with pulmonology outpatient

## 2023-08-21 NOTE — PATIENT INSTRUCTIONS
You were here for post hospital ED follow up  No s/s of active disease  Albuterol inhaler ordered PRN for SOB  Recommend follow up with pulmonology  Continue flonase and mucinex   Bloodwork anytime this week   Call office if having worsening symptoms

## 2023-08-21 NOTE — ASSESSMENT & PLAN NOTE
Two dark pigmented round soft lesions noted on left upper arm on birthmark  Patient has numerous moles all over her body  Commending follow-up with dermatology

## 2023-08-21 NOTE — PROGRESS NOTES
[address] [place] [address] At [place]  POS: [facility] [address] [place] [address]  AGE: 80 y.o. SEX: male    DATE OF ENCOUNTER: 8/21/2023    Assessment and Plan     80-year-old male with:    Shortness of breath  Patient was seen in the emergency room on 8/11/2023 for evaluation of shortness of breath and wheezing  With history of Obstructive sleep apnea and COPD, CHF  Appears euvolemic  With chronic labored respirations  CXR in the ED showed no acute cardiopulmonary disease  Patient denies any further episodes of shortness of breath, no orthopnea reported  Recommending that patient follow-up with pulmonology for follow-up of COPD and obstructive sleep apnea; patient's daughter stated that she will reach out to the [place]  Albuterol inhaler ordered PRN for SOB/wheezing  May continue Mucinex for upper secretions, but Mucinex severe dry mouth    Chronic obstructive pulmonary disease, unspecified COPD type ([address])  Last PFT study noted from December 2017  O2 sat stable on room air  Decreased breath sounds throughout lung scan chronic labored respirations  Recommend follow-up with pulmonology outpatient    Pigmented skin lesion  Two dark pigmented round soft lesions noted on left upper arm on birthmark  Patient has numerous moles all over her body  Commending follow-up with dermatology    - Counseling Documentation: patient was counseled regarding: diagnostic results, instructions for management, patient and family education, impressions, risks and benefits of treatment options and importance of compliance with treatment    Chief Complaint     Chief Complaint   Patient presents with   • Follow-up     ER       History of Present Illness     80-year-old male who presents in office today for TCM visit. Patient is accompanied by his son and daughter via son's phone. Patient appears nontoxic. He has chronic labored respirations at rest.  No signs of active disease noted.   Reviewed hospital notes and chest x-ray. Discussed plan of care with patient and patient's family and they verbalized an understanding. The following portions of the patient's history were reviewed and updated as appropriate: allergies, current medications, past family history, past medical history, past social history, past surgical history and problem list.    Review of Systems     Review of Systems   Constitutional: Negative for chills, diaphoresis and fever. HENT: Positive for hearing loss. Negative for congestion and sore throat. Eyes: Positive for visual disturbance. Respiratory: Positive for shortness of breath (On exertion). Negative for cough. Cardiovascular: Negative for chest pain. Gastrointestinal: Negative for abdominal pain, nausea and vomiting. Musculoskeletal: Positive for gait problem. Neurological: Negative for dizziness, syncope, speech difficulty, light-headedness, numbness and headaches. Psychiatric/Behavioral: Negative for behavioral problems, dysphoric mood and hallucinations. The patient is not nervous/anxious. All other systems reviewed and are negative.       Active Problem List     Patient Active Problem List   Diagnosis   • Atrial fibrillation (HCC) [I48.91]   • Coronary artery disease   • Benign essential hypertension   • Bicuspid aortic valve   • Chronic diastolic congestive heart failure (HCC)   • Hypothyroidism   • Obstructive sleep apnea   • Chronic anticoagulation   • Mild cognitive impairment with memory loss   • Malignant neoplasm of urinary bladder (HCC)   • Class 2 severe obesity with serious comorbidity in adult [facility])   • Restrictive lung disease   • Seasonal allergic rhinitis   • Change in mole   • Hypernatremia   • Ambulatory dysfunction   • Sensorineural hearing loss (SNHL) of both ears   • Chronic obstructive pulmonary disease, unspecified COPD type ([address])   • CKD (chronic kidney disease) stage 2, GFR 60-89 ml/min   • Dry eyes   • Shortness of breath   • Pigmented skin lesion       Objective     /78 (BP Location: Left arm, Patient Position: Sitting, Cuff Size: Adult)   Pulse 68   Temp (!) 97.3 °F (36.3 °C) (Temporal)   Resp 18   Ht 5' 8.5" (1.74 m)   Wt 109 kg (240 lb 12.8 oz)   SpO2 98%   BMI 36.08 kg/m²     Physical Exam  Vitals reviewed. Constitutional:       General: He is not in acute distress. Appearance: He is not toxic-appearing or diaphoretic. HENT:      Right Ear: External ear normal. There is no impacted cerumen. Left Ear: External ear normal. There is no impacted cerumen. Nose: No congestion or rhinorrhea. Mouth/Throat:      Mouth: Mucous membranes are moist.      Pharynx: No oropharyngeal exudate. Eyes:      General:         Right eye: No discharge. Left eye: No discharge. Extraocular Movements: Extraocular movements intact. Conjunctiva/sclera: Conjunctivae normal.      Pupils: Pupils are equal, round, and reactive to light. Cardiovascular:      Rate and Rhythm: Normal rate. Pulses: Normal pulses. Pulmonary:      Effort: Pulmonary effort is normal. No respiratory distress. Breath sounds: No rhonchi. Comments: With chronic labored respirations. Decreased breath sounds throughout lung fields. Abdominal:      General: Bowel sounds are normal. There is no distension. Palpations: Abdomen is soft. Tenderness: There is no abdominal tenderness. There is no guarding. Musculoskeletal:      Cervical back: Neck supple. No rigidity. Right lower leg: Edema (Trace dependent edema) present. Left lower leg: Edema (Trace dependent edema ) present. Comments: Moves all 4 extremities. Lymphadenopathy:      Cervical: No cervical adenopathy. Skin:     General: Skin is warm and dry. Capillary Refill: Capillary refill takes less than 2 seconds. Comments: Two soft small round purplish black lesions noted on left upper arm on top of birthmark.   Multiple moles throughout body. Neurological:      Mental Status: He is alert. Mental status is at baseline. Motor: Weakness present. Gait: Gait abnormal.      Comments: With ST memory impairment   Psychiatric:         Mood and Affect: Mood normal.         Behavior: Behavior normal.         Thought Content:  Thought content normal.         Pertinent Laboratory/Diagnostic Studies:  CXR from ED    Current Medications     Current Outpatient Medications:   •  albuterol (PROVENTIL HFA,VENTOLIN HFA) 90 mcg/act inhaler, Inhale 2 puffs every 4 (four) hours as needed (shortness of breath), Disp: 6.7 g, Rfl: 3  •  atorvastatin (LIPITOR) 40 mg tablet, TAKE ONE TABLET BY MOUTH AT BEDTIME, Disp: 90 tablet, Rfl: 1  •  benazepril (LOTENSIN) 40 MG tablet, TAKE ONE TABLET BY MOUTH EVERY DAY WITH LUNCH, Disp: 30 tablet, Rfl: 1  •  carbamide peroxide (DEBROX) 6.5 % otic solution, Administer 5 drops into both ears 2 (two) times a day For 5 days, Disp: 15 mL, Rfl: 1  •  carboxymethylcellulose 0.5 % SOLN, Administer 1 drop to both eyes 2 (two) times a day, Disp: 6 mL, Rfl: 3  •  carvedilol (COREG) 25 mg tablet, TAKE ONE TABLET BY MOUTH TWICE DAILY AT NOON AND BEDTIME FOR heart disease, Disp: 180 tablet, Rfl: 1  •  cholecalciferol (VITAMIN D3) 1,000 units tablet, TAKE ONE TABLET BY MOUTH AT NOON WITH LUNCH FOR VITAMIN D DEFICIENCY, Disp: 30 tablet, Rfl: 3  •  Eliquis 5 MG, TAKE ONE TABLET BY MOUTH TWICE DAILY AT NOON AND BEDTIME, Disp: 60 tablet, Rfl: 1  •  finasteride (PROSCAR) 5 mg tablet, TAKE ONE TABLET BY MOUTH EVERY DAY WITH LUNCH, Disp: 90 tablet, Rfl: 1  •  fluticasone (FLONASE) 50 mcg/act nasal spray, 1 spray into each nostril daily, Disp: , Rfl:   •  latanoprost (XALATAN) 0.005 % ophthalmic solution, Apply 1 drop to eye, Disp: , Rfl:   •  levothyroxine 100 mcg tablet, TAKE ONE TABLET BY MOUTH AT BEDTIME FOR HYPOTHYROID, Disp: 90 tablet, Rfl: 1  •  potassium chloride (K-DUR,KLOR-CON) 20 mEq tablet, TAKE ONE TABLET BY MOUTH EVERY DAY WITH LUNCH FOR heart failure, Disp: 90 tablet, Rfl: 1  •  torsemide (DEMADEX) 20 mg tablet, TAKE 1 AND 1/2 TABLETS BY MOUTH EVERY DAY WITH LUNCH, Disp: 135 tablet, Rfl: 1    Health Maintenance     Health Maintenance   Topic Date Due   • COVID-19 Vaccine (4 - Moderna series) 01/11/2022   • BMI: Followup Plan  06/14/2022   • Fall Risk  03/21/2023   • Depression Screening  03/21/2023   • Medicare Annual Wellness Visit (AWV)  03/21/2023   • Influenza Vaccine (1) 09/01/2023   • BMI: Adult  08/21/2024   • Pneumococcal Vaccine: 65+ Years  Completed   • HIB Vaccine  Aged Out   • IPV Vaccine  Aged Out   • Hepatitis A Vaccine  Aged Out   • Meningococcal ACWY Vaccine  Aged Out   • HPV Vaccine  Aged Out     Immunization History   Administered Date(s) Administered   • COVID-19 MODERNA VACC 0.5 ML IM 01/21/2021, 02/23/2021, 11/16/2021   • INFLUENZA 10/01/2013, 09/22/2014, 01/02/2015, 10/04/2018, 10/15/2019, 10/01/2020   • Influenza Quadrivalent Preservative Free 3 years and older IM 10/15/2019   • Influenza, high dose seasonal 0.7 mL 10/22/2020   • Influenza, injectable, quadrivalent, preservative free 0.5 mL 10/15/2019   • Influenza, seasonal, injectable [birth year]04[birth year]1927, 08/30/2011, 08/30/2011, 10/04/2017   • Pneumococcal 11/01/2005   • Pneumococcal Conjugate 13-Valent 02/18/2015, 02/08/2016   • Pneumococcal Conjugate PCV 7 02/18/2015   • Pneumococcal Polysaccharide PPV23 11/01/2005, 10/01/2012   • Td (adult), Unspecified 01/13/2003   • Td (adult), adsorbed 01/13/2003   • Tdap 01/17/2013   • Zoster 01/10/2013     This note was completed in part utilizing Dragon fluency direct voice recognition software. Grammatical errors, random word insertion, spelling mistakes, and incomplete sentences may be an occasional consequence of the system secondary to software limitations, ambient noise and hardware issues. At the time of dictation, efforts were made to edit, clarify and/or correct errors.   Please read the chart carefully and recognize, using context, where substitutions have occurred. If you have any questions or concerns about the context, text or information contained within the body of this dictation, please contact myself, the provider, for further clarification.     LIBORIO Cohen  8/21/2023 3:40 PM

## 2023-08-21 NOTE — ASSESSMENT & PLAN NOTE
Patient was seen in the emergency room on 8/11/2023 for evaluation of shortness of breath and wheezing  With history of Obstructive sleep apnea and COPD, CHF  Appears euvolemic  With chronic labored respirations  CXR in the ED showed no acute cardiopulmonary disease  Patient denies any further episodes of shortness of breath, no orthopnea reported  Recommending that patient follow-up with pulmonology for follow-up of COPD and obstructive sleep apnea; patient's daughter stated that she will reach out to the [place]  Albuterol inhaler ordered PRN for SOB/wheezing  May continue Mucinex for upper secretions, but Mucinex severe dry mouth

## 2023-08-28 ENCOUNTER — TELEPHONE (OUTPATIENT)
Dept: GERIATRICS | Facility: CLINIC | Age: 88
End: 2023-08-28

## 2023-08-28 DIAGNOSIS — R39.9 UTI SYMPTOMS: ICD-10-CM

## 2023-08-28 DIAGNOSIS — R41.0 CONFUSION: Primary | ICD-10-CM

## 2023-08-28 NOTE — TELEPHONE ENCOUNTER
Daughter called to relay that the pt is having a lot of confusion and forgetfulness. Pt seems "all over the place" per [place]. She is concerned it may be a UTI. Is it okay to place a UA and Urine culture?

## 2023-09-05 ENCOUNTER — TELEPHONE (OUTPATIENT)
Dept: GERIATRICS | Facility: OTHER | Age: 88
End: 2023-09-05

## 2023-09-05 DIAGNOSIS — I50.32 CHRONIC DIASTOLIC CONGESTIVE HEART FAILURE (HCC): ICD-10-CM

## 2023-09-05 DIAGNOSIS — G31.84 MILD COGNITIVE IMPAIRMENT WITH MEMORY LOSS: Primary | ICD-10-CM

## 2023-09-05 DIAGNOSIS — J44.9 CHRONIC OBSTRUCTIVE PULMONARY DISEASE, UNSPECIFIED COPD TYPE (HCC): ICD-10-CM

## 2023-09-05 NOTE — TELEPHONE ENCOUNTER
----- Message from [facility], [address] [place] [address] sent at 8/31/2023 11:32 AM EDT -----  Please call the patient regarding recent urine study. Contamination of urine-not a clean catch specimen. Urine will need to be reordered if patient having any symptoms and it has to be a clean-catch specimen. Patient may need help with urine collection.

## 2023-09-05 NOTE — TELEPHONE ENCOUNTER
Called pt's daughter and relayed Vasquez Davis message. Daughter relayed that he is still has moments of confusion but seems better than before. No complaints of abdominal pain and bleeding. Daughter still would like to try for the urine test. Will replace order to have collected with bloodwork on Thursday. Daughter would also like an order to check his magnesium with all the other routine bloodwork/     Please advise if okay to order.

## 2023-09-07 ENCOUNTER — APPOINTMENT (OUTPATIENT)
Dept: LAB | Facility: HOSPITAL | Age: 88
End: 2023-09-07
Payer: COMMERCIAL

## 2023-09-07 DIAGNOSIS — I50.32 CHRONIC DIASTOLIC CONGESTIVE HEART FAILURE (HCC): ICD-10-CM

## 2023-09-07 DIAGNOSIS — R41.0 CONFUSION: ICD-10-CM

## 2023-09-07 DIAGNOSIS — J44.9 CHRONIC OBSTRUCTIVE PULMONARY DISEASE, UNSPECIFIED COPD TYPE (HCC): ICD-10-CM

## 2023-09-07 DIAGNOSIS — R39.9 UTI SYMPTOMS: ICD-10-CM

## 2023-09-07 DIAGNOSIS — E03.9 HYPOTHYROIDISM, UNSPECIFIED TYPE: ICD-10-CM

## 2023-09-07 DIAGNOSIS — G31.84 MILD COGNITIVE IMPAIRMENT WITH MEMORY LOSS: ICD-10-CM

## 2023-09-07 LAB
25(OH)D3 SERPL-MCNC: 50.6 NG/ML (ref 30–100)
ALBUMIN SERPL BCP-MCNC: 3.5 G/DL (ref 3.5–5)
ALP SERPL-CCNC: 93 U/L (ref 34–104)
ALT SERPL W P-5'-P-CCNC: 13 U/L (ref 7–52)
ANION GAP SERPL CALCULATED.3IONS-SCNC: 8 MMOL/L
AST SERPL W P-5'-P-CCNC: 16 U/L (ref 13–39)
BASOPHILS # BLD AUTO: 0.03 THOUSANDS/ÂΜL (ref 0–0.1)
BASOPHILS NFR BLD AUTO: 1 % (ref 0–1)
BILIRUB SERPL-MCNC: 0.73 MG/DL (ref 0.2–1)
BUN SERPL-MCNC: 23 MG/DL (ref 5–25)
CALCIUM SERPL-MCNC: 9.1 MG/DL (ref 8.4–10.2)
CHLORIDE SERPL-SCNC: 107 MMOL/L (ref 96–108)
CHOLEST SERPL-MCNC: 98 MG/DL
CO2 SERPL-SCNC: 26 MMOL/L (ref 21–32)
CREAT SERPL-MCNC: 0.99 MG/DL (ref 0.6–1.3)
EOSINOPHIL # BLD AUTO: 0.14 THOUSAND/ÂΜL (ref 0–0.61)
EOSINOPHIL NFR BLD AUTO: 3 % (ref 0–6)
ERYTHROCYTE [DISTWIDTH] IN BLOOD BY AUTOMATED COUNT: 15.3 % (ref 11.6–15.1)
GFR SERPL CREATININE-BSD FRML MDRD: 64 ML/MIN/1.73SQ M
GLUCOSE P FAST SERPL-MCNC: 92 MG/DL (ref 65–99)
HCT VFR BLD AUTO: 38.6 % (ref 36.5–49.3)
HDLC SERPL-MCNC: 32 MG/DL
HGB BLD-MCNC: 12.1 G/DL (ref 12–17)
IMM GRANULOCYTES # BLD AUTO: 0.05 THOUSAND/UL (ref 0–0.2)
IMM GRANULOCYTES NFR BLD AUTO: 1 % (ref 0–2)
LDLC SERPL CALC-MCNC: 53 MG/DL (ref 0–100)
LYMPHOCYTES # BLD AUTO: 0.87 THOUSANDS/ÂΜL (ref 0.6–4.47)
LYMPHOCYTES NFR BLD AUTO: 15 % (ref 14–44)
MAGNESIUM SERPL-MCNC: 2.3 MG/DL (ref 1.9–2.7)
MCH RBC QN AUTO: 30.3 PG (ref 26.8–34.3)
MCHC RBC AUTO-ENTMCNC: 31.3 G/DL (ref 31.4–37.4)
MCV RBC AUTO: 97 FL (ref 82–98)
MONOCYTES # BLD AUTO: 0.54 THOUSAND/ÂΜL (ref 0.17–1.22)
MONOCYTES NFR BLD AUTO: 10 % (ref 4–12)
NEUTROPHILS # BLD AUTO: 4.07 THOUSANDS/ÂΜL (ref 1.85–7.62)
NEUTS SEG NFR BLD AUTO: 70 % (ref 43–75)
NONHDLC SERPL-MCNC: 66 MG/DL
NRBC BLD AUTO-RTO: 0 /100 WBCS
PLATELET # BLD AUTO: 256 THOUSANDS/UL (ref 149–390)
PMV BLD AUTO: 10.3 FL (ref 8.9–12.7)
POTASSIUM SERPL-SCNC: 4 MMOL/L (ref 3.5–5.3)
PROT SERPL-MCNC: 6.7 G/DL (ref 6.4–8.4)
RBC # BLD AUTO: 3.99 MILLION/UL (ref 3.88–5.62)
SODIUM SERPL-SCNC: 141 MMOL/L (ref 135–147)
TRIGL SERPL-MCNC: 67 MG/DL
TSH SERPL DL<=0.05 MIU/L-ACNC: 3.13 UIU/ML (ref 0.45–4.5)
WBC # BLD AUTO: 5.7 THOUSAND/UL (ref 4.31–10.16)

## 2023-09-07 PROCEDURE — 84443 ASSAY THYROID STIM HORMONE: CPT

## 2023-09-07 PROCEDURE — 82306 VITAMIN D 25 HYDROXY: CPT

## 2023-09-07 PROCEDURE — 83735 ASSAY OF MAGNESIUM: CPT

## 2023-09-07 PROCEDURE — 36415 COLL VENOUS BLD VENIPUNCTURE: CPT

## 2023-09-07 PROCEDURE — 80061 LIPID PANEL: CPT

## 2023-09-07 PROCEDURE — 85025 COMPLETE CBC W/AUTO DIFF WBC: CPT

## 2023-09-07 PROCEDURE — 80053 COMPREHEN METABOLIC PANEL: CPT

## 2023-09-11 DIAGNOSIS — J30.2 SEASONAL ALLERGIC RHINITIS, UNSPECIFIED TRIGGER: ICD-10-CM

## 2023-09-11 DIAGNOSIS — H04.123 BILATERAL DRY EYES: ICD-10-CM

## 2023-09-11 RX ORDER — CARBOXYMETHYLCELLULOSE SODIUM 5 MG/ML
1 SOLUTION/ DROPS OPHTHALMIC 2 TIMES DAILY
Qty: 6 ML | Refills: 3 | Status: SHIPPED | OUTPATIENT
Start: 2023-09-11

## 2023-09-11 RX ORDER — FLUTICASONE PROPIONATE 50 MCG
1 SPRAY, SUSPENSION (ML) NASAL DAILY PRN
Qty: 9.9 ML | Refills: 3 | Status: SHIPPED | OUTPATIENT
Start: 2023-09-11

## 2023-09-11 NOTE — TELEPHONE ENCOUNTER
Bogdan Nguyen came to let us know that the pt is now in personal care. Gave updated medication list. tM Yadira reviewed medication list with daughter and daughter would like an order of refresh eye drops to be sent to [facility] and as well as Flonase nasal spray as a prn order and not a daily. Please sign orders if appropriate.

## 2023-09-15 ENCOUNTER — TELEPHONE (OUTPATIENT)
Dept: OTHER | Facility: OTHER | Age: 88
End: 2023-09-15

## 2023-09-15 DIAGNOSIS — E03.9 HYPOTHYROIDISM, UNSPECIFIED TYPE: ICD-10-CM

## 2023-09-15 RX ORDER — LEVOTHYROXINE SODIUM 0.1 MG/1
TABLET ORAL
Qty: 90 TABLET | Refills: 1 | Status: SHIPPED | OUTPATIENT
Start: 2023-09-15

## 2023-09-16 NOTE — TELEPHONE ENCOUNTER
[address] / [facility] [place] that CPK machine not working/ Patient Mariel Finley.  [place]   1927        VIA TT Subjective:      Chief Complaint: Follow-up and Hypertension (BP check)    HPI  Mr. 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          Review of Systems   Constitutional:  Negative for appetite change, chills and fever.   HENT: Negative.     Respiratory:  Negative for cough, chest tightness and shortness of breath.    Cardiovascular:  Negative for chest pain, palpitations and leg swelling.   Gastrointestinal:  Negative for abdominal distention, abdominal pain, blood in stool, constipation, diarrhea, nausea and vomiting.   Endocrine: Negative.    Genitourinary:  Negative for difficulty urinating, dysuria, frequency and hematuria.   Musculoskeletal: Negative.    Integumentary:  Negative.   Neurological: Negative.    Psychiatric/Behavioral: Negative.           Objective:      Vitals:    09/15/23 0923   BP: 132/86   Pulse:    Resp:    Temp:       Physical Exam  Vitals reviewed.   Constitutional:       General: He is not in acute distress.     Appearance: Normal appearance.   HENT:      Head: Normocephalic and atraumatic.      Comments: Facial features are symmetric      Nose: Nose normal. No congestion or rhinorrhea.      Mouth/Throat:      Mouth: Mucous membranes are moist.      Pharynx:  Oropharynx is clear. No oropharyngeal exudate or posterior oropharyngeal erythema.   Eyes:      General: No scleral icterus.     Extraocular Movements: Extraocular movements intact.      Conjunctiva/sclera: Conjunctivae normal.   Cardiovascular:      Rate and Rhythm: Normal rate and regular rhythm.      Pulses: Normal pulses.      Heart sounds: Normal heart sounds.   Pulmonary:      Effort: Pulmonary effort is normal. No respiratory distress.      Breath sounds: Normal breath sounds.   Musculoskeletal:         General: No deformity or signs of injury. Normal range of motion.      Cervical back: Normal range of motion.      Comments: Gait normal    Skin:     General: Skin is warm and dry.      Findings: No rash.   Neurological:      General: No focal deficit present.      Mental Status: He is alert and oriented to person, place, and time. Mental status is at baseline.   Psychiatric:         Mood and Affect: Mood normal.         Behavior: Behavior normal.         Thought Content: Thought content normal.       Current Outpatient Medications on File Prior to Visit   Medication Sig Dispense Refill    ascorbic acid, vitamin C, (VITAMIN C) 100 MG tablet Take 100 mg by mouth once daily.      aspirin (ECOTRIN) 81 MG EC tablet Take 81 mg by mouth once daily.      calcium carbonate (OS-HERMES) 600 mg calcium (1,500 mg) Tab Take 600 mg by mouth once daily.      ergocalciferol, vitamin D2, (VITAMIN D ORAL) Take by mouth.      multivitamin (ONE DAILY MULTIVITAMIN) per tablet Take 1 tablet by mouth once daily.      NON FORMULARY MEDICATION Prevegan once daily      omega-3/dha/epa/dpa/fish oil (OMEGA-3 2100 ORAL) Take by mouth.      pravastatin (PRAVACHOL) 40 MG tablet TAKE 1 TABLET ONE TIME DAILY 90 tablet 0    vitamin E 100 UNIT capsule Take 100 Units by mouth once daily.      rosuvastatin (CRESTOR) 20 MG tablet Take 1 tablet (20 mg total) by mouth once daily. 90 tablet 3     No current facility-administered medications on file  prior to visit.         Assessment:       1. Hypertension, unspecified type        Plan:       Hypertension, unspecified type  -     Ankle Brachial Indices ([place]); Future  -     Ambulatory referral/consult to Cardiology; Future; Expected date: 09/22/2023   - adjusted home averages and in office values are entirely reassuring; however, blood pressure discrepancy from arm to arm raises concern for atherosclerotic disease versus congenital arterial abnormalities.  Will acquire formal ABIs and facilitate cardiology establishment; recommendations and interventions appreciated.

## 2023-09-25 DIAGNOSIS — G47.33 OBSTRUCTIVE SLEEP APNEA: Primary | ICD-10-CM

## 2023-10-02 ENCOUNTER — OFFICE VISIT (OUTPATIENT)
Dept: GERIATRICS | Facility: CLINIC | Age: 88
End: 2023-10-02
Payer: COMMERCIAL

## 2023-10-02 VITALS
TEMPERATURE: 97.2 F | SYSTOLIC BLOOD PRESSURE: 142 MMHG | HEART RATE: 63 BPM | OXYGEN SATURATION: 93 % | DIASTOLIC BLOOD PRESSURE: 84 MMHG | WEIGHT: 247.2 LBS | HEIGHT: 69 IN | RESPIRATION RATE: 20 BRPM | BODY MASS INDEX: 36.61 KG/M2

## 2023-10-02 DIAGNOSIS — J30.2 SEASONAL ALLERGIC RHINITIS, UNSPECIFIED TRIGGER: ICD-10-CM

## 2023-10-02 DIAGNOSIS — H04.123 DRY EYES: Primary | ICD-10-CM

## 2023-10-02 DIAGNOSIS — I25.10 CORONARY ARTERY DISEASE INVOLVING NATIVE CORONARY ARTERY OF NATIVE HEART WITHOUT ANGINA PECTORIS: ICD-10-CM

## 2023-10-02 DIAGNOSIS — G31.84 MILD COGNITIVE IMPAIRMENT WITH MEMORY LOSS: ICD-10-CM

## 2023-10-02 DIAGNOSIS — N40.1 BENIGN PROSTATIC HYPERPLASIA WITH LOWER URINARY TRACT SYMPTOMS, SYMPTOM DETAILS UNSPECIFIED: ICD-10-CM

## 2023-10-02 DIAGNOSIS — G47.33 OBSTRUCTIVE SLEEP APNEA: ICD-10-CM

## 2023-10-02 DIAGNOSIS — I50.9 CHRONIC CONGESTIVE HEART FAILURE, UNSPECIFIED HEART FAILURE TYPE (HCC): ICD-10-CM

## 2023-10-02 DIAGNOSIS — E55.9 VITAMIN D DEFICIENCY: ICD-10-CM

## 2023-10-02 DIAGNOSIS — J98.4 RESTRICTIVE LUNG DISEASE: ICD-10-CM

## 2023-10-02 DIAGNOSIS — I50.32 CHRONIC DIASTOLIC CONGESTIVE HEART FAILURE (HCC): ICD-10-CM

## 2023-10-02 DIAGNOSIS — E03.9 HYPOTHYROIDISM, UNSPECIFIED TYPE: ICD-10-CM

## 2023-10-02 DIAGNOSIS — L81.9 PIGMENTED SKIN LESION: ICD-10-CM

## 2023-10-02 DIAGNOSIS — J44.9 CHRONIC OBSTRUCTIVE PULMONARY DISEASE, UNSPECIFIED COPD TYPE (HCC): ICD-10-CM

## 2023-10-02 DIAGNOSIS — I10 BENIGN ESSENTIAL HYPERTENSION: ICD-10-CM

## 2023-10-02 DIAGNOSIS — I48.91 ATRIAL FIBRILLATION, UNSPECIFIED TYPE (HCC): ICD-10-CM

## 2023-10-02 DIAGNOSIS — R26.2 AMBULATORY DYSFUNCTION: ICD-10-CM

## 2023-10-02 DIAGNOSIS — R35.0 URINARY FREQUENCY: ICD-10-CM

## 2023-10-02 PROCEDURE — 99214 OFFICE O/P EST MOD 30 MIN: CPT | Performed by: NURSE PRACTITIONER

## 2023-10-02 RX ORDER — ALBUTEROL SULFATE 90 UG/1
2 AEROSOL, METERED RESPIRATORY (INHALATION) EVERY 4 HOURS PRN
Qty: 6.7 G | Refills: 3 | Status: SHIPPED | OUTPATIENT
Start: 2023-10-02

## 2023-10-02 RX ORDER — CARBOXYMETHYLCELLULOSE SODIUM 5 MG/ML
1 SOLUTION/ DROPS OPHTHALMIC 4 TIMES DAILY
COMMUNITY
End: 2023-10-02 | Stop reason: SDUPTHER

## 2023-10-02 RX ORDER — LEVOTHYROXINE SODIUM 0.1 MG/1
TABLET ORAL
Qty: 90 TABLET | Refills: 1 | Status: SHIPPED | OUTPATIENT
Start: 2023-10-02

## 2023-10-02 RX ORDER — CARBOXYMETHYLCELLULOSE SODIUM 5 MG/ML
1 SOLUTION/ DROPS OPHTHALMIC 4 TIMES DAILY
Qty: 30 EACH | Refills: 3 | Status: SHIPPED | OUTPATIENT
Start: 2023-10-02

## 2023-10-02 RX ORDER — MELATONIN
Qty: 30 TABLET | Refills: 3 | Status: SHIPPED | OUTPATIENT
Start: 2023-10-02

## 2023-10-02 RX ORDER — CARVEDILOL 25 MG/1
25 TABLET ORAL 2 TIMES DAILY WITH MEALS
Qty: 180 TABLET | Refills: 1 | Status: SHIPPED | OUTPATIENT
Start: 2023-10-02

## 2023-10-02 RX ORDER — FINASTERIDE 5 MG/1
5 TABLET, FILM COATED ORAL DAILY
Qty: 90 TABLET | Refills: 1 | Status: SHIPPED | OUTPATIENT
Start: 2023-10-02

## 2023-10-02 RX ORDER — ATORVASTATIN CALCIUM 40 MG/1
40 TABLET, FILM COATED ORAL
Qty: 90 TABLET | Refills: 1 | Status: SHIPPED | OUTPATIENT
Start: 2023-10-02

## 2023-10-02 RX ORDER — BENAZEPRIL HYDROCHLORIDE 40 MG/1
40 TABLET, FILM COATED ORAL DAILY
Qty: 90 TABLET | Refills: 1 | Status: SHIPPED | OUTPATIENT
Start: 2023-10-02

## 2023-10-02 RX ORDER — TORSEMIDE 10 MG/1
TABLET ORAL
COMMUNITY
Start: 2023-09-27

## 2023-10-02 RX ORDER — FLUTICASONE PROPIONATE 50 MCG
1 SPRAY, SUSPENSION (ML) NASAL DAILY PRN
Qty: 9.9 ML | Refills: 3 | Status: SHIPPED | OUTPATIENT
Start: 2023-10-02

## 2023-10-02 RX ORDER — POTASSIUM CHLORIDE 20 MEQ/1
20 TABLET, EXTENDED RELEASE ORAL DAILY
Qty: 90 TABLET | Refills: 1 | Status: SHIPPED | OUTPATIENT
Start: 2023-10-02

## 2023-10-02 RX ORDER — LATANOPROST 50 UG/ML
1 SOLUTION/ DROPS OPHTHALMIC DAILY
Qty: 7.5 ML | Refills: 0 | Status: SHIPPED | OUTPATIENT
Start: 2023-10-02

## 2023-10-02 NOTE — PATIENT INSTRUCTIONS
You were here for routine follow up. Continue torsemide 40 mg for total of 2 weeks then resume 20 mg daily. Start refresh eye drops QID for dry eyes. Start multivitamin daily. PT/OT eval for ambulatory dysfunction and walker needed. Return to office in 3 months for routine follow up.

## 2023-10-02 NOTE — PROGRESS NOTES
[address] [place] [address] At [place]  POS: [facility] at [address] [place] [address]  AGE: 80 y.o. SEX: male    DATE OF ENCOUNTER: 10/2/2023    Assessment and Plan     59-year-old old male with:    Chronic diastolic congestive heart failure (HCC)  Wt Readings from Last 3 Encounters:   10/02/23 112 kg (247 lb 3.2 oz)   08/21/23 109 kg (240 lb 12.8 oz)   06/12/23 111 kg (244 lb 3.2 oz)   With recent exacerbation of CHF and torsemide dose was increased from 20 mg to 40 mg daily x2 weeks by [place] physician.   With +1 bilateral lower extremity edema today on exam  Shortness of breath likely in setting of COPD and not from CHF  Denies shortness of breath or chest pain  Continue torsemide 40 mg for total 2 weeks then resume torsemide 20 mg daily  Check BMP    Chronic obstructive pulmonary disease, unspecified COPD type ([address])  Patient was started on Stiolto maintenance inhaler 2 puffs daily by [place]  Continue albuterol inhaler as needed for shortness of breath or wheezing  O2 sat 93% on room air    Urinary frequency  Likely due to BPH  Check PSA  Refer to urology if symptoms continue and having difficulty urinating    Benign prostatic hyperplasia with lower urinary tract symptoms  With associated urinary frequency  Currently on Proscar  Order PSA  Recommend follow-up with urology at [place] if symptoms worsens    Dry eyes  Order refresh eyedrops 4 times a day    Obstructive sleep apnea  Continue CPAP nightly  [facility]    Pigmented skin lesion  2 round dark pigmented soft lesions left upper arm on birthmark  Recommend to follow-up with [place] dermatology for complete skin check    Mild cognitive impairment with memory loss  Forgetful of present events  He now receives assistance from personal care at [facility] independent living  Patient's daughters are very supportive and involved with his care  Encourage patient to participate in activities provided at [place]     Ambulatory dysfunction  Order PT eval and treat  Patient is currently using a walker that was given to him which may not be appropriate for his height and body stature  Continue fall precautions    Seasonal allergic rhinitis  Continue fluticasone    Hypothyroidism  TSH WNL on 9/7/2023  Continue levothyroxine 100 mcg daily    Orders Placed This Encounter   Procedures   • Basic metabolic panel   • PSA, Total Screen     - Counseling Documentation: patient was counseled regarding: instructions for management, patient and family education, impressions and importance of compliance with treatment Medication orders    Chief Complaint     Chief Complaint   Patient presents with   • Follow-up     3 month        History of Present Illness     27-year-old male who presents in office today accompanied by his 2 daughters for routine follow-up of chronic medical conditions. Patient is alert and oriented to self, place and current situation. He is very forgetful of present events. He denies having shortness of breath, chest pain, headache, dizziness, nausea, or vomiting. Patient's daughter discussed her father's recent visit at [facility] clinic and new medication orders. PT gerardoal ordered; request PT to also evaluate for new walker as patient is currently using a walker that has been given to him which may not be appropriate for his height and body stature. Patient's daughter also reports that her father is continent of urine, but notes that he has urinary frequency likely related to his prostate; will check PSA. No signs of UTI. The following portions of the patient's history were reviewed and updated as appropriate: allergies, current medications, past family history, past medical history, past social history, past surgical history and problem list.    Review of Systems     Review of Systems   Unable to perform ROS: Dementia   Constitutional: Negative for chills and diaphoresis. HENT: Positive for hearing loss. Respiratory: Negative for cough. Cardiovascular: Negative for chest pain. Gastrointestinal: Negative for abdominal pain. Genitourinary: Positive for frequency. Negative for hematuria. Musculoskeletal: Positive for gait problem. Skin: Positive for color change. Neurological: Negative for dizziness, syncope, speech difficulty, light-headedness and headaches. Psychiatric/Behavioral: Negative for behavioral problems, dysphoric mood and hallucinations. All other systems reviewed and are negative. Active Problem List     Patient Active Problem List   Diagnosis   • Atrial fibrillation (HCC) [I48.91]   • Coronary artery disease   • Benign essential hypertension   • Bicuspid aortic valve   • Chronic diastolic congestive heart failure (HCC)   • Hypothyroidism   • Obstructive sleep apnea   • Chronic anticoagulation   • Mild cognitive impairment with memory loss   • Malignant neoplasm of urinary bladder (HCC)   • Class 2 severe obesity with serious comorbidity in adult [facility])   • Restrictive lung disease   • Seasonal allergic rhinitis   • Change in mole   • Hypernatremia   • Ambulatory dysfunction   • Sensorineural hearing loss (SNHL) of both ears   • Chronic obstructive pulmonary disease, unspecified COPD type ([address])   • CKD (chronic kidney disease) stage 2, GFR 60-89 ml/min   • Dry eyes   • Shortness of breath   • Pigmented skin lesion   • Urinary frequency   • Vitamin D deficiency   • Benign prostatic hyperplasia with lower urinary tract symptoms       Objective     /84 (BP Location: Left arm, Patient Position: Sitting, Cuff Size: Adult)   Pulse 63   Temp (!) 97.2 °F (36.2 °C) (Temporal)   Resp 20   Ht 5' 8.5" (1.74 m)   Wt 112 kg (247 lb 3.2 oz)   SpO2 93%   BMI 37.04 kg/m²     Physical Exam  Vitals and nursing note reviewed. Constitutional:       General: He is not in acute distress. Appearance: He is not toxic-appearing or diaphoretic.    HENT: Head: Normocephalic. Ears:      Comments: Wears bilateral hearing aids. Nose: No congestion or rhinorrhea. Mouth/Throat:      Mouth: Mucous membranes are moist.      Pharynx: No oropharyngeal exudate. Eyes:      General: No scleral icterus. Right eye: No discharge. Left eye: No discharge. Extraocular Movements: Extraocular movements intact. Conjunctiva/sclera: Conjunctivae normal.      Pupils: Pupils are equal, round, and reactive to light. Comments: Wears prescription glasses. Cardiovascular:      Rate and Rhythm: Normal rate. Pulses: Normal pulses. Pulmonary:      Effort: No respiratory distress. Comments: Chronic labored respirations in setting of COPD. Decreased breath sounds throughout bilateral lung fields. Abdominal:      General: Bowel sounds are normal. There is no distension. Palpations: Abdomen is soft. Tenderness: There is no abdominal tenderness. There is no guarding. Musculoskeletal:      Cervical back: Neck supple. No rigidity. Right lower leg: Edema present. Left lower leg: Edema present. Comments: Moves all 4 extremities. Lymphadenopathy:      Cervical: No cervical adenopathy. Skin:     General: Skin is warm and dry. Capillary Refill: Capillary refill takes less than 2 seconds. Comments: 2 dark round lesions along with maroon color skin left upper arm. Neurological:      Mental Status: He is alert. Mental status is at baseline. Comments: Forgetful of present events. Psychiatric:         Mood and Affect: Mood normal.         Behavior: Behavior normal.         Thought Content:  Thought content normal.       Pertinent Laboratory/Diagnostic Studies reviewed by this provider:   Magnesium, CMP, lipid panel, vitamin D, CBC with differential, TSH  Current Medications     Current Outpatient Medications:   •  albuterol (PROVENTIL HFA,VENTOLIN HFA) 90 mcg/act inhaler, Inhale 2 puffs every 4 (four) hours as needed (shortness of breath), Disp: 6.7 g, Rfl: 3  •  apixaban (Eliquis) 5 mg, Take 1 tablet (5 mg total) by mouth 2 (two) times a day, Disp: 180 tablet, Rfl: 1  •  atorvastatin (LIPITOR) 40 mg tablet, Take 1 tablet (40 mg total) by mouth daily at bedtime, Disp: 90 tablet, Rfl: 1  •  benazepril (LOTENSIN) 40 MG tablet, Take 1 tablet (40 mg total) by mouth daily, Disp: 90 tablet, Rfl: 1  •  carboxymethylcellulose 0.5 % SOLN, Administer 1 drop to both eyes 2 (two) times a day, Disp: 6 mL, Rfl: 3  •  carboxymethylcellulose 0.5 % SOLN, Administer 1 drop to both eyes 4 (four) times a day, Disp: 30 each, Rfl: 3  •  carvedilol (COREG) 25 mg tablet, Take 1 tablet (25 mg total) by mouth 2 (two) times a day with meals, Disp: 180 tablet, Rfl: 1  •  cholecalciferol (VITAMIN D3) 1,000 units tablet, Take one tablet by mouth at noon with lunch for vitamin d deficiency, Disp: 30 tablet, Rfl: 3  •  finasteride (PROSCAR) 5 mg tablet, Take 1 tablet (5 mg total) by mouth daily, Disp: 90 tablet, Rfl: 1  •  fluticasone (FLONASE) 50 mcg/act nasal spray, 1 spray into each nostril daily as needed for rhinitis, Disp: 9.9 mL, Rfl: 3  •  latanoprost (XALATAN) 0.005 % ophthalmic solution, Apply 1 drop to eye daily, Disp: 7.5 mL, Rfl: 0  •  levothyroxine 100 mcg tablet, Take one tablet by mouth at bedtime for hypothyroid, Disp: 90 tablet, Rfl: 1  •  potassium chloride (K-DUR,KLOR-CON) 20 mEq tablet, Take 1 tablet (20 mEq total) by mouth daily, Disp: 90 tablet, Rfl: 1  •  tiotropium-olodaterol (STIOLTO RESPIMAT) 2.5-2.5 MCG/ACT inhaler, Inhale 2 puffs [facility], Disp: , Rfl:   •  torsemide (DEMADEX) 20 mg tablet, TAKE 1 AND 1/2 TABLETS BY MOUTH EVERY DAY WITH LUNCH, Disp: 135 tablet, Rfl: 1  •  carbamide peroxide (DEBROX) 6.5 % otic solution, Administer 5 drops into both ears 2 (two) times a day For 5 days, Disp: 15 mL, Rfl: 1  •  torsemide (DEMADEX) 10 mg tablet, 14 days for swelling, Disp: , Rfl:     Health Maintenance     Health Maintenance   Topic Date Due   • COVID-19 Vaccine (4 - Moderna series) 01/11/2022   • BMI: Followup Plan  06/14/2022   • Fall Risk  03/21/2023   • Depression Screening  03/21/2023   • Medicare Annual Wellness Visit (AWV)  03/21/2023   • Influenza Vaccine (1) 09/01/2023   • BMI: Adult  10/02/2024   • Pneumococcal Vaccine: 65+ Years  Completed   • HIB Vaccine  Aged Out   • IPV Vaccine  Aged Out   • Hepatitis A Vaccine  Aged Out   • Meningococcal ACWY Vaccine  Aged Out   • HPV Vaccine  Aged Out     Immunization History   Administered Date(s) Administered   • COVID-19 MODERNA VACC 0.5 ML IM 01/21/2021, 02/23/2021, 11/16/2021   • INFLUENZA 10/01/2013, 09/22/2014, 01/02/2015, 10/04/2018, 10/15/2019, 10/01/2020   • Influenza Quadrivalent Preservative Free 3 years and older IM 10/15/2019   • Influenza, high dose seasonal 0.7 mL 10/22/2020   • Influenza, injectable, quadrivalent, preservative free 0.5 mL 10/15/2019   • Influenza, seasonal, injectable 11/04/1927, 08/30/2011, 08/30/2011, 10/04/2017   • Pneumococcal 11/01/2005   • Pneumococcal Conjugate 13-Valent 02/18/2015, 02/08/2016   • Pneumococcal Conjugate PCV 7 02/18/2015   • Pneumococcal Polysaccharide PPV23 11/01/2005, 10/01/2012   • Td (adult), Unspecified 01/13/2003   • Td (adult), adsorbed 01/13/2003   • Tdap 01/17/2013   • Zoster 01/10/2013       This note was completed in part utilizing [employer] direct voice recognition software. Grammatical errors, random word insertion, spelling mistakes, and incomplete sentences may be an occasional consequence of the system secondary to software limitations, ambient noise and hardware issues. At the time of dictation, efforts were made to edit, clarify and/or correct errors. Please read the chart carefully and recognize, using context, where substitutions have occurred.   If you have any questions or concerns about the context, text or information contained within the body of this dictation, please contact myself, the provider, for further clarification.     [facility], [address] [place] [address]  10/2/2023 8:36 PM

## 2023-10-03 NOTE — ASSESSMENT & PLAN NOTE
Wt Readings from Last 3 Encounters:   10/02/23 112 kg (247 lb 3.2 oz)   08/21/23 109 kg (240 lb 12.8 oz)   06/12/23 111 kg (244 lb 3.2 oz)   With recent exacerbation of CHF and torsemide dose was increased from 20 mg to 40 mg daily x2 weeks by [facility] physician.   With +1 bilateral lower extremity edema today on exam  Shortness of breath likely in setting of COPD and not from CHF  Denies shortness of breath or chest pain  Continue torsemide 40 mg for total 2 weeks then resume torsemide 20 mg daily  Check BMP

## 2023-10-03 NOTE — ASSESSMENT & PLAN NOTE
Forgetful of present events  He now receives assistance from personal care at [facility] independent living  Patient's daughters are very supportive and involved with his care  Encourage patient to participate in activities provided at [facility]

## 2023-10-03 NOTE — ASSESSMENT & PLAN NOTE
Order PT eval and treat  Patient is currently using a walker that was given to him which may not be appropriate for his height and body stature  Continue fall precautions

## 2023-10-03 NOTE — ASSESSMENT & PLAN NOTE
With associated urinary frequency  Currently on Proscar  Order PSA  Recommend follow-up with urology at [facility] if symptoms worsens

## 2023-10-03 NOTE — ASSESSMENT & PLAN NOTE
2 round dark pigmented soft lesions left upper arm on birthmark  [facility]

## 2023-10-03 NOTE — ASSESSMENT & PLAN NOTE
Patient was started on Stiolto maintenance inhaler 2 puffs daily by [facility]  Continue albuterol inhaler as needed for shortness of breath or wheezing  O2 sat 93% on room air

## 2023-10-03 NOTE — ASSESSMENT & PLAN NOTE
Continue CPAP nightly  Recommend that he follow-up with the [facility] in order to receive CPAP supplies

## 2023-10-03 NOTE — ASSESSMENT & PLAN NOTE
HR 60s  Continue Coreg 25 mg twice daily  Continue Eliquis for anticoagulation/stroke prevention  Recent hemoglobin 12.1  Monitor CBC

## 2023-10-16 ENCOUNTER — APPOINTMENT (EMERGENCY)
Dept: RADIOLOGY | Facility: HOSPITAL | Age: 88
End: 2023-10-16
Payer: COMMERCIAL

## 2023-10-16 ENCOUNTER — HOSPITAL ENCOUNTER (EMERGENCY)
Facility: HOSPITAL | Age: 88
Discharge: HOME | End: 2023-10-16
Attending: EMERGENCY MEDICINE
Payer: COMMERCIAL

## 2023-10-16 ENCOUNTER — TELEPHONE (OUTPATIENT)
Age: 88
End: 2023-10-16

## 2023-10-16 VITALS
RESPIRATION RATE: 20 BRPM | DIASTOLIC BLOOD PRESSURE: 87 MMHG | OXYGEN SATURATION: 94 % | TEMPERATURE: 98.1 F | SYSTOLIC BLOOD PRESSURE: 193 MMHG | HEART RATE: 74 BPM

## 2023-10-16 DIAGNOSIS — R82.998 DARK URINE: Primary | ICD-10-CM

## 2023-10-16 DIAGNOSIS — R41.82 ALTERED MENTAL STATUS: ICD-10-CM

## 2023-10-16 LAB
ALBUMIN SERPL BCP-MCNC: 3.6 G/DL (ref 3.5–5)
ALP SERPL-CCNC: 83 U/L (ref 34–104)
ALT SERPL W P-5'-P-CCNC: 17 U/L (ref 7–52)
ANION GAP SERPL CALCULATED.3IONS-SCNC: 7 MMOL/L
AST SERPL W P-5'-P-CCNC: 16 U/L (ref 13–39)
BASOPHILS # BLD AUTO: 0.05 THOUSANDS/ÂΜL (ref 0–0.1)
BASOPHILS NFR BLD AUTO: 1 % (ref 0–1)
BILIRUB SERPL-MCNC: 1.2 MG/DL (ref 0.2–1)
BILIRUB UR QL STRIP: NEGATIVE
BNP SERPL-MCNC: 144 PG/ML (ref 0–100)
BUN SERPL-MCNC: 23 MG/DL (ref 5–25)
CALCIUM SERPL-MCNC: 9.1 MG/DL (ref 8.4–10.2)
CHLORIDE SERPL-SCNC: 107 MMOL/L (ref 96–108)
CLARITY UR: CLEAR
CO2 SERPL-SCNC: 30 MMOL/L (ref 21–32)
COLOR UR: ABNORMAL
CREAT SERPL-MCNC: 0.96 MG/DL (ref 0.6–1.3)
EOSINOPHIL # BLD AUTO: 0.16 THOUSAND/ÂΜL (ref 0–0.61)
EOSINOPHIL NFR BLD AUTO: 3 % (ref 0–6)
ERYTHROCYTE [DISTWIDTH] IN BLOOD BY AUTOMATED COUNT: 15.8 % (ref 11.6–15.1)
GFR SERPL CREATININE-BSD FRML MDRD: 66 ML/MIN/1.73SQ M
GLUCOSE SERPL-MCNC: 116 MG/DL (ref 65–140)
GLUCOSE UR STRIP-MCNC: NEGATIVE MG/DL
HCT VFR BLD AUTO: 39.7 % (ref 36.5–49.3)
HGB BLD-MCNC: 12.5 G/DL (ref 12–17)
HGB UR QL STRIP.AUTO: NEGATIVE
IMM GRANULOCYTES # BLD AUTO: 0.03 THOUSAND/UL (ref 0–0.2)
IMM GRANULOCYTES NFR BLD AUTO: 1 % (ref 0–2)
KETONES UR STRIP-MCNC: NEGATIVE MG/DL
LEUKOCYTE ESTERASE UR QL STRIP: NEGATIVE
LYMPHOCYTES # BLD AUTO: 0.78 THOUSANDS/ÂΜL (ref 0.6–4.47)
LYMPHOCYTES NFR BLD AUTO: 13 % (ref 14–44)
MCH RBC QN AUTO: 30.6 PG (ref 26.8–34.3)
MCHC RBC AUTO-ENTMCNC: 31.5 G/DL (ref 31.4–37.4)
MCV RBC AUTO: 97 FL (ref 82–98)
MONOCYTES # BLD AUTO: 0.66 THOUSAND/ÂΜL (ref 0.17–1.22)
MONOCYTES NFR BLD AUTO: 11 % (ref 4–12)
NEUTROPHILS # BLD AUTO: 4.42 THOUSANDS/ÂΜL (ref 1.85–7.62)
NEUTS SEG NFR BLD AUTO: 71 % (ref 43–75)
NITRITE UR QL STRIP: NEGATIVE
NRBC BLD AUTO-RTO: 0 /100 WBCS
PH UR STRIP.AUTO: 6 [PH]
PLATELET # BLD AUTO: 246 THOUSANDS/UL (ref 149–390)
PMV BLD AUTO: 10.5 FL (ref 8.9–12.7)
POTASSIUM SERPL-SCNC: 4 MMOL/L (ref 3.5–5.3)
PROT SERPL-MCNC: 6.3 G/DL (ref 6.4–8.4)
PROT UR STRIP-MCNC: NEGATIVE MG/DL
RBC # BLD AUTO: 4.09 MILLION/UL (ref 3.88–5.62)
SODIUM SERPL-SCNC: 144 MMOL/L (ref 135–147)
SP GR UR STRIP.AUTO: 1.01 (ref 1–1.03)
TSH SERPL DL<=0.05 MIU/L-ACNC: 4.37 UIU/ML (ref 0.45–4.5)
UROBILINOGEN UR STRIP-ACNC: 2 MG/DL
WBC # BLD AUTO: 6.1 THOUSAND/UL (ref 4.31–10.16)

## 2023-10-16 PROCEDURE — 80053 COMPREHEN METABOLIC PANEL: CPT | Performed by: EMERGENCY MEDICINE

## 2023-10-16 PROCEDURE — 93005 ELECTROCARDIOGRAM TRACING: CPT

## 2023-10-16 PROCEDURE — 81003 URINALYSIS AUTO W/O SCOPE: CPT | Performed by: EMERGENCY MEDICINE

## 2023-10-16 PROCEDURE — 71046 X-RAY EXAM CHEST 2 VIEWS: CPT

## 2023-10-16 PROCEDURE — 83880 ASSAY OF NATRIURETIC PEPTIDE: CPT

## 2023-10-16 PROCEDURE — 36415 COLL VENOUS BLD VENIPUNCTURE: CPT

## 2023-10-16 PROCEDURE — 84443 ASSAY THYROID STIM HORMONE: CPT

## 2023-10-16 PROCEDURE — 74177 CT ABD & PELVIS W/CONTRAST: CPT

## 2023-10-16 PROCEDURE — G1004 CDSM NDSC: HCPCS

## 2023-10-16 PROCEDURE — 85025 COMPLETE CBC W/AUTO DIFF WBC: CPT | Performed by: EMERGENCY MEDICINE

## 2023-10-16 RX ADMIN — IOHEXOL 100 ML: 350 INJECTION, SOLUTION INTRAVENOUS at 15:52

## 2023-10-16 NOTE — ED ATTENDING ATTESTATION
10/16/2023  I, Won Kevin MD, saw and evaluated the patient. I have discussed the patient with the resident/non-physician practitioner and agree with the resident's/non-physician practitioner's findings, Plan of Care, and MDM as documented in the resident's/non-physician practitioner's note, except where noted. All available labs and Radiology studies were reviewed. I was present for key portions of any procedure(s) performed by the resident/non-physician practitioner and I was immediately available to provide assistance. At this point I agree with the current assessment done in the Emergency Department. I have conducted an independent evaluation of this patient a history and physical is as follows:    ED Course     80year-old male, past medical history of COPD, CHF, cognitive impairment, hypothyroidism, at [facility] assisted living, presenting to the emergency department for evaluation of multiple complaints. Patient's nursing staff had noted that the patient had darker colored urine, however the daughter says that that interpretation of the urine was interpreting urine that was being wiped off the floor. Additionally nursing staff had noted that the patient had increasing abdominal distention. Additional history is that the patient has had 2 episodes of incontinence of stool at night where the patient states that he understands that he needs to move his bowels, however has been unable to get himself up to the restroom prior to defecating a loose bowel movement. No reported blood in the bowel movement. No reported fever. No abdominal pain. No dysuria. Daughter who is an independent historian and is present at the patient's bedside and states that his increased work of breathing is his baseline. On examination the patient is noted to be mildly tachypneic. Head is normocephalic and atraumatic. Mucous membranes are dry. Lungs are diminished bilateral bases. No wheezing.   Heart is regular rate and rhythm with no murmurs rubs or gallops. Abdomen is obese, soft and nontender. Extremities are significant for bilateral lower extremity edema with 3+ pitting edema on the right and 2+ pitting edema on the left. Daughter states that that is improved from 2 weeks ago and has improved on increased dose of torsemide. MEDICAL DECISION MAKING    Number and Complexity of Problems  Differential diagnosis: Dark urine: Hyperbilirubinemia, dehydration, urinary tract infection. Abdominal distention: Abdominal ascites, partial small bowel obstruction, bowel obstruction, intra-abdominal cancer. Increased work of breathing: Baseline COPD, CHF, pneumonia. Medical Decision Making Data  External documents reviewed: Reviewed office visit with [facility] on 10/2/2023 that confirms that patient had increased torsemide dosing for recent diagnosis of CHF with improvement of the lower extremity edema. Patient was noted to be tachypneic at that time. My EKG interpretation: Atrial fibrillation at 63 bpm.  No acute ST or T wave changes. My CT interpretation: No obstruction and no free fluid. My X-ray interpretation: No acute cardiopulmonary disease. My ultrasound interpretation: No abdominal free fluid. No evidence of bowel obstruction on abdominal ultrasound. XR chest 2 views   ED Interpretation   No acute cardiopulmonary disease. CT abdomen pelvis w contrast   ED Interpretation   No obstruction, no free fluid. Final Result   Addendum (preliminary) 1 of 1   ADDENDUM:      OSSEOUS TRACTORS: There are  compression fracture deformities of T12,    L1-L2 and L4 vertebral bodies. However, these are also mentioned on report    of prior CT abdomen pelvis 8/16/2016, representing chronic nature. There is also age-indeterminate compression fracture deformity of T9    vertebral body with mild height loss. Clinical correlation is recommended.       The study was marked in [facility] for immediate notification. Final      No abnormal distention of bowel loops. A 3.5 cm right lower lobe opacity with spiculated margins, which can    represent rounded atelectasis, focal consolidation or pulmonary neoplasm. Follow-up CT chest in 1 month is recommended to assess for resolution. 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 The study was marked in [facility] for immediate notification.             Workstation performed: [id number]             Labs Reviewed   CBC AND DIFFERENTIAL - Abnormal       Result Value Ref Range Status    WBC 6.10  4.31 - 10.16 Thousand/uL Final    RBC 4.09  3.88 - 5.62 Million/uL Final    Hemoglobin 12.5  12.0 - 17.0 g/dL Final    Hematocrit 39.7  36.5 - 49.3 % Final    MCV 97  82 - 98 fL Final    MCH 30.6  26.8 - 34.3 pg Final    MCHC 31.5  31.4 - 37.4 g/dL Final    RDW 15.8 (*) 11.6 - 15.1 % Final    MPV 10.5  8.9 - 12.7 fL Final    Platelets 345  774 - 390 Thousands/uL Final    nRBC 0  /100 WBCs Final    Neutrophils Relative 71  43 - 75 % Final    Immat GRANS % 1  0 - 2 % Final    Lymphocytes Relative 13 (*) 14 - 44 % Final    Monocytes Relative 11  4 - 12 % Final    Eosinophils Relative 3  0 - 6 % Final    Basophils Relative 1  0 - 1 % Final    Neutrophils Absolute 4.42  1.85 - 7.62 Thousands/µL Final    Immature Grans Absolute 0.03  0.00 - 0.20 Thousand/uL Final    Lymphocytes Absolute 0.78  0.60 - 4.47 Thousands/µL Final    Monocytes Absolute 0.66  0.17 - 1.22 Thousand/µL Final    Eosinophils Absolute 0.16  0.00 - 0.61 Thousand/µL Final    Basophils Absolute 0.05  0.00 - 0.10 Thousands/µL Final   COMPREHENSIVE METABOLIC PANEL - Abnormal    Sodium 144  135 - 147 mmol/L Final    Potassium 4.0  3.5 - 5.3 mmol/L Final    Chloride 107  96 - 108 mmol/L Final    CO2 30  21 - 32 mmol/L Final    ANION GAP 7  mmol/L Final    BUN 23  5 - 25 mg/dL Final Creatinine 0.96  0.60 - 1.30 mg/dL Final    Comment: Standardized to IDMS reference method    Glucose 116  65 - 140 mg/dL Final    Comment: If the patient is fasting, the ADA then defines impaired fasting glucose as > 100 mg/dL and diabetes as > or equal to 123 mg/dL. Calcium 9.1  8.4 - 10.2 mg/dL Final    AST 16  13 - 39 U/L Final    ALT 17  7 - 52 U/L Final    Comment: Specimen collection should occur prior to Sulfasalazine administration due to the potential for falsely depressed results. Alkaline Phosphatase 83  34 - 104 U/L Final    Total Protein 6.3 (*) 6.4 - 8.4 g/dL Final    Albumin 3.6  3.5 - 5.0 g/dL Final    Total Bilirubin 1.20 (*) 0.20 - 1.00 mg/dL Final    Comment: Use of this assay is not recommended for patients undergoing treatment with eltrombopag due to the potential for falsely elevated results. N-acetyl-p-benzoquinone imine (metabolite of Acetaminophen) will generate erroneously low results in samples for patients that have taken an overdose of Acetaminophen.     eGFR 66  ml/min/1.73sq m Final    Narrative:     National Kidney Disease Foundation guidelines for Chronic Kidney Disease (CKD):     Stage 1 with normal or high GFR (GFR > 90 mL/min/1.73 square meters)    Stage 2 Mild CKD (GFR = 60-89 mL/min/1.73 square meters)    Stage 3A Moderate CKD (GFR = 45-59 mL/min/1.73 square meters)    Stage 3B Moderate CKD (GFR = 30-44 mL/min/1.73 square meters)    Stage 4 Severe CKD (GFR = 15-29 mL/min/1.73 square meters)    Stage 5 End Stage CKD (GFR <15 mL/min/1.73 square meters)  Note: GFR calculation is accurate only with a steady state creatinine   UA W REFLEX TO MICROSCOPIC WITH REFLEX TO CULTURE - Abnormal    Color, UA Light Yellow   Final    Clarity, UA Clear   Final    Specific Gravity, UA 1.008  1.003 - 1.030 Final    pH, UA 6.0  4.5, 5.0, 5.5, 6.0, 6.5, 7.0, 7.5, 8.0 Final    Leukocytes, UA Negative  Negative Final    Nitrite, UA Negative  Negative Final    Protein, UA Negative  Negative mg/dl Final    Glucose, UA Negative  Negative mg/dl Final    Ketones, UA Negative  Negative mg/dl Final    Urobilinogen, UA 2.0 (*) <2.0 mg/dl mg/dl Final    Bilirubin, UA Negative  Negative Final    Occult Blood, UA Negative  Negative Final   B-TYPE NATRIURETIC PEPTIDE (BNP) - Abnormal     (*) 0 - 100 pg/mL Final   TSH, 3RD GENERATION WITH FREE T4 REFLEX - Normal    TSH 3RD GENERATON 4.367  0.450 - 4.500 uIU/mL Final    Comment: Adult TSH (3rd generation) reference range follows the recommended guidelines of the American Thyroid Association, January, 2020. Labs reviewed by me are significant for:  No significant lab abnormalities. Treatment and Disposition  ED course: CT results were discussed with the patient and his daughter. They were made aware of the need for repeat CT chest imaging to reevaluate the lower lobe mass in 1 month time. Patient's respiratory status stayed stable while he was in the emergency department and both the patient and his daughter were comfortable with discharge. Shared decision making: Ultimate decision making was performed by the daughter who is present at the bedside. Critical Care Time  POC FAST     Date/Time: 10/16/2023 4:51 PM    Performed by: Nadine Boast, MD  Authorized by: Nadine Boast, MD    Patient location:  ED  Procedure details:     Exam Type:  Diagnostic    Indications comment:  Abdominal distention    Assess for:  Intra-abdominal fluid    Technique: FAST      Views obtained:  RUQ - Rodney's Pouch, LUQ - Splenorenal space and Suprapubic - Pouch of Caden    Image quality: diagnostic      Image availability:  Images available in PACS  FAST Findings:     RUQ (Hepatorenal) free fluid: absent      LUQ (Splenorenal) free fluid: absent      Suprapubic free fluid: absent      Suprapubic free fluid comment:  Bladder distended with 240 mL.   Interpretation:     Impressions: negative

## 2023-10-16 NOTE — ED PROVIDER NOTES
History  Chief Complaint   Patient presents with    Medical Problem     Per EMS pt is coming from facility where staff states pt's urine was dark and that the pt seemed more confused than normal. Pt has a hx of amnesia      89M pmh COPD, CHF, mild cognitive impairment, hypothyroidism living at [facility] p/w soiling himself in bed at night, unlike him per care home staff. Patient's daughter present in room, assisting with history. Per daughter, patient is at mental and physical baseline. Patient himself has no complaints. Care home additionally notes dark urine overnight. Prior to Admission Medications   Prescriptions Last Dose Informant Patient Reported? Taking?    albuterol (PROVENTIL HFA,VENTOLIN HFA) 90 mcg/act inhaler   No No   Sig: Inhale 2 puffs every 4 (four) hours as needed (shortness of breath)   apixaban (Eliquis) 5 mg   No No   Sig: Take 1 tablet (5 mg total) by mouth 2 (two) times a day   atorvastatin (LIPITOR) 40 mg tablet   No No   Sig: Take 1 tablet (40 mg total) by mouth daily at bedtime   benazepril (LOTENSIN) 40 MG tablet   No No   Sig: Take 1 tablet (40 mg total) by mouth daily   carbamide peroxide (DEBROX) 6.5 % otic solution   No No   Sig: Administer 5 drops into both ears 2 (two) times a day For 5 days   carboxymethylcellulose 0.5 % SOLN   No No   Sig: Administer 1 drop to both eyes 2 (two) times a day   carboxymethylcellulose 0.5 % SOLN   No No   Sig: Administer 1 drop to both eyes 4 (four) times a day   carvedilol (COREG) 25 mg tablet   No No   Sig: Take 1 tablet (25 mg total) by mouth 2 (two) times a day with meals   cholecalciferol (VITAMIN D3) 1,000 units tablet   No No   Sig: Take one tablet by mouth at noon with lunch for vitamin d deficiency   finasteride (PROSCAR) 5 mg tablet   No No   Sig: Take 1 tablet (5 mg total) by mouth daily   fluticasone (FLONASE) 50 mcg/act nasal spray   No No   Si spray into each nostril daily as needed for rhinitis   latanoprost (XALATAN) 0.005 % ophthalmic solution   No No   Sig: Apply 1 drop to eye daily   levothyroxine 100 mcg tablet   No No   Sig: Take one tablet by mouth at bedtime for hypothyroid   potassium chloride (K-DUR,KLOR-CON) 20 mEq tablet   No No   Sig: Take 1 tablet (20 mEq total) by mouth daily   tiotropium-olodaterol (STIOLTO RESPIMAT) 2.5-2.5 MCG/ACT inhaler   Yes No   Sig: Inhale 2 puffs [facility]   torsemide (DEMADEX) 10 mg tablet   Yes No   Si days for swelling   torsemide (DEMADEX) 20 mg tablet   No No   Sig: TAKE 1 AND 1/2 TABLETS BY MOUTH EVERY DAY WITH LUNCH      Facility-Administered Medications: None       Past Medical History:   Diagnosis Date    Arithmetic disorder     2016    Myocardial infarct [facility])     [id number]  LAST ASSESSED       Past Surgical History:   Procedure Laterality Date    BLADDER SURGERY      LAST ASSESSED 2016      CORONARY ARTERY BYPASS GRAFT      [id number] RESOLVED    SKIN BIOPSY         Family History   Problem Relation Age of Onset    Breast cancer Family         [id number] LAST ASSESSED     I have reviewed and agree with the history as documented. E-Cigarette/Vaping    E-Cigarette Use Never User      E-Cigarette/Vaping Substances    Nicotine No     THC No     CBD No     Flavoring No     Other No      Social History     Tobacco Use    Smoking status: Former    Smokeless tobacco: Never   Vaping Use    Vaping Use: Never used   Substance Use Topics    Alcohol use: Yes     Comment: OCCASIONAL        Review of Systems   Constitutional:  Negative for chills and fever. HENT:  Negative for ear pain and sore throat. Eyes:  Negative for pain and visual disturbance. Respiratory:  Negative for cough and shortness of breath. Cardiovascular:  Negative for chest pain and palpitations. Gastrointestinal:  Negative for abdominal pain and vomiting. Genitourinary:  Negative for dysuria and hematuria. Musculoskeletal:  Negative for arthralgias and back pain. Skin:  Negative for color change and rash. Neurological:  Negative for seizures and syncope. All other systems reviewed and are negative. Physical Exam  ED Triage Vitals [10/16/23 1423]   Temperature Pulse Respirations Blood Pressure SpO2   98.1 °F (36.7 °C) 64 20 (!) 209/87 95 %      Temp Source Heart Rate Source Patient Position - Orthostatic VS BP Location FiO2 (%)   Oral Monitor Lying Right arm --      Pain Score       No Pain             Orthostatic Vital Signs  Vitals:    10/16/23 1423 10/16/23 1530 10/16/23 1730 10/16/23 1830   BP: (!) 209/87 (!) 172/73 156/76 (!) 193/87   Pulse: 64 62 66 74   Patient Position - Orthostatic VS: Lying Lying Sitting        Physical Exam  Vitals and nursing note reviewed. Constitutional:       General: He is not in acute distress. Appearance: He is well-developed. HENT:      Head: Normocephalic and atraumatic. Eyes:      Conjunctiva/sclera: Conjunctivae normal.   Cardiovascular:      Rate and Rhythm: Normal rate and regular rhythm. Heart sounds: No murmur heard. Pulmonary:      Effort: Pulmonary effort is normal. No respiratory distress. Breath sounds: Normal breath sounds. Comments: tachypnea  Abdominal:      General: There is distension. Palpations: Abdomen is soft. Tenderness: There is no abdominal tenderness. Musculoskeletal:         General: No swelling. Cervical back: Neck supple. Skin:     General: Skin is warm and dry. Capillary Refill: Capillary refill takes less than 2 seconds. Neurological:      Mental Status: He is alert.    Psychiatric:         Mood and Affect: Mood normal.         ED Medications  Medications   iohexol (OMNIPAQUE) 350 MG/ML injection (MULTI-DOSE) 100 mL (100 mL Intravenous Given 10/16/23 1552)       Diagnostic Studies  Results Reviewed       Procedure Component Value Units Date/Time    B-Type Natriuretic Peptide(BNP) [[id number]]  (Abnormal) Collected: 10/16/23 1430    Lab Status: Final result Specimen: Blood from Arm, Left Updated: 10/16/23 1653      pg/mL     TSH, 3rd generation with Free T4 reflex [[id number]]  (Normal) Collected: 10/16/23 1430    Lab Status: Final result Specimen: Blood from Arm, Left Updated: 10/16/23 1643     TSH 3RD GENERATON 4.367 uIU/mL     Comprehensive metabolic panel [[id number]]  (Abnormal) Collected: 10/16/23 1430    Lab Status: Final result Specimen: Blood from Arm, Left Updated: 10/16/23 1504     Sodium 144 mmol/L      Potassium 4.0 mmol/L      Chloride 107 mmol/L      CO2 30 mmol/L      ANION GAP 7 mmol/L      BUN 23 mg/dL      Creatinine 0.96 mg/dL      Glucose 116 mg/dL      Calcium 9.1 mg/dL      AST 16 U/L      ALT 17 U/L      Alkaline Phosphatase 83 U/L      Total Protein 6.3 g/dL      Albumin 3.6 g/dL      Total Bilirubin 1.20 mg/dL      eGFR 66 ml/min/1.73sq m     Narrative:      [place] guidelines for Chronic Kidney Disease (CKD):     Stage 1 with normal or high GFR (GFR > 90 mL/min/1.73 square meters)    Stage 2 Mild CKD (GFR = 60-89 mL/min/1.73 square meters)    Stage 3A Moderate CKD (GFR = 45-59 mL/min/1.73 square meters)    Stage 3B Moderate CKD (GFR = 30-44 mL/min/1.73 square meters)    Stage 4 Severe CKD (GFR = 15-29 mL/min/1.73 square meters)    Stage 5 End Stage CKD (GFR <15 mL/min/1.73 square meters)  Note: GFR calculation is accurate only with a steady state creatinine    UA w Reflex to Microscopic w Reflex to Culture [[id number]]  (Abnormal) Collected: 10/16/23 1430    Lab Status: Final result Specimen: Urine, Clean Catch Updated: 10/16/23 1449     Color, UA Light Yellow     Clarity, UA Clear     Specific Gravity, UA 1.008     pH, UA 6.0     Leukocytes, UA Negative     Nitrite, UA Negative     Protein, UA Negative mg/dl      Glucose, UA Negative mg/dl      Ketones, UA Negative mg/dl      Urobilinogen, UA 2.0 mg/dl      Bilirubin, UA Negative     Occult Blood, UA Negative    CBC and differential [[id number]] (Abnormal) Collected: 10/16/23 1430    Lab Status: Final result Specimen: Blood from Arm, Left Updated: 10/16/23 1444     WBC 6.10 Thousand/uL      RBC 4.09 Million/uL      Hemoglobin 12.5 g/dL      Hematocrit 39.7 %      MCV 97 fL      MCH 30.6 pg      MCHC 31.5 g/dL      RDW 15.8 %      MPV 10.5 fL      Platelets 375 Thousands/uL      nRBC 0 /100 WBCs      Neutrophils Relative 71 %      Immat GRANS % 1 %      Lymphocytes Relative 13 %      Monocytes Relative 11 %      Eosinophils Relative 3 %      Basophils Relative 1 %      Neutrophils Absolute 4.42 Thousands/µL      Immature Grans Absolute 0.03 Thousand/uL      Lymphocytes Absolute 0.78 Thousands/µL      Monocytes Absolute 0.66 Thousand/µL      Eosinophils Absolute 0.16 Thousand/µL      Basophils Absolute 0.05 Thousands/µL                    XR chest 2 views   ED Interpretation by Letitia Park MD (18/02 6179)   No acute cardiopulmonary disease. Final Result by Chanda Gallo MD (10/17 5696)   Stable cardiomegaly. Mild pulmonary vascular congestion. Workstation performed: [id number]         CT abdomen pelvis w contrast   ED Interpretation by Letitia Park MD (93/19 8506)   No obstruction, no free fluid. Final Result by Joseluis Basilio MD (54/13 4871)   Addendum (preliminary) 1 of 1 by Joseluis Basilio MD (10/16 1068)   ADDENDUM:      OSSEOUS TRACTORS: There are  compression fracture deformities of T12,    L1-L2 and L4 vertebral bodies. However, these are also mentioned on report    of prior CT abdomen pelvis 8/16/2016, representing chronic nature. There is also age-indeterminate compression fracture deformity of T9    vertebral body with mild height loss. Clinical correlation is recommended. The study was marked in [facility] for immediate notification. Final      No abnormal distention of bowel loops.       A 3.5 cm right lower lobe opacity with spiculated margins, which can    represent rounded atelectasis, focal consolidation or pulmonary neoplasm. Follow-up CT chest in 1 month is recommended to assess for resolution. 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 The study was marked in [facility] for immediate notification. Workstation performed: [id number]               Procedures  ECG 12 Lead Documentation Only    Date/Time: 10/16/2023 4:29 PM    Performed by: Lori Stephenson MD  Authorized by: Lori Stephenson MD    Patient location:  ED  Interpretation:     Interpretation: abnormal    Rate:     ECG rate assessment: normal    Rhythm:     Rhythm: atrial fibrillation    Ectopy:     Ectopy: none    QRS:     QRS axis:  Left    QRS intervals:  Normal  Conduction:     Conduction: normal    ST segments:     ST segments:  Normal  T waves:     T waves: normal          ED Course                                       Medical Decision Making  Patient is a 75-year-old male with a past medical history of bladder cancer and mild cognitive impairment presenting with abdominal distention and diarrhea. Differential includes but is not limited to worsening malignancy, SBO, pancreatitis, constipation. Will evaluate with abdominal labs, urinalysis, CT scan. If patient evaluation returns unremarkable, patient will be discharged back to care home. Plan of care signed out to Dr. Mc High. Amount and/or Complexity of Data Reviewed  Labs: ordered. Radiology: ordered and independent interpretation performed. Risk  Prescription drug management.           Disposition  Final diagnoses:   Dark urine   Altered mental status     Time reflects when diagnosis was documented in both MDM as applicable and the Disposition within this note       Time User Action Codes Description Comment    10/16/2023  6:53 PM Deneen Donato Add [R82.998] Dark urine     10/16/2023  6:53 PM Babaryossi Santiago Add [R41.82] Altered mental status           ED Disposition       ED Disposition   Discharge    Condition   Stable    Date/Time   Mon Oct 16, 2023  6:53 PM    Comment   [facility] discharge to home/self care.                    Follow-up Information       Follow up With Specialties Details Why Contact Info Additional Information    Tasia Schaeffer,  Geriatric Medicine Schedule an appointment as soon as possible for a visit in 1 week  [address] Emergency Department Emergency Medicine  If symptoms worsen [address] [id number]  [facility] Emergency Department, [address] [facility]            Discharge Medication List as of 10/16/2023  6:56 PM        CONTINUE these medications which have NOT CHANGED    Details   albuterol (PROVENTIL HFA,VENTOLIN HFA) 90 mcg/act inhaler Inhale 2 puffs every 4 (four) hours as needed (shortness of breath), Starting Mon 10/2/2023, Normal      apixaban (Eliquis) 5 mg Take 1 tablet (5 mg total) by mouth 2 (two) times a day, Starting Mon 10/2/2023, Normal      atorvastatin (LIPITOR) 40 mg tablet Take 1 tablet (40 mg total) by mouth daily at bedtime, Starting Mon 10/2/2023, Normal      benazepril (LOTENSIN) 40 MG tablet Take 1 tablet (40 mg total) by mouth daily, Starting Mon 10/2/2023, Normal      carbamide peroxide (DEBROX) 6.5 % otic solution Administer 5 drops into both ears 2 (two) times a day For 5 days, Starting Mon 6/12/2023, Normal      !! carboxymethylcellulose 0.5 % SOLN Administer 1 drop to both eyes 2 (two) times a day, Starting Mon 9/11/2023, Normal      !! carboxymethylcellulose 0.5 % SOLN Administer 1 drop to both eyes 4 (four) times a day, Starting Mon 10/2/2023, Normal      carvedilol (COREG) 25 mg tablet Take 1 tablet (25 mg total) by mouth 2 (two) times a day with meals, Starting Mon 10/2/2023, Normal      cholecalciferol (VITAMIN D3) 1,000 units tablet Take one tablet by mouth at noon with lunch for vitamin d deficiency, Normal      finasteride (PROSCAR) 5 mg tablet Take 1 tablet (5 mg total) by mouth daily, Starting Mon 10/2/2023, Normal      fluticasone (FLONASE) 50 mcg/act nasal spray 1 spray into each nostril daily as needed for rhinitis, Starting Mon 10/2/2023, Normal      latanoprost (XALATAN) 0.005 % ophthalmic solution Apply 1 drop to eye daily, Starting Mon 10/2/2023, Normal      levothyroxine 100 mcg tablet Take one tablet by mouth at bedtime for hypothyroid, Normal      potassium chloride (K-DUR,KLOR-CON) 20 mEq tablet Take 1 tablet (20 mEq total) by mouth daily, Starting Mon 10/2/2023, Normal      tiotropium-olodaterol (STIOLTO RESPIMAT) 2.5-2.5 MCG/ACT inhaler Inhale 2 puffs [facility], [facility] Med      !! torsemide (DEMADEX) 10 mg tablet 14 days for swelling, Starting Wed 9/27/2023, Historical Med      !! torsemide (DEMADEX) 20 mg tablet TAKE 1 AND 1/2 TABLETS BY MOUTH EVERY DAY WITH LUNCH, Normal       !! - Potential duplicate medications found. Please discuss with provider. No discharge procedures on file. PDMP Review       None             ED Provider  Attending physically available and evaluated Valente Shows. I managed the patient along with the ED Attending.     Electronically Signed by           Emeterio Young MD  10/17/23 0606

## 2023-10-16 NOTE — DISCHARGE INSTRUCTIONS
You have been evaluated in the emergency department for diarrhea, dark urine, and possible increase in your abdominal distention. Your evaluation, including CAT scan, showing no pathology or disease requiring treatment in the emergency department or admission to the hospital.  Please continue to stay hydrated and follow-up with your outpatient doctors. Return to the emergency department if your symptoms worsen. Follow up with your PCP for the following CT finding: A 3.5 cm right lower lobe opacity with spiculated margins, which can represent rounded atelectasis, focal consolidation or pulmonary neoplasm. Follow-up CT chest in 1 month is recommended to assess for resolution.

## 2023-10-16 NOTE — TELEPHONE ENCOUNTER
Received call from [facility] @  concerned with symptoms patient is showing. He is short of breath, very confused, and belly is distended/hard. He got a new pair of pants Friday which fit him fine and now they are very tight. She believes that he might be filling up w/fluid. Spoke with Cathleen Kim and she stated that an ED visit is needed due to shortness of breath. Relayed message to [facility].

## 2023-10-16 NOTE — ED NOTES
Patient transported to [address] [place] [address], [address] [place] [address]  10/16/23 6381

## 2023-10-17 LAB
ATRIAL RATE: 267 BPM
QRS AXIS: -25 DEGREES
QRSD INTERVAL: 114 MS
QT INTERVAL: 388 MS
QTC INTERVAL: 397 MS
T WAVE AXIS: -61 DEGREES
VENTRICULAR RATE: 63 BPM

## 2023-10-17 PROCEDURE — 93010 ELECTROCARDIOGRAM REPORT: CPT | Performed by: INTERNAL MEDICINE

## 2023-10-23 ENCOUNTER — TELEPHONE (OUTPATIENT)
Age: 88
End: 2023-10-23

## 2023-10-23 ENCOUNTER — OFFICE VISIT (OUTPATIENT)
Dept: GERIATRICS | Facility: CLINIC | Age: 88
End: 2023-10-23
Payer: COMMERCIAL

## 2023-10-23 VITALS
OXYGEN SATURATION: 92 % | SYSTOLIC BLOOD PRESSURE: 130 MMHG | DIASTOLIC BLOOD PRESSURE: 90 MMHG | BODY MASS INDEX: 38.03 KG/M2 | WEIGHT: 253.8 LBS | HEART RATE: 61 BPM

## 2023-10-23 DIAGNOSIS — J44.9 CHRONIC OBSTRUCTIVE PULMONARY DISEASE, UNSPECIFIED COPD TYPE (HCC): ICD-10-CM

## 2023-10-23 DIAGNOSIS — I50.32 CHRONIC DIASTOLIC CONGESTIVE HEART FAILURE (HCC): ICD-10-CM

## 2023-10-23 DIAGNOSIS — R93.89 ABNORMAL CT SCAN: ICD-10-CM

## 2023-10-23 DIAGNOSIS — R91.8 ABNORMAL CT SCAN OF LUNG: Primary | ICD-10-CM

## 2023-10-23 DIAGNOSIS — N40.1 BENIGN PROSTATIC HYPERPLASIA WITH INCOMPLETE BLADDER EMPTYING: ICD-10-CM

## 2023-10-23 DIAGNOSIS — R39.14 BENIGN PROSTATIC HYPERPLASIA WITH INCOMPLETE BLADDER EMPTYING: ICD-10-CM

## 2023-10-23 PROCEDURE — 99214 OFFICE O/P EST MOD 30 MIN: CPT | Performed by: NURSE PRACTITIONER

## 2023-10-23 NOTE — TELEPHONE ENCOUNTER
Yadira let me know that there was a signed order from you for Miconazole 27. Te script is not in his chart and was never sent. Please send script to [id number] [address]. Thank you!

## 2023-10-23 NOTE — PROGRESS NOTES
[address] [place] [address] At [place]  POS: [facility] at [address] [place] [address]  AGE: 80 y.o.  SEX: male    DATE OF ENCOUNTER: 10/23/2023    Assessment and Plan     80year-old male with:    Abnormal CT scan  Incidental finding of 3.5 cm right lower lobe opacity with spiculated margins, which represent rounded atelectasis, focal consolidation or pulmonary neoplasm as per CT of abdomen and pelvis on 10/16/2023; hospital recommended CT of chest in 1 month which was scheduled at today's office visit    Chronic diastolic congestive heart failure ([address])  Wt Readings from Last 3 Encounters:   10/23/23 115 kg (253 lb 12.8 oz)   10/02/23 112 kg (247 lb 3.2 oz)   08/21/23 109 kg (240 lb 12.8 oz)   BNP 10/16/2023 in the   Chest x-ray 10/16/2023 showed mild pulmonary vascular congestion, no pneumothorax or pleural effusion  Continue torsemide 20 mg daily  Personal-care staff to help patient with ace wraps to bilateral lower extremities daily and remove nightly  Recent BMP 10/16/2023 stable    Chronic obstructive pulmonary disease, unspecified COPD type ([address])  Respirations at baseline; chronic labored respirations with decreased breath sounds throughout lung fields  Continue Stiolto inhaler  O2 sat 98 to 99% today on room air  Patient will be making appointment for patient to see pulmonology at [place]    Benign prostatic hyperplasia with lower urinary tract symptoms  Enlarged prostate noted on recent hospital imaging  Continue finasteride  Recommend follow-up with urology    Orders Placed This Encounter   Procedures    CT chest high resolution     - Counseling Documentation: patient was counseled regarding: diagnostic results, instructions for management, risk factor reductions, patient and family education, impressions, and risks and benefits of treatment options    Chief Complaint   [address] follow-up  Chief Complaint   Patient presents with    Follow-up     No concerns History of Present Illness     80-year-old male who presents in office today accompanied by his daughter for posthospital BLAYNE. Patient was sent to [facility] emergency room on 10/16/2023 for evaluation of dark urine, abdominal distention and increased shortness of breath noted by personal care staff at [facility] independent living. Patient with labored respirations at baseline along with tachypnea. Abdomen large and firm at baseline. No acute findings with lab results from the ED. UA negative. CT of abdomen and pelvis with contrast showed no obstruction, no free fluid, chronic compression fracture deformities of T12, L1-L2, and L4 vertebral bodies, age-indeterminate compression fracture deformity of T9, and a 3.5 cm right lower lobe opacity with spiculated margins, which represent rounded atelectasis, focal consolidation or pulmonary neoplasm. See A/P for additional information. Lab results and diagnostic test reviewed with patient's daughter. Discussed plan of care with patient's daughter which included CT of chest in 1 month, urology consult, pulmonary consult. Patient's daughter verbalized understanding. The following portions of the patient's history were reviewed and updated as appropriate: allergies, current medications, past family history, past medical history, past social history, past surgical history and problem list.    Review of Systems     Review of Systems   Constitutional:  Negative for appetite change, chills and diaphoresis. HENT:  Positive for hearing loss. Respiratory:  Positive for shortness of breath (On exertion). Negative for cough. Cardiovascular:  Positive for leg swelling. Negative for chest pain. Gastrointestinal:  Negative for abdominal pain. Endocrine: Negative. Genitourinary:  Positive for difficulty urinating. Musculoskeletal:  Positive for gait problem.    Neurological:  Negative for dizziness, speech difficulty, light-headedness, numbness and headaches. Psychiatric/Behavioral:  Negative for behavioral problems, dysphoric mood and hallucinations. The patient is not nervous/anxious. All other systems reviewed and are negative. Active Problem List     Patient Active Problem List   Diagnosis    Atrial fibrillation (HCC) [I48.91]    Coronary artery disease    Benign essential hypertension    Bicuspid aortic valve    Chronic diastolic congestive heart failure (HCC)    Hypothyroidism    Obstructive sleep apnea    Chronic anticoagulation    Mild cognitive impairment with memory loss    Malignant neoplasm of urinary bladder (HCC)    Class 2 severe obesity with serious comorbidity in adult [facility])    Restrictive lung disease    Seasonal allergic rhinitis    Change in mole    Hypernatremia    Ambulatory dysfunction    Sensorineural hearing loss (SNHL) of both ears    Chronic obstructive pulmonary disease, unspecified COPD type ([address])    CKD (chronic kidney disease) stage 2, GFR 60-89 ml/min    Dry eyes    Shortness of breath    Pigmented skin lesion    Urinary frequency    Vitamin D deficiency    Benign prostatic hyperplasia with lower urinary tract symptoms    Abnormal CT scan       Objective     /90 (BP Location: Left arm, Patient Position: Sitting, Cuff Size: Adult)   Pulse 61   Wt 115 kg (253 lb 12.8 oz)   SpO2 92%   BMI 38.03 kg/m²     Physical Exam  Vitals and nursing note reviewed. Constitutional:       General: He is not in acute distress. Appearance: He is ill-appearing. He is not toxic-appearing or diaphoretic. HENT:      Head: Normocephalic. Ears:      Comments: Hard of hearing. Wears bilateral hearing aids. Nose: No congestion or rhinorrhea. Mouth/Throat:      Mouth: Mucous membranes are moist.      Pharynx: No oropharyngeal exudate. Eyes:      General:         Right eye: No discharge. Left eye: No discharge. Extraocular Movements: Extraocular movements intact.       Conjunctiva/sclera: Conjunctivae normal.   Cardiovascular:      Rate and Rhythm: Normal rate. Pulses: Normal pulses. Pulmonary:      Effort: Pulmonary effort is normal. No respiratory distress. Comments: Decreased breath sounds throughout and diminished in the bases. Respirations labored at baseline along with tachypnea. Abdominal:      General: Bowel sounds are normal. There is no distension. Tenderness: There is no abdominal tenderness. There is no guarding. Comments: Chronic large round firm abdomen. Musculoskeletal:      Right lower leg: Edema (R>L (chronic); +2-3 pitting edema.) present. Left lower leg: Edema (+1 pitting) present. Comments: Moves all 4 extremities. Skin:     General: Skin is warm and dry. Capillary Refill: Capillary refill takes less than 2 seconds. Neurological:      Mental Status: He is alert. Mental status is at baseline. Comments: With severe short-term memory loss. Psychiatric:         Mood and Affect: Mood normal.         Behavior: Behavior normal.         Thought Content:  Thought content normal.         Pertinent Laboratory/Diagnostic Studies: Hospital blood work from 10/16/2023  BNP: 144  CBC:   Lab Results   Component Value Date/Time    WBC 6.10 10/16/2023 02:30 PM    RBC 4.09 10/16/2023 02:30 PM    HGB 12.5 10/16/2023 02:30 PM    HCT 39.7 10/16/2023 02:30 PM    MCV 97 10/16/2023 02:30 PM    MCH 30.6 10/16/2023 02:30 PM    MCHC 31.5 10/16/2023 02:30 PM    RDW 15.8 (H) 10/16/2023 02:30 PM    MPV 10.5 10/16/2023 02:30 PM     10/16/2023 02:30 PM    NRBC 0 10/16/2023 02:30 PM    NEUTOPHILPCT 71 10/16/2023 02:30 PM    LYMPHOPCT 13 (L) 10/16/2023 02:30 PM    MONOPCT 11 10/16/2023 02:30 PM    EOSPCT 3 10/16/2023 02:30 PM    BASOPCT 1 10/16/2023 02:30 PM    NEUTROABS 4.42 10/16/2023 02:30 PM    LYMPHSABS 0.78 10/16/2023 02:30 PM    MONOSABS 0.66 10/16/2023 02:30 PM    EOSABS 0.16 10/16/2023 02:30 PM     CMP:  Sodium: 144    potassium: 4.0   chloride: 107 CO2: 30     anion gap: 7    BUN: 23     creatinine: 0.96     glucose: 116    calcium: 9.1    AST: 16     ALT: 17    alk phos: 83    albumin: 3.6    total bilirubin: 1.20    GFR: 66      Current Medications     Current Outpatient Medications:     albuterol (PROVENTIL HFA,VENTOLIN HFA) 90 mcg/act inhaler, Inhale 2 puffs every 4 (four) hours as needed (shortness of breath), Disp: 6.7 g, Rfl: 3    apixaban (Eliquis) 5 mg, Take 1 tablet (5 mg total) by mouth 2 (two) times a day, Disp: 180 tablet, Rfl: 1    atorvastatin (LIPITOR) 40 mg tablet, Take 1 tablet (40 mg total) by mouth daily at bedtime, Disp: 90 tablet, Rfl: 1    benazepril (LOTENSIN) 40 MG tablet, Take 1 tablet (40 mg total) by mouth daily, Disp: 90 tablet, Rfl: 1    carbamide peroxide (DEBROX) 6.5 % otic solution, Administer 5 drops into both ears 2 (two) times a day For 5 days, Disp: 15 mL, Rfl: 1    carboxymethylcellulose 0.5 % SOLN, Administer 1 drop to both eyes 2 (two) times a day, Disp: 6 mL, Rfl: 3    carboxymethylcellulose 0.5 % SOLN, Administer 1 drop to both eyes 4 (four) times a day, Disp: 30 each, Rfl: 3    carvedilol (COREG) 25 mg tablet, Take 1 tablet (25 mg total) by mouth 2 (two) times a day with meals, Disp: 180 tablet, Rfl: 1    cholecalciferol (VITAMIN D3) 1,000 units tablet, Take one tablet by mouth at noon with lunch for vitamin d deficiency, Disp: 30 tablet, Rfl: 3    finasteride (PROSCAR) 5 mg tablet, Take 1 tablet (5 mg total) by mouth daily, Disp: 90 tablet, Rfl: 1    fluticasone (FLONASE) 50 mcg/act nasal spray, 1 spray into each nostril daily as needed for rhinitis, Disp: 9.9 mL, Rfl: 3    latanoprost (XALATAN) 0.005 % ophthalmic solution, Apply 1 drop to eye daily, Disp: 7.5 mL, Rfl: 0    levothyroxine 100 mcg tablet, Take one tablet by mouth at bedtime for hypothyroid, Disp: 90 tablet, Rfl: 1    potassium chloride (K-DUR,KLOR-CON) 20 mEq tablet, Take 1 tablet (20 mEq total) by mouth daily, Disp: 90 tablet, Rfl: 1 tiotropium-olodaterol (STIOLTO RESPIMAT) 2.5-2.5 MCG/ACT inhaler, Inhale 2 puffs daily [facility] by [facility] for [facility], Disp: , Rfl:     torsemide (DEMADEX) 10 mg tablet, 14 days for swelling, Disp: , Rfl:     torsemide (DEMADEX) 20 mg tablet, TAKE 1 AND 1/2 TABLETS BY MOUTH EVERY DAY WITH LUNCH, Disp: 135 tablet, Rfl: 1    Health Maintenance     Health Maintenance   Topic Date Due    COVID-19 Vaccine (4 - Moderna series) 01/11/2022    BMI: Followup Plan  06/14/2022    Fall Risk  03/21/2023    Depression Screening  03/21/2023    Medicare Annual Wellness Visit (AWV)  03/21/2023    Influenza Vaccine (1) 09/01/2023    BMI: Adult  10/23/2024    Pneumococcal Vaccine: 65+ Years  Completed    HIB Vaccine  Aged Out    IPV Vaccine  Aged Out    Hepatitis A Vaccine  Aged Out    Meningococcal ACWY Vaccine  Aged Out    HPV Vaccine  Aged Out     Immunization History   Administered Date(s) Administered    COVID-19 MODERNA VACC 0.5 ML IM 01/21/2021, 02/23/2021, 11/16/2021    INFLUENZA 10/01/2013, 09/22/2014, 01/02/2015, 10/04/2018, 10/15/2019, 10/01/2020    Influenza Quadrivalent Preservative Free 3 years and older IM 10/15/2019    Influenza, high dose seasonal 0.7 mL 10/22/2020    Influenza, injectable, quadrivalent, preservative free 0.5 mL 10/15/2019    Influenza, seasonal, injectable 11/04/1927, 08/30/2011, 08/30/2011, 10/04/2017    Pneumococcal 11/01/2005    Pneumococcal Conjugate 13-Valent 02/18/2015, 02/08/2016    Pneumococcal Conjugate PCV 7 02/18/2015    Pneumococcal Polysaccharide PPV23 11/01/2005, 10/01/2012    Td (adult), Unspecified 01/13/2003    Td (adult), adsorbed 01/13/2003    Tdap 01/17/2013    Zoster 01/10/2013     This note was completed in part utilizing [employer] direct voice recognition software. Grammatical errors, random word insertion, spelling mistakes, and incomplete sentences may be an occasional consequence of the system secondary to software limitations, ambient noise and hardware issues.   At the time of dictation, efforts were made to edit, clarify and/or correct errors. Please read the chart carefully and recognize, using context, where substitutions have occurred. If you have any questions or concerns about the context, text or information contained within the body of this dictation, please contact myself, the provider, for further clarification.     10/23/2023 6:17 PM

## 2023-10-23 NOTE — ASSESSMENT & PLAN NOTE
Respirations at baseline; chronic labored respirations with decreased breath sounds throughout lung fields  Continue Stiolto inhaler  O2 sat 98 to 99% today on room air  Patient will be making appointment for patient to see pulmonology at [address] [place] [address]

## 2023-10-23 NOTE — ASSESSMENT & PLAN NOTE
Wt Readings from Last 3 Encounters:   10/23/23 115 kg (253 lb 12.8 oz)   10/02/23 112 kg (247 lb 3.2 oz)   08/21/23 109 kg (240 lb 12.8 oz)   BNP 10/16/2023 in the   Chest x-ray 10/16/2023 showed mild pulmonary vascular congestion, no pneumothorax or pleural effusion  Continue torsemide 20 mg daily  Personal-care staff to help patient with ace wraps to bilateral lower extremities daily and remove nightly  Recent BMP 10/16/2023 stable

## 2023-10-23 NOTE — ASSESSMENT & PLAN NOTE
Incidental finding of 3.5 cm right lower lobe opacity with spiculated margins, which represent rounded atelectasis, focal consolidation or pulmonary neoplasm as per CT of abdomen and pelvis on 10/16/2023; hospital recommended CT of chest in 1 month which was scheduled at today's office visit

## 2023-10-23 NOTE — PATIENT INSTRUCTIONS
You were here for post hospital ED follow up. Apply ace wrap to BLE every morning and remove every night. You will need to repeat CT scan of chest in one month (scheduled). Make an appt with urology due to enlarged prostate. Make an appt with pulmomary f/u COPD and Sleep apnea.

## 2023-10-23 NOTE — ASSESSMENT & PLAN NOTE
Enlarged prostate noted on recent hospital imaging  Continue finasteride  Recommend follow-up with urology

## 2023-10-27 ENCOUNTER — TELEPHONE (OUTPATIENT)
Dept: UROLOGY | Facility: CLINIC | Age: 88
End: 2023-10-27

## 2023-10-30 ENCOUNTER — TELEPHONE (OUTPATIENT)
Dept: GERIATRICS | Facility: CLINIC | Age: 88
End: 2023-10-30

## 2023-10-30 NOTE — TELEPHONE ENCOUNTER
Pt daughter spoke to [facility] about getting the pt a hospital bed. Pt doesn't get a full night rest due to his COPD and cpap machine and would be better to have him on a hospital bed to elevate him. Is it okay to place the order?

## 2023-11-02 LAB
DME PARACHUTE DELIVERY DATE ACTUAL: NORMAL
DME PARACHUTE DELIVERY DATE EXPECTED: NORMAL
DME PARACHUTE DELIVERY DATE REQUESTED: NORMAL
DME PARACHUTE ITEM DESCRIPTION: NORMAL
DME PARACHUTE ORDER STATUS: NORMAL
DME PARACHUTE SUPPLIER NAME: NORMAL
DME PARACHUTE SUPPLIER PHONE: NORMAL

## 2023-11-03 ENCOUNTER — TELEPHONE (OUTPATIENT)
Age: 88
End: 2023-11-03

## 2023-11-03 DIAGNOSIS — G93.40 ENCEPHALOPATHY ACUTE: Primary | ICD-10-CM

## 2023-11-03 DIAGNOSIS — R35.0 INCREASED URINARY FREQUENCY: ICD-10-CM

## 2023-11-03 NOTE — TELEPHONE ENCOUNTER
Ava with [facility] called to say patient is showing signs of a UTI. Signs include shouting out and confusion. Requesting an order for Urine culture. Please fax order to [phone].

## 2023-11-03 NOTE — TELEPHONE ENCOUNTER
POA called to request that pt DOES NOT receive any further vaccines. She would also like to make sure she is called first before pt gets sent out or needs emergency services.  Additional number [phone]

## 2023-11-10 ENCOUNTER — APPOINTMENT (EMERGENCY)
Dept: RADIOLOGY | Facility: HOSPITAL | Age: 88
DRG: 291 | End: 2023-11-10
Payer: COMMERCIAL

## 2023-11-10 ENCOUNTER — HOSPITAL ENCOUNTER (INPATIENT)
Facility: HOSPITAL | Age: 88
LOS: 10 days | Discharge: NURSING, REHAB OR LONG-TERM CARE | DRG: 291 | End: 2023-11-21
Attending: EMERGENCY MEDICINE | Admitting: INTERNAL MEDICINE
Payer: COMMERCIAL

## 2023-11-10 ENCOUNTER — TELEPHONE (OUTPATIENT)
Age: 88
End: 2023-11-10

## 2023-11-10 DIAGNOSIS — J96.00 ACUTE RESPIRATORY FAILURE (HCC): Primary | ICD-10-CM

## 2023-11-10 DIAGNOSIS — I50.33 ACUTE ON CHRONIC DIASTOLIC (CONGESTIVE) HEART FAILURE (HCC): ICD-10-CM

## 2023-11-10 DIAGNOSIS — G93.40 ACUTE ENCEPHALOPATHY: ICD-10-CM

## 2023-11-10 DIAGNOSIS — K59.09 OTHER CONSTIPATION: ICD-10-CM

## 2023-11-10 DIAGNOSIS — J96.02 ACUTE RESPIRATORY FAILURE WITH HYPOXIA AND HYPERCAPNIA (HCC): ICD-10-CM

## 2023-11-10 DIAGNOSIS — J96.01 ACUTE RESPIRATORY FAILURE WITH HYPOXIA AND HYPERCAPNIA (HCC): ICD-10-CM

## 2023-11-10 DIAGNOSIS — J44.9 CHRONIC OBSTRUCTIVE PULMONARY DISEASE, UNSPECIFIED COPD TYPE (HCC): ICD-10-CM

## 2023-11-10 DIAGNOSIS — R29.90 STROKE-LIKE SYMPTOMS: ICD-10-CM

## 2023-11-10 LAB
ALBUMIN SERPL BCP-MCNC: 3.7 G/DL (ref 3.5–5)
ALP SERPL-CCNC: 90 U/L (ref 34–104)
ALT SERPL W P-5'-P-CCNC: 18 U/L (ref 7–52)
ANION GAP SERPL CALCULATED.3IONS-SCNC: 6 MMOL/L
APTT PPP: 44 SECONDS (ref 23–37)
AST SERPL W P-5'-P-CCNC: 18 U/L (ref 13–39)
BASE EX.OXY STD BLDV CALC-SCNC: 54.3 % (ref 60–80)
BASE EXCESS BLDV CALC-SCNC: 2.7 MMOL/L
BASOPHILS # BLD MANUAL: 0 THOUSAND/UL (ref 0–0.1)
BASOPHILS NFR MAR MANUAL: 0 % (ref 0–1)
BILIRUB SERPL-MCNC: 2.1 MG/DL (ref 0.2–1)
BNP SERPL-MCNC: 306 PG/ML (ref 0–100)
BUN SERPL-MCNC: 25 MG/DL (ref 5–25)
CALCIUM SERPL-MCNC: 9.2 MG/DL (ref 8.4–10.2)
CHLORIDE SERPL-SCNC: 104 MMOL/L (ref 96–108)
CO2 SERPL-SCNC: 29 MMOL/L (ref 21–32)
CREAT SERPL-MCNC: 1.11 MG/DL (ref 0.6–1.3)
EOSINOPHIL # BLD MANUAL: 0 THOUSAND/UL (ref 0–0.4)
EOSINOPHIL NFR BLD MANUAL: 0 % (ref 0–6)
ERYTHROCYTE [DISTWIDTH] IN BLOOD BY AUTOMATED COUNT: 15.2 % (ref 11.6–15.1)
FLUAV RNA RESP QL NAA+PROBE: NEGATIVE
FLUBV RNA RESP QL NAA+PROBE: NEGATIVE
GFR SERPL CREATININE-BSD FRML MDRD: 55 ML/MIN/1.73SQ M
GLUCOSE SERPL-MCNC: 127 MG/DL (ref 65–140)
HCO3 BLDV-SCNC: 29 MMOL/L (ref 24–30)
HCT VFR BLD AUTO: 40.4 % (ref 36.5–49.3)
HGB BLD-MCNC: 13 G/DL (ref 12–17)
INR PPP: 1.68 (ref 0.84–1.19)
LACTATE SERPL-SCNC: 1.3 MMOL/L (ref 0.5–2)
LIPASE SERPL-CCNC: 46 U/L (ref 11–82)
LYMPHOCYTES # BLD AUTO: 0.75 THOUSAND/UL (ref 0.6–4.47)
LYMPHOCYTES # BLD AUTO: 5 % (ref 14–44)
MAGNESIUM SERPL-MCNC: 2.3 MG/DL (ref 1.9–2.7)
MCH RBC QN AUTO: 30.9 PG (ref 26.8–34.3)
MCHC RBC AUTO-ENTMCNC: 32.2 G/DL (ref 31.4–37.4)
MCV RBC AUTO: 96 FL (ref 82–98)
METAMYELOCYTES NFR BLD MANUAL: 1 % (ref 0–1)
MONOCYTES # BLD AUTO: 0.85 THOUSAND/UL (ref 0–1.22)
MONOCYTES NFR BLD: 9 % (ref 4–12)
NEUTROPHILS # BLD MANUAL: 7.72 THOUSAND/UL (ref 1.85–7.62)
NEUTS BAND NFR BLD MANUAL: 9 % (ref 0–8)
NEUTS SEG NFR BLD AUTO: 73 % (ref 43–75)
O2 CT BLDV-SCNC: 10.6 ML/DL
PCO2 BLDV: 51.7 MM HG (ref 42–50)
PH BLDV: 7.37 [PH] (ref 7.3–7.4)
PLATELET # BLD AUTO: 248 THOUSANDS/UL (ref 149–390)
PLATELET BLD QL SMEAR: ADEQUATE
PMV BLD AUTO: 10.7 FL (ref 8.9–12.7)
PO2 BLDV: 32 MM HG (ref 35–45)
POLYCHROMASIA BLD QL SMEAR: PRESENT
POTASSIUM SERPL-SCNC: 4.1 MMOL/L (ref 3.5–5.3)
PROCALCITONIN SERPL-MCNC: 0.25 NG/ML
PROT SERPL-MCNC: 6.6 G/DL (ref 6.4–8.4)
PROTHROMBIN TIME: 19.5 SECONDS (ref 11.6–14.5)
RBC # BLD AUTO: 4.21 MILLION/UL (ref 3.88–5.62)
RBC MORPH BLD: PRESENT
RSV RNA RESP QL NAA+PROBE: NEGATIVE
SARS-COV-2 RNA RESP QL NAA+PROBE: NEGATIVE
SODIUM SERPL-SCNC: 139 MMOL/L (ref 135–147)
VARIANT LYMPHS # BLD AUTO: 3 %
WBC # BLD AUTO: 9.42 THOUSAND/UL (ref 4.31–10.16)

## 2023-11-10 PROCEDURE — 85027 COMPLETE CBC AUTOMATED: CPT

## 2023-11-10 PROCEDURE — 82805 BLOOD GASES W/O2 SATURATION: CPT

## 2023-11-10 PROCEDURE — 96376 TX/PRO/DX INJ SAME DRUG ADON: CPT

## 2023-11-10 PROCEDURE — 94002 VENT MGMT INPAT INIT DAY: CPT

## 2023-11-10 PROCEDURE — 85730 THROMBOPLASTIN TIME PARTIAL: CPT

## 2023-11-10 PROCEDURE — 96375 TX/PRO/DX INJ NEW DRUG ADDON: CPT

## 2023-11-10 PROCEDURE — 83880 ASSAY OF NATRIURETIC PEPTIDE: CPT

## 2023-11-10 PROCEDURE — 85610 PROTHROMBIN TIME: CPT

## 2023-11-10 PROCEDURE — 83690 ASSAY OF LIPASE: CPT

## 2023-11-10 PROCEDURE — 94760 N-INVAS EAR/PLS OXIMETRY 1: CPT

## 2023-11-10 PROCEDURE — 93005 ELECTROCARDIOGRAM TRACING: CPT

## 2023-11-10 PROCEDURE — 83735 ASSAY OF MAGNESIUM: CPT

## 2023-11-10 PROCEDURE — 80053 COMPREHEN METABOLIC PANEL: CPT

## 2023-11-10 PROCEDURE — 84145 PROCALCITONIN (PCT): CPT

## 2023-11-10 PROCEDURE — 36415 COLL VENOUS BLD VENIPUNCTURE: CPT

## 2023-11-10 PROCEDURE — 83605 ASSAY OF LACTIC ACID: CPT

## 2023-11-10 PROCEDURE — 0241U HB NFCT DS VIR RESP RNA 4 TRGT: CPT

## 2023-11-10 PROCEDURE — 99285 EMERGENCY DEPT VISIT HI MDM: CPT

## 2023-11-10 PROCEDURE — 96374 THER/PROPH/DIAG INJ IV PUSH: CPT

## 2023-11-10 PROCEDURE — 94644 CONT INHLJ TX 1ST HOUR: CPT

## 2023-11-10 PROCEDURE — 87040 BLOOD CULTURE FOR BACTERIA: CPT

## 2023-11-10 PROCEDURE — 85007 BL SMEAR W/DIFF WBC COUNT: CPT

## 2023-11-10 PROCEDURE — 99291 CRITICAL CARE FIRST HOUR: CPT | Performed by: EMERGENCY MEDICINE

## 2023-11-10 RX ORDER — LORAZEPAM 2 MG/ML
0.5 INJECTION INTRAMUSCULAR ONCE
Status: COMPLETED | OUTPATIENT
Start: 2023-11-10 | End: 2023-11-10

## 2023-11-10 RX ORDER — SODIUM CHLORIDE FOR INHALATION 0.9 %
12 VIAL, NEBULIZER (ML) INHALATION ONCE
Status: COMPLETED | OUTPATIENT
Start: 2023-11-10 | End: 2023-11-10

## 2023-11-10 RX ORDER — FENTANYL CITRATE 50 UG/ML
25 INJECTION, SOLUTION INTRAMUSCULAR; INTRAVENOUS ONCE
Status: COMPLETED | OUTPATIENT
Start: 2023-11-10 | End: 2023-11-10

## 2023-11-10 RX ADMIN — LORAZEPAM 0.5 MG: 2 INJECTION INTRAMUSCULAR; INTRAVENOUS at 22:22

## 2023-11-10 RX ADMIN — FENTANYL CITRATE 25 MCG: 50 INJECTION INTRAMUSCULAR; INTRAVENOUS at 22:21

## 2023-11-10 RX ADMIN — LORAZEPAM 0.5 MG: 2 INJECTION INTRAMUSCULAR; INTRAVENOUS at 23:33

## 2023-11-10 RX ADMIN — ISODIUM CHLORIDE 12 ML: 0.03 SOLUTION RESPIRATORY (INHALATION) at 22:00

## 2023-11-10 RX ADMIN — ALBUTEROL SULFATE 10 MG: 2.5 SOLUTION RESPIRATORY (INHALATION) at 22:00

## 2023-11-10 RX ADMIN — IPRATROPIUM BROMIDE 1 MG: 0.5 SOLUTION RESPIRATORY (INHALATION) at 22:00

## 2023-11-10 NOTE — TELEPHONE ENCOUNTER
Called [facility] and relayed Dr. Pelon Acosta message and recommendation. [facility]ah expressed understanding.

## 2023-11-10 NOTE — TELEPHONE ENCOUNTER
[facility] calling to relay that pt has been declining physically and now has swelling in his right leg that is unable to walk. Vitals are stable per symeerah. Daughter does not want pt to go to ER but if recommended by Dr. Brendan Henriquez he is okay to go to healthcare side as it is becoming hard to move pt. Pat Ochoa would like an order for healthcare if that is what is recommended. Please advise.

## 2023-11-11 ENCOUNTER — APPOINTMENT (EMERGENCY)
Dept: RADIOLOGY | Facility: HOSPITAL | Age: 88
DRG: 291 | End: 2023-11-11
Payer: COMMERCIAL

## 2023-11-11 PROBLEM — J96.01 ACUTE RESPIRATORY FAILURE WITH HYPOXIA AND HYPERCAPNIA (HCC): Status: ACTIVE | Noted: 2023-11-11

## 2023-11-11 PROBLEM — G93.40 ACUTE ENCEPHALOPATHY: Status: ACTIVE | Noted: 2023-11-11

## 2023-11-11 PROBLEM — I50.33 ACUTE ON CHRONIC DIASTOLIC (CONGESTIVE) HEART FAILURE (HCC): Status: ACTIVE | Noted: 2017-10-31

## 2023-11-11 PROBLEM — J96.02 ACUTE RESPIRATORY FAILURE WITH HYPOXIA AND HYPERCAPNIA (HCC): Status: ACTIVE | Noted: 2023-11-11

## 2023-11-11 LAB
ANION GAP SERPL CALCULATED.3IONS-SCNC: 7 MMOL/L
ATRIAL RATE: 208 BPM
BASE EX.OXY STD BLDV CALC-SCNC: 97.2 % (ref 60–80)
BASE EXCESS BLDV CALC-SCNC: 1.5 MMOL/L
BILIRUB UR QL STRIP: NEGATIVE
BUN SERPL-MCNC: 28 MG/DL (ref 5–25)
CALCIUM SERPL-MCNC: 8.8 MG/DL (ref 8.4–10.2)
CHLORIDE SERPL-SCNC: 104 MMOL/L (ref 96–108)
CLARITY UR: CLEAR
CO2 SERPL-SCNC: 29 MMOL/L (ref 21–32)
COLOR UR: NORMAL
CREAT SERPL-MCNC: 1.17 MG/DL (ref 0.6–1.3)
ERYTHROCYTE [DISTWIDTH] IN BLOOD BY AUTOMATED COUNT: 15.2 % (ref 11.6–15.1)
FOLATE SERPL-MCNC: 13.8 NG/ML
GFR SERPL CREATININE-BSD FRML MDRD: 52 ML/MIN/1.73SQ M
GLUCOSE SERPL-MCNC: 129 MG/DL (ref 65–140)
GLUCOSE UR STRIP-MCNC: NEGATIVE MG/DL
HCO3 BLDV-SCNC: 28.8 MMOL/L (ref 24–30)
HCT VFR BLD AUTO: 40.6 % (ref 36.5–49.3)
HGB BLD-MCNC: 12.7 G/DL (ref 12–17)
HGB UR QL STRIP.AUTO: NEGATIVE
KETONES UR STRIP-MCNC: NEGATIVE MG/DL
LEUKOCYTE ESTERASE UR QL STRIP: NEGATIVE
MCH RBC QN AUTO: 30.5 PG (ref 26.8–34.3)
MCHC RBC AUTO-ENTMCNC: 31.3 G/DL (ref 31.4–37.4)
MCV RBC AUTO: 97 FL (ref 82–98)
NITRITE UR QL STRIP: NEGATIVE
O2 CT BLDV-SCNC: 16.9 ML/DL
PCO2 BLDV: 58.2 MM HG (ref 42–50)
PH BLDV: 7.31 [PH] (ref 7.3–7.4)
PH UR STRIP.AUTO: 5 [PH]
PLATELET # BLD AUTO: 274 THOUSANDS/UL (ref 149–390)
PMV BLD AUTO: 10.7 FL (ref 8.9–12.7)
PO2 BLDV: 132.7 MM HG (ref 35–45)
POTASSIUM SERPL-SCNC: 4.2 MMOL/L (ref 3.5–5.3)
PROT UR STRIP-MCNC: NEGATIVE MG/DL
QRS AXIS: -3 DEGREES
QRSD INTERVAL: 108 MS
QT INTERVAL: 410 MS
QTC INTERVAL: 433 MS
RBC # BLD AUTO: 4.17 MILLION/UL (ref 3.88–5.62)
SODIUM SERPL-SCNC: 140 MMOL/L (ref 135–147)
SP GR UR STRIP.AUTO: 1.01 (ref 1–1.03)
T WAVE AXIS: 114 DEGREES
UROBILINOGEN UR STRIP-ACNC: <2 MG/DL
VENTRICULAR RATE: 67 BPM
VIT B12 SERPL-MCNC: 353 PG/ML (ref 180–914)
WBC # BLD AUTO: 9.72 THOUSAND/UL (ref 4.31–10.16)

## 2023-11-11 PROCEDURE — 85027 COMPLETE CBC AUTOMATED: CPT | Performed by: INTERNAL MEDICINE

## 2023-11-11 PROCEDURE — 94640 AIRWAY INHALATION TREATMENT: CPT

## 2023-11-11 PROCEDURE — 82607 VITAMIN B-12: CPT | Performed by: INTERNAL MEDICINE

## 2023-11-11 PROCEDURE — 99024 POSTOP FOLLOW-UP VISIT: CPT | Performed by: STUDENT IN AN ORGANIZED HEALTH CARE EDUCATION/TRAINING PROGRAM

## 2023-11-11 PROCEDURE — 99223 1ST HOSP IP/OBS HIGH 75: CPT | Performed by: INTERNAL MEDICINE

## 2023-11-11 PROCEDURE — 81003 URINALYSIS AUTO W/O SCOPE: CPT | Performed by: STUDENT IN AN ORGANIZED HEALTH CARE EDUCATION/TRAINING PROGRAM

## 2023-11-11 PROCEDURE — 94660 CPAP INITIATION&MGMT: CPT

## 2023-11-11 PROCEDURE — 93010 ELECTROCARDIOGRAM REPORT: CPT | Performed by: INTERNAL MEDICINE

## 2023-11-11 PROCEDURE — 96365 THER/PROPH/DIAG IV INF INIT: CPT

## 2023-11-11 PROCEDURE — G1004 CDSM NDSC: HCPCS

## 2023-11-11 PROCEDURE — 94760 N-INVAS EAR/PLS OXIMETRY 1: CPT

## 2023-11-11 PROCEDURE — 96372 THER/PROPH/DIAG INJ SC/IM: CPT

## 2023-11-11 PROCEDURE — 71275 CT ANGIOGRAPHY CHEST: CPT

## 2023-11-11 PROCEDURE — 96367 TX/PROPH/DG ADDL SEQ IV INF: CPT

## 2023-11-11 PROCEDURE — 82746 ASSAY OF FOLIC ACID SERUM: CPT | Performed by: INTERNAL MEDICINE

## 2023-11-11 PROCEDURE — 96375 TX/PRO/DX INJ NEW DRUG ADDON: CPT

## 2023-11-11 PROCEDURE — 36415 COLL VENOUS BLD VENIPUNCTURE: CPT

## 2023-11-11 PROCEDURE — 82805 BLOOD GASES W/O2 SATURATION: CPT

## 2023-11-11 PROCEDURE — 80048 BASIC METABOLIC PNL TOTAL CA: CPT | Performed by: INTERNAL MEDICINE

## 2023-11-11 PROCEDURE — 94664 DEMO&/EVAL PT USE INHALER: CPT

## 2023-11-11 RX ORDER — HYDRALAZINE HYDROCHLORIDE 20 MG/ML
5 INJECTION INTRAMUSCULAR; INTRAVENOUS EVERY 6 HOURS PRN
Status: DISCONTINUED | OUTPATIENT
Start: 2023-11-11 | End: 2023-11-21 | Stop reason: HOSPADM

## 2023-11-11 RX ORDER — FINASTERIDE 5 MG/1
5 TABLET, FILM COATED ORAL DAILY
Status: DISCONTINUED | OUTPATIENT
Start: 2023-11-11 | End: 2023-11-21 | Stop reason: HOSPADM

## 2023-11-11 RX ORDER — LATANOPROST 50 UG/ML
1 SOLUTION/ DROPS OPHTHALMIC
Status: DISCONTINUED | OUTPATIENT
Start: 2023-11-11 | End: 2023-11-21 | Stop reason: HOSPADM

## 2023-11-11 RX ORDER — LORAZEPAM 2 MG/ML
0.5 INJECTION INTRAMUSCULAR ONCE
Status: COMPLETED | OUTPATIENT
Start: 2023-11-11 | End: 2023-11-11

## 2023-11-11 RX ORDER — FLUTICASONE PROPIONATE 50 MCG
1 SPRAY, SUSPENSION (ML) NASAL DAILY PRN
Status: DISCONTINUED | OUTPATIENT
Start: 2023-11-11 | End: 2023-11-21 | Stop reason: HOSPADM

## 2023-11-11 RX ORDER — OLANZAPINE 10 MG/2ML
10 INJECTION, POWDER, FOR SOLUTION INTRAMUSCULAR ONCE
Status: COMPLETED | OUTPATIENT
Start: 2023-11-11 | End: 2023-11-11

## 2023-11-11 RX ORDER — FUROSEMIDE 10 MG/ML
40 INJECTION INTRAMUSCULAR; INTRAVENOUS 2 TIMES DAILY
Status: DISCONTINUED | OUTPATIENT
Start: 2023-11-11 | End: 2023-11-11

## 2023-11-11 RX ORDER — ATORVASTATIN CALCIUM 40 MG/1
40 TABLET, FILM COATED ORAL
Status: DISCONTINUED | OUTPATIENT
Start: 2023-11-11 | End: 2023-11-21 | Stop reason: HOSPADM

## 2023-11-11 RX ORDER — BUMETANIDE 0.25 MG/ML
2 INJECTION INTRAMUSCULAR; INTRAVENOUS 2 TIMES DAILY
Status: DISCONTINUED | OUTPATIENT
Start: 2023-11-11 | End: 2023-11-17

## 2023-11-11 RX ORDER — LEVOTHYROXINE SODIUM 0.1 MG/1
100 TABLET ORAL
Status: DISCONTINUED | OUTPATIENT
Start: 2023-11-11 | End: 2023-11-21 | Stop reason: HOSPADM

## 2023-11-11 RX ORDER — OLANZAPINE 10 MG/2ML
5 INJECTION, POWDER, FOR SOLUTION INTRAMUSCULAR ONCE
Status: DISCONTINUED | OUTPATIENT
Start: 2023-11-11 | End: 2023-11-11

## 2023-11-11 RX ORDER — HALOPERIDOL 5 MG/ML
0.25 INJECTION INTRAMUSCULAR ONCE
Status: COMPLETED | OUTPATIENT
Start: 2023-11-11 | End: 2023-11-11

## 2023-11-11 RX ORDER — WATER 10 ML/10ML
INJECTION INTRAMUSCULAR; INTRAVENOUS; SUBCUTANEOUS
Status: COMPLETED
Start: 2023-11-11 | End: 2023-11-11

## 2023-11-11 RX ORDER — LISINOPRIL 20 MG/1
40 TABLET ORAL DAILY
Status: DISCONTINUED | OUTPATIENT
Start: 2023-11-11 | End: 2023-11-21 | Stop reason: HOSPADM

## 2023-11-11 RX ORDER — MELATONIN
1000 DAILY
Status: DISCONTINUED | OUTPATIENT
Start: 2023-11-11 | End: 2023-11-21 | Stop reason: HOSPADM

## 2023-11-11 RX ORDER — ONDANSETRON 2 MG/ML
4 INJECTION INTRAMUSCULAR; INTRAVENOUS EVERY 6 HOURS PRN
Status: DISCONTINUED | OUTPATIENT
Start: 2023-11-11 | End: 2023-11-21 | Stop reason: HOSPADM

## 2023-11-11 RX ORDER — OLANZAPINE 10 MG/2ML
2.5 INJECTION, POWDER, FOR SOLUTION INTRAMUSCULAR ONCE
Status: COMPLETED | OUTPATIENT
Start: 2023-11-11 | End: 2023-11-11

## 2023-11-11 RX ORDER — ALBUTEROL SULFATE 2.5 MG/3ML
2.5 SOLUTION RESPIRATORY (INHALATION) EVERY 6 HOURS PRN
Status: DISCONTINUED | OUTPATIENT
Start: 2023-11-11 | End: 2023-11-21 | Stop reason: HOSPADM

## 2023-11-11 RX ORDER — CARVEDILOL 25 MG/1
25 TABLET ORAL 2 TIMES DAILY WITH MEALS
Status: DISCONTINUED | OUTPATIENT
Start: 2023-11-11 | End: 2023-11-21 | Stop reason: HOSPADM

## 2023-11-11 RX ADMIN — HALOPERIDOL LACTATE 0.25 MG: 5 INJECTION, SOLUTION INTRAMUSCULAR at 04:22

## 2023-11-11 RX ADMIN — LATANOPROST 1 DROP: 50 SOLUTION OPHTHALMIC at 22:03

## 2023-11-11 RX ADMIN — FUROSEMIDE 40 MG: 10 INJECTION, SOLUTION INTRAMUSCULAR; INTRAVENOUS at 08:46

## 2023-11-11 RX ADMIN — AZITHROMYCIN MONOHYDRATE 500 MG: 500 INJECTION, POWDER, LYOPHILIZED, FOR SOLUTION INTRAVENOUS at 03:49

## 2023-11-11 RX ADMIN — FUROSEMIDE 40 MG: 10 INJECTION, SOLUTION INTRAMUSCULAR; INTRAVENOUS at 04:56

## 2023-11-11 RX ADMIN — OLANZAPINE 10 MG: 10 INJECTION, POWDER, FOR SOLUTION INTRAMUSCULAR at 01:12

## 2023-11-11 RX ADMIN — LORAZEPAM 0.5 MG: 2 INJECTION INTRAMUSCULAR; INTRAVENOUS at 01:42

## 2023-11-11 RX ADMIN — ALBUTEROL SULFATE 2.5 MG: 2.5 SOLUTION RESPIRATORY (INHALATION) at 17:16

## 2023-11-11 RX ADMIN — IOHEXOL 85 ML: 350 INJECTION, SOLUTION INTRAVENOUS at 02:17

## 2023-11-11 RX ADMIN — WATER: 1 INJECTION INTRAMUSCULAR; INTRAVENOUS; SUBCUTANEOUS at 05:58

## 2023-11-11 RX ADMIN — CEFTRIAXONE SODIUM 1000 MG: 10 INJECTION, POWDER, FOR SOLUTION INTRAVENOUS at 03:08

## 2023-11-11 RX ADMIN — BUMETANIDE 2 MG: 0.25 INJECTION INTRAMUSCULAR; INTRAVENOUS at 17:02

## 2023-11-11 RX ADMIN — WATER 10 ML: 1 INJECTION INTRAMUSCULAR; INTRAVENOUS; SUBCUTANEOUS at 01:12

## 2023-11-11 RX ADMIN — HYDRALAZINE HYDROCHLORIDE 5 MG: 20 INJECTION, SOLUTION INTRAMUSCULAR; INTRAVENOUS at 23:03

## 2023-11-11 RX ADMIN — OLANZAPINE 2.5 MG: 10 INJECTION, POWDER, FOR SOLUTION INTRAMUSCULAR at 05:57

## 2023-11-11 NOTE — ASSESSMENT & PLAN NOTE
Presented with worsening shortness of breath over the past 2 days per daughter, with respiratory distress on arrival requiring initial BiPAP however patient unable to tolerate and subsequently placed on 10 L [address]  Labs significant for elevated BNP; CTA PE study limited due to motion artifact but no mainstem pulmonary emboli visualized and lungs with hypoinflation and dependent opacities suspected atelectasis  Suspect multifactorial in setting of acute on chronic heart failure, underlying COPD, possible lung mass (was noted on prior CT abdomen/pelvis incidentally however was not reported on current CTA PE study)  Remaining confused as below  Management of the acute on chronic heart failure, COPD as per associated problems with IV diuresis and nebulizers as needed  Continue supplemental oxygen as needed, titrate to maintain appropriate saturations as appropriate  Respiratory protocol, incentive spirometry, pulmonary toileting  Guarded prognosis at this juncture, confirmed patient DNR/DNI as per daughter (POA).   We will see if there is any improvement over the weekend, further discussions regarding treatment goals if no significant improvement or particularly if patient should deteriorate

## 2023-11-11 NOTE — PROGRESS NOTES
[facility] Internal Medicine Post Admit Check:     Date of visit: 11/11/23    The patient was admitted earlier to the [facility] Internal Medicine Service. Please see initial intake history and physical for detailed admission history. This is a supplemental update following a post admit checkup. Subjective:   Patient assessed at bedside in the ED and he was severely lethargic. Per chart review patient had received multiple doses of fentanyl, Ativan, Haldol, Zyprexa over the last 12 hours for agitation. Currently in restraints. Patient was reevaluated at bedside once patient reached the floor where he continued to be lethargic but withdrew to pain. Patient was on 15 L mid flow satting 99%. RT attempted to place patient on CPAP however patient became agitated again and asked to be taken off the CPAP. Patient was saturating well on 6 L nasal cannula. Mentation improved from initial evaluation in the ED although continues to wax and wane. Intermittently talking to son at bedside. Exam:   No respiratory distress. Diminished breath sounds bilaterally. Right greater than left edema, chronic without pain or erythema. Not oriented.        Intake/Output Summary (Last 24 hours) at 11/11/2023 1741  Last data filed at 11/11/2023 1719  Gross per 24 hour   Intake 300 ml   Output 750 ml   Net -450 ml     Assessment and Plan:   Discontinue IV Lasix given poor diuresis after 2 doses of IV Lasix 40 mg and switch to IV Bumex 2 g twice daily starting this evening  Monitor off antibiotics given suspicion for pneumonia  Obtain UA  N.p.o. while altered  Continue restraints for now and wean as tolerated  Continuous observation ordered  Follow-up KATIE Vázquez DO

## 2023-11-11 NOTE — ED PROVIDER NOTES
History  Chief Complaint   Patient presents with    Shortness of Breath     PT brought in by EMS for SOB from independent living facility. Per ems, daughter called 911 bc pt c/o sob. Hx CHF, COPD. PT on CPAP upon arrival O2 98%. This is a 80-year-old male with history of CHF, CAD, COPD who is presenting with worsening shortness of breath over the past 2 days. Daughter presents with the patient who reports that last night he was complaining of some shortness of breath and seems like he was having a little bit more of a difficult time breathing than he normally does. Over the course of the day today patient has gotten significantly worse and is now having some confusion that is worse than his normal demented self. She notes that he continues to have a dry, nonproductive cough which is his baseline. He is anticoagulated on Eliquis. He does note that his right leg will be more swollen than normal.  She has not noticed any fevers at home and denies any known infectious symptoms other than the cough. Patient arrives on CPAP with SPO2 in the 99 to 100% range. Prior to Admission Medications   Prescriptions Last Dose Informant Patient Reported? Taking?    albuterol (PROVENTIL HFA,VENTOLIN HFA) 90 mcg/act inhaler   No No   Sig: Inhale 2 puffs every 4 (four) hours as needed (shortness of breath)   apixaban (Eliquis) 5 mg   No No   Sig: Take 1 tablet (5 mg total) by mouth 2 (two) times a day   atorvastatin (LIPITOR) 40 mg tablet   No No   Sig: Take 1 tablet (40 mg total) by mouth daily at bedtime   benazepril (LOTENSIN) 40 MG tablet   No No   Sig: Take 1 tablet (40 mg total) by mouth daily   carbamide peroxide (DEBROX) 6.5 % otic solution   No No   Sig: Administer 5 drops into both ears 2 (two) times a day For 5 days   carboxymethylcellulose 0.5 % SOLN   No No   Sig: Administer 1 drop to both eyes 2 (two) times a day   carboxymethylcellulose 0.5 % SOLN   No No   Sig: Administer 1 drop to both eyes 4 (four) times a day   carvedilol (COREG) 25 mg tablet   No No   Sig: Take 1 tablet (25 mg total) by mouth 2 (two) times a day with meals   cholecalciferol (VITAMIN D3) 1,000 units tablet   No No   Sig: Take one tablet by mouth at noon with lunch for vitamin d deficiency   finasteride (PROSCAR) 5 mg tablet   No No   Sig: Take 1 tablet (5 mg total) by mouth daily   fluticasone (FLONASE) 50 mcg/act nasal spray   No No   Si spray into each nostril daily as needed for rhinitis   latanoprost (XALATAN) 0.005 % ophthalmic solution   No No   Sig: Apply 1 drop to eye daily   levothyroxine 100 mcg tablet   No No   Sig: Take one tablet by mouth at bedtime for hypothyroid   potassium chloride (K-DUR,KLOR-CON) 20 mEq tablet   No No   Sig: Take 1 tablet (20 mEq total) by mouth daily   tiotropium-olodaterol (STIOLTO RESPIMAT) 2.5-2.5 MCG/ACT inhaler   Yes No   Sig: Inhale 2 puffs daily [facility]   torsemide (DEMADEX) 10 mg tablet   Yes No   Si days for swelling   torsemide (DEMADEX) 20 mg tablet   No No   Sig: TAKE 1 AND 1/2 TABLETS BY MOUTH EVERY DAY WITH LUNCH      Facility-Administered Medications: None       Past Medical History:   Diagnosis Date    Arithmetic disorder     2016    Myocardial infarct [facility])     [id number]  LAST ASSESSED       Past Surgical History:   Procedure Laterality Date    BLADDER SURGERY      LAST ASSESSED 2016      CORONARY ARTERY BYPASS GRAFT      [id number] RESOLVED    SKIN BIOPSY         Family History   Problem Relation Age of Onset    Breast cancer Family         [id number] LAST ASSESSED     I have reviewed and agree with the history as documented. E-Cigarette/Vaping    E-Cigarette Use Never User      E-Cigarette/Vaping Substances    Nicotine No     THC No     CBD No     Flavoring No     Other No      Social History     Tobacco Use    Smoking status: Former    Smokeless tobacco: Never   Vaping Use    Vaping Use: Never used   Substance Use Topics    Alcohol use:  Yes Comment: OCCASIONAL        Review of Systems   Unable to perform ROS: Mental status change       Physical Exam  ED Triage Vitals   Temperature Pulse Respirations Blood Pressure SpO2   11/10/23 2245 11/10/23 2039 11/10/23 2039 11/10/23 2040 11/10/23 2039   97.7 °F (36.5 °C) 59 (!) 24 144/65 100 %      Temp Source Heart Rate Source Patient Position - Orthostatic VS BP Location FiO2 (%)   11/10/23 2245 11/10/23 2039 11/10/23 2040 11/10/23 2040 11/10/23 2200   Tympanic Monitor Sitting Left arm 60      Pain Score       11/10/23 2221       9             Orthostatic Vital Signs  Vitals:    11/13/23 0223 11/13/23 0737 11/13/23 1140 11/13/23 1500   BP: 164/74 161/84 150/91 131/70   Pulse: 78 72 69 62   Patient Position - Orthostatic VS: Lying Lying Lying Lying       Physical Exam  Vitals and nursing note reviewed. Constitutional:       General: He is in acute distress. Appearance: He is well-developed. He is obese. He is ill-appearing. He is not toxic-appearing or diaphoretic. HENT:      Head: Normocephalic and atraumatic. Right Ear: External ear normal.      Left Ear: External ear normal.      Nose: Nose normal. No congestion or rhinorrhea. Mouth/Throat:      Mouth: Mucous membranes are moist.      Pharynx: No oropharyngeal exudate or posterior oropharyngeal erythema. Eyes:      General: No scleral icterus. Extraocular Movements: Extraocular movements intact. Conjunctiva/sclera: Conjunctivae normal.      Pupils: Pupils are equal, round, and reactive to light. Cardiovascular:      Rate and Rhythm: Tachycardia present. Rhythm irregular. Heart sounds: No murmur heard. Pulmonary:      Effort: Tachypnea, accessory muscle usage and respiratory distress present. Breath sounds: Examination of the right-middle field reveals rales. Examination of the left-middle field reveals rales. Examination of the right-lower field reveals rales. Examination of the left-lower field reveals rales. Decreased breath sounds (globally) and rales present. No wheezing. Abdominal:      Palpations: Abdomen is soft. There is no mass. Tenderness: There is no abdominal tenderness. There is no right CVA tenderness, left CVA tenderness or guarding. Hernia: No hernia is present. Musculoskeletal:         General: No swelling. Normal range of motion. Cervical back: Normal range of motion and neck supple. Right lower leg: No edema. Left lower leg: No edema. Skin:     General: Skin is warm and dry. Capillary Refill: Capillary refill takes less than 2 seconds. Nails: There is clubbing. Neurological:      Mental Status: He is alert. He is disoriented. Motor: No weakness. Psychiatric:         Mood and Affect: Mood is anxious. Behavior: Behavior is agitated.          ED Medications  Medications   apixaban (ELIQUIS) tablet 5 mg (5 mg Oral Given 11/13/23 0919)   atorvastatin (LIPITOR) tablet 40 mg (40 mg Oral Not Given 11/12/23 2131)   lisinopril (ZESTRIL) tablet 40 mg (40 mg Oral Given 11/13/23 0919)   carvedilol (COREG) tablet 25 mg (25 mg Oral Given 11/13/23 0919)   cholecalciferol (VITAMIN D3) tablet 1,000 Units (1,000 Units Oral Given 11/13/23 0919)   finasteride (PROSCAR) tablet 5 mg (5 mg Oral Given 11/13/23 0919)   fluticasone (FLONASE) 50 mcg/act nasal spray 1 spray (has no administration in time range)   latanoprost (XALATAN) 0.005 % ophthalmic solution 1 drop (1 drop Both Eyes Not Given 11/12/23 2132)   levothyroxine tablet 100 mcg (100 mcg Oral Not Given 11/13/23 0644)   ondansetron (ZOFRAN) injection 4 mg (has no administration in time range)   albuterol inhalation solution 2.5 mg (2.5 mg Nebulization Given 11/11/23 1716)   bumetanide (BUMEX) injection 2 mg (2 mg Intravenous Given 11/13/23 0919)   hydrALAZINE (APRESOLINE) injection 5 mg (5 mg Intravenous Given 11/11/23 2303)   cefTRIAXone (ROCEPHIN) 1,000 mg in dextrose 5 % 50 mL IVPB (0 mg Intravenous Stopped 11/12/23 1731)   azithromycin (ZITHROMAX) 500 mg in sodium chloride 0.9 % 250 mL IVPB (500 mg Intravenous New Bag 11/13/23 1447)   calcium carbonate (OYSTER SHELL,OSCAL) 500 mg tablet 1 tablet (1 tablet Oral Given 11/13/23 0919)   levalbuterol (XOPENEX) inhalation solution 1.25 mg (1.25 mg Nebulization Given 11/13/23 1425)   ipratropium (ATROVENT) 0.02 % inhalation solution 0.5 mg (0.5 mg Nebulization Given 11/13/23 1425)   albuterol inhalation solution 10 mg (10 mg Nebulization Given 11/10/23 2200)   ipratropium (ATROVENT) 0.02 % inhalation solution 1 mg (1 mg Nebulization Given 11/10/23 2200)   sodium chloride 0.9 % inhalation solution 12 mL (12 mL Nebulization Given 11/10/23 2200)   LORazepam (ATIVAN) injection 0.5 mg (0.5 mg Intravenous Given 11/10/23 2222)   fentanyl citrate (PF) 100 MCG/2ML 25 mcg (25 mcg Intravenous Given 11/10/23 2221)   LORazepam (ATIVAN) injection 0.5 mg (0.5 mg Intravenous Given 11/10/23 2333)   ceftriaxone (ROCEPHIN) 1 g/50 mL in dextrose IVPB (0 mg Intravenous Stopped 11/11/23 0349)   azithromycin (ZITHROMAX) 500 mg in sodium chloride 0.9% 250mL IVPB 500 mg (0 mg Intravenous Stopped 11/11/23 0549)   OLANZapine (ZyPREXA) IM injection 10 mg (10 mg Intramuscular Given 11/11/23 0112)   sterile water injection **ADS Override Pull** (10 mL  Given 11/11/23 0112)   LORazepam (ATIVAN) injection 0.5 mg (0.5 mg Intravenous Given 11/11/23 0142)   iohexol (OMNIPAQUE) 350 MG/ML injection (SINGLE-DOSE) 85 mL (85 mL Intravenous Given 11/11/23 0217)   haloperidol lactate (HALDOL) injection 0.25 mg (0.25 mg Intramuscular Given 11/11/23 0422)   OLANZapine (ZyPREXA) IM injection 2.5 mg (2.5 mg Intramuscular Given 11/11/23 0557)   sterile water injection **ADS Override Pull** (  Given 11/11/23 0574)   bumetanide (BUMEX) injection 2 mg (2 mg Intravenous Given 11/12/23 0239)       Diagnostic Studies  Results Reviewed       Procedure Component Value Units Date/Time    Blood culture #2 [[id number]] Collected: 11/10/23 2151    Lab Status: Preliminary result Specimen: Blood from Arm, Right Updated: 11/13/23 0801     Blood Culture No Growth at 48 hrs. Blood culture #1 [[id number]] Collected: 11/10/23 2139    Lab Status: Preliminary result Specimen: Blood from Arm, Right Updated: 11/13/23 0801     Blood Culture No Growth at 48 hrs.     TSH, 3rd generation [[id number]]  (Normal) Collected: 11/12/23 0459    Lab Status: Final result Specimen: Blood from Hand, Right Updated: 11/12/23 0958     TSH 3RD GENERATON 0.860 uIU/mL     Vitamin B12 [[id number]]  (Normal) Collected: 11/11/23 0502    Lab Status: Final result Specimen: Blood from Arm, Right Updated: 11/11/23 1029     Vitamin B-12 353 pg/mL     Folate [[id number]]  (Normal) Collected: 11/11/23 0502    Lab Status: Final result Specimen: Blood from Arm, Right Updated: 11/11/23 1029     Folate 13.8 ng/mL     Basic metabolic panel [[id number]]  (Abnormal) Collected: 11/11/23 0502    Lab Status: Final result Specimen: Blood from Arm, Right Updated: 11/11/23 0602     Sodium 140 mmol/L      Potassium 4.2 mmol/L      Chloride 104 mmol/L      CO2 29 mmol/L      ANION GAP 7 mmol/L      BUN 28 mg/dL      Creatinine 1.17 mg/dL      Glucose 129 mg/dL      Calcium 8.8 mg/dL      eGFR 52 ml/min/1.73sq m     Narrative:      [place] guidelines for Chronic Kidney Disease (CKD):     Stage 1 with normal or high GFR (GFR > 90 mL/min/1.73 square meters)    Stage 2 Mild CKD (GFR = 60-89 mL/min/1.73 square meters)    Stage 3A Moderate CKD (GFR = 45-59 mL/min/1.73 square meters)    Stage 3B Moderate CKD (GFR = 30-44 mL/min/1.73 square meters)    Stage 4 Severe CKD (GFR = 15-29 mL/min/1.73 square meters)    Stage 5 End Stage CKD (GFR <15 mL/min/1.73 square meters)  Note: GFR calculation is accurate only with a steady state creatinine    CBC (With Platelets) [[id number]]  (Abnormal) Collected: 11/11/23 0501    Lab Status: Final result Specimen: Blood from Arm, Right Updated: 11/11/23 0519     WBC 9.72 Thousand/uL      RBC 4.17 Million/uL      Hemoglobin 12.7 g/dL      Hematocrit 40.6 %      MCV 97 fL      MCH 30.5 pg      MCHC 31.3 g/dL      RDW 15.2 %      Platelets 166 Thousands/uL      MPV 10.7 fL     Blood gas, venous [[id number]]  (Abnormal) Collected: 11/11/23 0304    Lab Status: Final result Specimen: Blood from Arm, Right Updated: 11/11/23 0314     pH, Jose 7.313     pCO2, Jose 58.2 mm Hg      pO2, Jose 132.7 mm Hg      HCO3, Jose 28.8 mmol/L      Base Excess, Jose 1.5 mmol/L      O2 Content, Jose 16.9 ml/dL      O2 HGB, VENOUS 97.2 %     RBC Morphology Reflex Test [[id number]] Collected: 11/10/23 2139    Lab Status: Final result Specimen: Blood from Arm, Left Updated: 11/10/23 2302    FLU/RSV/COVID - if FLU/RSV clinically relevant [[id number]]  (Normal) Collected: 11/10/23 2139    Lab Status: Final result Specimen: Nares from Nose Updated: 11/10/23 2243     SARS-CoV-2 Negative     INFLUENZA A PCR Negative     INFLUENZA B PCR Negative     RSV PCR Negative    Narrative:      FOR PEDIATRIC PATIENTS - copy/paste COVID Guidelines URL to browser: [email or web address]    SARS-CoV-2 assay is a Nucleic Acid Amplification assay intended for the  qualitative detection of nucleic acid from SARS-CoV-2 in nasopharyngeal  swabs. Results are for the presumptive identification of SARS-CoV-2 RNA. Positive results are indicative of infection with SARS-CoV-2, the virus  causing COVID-19, but do not rule out bacterial infection or co-infection  with other viruses. Laboratories within the [facility] and its  territories are required to report all positive results to the appropriate  public health authorities. Negative results do not preclude SARS-CoV-2  infection and should not be used as the sole basis for treatment or other  patient management decisions.  Negative results must be combined with  clinical observations, patient history, and epidemiological information. This test has not been FDA cleared or approved. This test has been authorized by FDA under an Emergency Use Authorization  (EUA). This test is only authorized for the duration of time the  declaration that circumstances exist justifying the authorization of the  emergency use of an in vitro diagnostic tests for detection of SARS-CoV-2  virus and/or diagnosis of COVID-19 infection under section 564(b)(1) of  the Act, 21 U. S.C. 287NXG-6(F)(3), unless the authorization is terminated  or revoked sooner. The test has been validated but independent review by FDA  and CLIA is pending. Test performed using [employer] GeneXpert: This RT-PCR assay targets N2,  a region unique to SARS-CoV-2. A conserved region in the E-gene was chosen  for pan-Sarbecovirus detection which includes SARS-CoV-2. According to CMS-2020-01-R, this platform meets the definition of high-throughput technology. Procalcitonin [[id number]]  (Normal) Collected: 11/10/23 2139    Lab Status: Final result Specimen: Blood from Arm, Left Updated: 11/10/23 2218     Procalcitonin 0.25 ng/ml     CBC and differential [[id number]]  (Abnormal) Collected: 11/10/23 2139    Lab Status: Final result Specimen: Blood from Arm, Left Updated: 11/10/23 2217     WBC 9.42 Thousand/uL      RBC 4.21 Million/uL      Hemoglobin 13.0 g/dL      Hematocrit 40.4 %      MCV 96 fL      MCH 30.9 pg      MCHC 32.2 g/dL      RDW 15.2 %      MPV 10.7 fL      Platelets 690 Thousands/uL     Narrative: This is an appended report. These results have been appended to a previously verified report.     Manual Differential(PHLEBS Do Not Order) [[id number]]  (Abnormal) Collected: 11/10/23 2139    Lab Status: Final result Specimen: Blood from Arm, Left Updated: 11/10/23 2217     Segmented % 73 %      Bands % 9 %      Lymphocytes % 5 %      Monocytes % 9 %      Eosinophils, % 0 %      Basophils % 0 %      Metamyelocytes% 1 %      Atypical Lymphocytes % 3 % Absolute Neutrophils 7.72 Thousand/uL      Lymphocytes Absolute 0.75 Thousand/uL      Monocytes Absolute 0.85 Thousand/uL      Eosinophils Absolute 0.00 Thousand/uL      Basophils Absolute 0.00 Thousand/uL      Total Counted --     RBC Morphology Present     Platelet Estimate Adequate     Polychromasia Present    B-Type Natriuretic Peptide(BNP) [[id number]]  (Abnormal) Collected: 11/10/23 2139    Lab Status: Final result Specimen: Blood from Arm, Left Updated: 11/10/23 2216      pg/mL     Protime-INR [[id number]]  (Abnormal) Collected: 11/10/23 2139    Lab Status: Final result Specimen: Blood from Arm, Left Updated: 11/10/23 2214     Protime 19.5 seconds      INR 1.68    APTT [[id number]]  (Abnormal) Collected: 11/10/23 2139    Lab Status: Final result Specimen: Blood from Arm, Left Updated: 11/10/23 2214     PTT 44 seconds     Comprehensive metabolic panel [[id number]]  (Abnormal) Collected: 11/10/23 2139    Lab Status: Final result Specimen: Blood from Arm, Left Updated: 11/10/23 2212     Sodium 139 mmol/L      Potassium 4.1 mmol/L      Chloride 104 mmol/L      CO2 29 mmol/L      ANION GAP 6 mmol/L      BUN 25 mg/dL      Creatinine 1.11 mg/dL      Glucose 127 mg/dL      Calcium 9.2 mg/dL      AST 18 U/L      ALT 18 U/L      Alkaline Phosphatase 90 U/L      Total Protein 6.6 g/dL      Albumin 3.7 g/dL      Total Bilirubin 2.10 mg/dL      eGFR 55 ml/min/1.73sq m     Narrative:      [place] guidelines for Chronic Kidney Disease (CKD):     Stage 1 with normal or high GFR (GFR > 90 mL/min/1.73 square meters)    Stage 2 Mild CKD (GFR = 60-89 mL/min/1.73 square meters)    Stage 3A Moderate CKD (GFR = 45-59 mL/min/1.73 square meters)    Stage 3B Moderate CKD (GFR = 30-44 mL/min/1.73 square meters)    Stage 4 Severe CKD (GFR = 15-29 mL/min/1.73 square meters)    Stage 5 End Stage CKD (GFR <15 mL/min/1.73 square meters)  Note: GFR calculation is accurate only with a steady state creatinine Magnesium [[id number]]  (Normal) Collected: 11/10/23 2139    Lab Status: Final result Specimen: Blood from Arm, Left Updated: 11/10/23 2212     Magnesium 2.3 mg/dL     Lipase [[id number]]  (Normal) Collected: 11/10/23 2139    Lab Status: Final result Specimen: Blood from Arm, Left Updated: 11/10/23 2212     Lipase 46 u/L     Lactic acid [[id number]]  (Normal) Collected: 11/10/23 2139    Lab Status: Final result Specimen: Blood from Arm, Left Updated: 11/10/23 2211     LACTIC ACID 1.3 mmol/L     Narrative:      Result may be elevated if tourniquet was used during collection. Blood gas, venous [[id number]]  (Abnormal) Collected: 11/10/23 2139    Lab Status: Final result Specimen: Blood from Arm, Left Updated: 11/10/23 2150     pH, Jose 7.367     pCO2, Jose 51.7 mm Hg      pO2, Jose 32.0 mm Hg      HCO3, Jose 29.0 mmol/L      Base Excess, Jose 2.7 mmol/L      O2 Content, Jose 10.6 ml/dL      O2 HGB, VENOUS 54.3 %                    XR elbow 3+ vw right   Final Result by Melissa Nails MD (11/13 1413)      1. Soft tissue swelling of the dorsal aspect of the proximal forearm without other acute osseous abnormalities. 2. Degenerative changes as noted. Resident: Humble Jones, the attending radiologist, have reviewed the images and agree with the final report above. Workstation performed: [id number]         CT head wo contrast   Final Result by Tamara Kraft MD (11/12 1211)      1. No acute intracranial abnormality. 2.  Generalized cerebral greater than cerebellar atrophy. 3.  Chronic microangiopathic changes, chronic bilateral thalamic lacunar infarctions, and chronic right parietal cortical infarction. 4.  Subcentimeter extra-axial hyperdensity in the right occipital region most likely represents a meningioma. This is unchanged when compared to the prior study of February 28, 2020.                   Workstation performed: [id number]         CTA ED chest PE Study   Final Result by Nick Roper MD (11/11 6315)      1. Limited evaluation without evidence of pulmonary embolism   2. Additional chronic findings as above               Workstation performed: [id number]               Procedures  Procedures  ECG interpreted by me. Date: 11/10/2023. Time: 2048. Rate: 67 bpm.  Axis: Normal.  Rhythm: Irregular, this is atrial fibrillation with multiple morphologies of P waves preceding every QRS. This is limited by artifact however seems grossly similar to prior ECG which was performed on 10/16/2023. Interpretation: Atrial fibrillation, abnormal ECG. ED Course  ED Course as of 11/13/23 1633   Sat Nov 11, 2023   0623 Failed wean off bipap     0059 Patient has required multiple small doses of ativan and now zyprexa IM d/t pulling at IV's and BiPap.     0108 Confirmed again with Daughter- patient is definitely DNR/DNI   0140 IV replaced at bedside. 0141 Bands Relative(!): 9  Treating with Ceftriaxone + Azithromycin for now                               SBIRT 20yo+      Flowsheet Row Most Recent Value   Initial Alcohol Screen: US AUDIT-C     1. How often do you have a drink containing alcohol? 0 Filed at: 11/10/2023 2308   2. How many drinks containing alcohol do you have on a typical day you are drinking? 0 Filed at: 11/10/2023 2308   3a. Male UNDER 65: How often do you have five or more drinks on one occasion? 0 Filed at: 11/10/2023 2308   3b. FEMALE Any Age, or MALE 65+: How often do you have 4 or more drinks on one occassion? 0 Filed at: 11/10/2023 2308   Audit-C Score 0 Filed at: 11/10/2023 2308   RAF: How many times in the past year have you. .. Used an illegal drug or used a prescription medication for non-medical reasons?  Never Filed at: 11/10/2023 2308                  Medical Decision Making  Patient presents in obvious respiratory distress, placed on BiPAP upon arrival.  Differentials considered most likely CHF exacerbation versus COPD exacerbation, need to rule out pulmonary embolus as patient does have asymmetric leg swelling which may or may not be chronic for him, will need to rule out pneumonia, pneumothorax, pleural effusion, ACS. Reassessment/disposition: Patient was breathing much more comfortably on BiPAP, however is very confused and is pulling at lines repetitively. Needed some sedation and restraints in order to complete work-up. We will try to avoid any benzodiazepines or other medications that exacerbate delirium now that work-up is complete. Patient will require admission for management of his acute encephalopathy in addition to his respiratory distress which I suspect is most likely due to CHF exacerbation. Patient was given IV diuretic, and IV antibiotics for possible CHF/COPD exacerbations including ceftriaxone and azithromycin. Patient was admitted after consultation with the internal medicine service. Amount and/or Complexity of Data Reviewed  Labs: ordered. Decision-making details documented in ED Course. Radiology: ordered. Risk  Prescription drug management. Decision regarding hospitalization. Disposition  Final diagnoses:   Acute respiratory failure ([address])     Time reflects when diagnosis was documented in both MDM as applicable and the Disposition within this note       Time User Action Codes Description Comment    11/11/2023 12:53 AM Maxwell Max Add [J96.00] Acute respiratory failure ([address])     11/11/2023  4:07 AM Alanna GRIDER Add [I50.33] Acute on chronic diastolic (congestive) heart failure ([address])     11/12/2023  9:43 AM Bethany Judd Add [J96.01,  J96.02] Acute respiratory failure with hypoxia and hypercapnia [facility])           ED Disposition       ED Disposition   Admit    Condition   Stable    Date/Time   Sat Nov 11, 2023 0052    Comment   Case was discussed with Dr. Teresa Kwok and the patient's admission status was agreed to be Admission Status: inpatient status to the service of Dr. Teresa Kwok .                Follow-up Information None         Current Discharge Medication List        CONTINUE these medications which have NOT CHANGED    Details   albuterol (PROVENTIL HFA,VENTOLIN HFA) 90 mcg/act inhaler Inhale 2 puffs every 4 (four) hours as needed (shortness of breath)  Qty: 6.7 g, Refills: 3    Comments: Substitution to a formulary equivalent within the same pharmaceutical class is authorized. Associated Diagnoses: Restrictive lung disease      apixaban (Eliquis) 5 mg Take 1 tablet (5 mg total) by mouth 2 (two) times a day  Qty: 180 tablet, Refills: 1    Associated Diagnoses: Atrial fibrillation, unspecified type ([facility])      atorvastatin (LIPITOR) 40 mg tablet Take 1 tablet (40 mg total) by mouth daily at bedtime  Qty: 90 tablet, Refills: 1    Associated Diagnoses: Coronary artery disease involving native coronary artery of native heart without angina pectoris      benazepril (LOTENSIN) 40 MG tablet Take 1 tablet (40 mg total) by mouth daily  Qty: 90 tablet, Refills: 1    Associated Diagnoses: Benign essential hypertension      carbamide peroxide (DEBROX) 6.5 % otic solution Administer 5 drops into both ears 2 (two) times a day For 5 days  Qty: 15 mL, Refills: 1    Associated Diagnoses: Chronic diastolic congestive heart failure ([address]); Chronic obstructive pulmonary disease, unspecified COPD type ([address])      ! ! carboxymethylcellulose 0.5 % SOLN Administer 1 drop to both eyes 2 (two) times a day  Qty: 6 mL, Refills: 3    Associated Diagnoses: Bilateral dry eyes      !! carboxymethylcellulose 0.5 % SOLN Administer 1 drop to both eyes 4 (four) times a day  Qty: 30 each, Refills: 3    Associated Diagnoses: Seasonal allergic rhinitis, unspecified trigger; Dry eyes      carvedilol (COREG) 25 mg tablet Take 1 tablet (25 mg total) by mouth 2 (two) times a day with meals  Qty: 180 tablet, Refills: 1    Associated Diagnoses: Coronary artery disease involving native coronary artery of native heart without angina pectoris;  Benign essential hypertension cholecalciferol (VITAMIN D3) 1,000 units tablet Take one tablet by mouth at noon with lunch for vitamin d deficiency  Qty: 30 tablet, Refills: 3    Associated Diagnoses: Vitamin D deficiency      finasteride (PROSCAR) 5 mg tablet Take 1 tablet (5 mg total) by mouth daily  Qty: 90 tablet, Refills: 1    Associated Diagnoses: Benign prostatic hyperplasia with lower urinary tract symptoms, symptom details unspecified      fluticasone (FLONASE) 50 mcg/act nasal spray 1 spray into each nostril daily as needed for rhinitis  Qty: 9.9 mL, Refills: 3    Associated Diagnoses: Seasonal allergic rhinitis, unspecified trigger      latanoprost (XALATAN) 0.005 % ophthalmic solution Apply 1 drop to eye daily  Qty: 7.5 mL, Refills: 0    Associated Diagnoses: Seasonal allergic rhinitis, unspecified trigger; Dry eyes      levothyroxine 100 mcg tablet Take one tablet by mouth at bedtime for hypothyroid  Qty: 90 tablet, Refills: 1    Associated Diagnoses: Hypothyroidism, unspecified type      potassium chloride (K-DUR,KLOR-CON) 20 mEq tablet Take 1 tablet (20 mEq total) by mouth daily  Qty: 90 tablet, Refills: 1    Associated Diagnoses: Chronic congestive heart failure, unspecified heart failure type (HCC)      tiotropium-olodaterol (STIOLTO RESPIMAT) 2.5-2.5 MCG/ACT inhaler Inhale 2 puffs daily [facility]      !! torsemide (DEMADEX) 10 mg tablet 14 days for swelling      !! torsemide (DEMADEX) 20 mg tablet TAKE 1 AND 1/2 TABLETS BY MOUTH EVERY DAY WITH LUNCH  Qty: 135 tablet, Refills: 1    Associated Diagnoses: Chronic congestive heart failure, unspecified heart failure type (HCC)       ! ! - Potential duplicate medications found. Please discuss with provider. No discharge procedures on file. PDMP Review         Value Time User    PDMP Reviewed  Yes 11/11/2023  4:03 AM Tina Kimball DO             ED Provider  Attending physically available and evaluated Bhumi Rodriguez.  I managed the patient along with the ED Attending.     Electronically Signed by           Lita Martinez MD  11/13/23 4700

## 2023-11-11 NOTE — H&P
[facility]  H&P  Name: Iza Garcia 80 y.o. male I MRN: [id number]  Unit/Bed#: ED 32 I Date of Admission: 11/10/2023   Date of Service: 11/11/2023 I Hospital Day: 0      Assessment/Plan   * Acute respiratory failure with hypoxia and hypercapnia (HCC)  Assessment & Plan  Presented with worsening shortness of breath over the past 2 days per daughter, with respiratory distress on arrival requiring initial BiPAP however patient unable to tolerate and subsequently placed on 10 L [address]  Labs significant for elevated BNP; CTA PE study limited due to motion artifact but no mainstem pulmonary emboli visualized and lungs with hypoinflation and dependent opacities suspected atelectasis  Suspect multifactorial in setting of acute on chronic heart failure, underlying COPD, possible lung mass (was noted on prior CT abdomen/pelvis incidentally however was not reported on current CTA PE study)  Remaining confused as below  Management of the acute on chronic heart failure, COPD as per associated problems with IV diuresis and nebulizers as needed  Continue supplemental oxygen as needed, titrate to maintain appropriate saturations as appropriate  Respiratory protocol, incentive spirometry, pulmonary toileting  Guarded prognosis at this juncture, confirmed patient DNR/DNI as per daughter (POA).   We will see if there is any improvement over the weekend, further discussions regarding treatment goals if no significant improvement or particularly if patient should deteriorate    Acute on chronic diastolic (congestive) heart failure (HCC)  Assessment & Plan  Wt Readings from Last 3 Encounters:   10/23/23 115 kg (253 lb 12.8 oz)   10/02/23 112 kg (247 lb 3.2 oz)   08/21/23 109 kg (240 lb 12.8 oz)     Daughter reporting patient with increasing lower extremity edema over the past several days, appears at least somewhat overloaded on examination and elevated BNP noted  Give 40 mg IV Lasix now then 40 mg IV Lasix twice daily  We will get updated echocardiogram once able  Monitor daily weights, I/O, volume status  Low-sodium, fluid restricted diet  Daily BMP while on IV diuresis, replace electrolytes as needed and monitor renal function closely          Chronic obstructive pulmonary disease, unspecified COPD type ([address])  Assessment & Plan  Difficult examination due to limited cooperation, however no significant wheezing on examination  Will have nebulizers available however as needed, continue remainder of home medications    Acute encephalopathy  Assessment & Plan  Markedly confused throughout my evaluation, does not answer questions and frequently attempting to grab objects/remove IVs.  Required multiple rounds of chemical sedation during ED evaluation and additionally requiring wrist restraints  Wean off restraints as able  Suspect in setting of primary problem with some hypercapnia, unfortunately patient not tolerating BiPAP  Assess response to treatment for acute on chronic heart failure and COPD as above    Hypothyroidism  Assessment & Plan  Follow-up repeat TSH, continue home dose levothyroxine in the interim    Benign essential hypertension  Assessment & Plan  Continue home antihypertensives with strict hold parameters, patient additionally will be on IV Lasix as above  Monitor blood pressure per protocol    Atrial fibrillation ([address]) [I48.91]  Assessment & Plan  Rate controlled on Coreg, continue. Continue anticoagulation with Eliquis             VTE Prophylaxis: Apixaban (Eliquis)  / sequential compression device   Code Status: Level 3 - DNAR and DNI confirmed with patient's daughter at bedside  POLST: POLST form is not discussed and not completed at this time. Discussion with family: Daughter at bedside    Anticipated Length of Stay:  Patient will be admitted on an Inpatient basis with an anticipated length of stay of greater than 2 midnights.    Justification for Hospital Stay: Please see detailed plans noted above. Chief Complaint:     Shortness of breath/respiratory distress  History of Present Illness:  History is obtained from chart review, discussion with EM physician, and discussion with patient's daughter at bedside as patient confused/agitated and does not provide history    Darshan Mckeon is a 80 y.o. male who has a past medical history significant for CAD s/p CABG, chronic diastolic heart failure, COPD, atrial fibrillation anticoagulated on Eliquis, reported dementia presenting with worsening shortness of breath and respiratory distress. Per daughter, patient apparently has been having increasing shortness of breath over the past 2-3 days, additionally reported with increasing lower extremity edema from baseline with unclear weight gain. Apparently patient was complaining of some shortness of breath the evening of 11/9/2023 however when our visit 11/10/2020 he was noted with marked worsening of the shortness of breath and now confusion which prompted presentation. Some ongoing nonproductive coughing was noted however no fever/chills, apparent pain, known sick contacts, apparent nausea/vomiting/diarrhea, syncope, or focal weakness were reported. On ED arrival patient was noted in respiratory distress requiring CPAP via EMS for which patient was transitioned to BiPAP, however transition to  NC as patient intolerant of BiPAP, additionally with agitation requiring wrist restraints and multiple rounds of chemical sedation. Further work-up included labs significant only for elevated BNP and CTA PE study somewhat limited due to motion artifact however without mainstem pulmonary emboli visualized, and lung parenchyma significant only for apparent low lung volumes and bilateral opacities suspected atelectasis. Given the ongoing shortness of breath and respiratory distress he is admitted for further management.     Currently, patient is lying in bed agitated, frequently moving in bed and attempting to remove leads and IV. Daughter reports this is a marked change from baseline, additionally feels he is having ongoing shortness of breath.     Review of Systems:  Unable to perform ROS: mental status change    Past Medical and Surgical History:   Past Medical History:   Diagnosis Date    Arithmetic disorder     [id number]    Myocardial infarct [facility])     [id number]  LAST ASSESSED     Past Surgical History:   Procedure Laterality Date    BLADDER SURGERY      LAST ASSESSED 12OCT2016      CORONARY ARTERY BYPASS GRAFT      12OCT2016 RESOLVED    SKIN BIOPSY         Meds/Allergies:    Current Facility-Administered Medications:     albuterol inhalation solution 2.5 mg, 2.5 mg, Nebulization, Q6H PRN, Nimesh Sing, DO    apixaban (ELIQUIS) tablet 5 mg, 5 mg, Oral, BID, Nimesh Sing, DO    atorvastatin (LIPITOR) tablet 40 mg, 40 mg, Oral, HS, Nimesh Sing, DO    carvedilol (COREG) tablet 25 mg, 25 mg, Oral, BID With Meals, Nimesh Sing, DO    cholecalciferol (VITAMIN D3) tablet 1,000 Units, 1,000 Units, Oral, Daily, Nimesh Sing, DO    finasteride (PROSCAR) tablet 5 mg, 5 mg, Oral, Daily, Nimesh Sing, DO    fluticasone (FLONASE) 50 mcg/act nasal spray 1 spray, 1 spray, Nasal, Daily PRN, Nimesh Sing, DO    furosemide (LASIX) injection 40 mg, 40 mg, Intravenous, BID, Nimesh Sing, DO, 40 mg at 11/11/23 0456    latanoprost (XALATAN) 0.005 % ophthalmic solution 1 drop, 1 drop, Both Eyes, HS, Nimesh Sing, DO    levothyroxine tablet 100 mcg, 100 mcg, Oral, Early Morning, Nimesh Sing, DO    lisinopril (ZESTRIL) tablet 40 mg, 40 mg, Oral, Daily, Nimesh Sing, DO    umeclidinium 62.5 mcg/actuation inhaler AEPB 1 puff, 1 puff, Inhalation, Daily **AND** olodaterol HCl (STRIVERDI RESPIMAT) inhaler 2 puff, 2 puff, Inhalation, Daily, Nimesh Sing, DO    ondansetron (ZOFRAN) injection 4 mg, 4 mg, Intravenous, Q6H PRN, Nimesh Sing, DO    Current Outpatient Medications:     albuterol (PROVENTIL HFA,VENTOLIN HFA) 90 mcg/act inhaler, Inhale 2 puffs every 4 (four) hours as needed (shortness of breath), Disp: 6.7 g, Rfl: 3    apixaban (Eliquis) 5 mg, Take 1 tablet (5 mg total) by mouth 2 (two) times a day, Disp: 180 tablet, Rfl: 1    atorvastatin (LIPITOR) 40 mg tablet, Take 1 tablet (40 mg total) by mouth daily at bedtime, Disp: 90 tablet, Rfl: 1    benazepril (LOTENSIN) 40 MG tablet, Take 1 tablet (40 mg total) by mouth daily, Disp: 90 tablet, Rfl: 1    carbamide peroxide (DEBROX) 6.5 % otic solution, Administer 5 drops into both ears 2 (two) times a day For 5 days, Disp: 15 mL, Rfl: 1    carboxymethylcellulose 0.5 % SOLN, Administer 1 drop to both eyes 2 (two) times a day, Disp: 6 mL, Rfl: 3    carboxymethylcellulose 0.5 % SOLN, Administer 1 drop to both eyes 4 (four) times a day, Disp: 30 each, Rfl: 3    carvedilol (COREG) 25 mg tablet, Take 1 tablet (25 mg total) by mouth 2 (two) times a day with meals, Disp: 180 tablet, Rfl: 1    cholecalciferol (VITAMIN D3) 1,000 units tablet, Take one tablet by mouth at noon with lunch for vitamin d deficiency, Disp: 30 tablet, Rfl: 3    finasteride (PROSCAR) 5 mg tablet, Take 1 tablet (5 mg total) by mouth daily, Disp: 90 tablet, Rfl: 1    fluticasone (FLONASE) 50 mcg/act nasal spray, 1 spray into each nostril daily as needed for rhinitis, Disp: 9.9 mL, Rfl: 3    latanoprost (XALATAN) 0.005 % ophthalmic solution, Apply 1 drop to eye daily, Disp: 7.5 mL, Rfl: 0    levothyroxine 100 mcg tablet, Take one tablet by mouth at bedtime for hypothyroid, Disp: 90 tablet, Rfl: 1    potassium chloride (K-DUR,KLOR-CON) 20 mEq tablet, Take 1 tablet (20 mEq total) by mouth daily, Disp: 90 tablet, Rfl: 1    tiotropium-olodaterol (STIOLTO RESPIMAT) 2.5-2.5 MCG/ACT inhaler, Inhale 2 puffs daily [facility], Disp: , Rfl:     torsemide (DEMADEX) 10 mg tablet, 14 days for swelling, Disp: , Rfl:     torsemide (DEMADEX) 20 mg tablet, TAKE 1 AND 1/2 TABLETS BY MOUTH EVERY DAY WITH LUNCH, Disp: 135 tablet, Rfl: 1        Allergies: No Known Allergies  History:  Marital Status: /Civil Union     Substance Use History:   Social History     Substance and Sexual Activity   Alcohol Use Yes    Comment: OCCASIONAL     Social History     Tobacco Use   Smoking Status Former   Smokeless Tobacco Never     Social History     Substance and Sexual Activity   Drug Use Not on file       Family History:  Family History   Problem Relation Age of Onset    Breast cancer Family         [id number] LAST ASSESSED       Physical Exam:     Vitals:   Blood Pressure: 149/90 (11/11/23 0515)  Pulse: 62 (11/11/23 0630)  Temperature: 97.7 °F (36.5 °C) (11/10/23 2245)  Temp Source: Tympanic (11/10/23 2245)  Respirations: 20 (11/11/23 0630)  SpO2: 99 % (11/11/23 0630)    Constitutional:  Well developed, obese, no acute distress, non-toxic appearance   Eyes:  PERRL, conjunctiva normal   HENT:  Atraumatic, external ears normal, nose normal, oropharynx moist, no pharyngeal exudates. Neck- normal range of motion, no tenderness, supple   Respiratory:  Limited due to poor patient effort. No respiratory distress, diminished breath sounds, no rales, no wheezing   Cardiovascular:  Irregularly irregular rate and rhythm, no murmurs, no gallops, no rubs   GI:  Soft, nondistended, normal bowel sounds, nontender, no organomegaly, no mass, no rebound, no guarding   :  No costovertebral angle tenderness   Musculoskeletal:  Bilateral right>left LE edema, no tenderness, no deformities. Back- no tenderness  Integument:  Well hydrated, no rash   Lymphatic:  No lymphadenopathy noted   Neurologic:  Awake, agitated, does not follow commands, CN 2-12 normal, normal motor function, normal sensory function, no focal deficits noted       Lab Results: I have personally reviewed pertinent reports.       Results from last 7 days   Lab Units 11/11/23  0502 11/10/23  2139   WBC Thousand/uL 9.72 9.42   HEMOGLOBIN g/dL 12.7 13.0   HEMATOCRIT % 40.6 40.4   PLATELETS Thousands/uL 274 248   LYMPHO PCT %  --  5*   MONO PCT %  --  9   EOS PCT %  --  0     Results from last 7 days   Lab Units 11/11/23  0502 11/10/23  2139   POTASSIUM mmol/L 4.2 4.1   CHLORIDE mmol/L 104 104   CO2 mmol/L 29 29   BUN mg/dL 28* 25   CREATININE mg/dL 1.17 1.11   CALCIUM mg/dL 8.8 9.2   ALK PHOS U/L  --  90   ALT U/L  --  18   AST U/L  --  18     Results from last 7 days   Lab Units 11/10/23  2139   INR  1.68*       EKG: Atrial fibrillation with occasional PVCs HR 67    Imaging: I have personally reviewed pertinent reports. CTA ED chest PE Study    Result Date: 11/11/2023  Narrative: CTA - CHEST WITH IV CONTRAST - PULMONARY ANGIOGRAM INDICATION:   RLE swollen, acute respiratory failure. COMPARISON: X-ray and CT 10/16/2023 TECHNIQUE: CTA examination of the chest was performed using angiographic technique according to a protocol specifically tailored to evaluate for pulmonary embolism. Multiplanar 2D reformatted images were created from the source data. In addition, coronal 3D MIP postprocessing was performed on the acquisition scanner. Radiation dose length product (DLP) for this visit:  1806.99 mGy-cm . This examination, like all CT scans performed in the [facility], was performed utilizing techniques to minimize radiation dose exposure, including the use of iterative reconstruction and automated exposure control. IV Contrast:  85 mL of iohexol (OMNIPAQUE) FINDINGS: PULMONARY ARTERIAL TREE: Respiratory motion artifact partially limits evaluation within the upper lobes, and to a lesser extent in the bases. No filling defect identified. LUNGS: Hypoinflation Dependent opacities, favored to represent atelectasis PLEURA:  Unremarkable. HEART/GREAT VESSELS: Cardiomegaly Status post CABG No thoracic aortic aneurysm. MEDIASTINUM AND ASHER:  Unremarkable. CHEST WALL AND LOWER NECK:   Unremarkable.  VISUALIZED STRUCTURES IN THE UPPER ABDOMEN: Gallbladder is decompressed, contains a calcium gallstone Left adrenal myelolipoma contains calcification, similar to prior Renal focal atrophy OSSEOUS STRUCTURES: T12 compression, appears chronic Mild T9 superior endplate compression, stable Mild T5 superior endplate compression, stable Sternotomy     Impression: 1. Limited evaluation without evidence of pulmonary embolism 2. Additional chronic findings as above Workstation performed: [id number]     US bedside procedure    Result Date: 10/17/2023  Narrative: [id number]. [id number]    XR chest 2 views    Result Date: 10/17/2023  Narrative: CHEST INDICATION:   ams. COMPARISON: 8/11/2023 EXAM PERFORMED/VIEWS:  XR CHEST PA & LATERAL FINDINGS: Heart shadow is enlarged but unchanged from prior exam. Redemonstrated are findings of prior CABG surgery. There is mild pulmonary vascular congestion. The lungs are otherwise clear. No pneumothorax or pleural effusion. Osseous structures appear within normal limits for patient age. Impression: Stable cardiomegaly. Mild pulmonary vascular congestion. Workstation performed: [id number]     CT abdomen pelvis w contrast    Addendum Date: 10/16/2023 Addendum:   ADDENDUM: OSSEOUS TRACTORS: There are  compression fracture deformities of T12, L1-L2 and L4 vertebral bodies. However, these are also mentioned on report of prior CT abdomen pelvis 8/16/2016, representing chronic nature. There is also age-indeterminate compression fracture deformity of T9 vertebral body with mild height loss. Clinical correlation is recommended. The study was marked in [facility] for immediate notification. Result Date: 10/16/2023  Narrative: CT ABDOMEN AND PELVIS WITH IV CONTRAST INDICATION:   Bowel obstruction suspected distension. COMPARISON: Abdominal ultrasound 8/26/2011 report of CT abdomen pelvis at [facility] 8/16/2016 TECHNIQUE:  CT examination of the abdomen and pelvis was performed. Multiplanar 2D reformatted images were created from the source data.  This examination, like all CT scans performed in the [facility], was performed utilizing techniques to minimize radiation dose exposure, including the use of iterative reconstruction and automated exposure control. Radiation dose length product (DLP) for this visit:  1104.95 mGy-cm IV Contrast:  100 mL of iohexol (OMNIPAQUE) Enteric Contrast:  Enteric contrast was not administered. FINDINGS: ABDOMEN LOWER CHEST: There is a 2.0 x 3.5 x 2.1 cm right lung base opacity with spiculated margins and a coarse calcification coronary artery calcifications at the heart base. LIVER/BILIARY TREE: Marked streak artifact through hepatic parenchyma, limiting evaluation. Subcentimeter low-attenuation lesions in the liver, too small to characterize. GALLBLADDER:  There are gallstone(s) within the gallbladder, without pericholecystic inflammatory changes. SPLEEN: A subcentimeter splenic low-attenuation lesion, too small to characterize. PANCREAS:  Unremarkable. ADRENAL GLANDS: There is a left adrenal mass measuring 5.2 x 4.7 cm. It contains macroscopic fat and coarse calcifications. It was present on 8/16/2016 measuring 4.4 cm as per the report from [facility], suggesting a benign etiology such as myolipoma. KIDNEYS/URETERS:  Unremarkable. No hydronephrosis. STOMACH AND BOWEL:  There is colonic diverticulosis without evidence of acute diverticulitis. APPENDIX:  A normal appendix was visualized. ABDOMINOPELVIC CAVITY:  No ascites. No pneumoperitoneum. No lymphadenopathy. VESSELS:  Atherosclerotic changes are present. No evidence of aneurysm. PELVIS REPRODUCTIVE ORGANS:  The prostate is enlarged. URINARY BLADDER:  Unremarkable. ABDOMINAL WALL/INGUINAL REGIONS:  Unremarkable. OSSEOUS STRUCTURES:  No acute fracture or destructive osseous lesion. Impression: No abnormal distention of bowel loops.  A 3.5 cm right lower lobe opacity with spiculated margins, which can represent rounded atelectasis, focal consolidation or pulmonary neoplasm. Follow-up CT chest in 1 month is recommended to assess for resolution. 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 The study was marked in [facility] for immediate notification. Workstation performed: [id number]         ** Please Note: Dragon 360 Dictation voice to text software was used in the creation of this document.  **

## 2023-11-11 NOTE — ASSESSMENT & PLAN NOTE
Continue home antihypertensives with strict hold parameters, patient additionally will be on IV Lasix as above  Monitor blood pressure per protocol

## 2023-11-11 NOTE — RESPIRATORY THERAPY NOTE
RT Protocol Note  Mirta Russell 80 y.o. male MRN: [id number]  Unit/Bed#: ED 26 Encounter: [id number]    Assessment    Principal Problem:    Acute respiratory failure with hypoxia and hypercapnia (HCC)  Active Problems:    Atrial fibrillation (HCC) [I48.91]    Acute on chronic diastolic (congestive) heart failure (HCC)    Mild cognitive impairment with memory loss    Chronic obstructive pulmonary disease, unspecified COPD type ([address])    Acute encephalopathy      Home Pulmonary Medications:    Home Devices/Therapy: Other (Comment) (albuterol mdi prn)    Past Medical History:   Diagnosis Date    Arithmetic disorder     07OCT2016    Myocardial infarct [facility])     [id number]  LAST ASSESSED     Social History     Socioeconomic History    Marital status: /Civil Union     Spouse name: Not on file    Number of children: 3    Years of education: Not on file    Highest education level: Not on file   Occupational History    Not on file   Tobacco Use    Smoking status: Former    Smokeless tobacco: Never   Vaping Use    Vaping Use: Never used   Substance and Sexual Activity    Alcohol use: Yes     Comment: OCCASIONAL    Drug use: Not on file    Sexual activity: Not on file   Other Topics Concern    Not on file   Social History Narrative    ACTIVE ADVANCE DIRECTIVE ON FILE  LAST ASSESSED 23 JAN 2018    ALL SCRIPTS SAYS      Social Determinants of Health     Financial Resource Strain: Not on file   Food Insecurity: Not on file   Transportation Needs: Not on file   Physical Activity: Not on file   Stress: Not on file   Social Connections: Not on file   Intimate Partner Violence: Not on file   Housing Stability: Not on file       Subjective         Objective    Physical Exam:   Assessment Type: Assess only  General Appearance: Alert, Awake  Respiratory Pattern: Tachypneic, Accessory muscle use  Chest Assessment: Chest expansion symmetrical  Bilateral Breath Sounds: Crackles, Diminished  Cough: None  O2 Device: mfnc    Vitals:  Blood pressure 149/90, pulse 78, temperature 97.7 °F (36.5 °C), temperature source Tympanic, resp. rate 22, SpO2 97 %. Imaging and other studies: I have personally reviewed pertinent reports. O2 Device: mfnc     Plan    Respiratory Plan: Vent/NIV/HFNC        Resp Comments: pt remains on 15L [id number] [facility], Bipap on stand by. pt appears to be unable to perform MDI so will order albuterol udn prn per respiratory protocol. RT will continue to monitor.

## 2023-11-11 NOTE — ED NOTES
PT pulling leads and Ivs. PT not cooperating with nursing staff. PT pulled both Ivs out. PT gown and linens changed at this time.      Jens Mcintosh RN  11/11/23 1718

## 2023-11-11 NOTE — RESPIRATORY THERAPY NOTE
RT Ventilator Management Note  Casie Altman 80 y.o. male MRN: [id number]  Unit/Bed#: ED 26 Encounter: [id number]      Daily Screen    No data found in the last 10 encounters.            Physical Exam:   Assessment Type: Assess only  General Appearance: Awake  Respiratory Pattern: Accessory muscle use, Retractions, Tachypneic  Chest Assessment: Chest expansion symmetrical  Bilateral Breath Sounds: Diminished, Crackles  Cough: None  O2 Device: mfnc      Resp Comments: pt unable to tolerate bipap mask, placed on 15L [address]

## 2023-11-11 NOTE — ASSESSMENT & PLAN NOTE
Difficult examination due to limited cooperation, however no significant wheezing on examination  Will have nebulizers available however as needed, continue remainder of home medications

## 2023-11-11 NOTE — ASSESSMENT & PLAN NOTE
Markedly confused throughout my evaluation, does not answer questions and frequently attempting to grab objects/remove IVs.  Required multiple rounds of chemical sedation during ED evaluation and additionally requiring wrist restraints  Wean off restraints as able  Suspect in setting of primary problem with some hypercapnia, unfortunately patient not tolerating BiPAP  Assess response to treatment for acute on chronic heart failure and COPD as above

## 2023-11-11 NOTE — PROGRESS NOTES
Placed bipap on pt prior to speaking with daughter. Pt wear bipap Hs at home. Placed pt on, and after a short period of time pt began to take zohra of.  Placed back on 6L nasal cannla

## 2023-11-11 NOTE — ASSESSMENT & PLAN NOTE
Wt Readings from Last 3 Encounters:   10/23/23 115 kg (253 lb 12.8 oz)   10/02/23 112 kg (247 lb 3.2 oz)   08/21/23 109 kg (240 lb 12.8 oz)     Daughter reporting patient with increasing lower extremity edema over the past several days, appears at least somewhat overloaded on examination and elevated BNP noted  Give 40 mg IV Lasix now then 40 mg IV Lasix twice daily  We will get updated echocardiogram once able  Monitor daily weights, I/O, volume status  Low-sodium, fluid restricted diet  Daily BMP while on IV diuresis, replace electrolytes as needed and monitor renal function closely

## 2023-11-12 ENCOUNTER — APPOINTMENT (INPATIENT)
Dept: NON INVASIVE DIAGNOSTICS | Facility: HOSPITAL | Age: 88
DRG: 291 | End: 2023-11-12
Payer: COMMERCIAL

## 2023-11-12 ENCOUNTER — APPOINTMENT (INPATIENT)
Dept: RADIOLOGY | Facility: HOSPITAL | Age: 88
DRG: 291 | End: 2023-11-12
Payer: COMMERCIAL

## 2023-11-12 PROBLEM — M25.521 RIGHT ELBOW PAIN: Status: ACTIVE | Noted: 2023-11-12

## 2023-11-12 PROBLEM — N40.0 BPH (BENIGN PROSTATIC HYPERPLASIA): Status: ACTIVE | Noted: 2023-10-02

## 2023-11-12 PROBLEM — Z86.73 HISTORY OF CVA IN ADULTHOOD: Status: ACTIVE | Noted: 2023-11-12

## 2023-11-12 PROBLEM — S22.000D COMPRESSION FRACTURE OF THORACIC VERTEBRA WITH ROUTINE HEALING: Status: ACTIVE | Noted: 2023-11-12

## 2023-11-12 LAB
ANION GAP SERPL CALCULATED.3IONS-SCNC: 13 MMOL/L
AORTIC ROOT: 4 CM
AORTIC VALVE MEAN VELOCITY: 10 M/S
APICAL FOUR CHAMBER EJECTION FRACTION: 40 %
ASCENDING AORTA: 3.8 CM
AV AREA BY CONTINUOUS VTI: 2.4 CM2
AV AREA PEAK VELOCITY: 2.6 CM2
AV LVOT MEAN GRADIENT: 2 MMHG
AV LVOT PEAK GRADIENT: 5 MMHG
AV MEAN GRADIENT: 4 MMHG
AV PEAK GRADIENT: 8 MMHG
AV VALVE AREA: 2.37 CM2
AV VELOCITY RATIO: 0.74
BASOPHILS # BLD AUTO: 0.03 THOUSANDS/ÂΜL (ref 0–0.1)
BASOPHILS NFR BLD AUTO: 0 % (ref 0–1)
BUN SERPL-MCNC: 30 MG/DL (ref 5–25)
CALCIUM SERPL-MCNC: 9.4 MG/DL (ref 8.4–10.2)
CHLORIDE SERPL-SCNC: 106 MMOL/L (ref 96–108)
CO2 SERPL-SCNC: 26 MMOL/L (ref 21–32)
CREAT SERPL-MCNC: 1 MG/DL (ref 0.6–1.3)
DOP CALC AO PEAK VEL: 1.43 M/S
DOP CALC AO VTI: 26.84 CM
DOP CALC LVOT AREA: 3.46 CM2
DOP CALC LVOT CARDIAC INDEX: 2.39 L/MIN/M2
DOP CALC LVOT CARDIAC OUTPUT: 5.85 L/MIN
DOP CALC LVOT DIAMETER: 2.1 CM
DOP CALC LVOT PEAK VEL VTI: 18.39 CM
DOP CALC LVOT PEAK VEL: 1.06 M/S
DOP CALC LVOT STROKE INDEX: 26.9 ML/M2
DOP CALC LVOT STROKE VOLUME: 63.66
EOSINOPHIL # BLD AUTO: 0.01 THOUSAND/ÂΜL (ref 0–0.61)
EOSINOPHIL NFR BLD AUTO: 0 % (ref 0–6)
ERYTHROCYTE [DISTWIDTH] IN BLOOD BY AUTOMATED COUNT: 14.9 % (ref 11.6–15.1)
FRACTIONAL SHORTENING: 9 (ref 28–44)
GFR SERPL CREATININE-BSD FRML MDRD: 63 ML/MIN/1.73SQ M
GLUCOSE SERPL-MCNC: 85 MG/DL (ref 65–140)
HCT VFR BLD AUTO: 43 % (ref 36.5–49.3)
HGB BLD-MCNC: 13.4 G/DL (ref 12–17)
IMM GRANULOCYTES # BLD AUTO: 0.07 THOUSAND/UL (ref 0–0.2)
IMM GRANULOCYTES NFR BLD AUTO: 1 % (ref 0–2)
INTERVENTRICULAR SEPTUM IN DIASTOLE (PARASTERNAL SHORT AXIS VIEW): 1.8 CM
INTERVENTRICULAR SEPTUM: 1.8 CM (ref 0.6–1.1)
LAAS-AP2: 35.3 CM2
LAAS-AP4: 38.6 CM2
LEFT ATRIUM SIZE: 6.3 CM
LEFT ATRIUM VOLUME (MOD BIPLANE): 150 ML
LEFT ATRIUM VOLUME INDEX (MOD BIPLANE): 61.2 ML/M2
LEFT INTERNAL DIMENSION IN SYSTOLE: 5 CM (ref 2.1–4)
LEFT VENTRICLE DIASTOLIC VOLUME (MOD BIPLANE): 104 ML
LEFT VENTRICLE SYSTOLIC VOLUME (MOD BIPLANE): 53 ML
LEFT VENTRICULAR INTERNAL DIMENSION IN DIASTOLE: 5.5 CM (ref 3.5–6)
LEFT VENTRICULAR POSTERIOR WALL IN END DIASTOLE: 1.8 CM
LEFT VENTRICULAR STROKE VOLUME: 28 ML
LV EF: 48 %
LVSV (TEICH): 28 ML
LYMPHOCYTES # BLD AUTO: 0.41 THOUSANDS/ÂΜL (ref 0.6–4.47)
LYMPHOCYTES NFR BLD AUTO: 4 % (ref 14–44)
MAGNESIUM SERPL-MCNC: 2.5 MG/DL (ref 1.9–2.7)
MCH RBC QN AUTO: 30.2 PG (ref 26.8–34.3)
MCHC RBC AUTO-ENTMCNC: 31.2 G/DL (ref 31.4–37.4)
MCV RBC AUTO: 97 FL (ref 82–98)
MONOCYTES # BLD AUTO: 0.93 THOUSAND/ÂΜL (ref 0.17–1.22)
MONOCYTES NFR BLD AUTO: 9 % (ref 4–12)
NEUTROPHILS # BLD AUTO: 8.98 THOUSANDS/ÂΜL (ref 1.85–7.62)
NEUTS SEG NFR BLD AUTO: 86 % (ref 43–75)
NRBC BLD AUTO-RTO: 0 /100 WBCS
PLATELET # BLD AUTO: 289 THOUSANDS/UL (ref 149–390)
PMV BLD AUTO: 10.5 FL (ref 8.9–12.7)
POTASSIUM SERPL-SCNC: 4 MMOL/L (ref 3.5–5.3)
RBC # BLD AUTO: 4.44 MILLION/UL (ref 3.88–5.62)
RIGHT ATRIUM AREA SYSTOLE A4C: 39.7 CM2
RIGHT VENTRICLE ID DIMENSION: 5.2 CM
SINOTUBULAR JUNCTION: 3.1 CM
SL CV LEFT ATRIUM LENGTH A2C: 7.3 CM
SL CV LV EF: 55
SL CV PED ECHO LEFT VENTRICLE DIASTOLIC VOLUME (MOD BIPLANE) 2D: 148 ML
SL CV PED ECHO LEFT VENTRICLE SYSTOLIC VOLUME (MOD BIPLANE) 2D: 120 ML
SODIUM SERPL-SCNC: 145 MMOL/L (ref 135–147)
STJ: 3.1 CM
TRICUSPID ANNULAR PLANE SYSTOLIC EXCURSION: 1.4 CM
TSH SERPL DL<=0.05 MIU/L-ACNC: 0.86 UIU/ML (ref 0.45–4.5)
WBC # BLD AUTO: 10.43 THOUSAND/UL (ref 4.31–10.16)

## 2023-11-12 PROCEDURE — 73080 X-RAY EXAM OF ELBOW: CPT

## 2023-11-12 PROCEDURE — 99232 SBSQ HOSP IP/OBS MODERATE 35: CPT | Performed by: INTERNAL MEDICINE

## 2023-11-12 PROCEDURE — 93306 TTE W/DOPPLER COMPLETE: CPT

## 2023-11-12 PROCEDURE — 85025 COMPLETE CBC W/AUTO DIFF WBC: CPT | Performed by: STUDENT IN AN ORGANIZED HEALTH CARE EDUCATION/TRAINING PROGRAM

## 2023-11-12 PROCEDURE — 92610 EVALUATE SWALLOWING FUNCTION: CPT

## 2023-11-12 PROCEDURE — 93306 TTE W/DOPPLER COMPLETE: CPT | Performed by: INTERNAL MEDICINE

## 2023-11-12 PROCEDURE — 84443 ASSAY THYROID STIM HORMONE: CPT | Performed by: INTERNAL MEDICINE

## 2023-11-12 PROCEDURE — 94660 CPAP INITIATION&MGMT: CPT

## 2023-11-12 PROCEDURE — 94002 VENT MGMT INPAT INIT DAY: CPT

## 2023-11-12 PROCEDURE — 94760 N-INVAS EAR/PLS OXIMETRY 1: CPT

## 2023-11-12 PROCEDURE — 99223 1ST HOSP IP/OBS HIGH 75: CPT | Performed by: INTERNAL MEDICINE

## 2023-11-12 PROCEDURE — 80048 BASIC METABOLIC PNL TOTAL CA: CPT | Performed by: STUDENT IN AN ORGANIZED HEALTH CARE EDUCATION/TRAINING PROGRAM

## 2023-11-12 PROCEDURE — 70450 CT HEAD/BRAIN W/O DYE: CPT

## 2023-11-12 PROCEDURE — 94640 AIRWAY INHALATION TREATMENT: CPT

## 2023-11-12 PROCEDURE — NC001 PR NO CHARGE: Performed by: INTERNAL MEDICINE

## 2023-11-12 PROCEDURE — G1004 CDSM NDSC: HCPCS

## 2023-11-12 PROCEDURE — 83735 ASSAY OF MAGNESIUM: CPT | Performed by: STUDENT IN AN ORGANIZED HEALTH CARE EDUCATION/TRAINING PROGRAM

## 2023-11-12 RX ORDER — LEVALBUTEROL INHALATION SOLUTION 1.25 MG/3ML
1.25 SOLUTION RESPIRATORY (INHALATION)
Status: DISCONTINUED | OUTPATIENT
Start: 2023-11-12 | End: 2023-11-21 | Stop reason: HOSPADM

## 2023-11-12 RX ORDER — BUMETANIDE 0.25 MG/ML
2 INJECTION INTRAMUSCULAR; INTRAVENOUS ONCE
Status: COMPLETED | OUTPATIENT
Start: 2023-11-12 | End: 2023-11-12

## 2023-11-12 RX ORDER — CALCIUM CARBONATE 500(1250)
1 TABLET ORAL 2 TIMES DAILY WITH MEALS
Status: DISCONTINUED | OUTPATIENT
Start: 2023-11-12 | End: 2023-11-21 | Stop reason: HOSPADM

## 2023-11-12 RX ADMIN — IPRATROPIUM BROMIDE 0.5 MG: 0.5 SOLUTION RESPIRATORY (INHALATION) at 20:58

## 2023-11-12 RX ADMIN — CEFTRIAXONE SODIUM 1000 MG: 10 INJECTION, POWDER, FOR SOLUTION INTRAVENOUS at 17:01

## 2023-11-12 RX ADMIN — Medication 1 TABLET: at 17:09

## 2023-11-12 RX ADMIN — APIXABAN 5 MG: 5 TABLET, FILM COATED ORAL at 17:09

## 2023-11-12 RX ADMIN — CARVEDILOL 25 MG: 25 TABLET, FILM COATED ORAL at 17:09

## 2023-11-12 RX ADMIN — LEVALBUTEROL HYDROCHLORIDE 1.25 MG: 1.25 SOLUTION RESPIRATORY (INHALATION) at 20:58

## 2023-11-12 RX ADMIN — BUMETANIDE 2 MG: 0.25 INJECTION INTRAMUSCULAR; INTRAVENOUS at 17:09

## 2023-11-12 RX ADMIN — AZITHROMYCIN MONOHYDRATE 500 MG: 500 INJECTION, POWDER, LYOPHILIZED, FOR SOLUTION INTRAVENOUS at 17:51

## 2023-11-12 RX ADMIN — BUMETANIDE 2 MG: 0.25 INJECTION INTRAMUSCULAR; INTRAVENOUS at 02:39

## 2023-11-12 RX ADMIN — BUMETANIDE 2 MG: 0.25 INJECTION INTRAMUSCULAR; INTRAVENOUS at 08:23

## 2023-11-12 NOTE — ASSESSMENT & PLAN NOTE
CT of head reveals evidence of chronic bilateral thalamic lacunar and right parietal cortical infarctions  Continue statin/Eliquis  PT/OT as tolerated

## 2023-11-12 NOTE — PROGRESS NOTES
[address] [facility]  Progress Note  Name: Darshan Mckeon I  MRN: [id number]  Unit/Bed#: [facility] 406-01 I Date of Admission: 11/10/2023   Date of Service: 11/12/2023 I Hospital Day: 1      Assessment & Plan:    * Acute respiratory failure with hypoxia and hypercapnia (HCC)  Assessment & Plan  Presented with worsening shortness of breath over the past 2 days per daughter, with respiratory distress on arrival requiring initial BiPAP however patient unable to tolerate and subsequently placed on 10 L [address]  Labs significant for elevated BNP; CTA PE study limited due to motion artifact but no mainstem pulmonary emboli visualized and lungs with hypoinflation and dependent opacities suspected atelectasis  Suspect multifactorial in setting of acute on chronic heart failure, underlying COPD, possible lung mass (was noted on prior CT abdomen/pelvis incidentally however was not reported on current CTA PE study)  Remaining confused as below  Management of the acute on chronic heart failure, COPD as per associated problems with IV diuresis and nebulizers as needed  Continue supplemental oxygen as needed, titrate to maintain appropriate saturations as appropriate  Respiratory protocol, incentive spirometry, pulmonary toileting  Guarded prognosis at this juncture, confirmed patient DNR/DNI as per daughter (POA). We will see if there is any improvement over the weekend, further discussions regarding treatment goals if no significant improvement or particularly if patient should deteriorate. 11/12 - mentation is wax/waning although awake today after use of BIPAP last night - peak daytime oxygen requirement of 15 L now weaned down to 8 L. Continue aggressive intravenous diuresis for CHF exacerbation. Will appreciate pulmonology input -> check repeat blood gas tomorrow morning. Highly suspect both hypoxic and hypercapnic respiratory failure playing a role in altered mentation.      Acute on chronic diastolic CHF ([address])  Assessment & Plan  Per daughter, presented with increased lower extremity edema over the last several days   According revealed an EF of 55% with moderate biatrial dilatation, no evidence of regional LV wall motion abnormalities, and mild MR/TR  Continue IV Bumex BID for diuresis - on Coreg for beta-blockade  Monitor I/Os and daily weights  NT-proBNP elevated at 306 (was 144 last month)  TSH normal (h/o hypothyroidism noted)  Low-sodium/fluid restriction  Monitor/replete electrolyte deficiencies of present  Continue to encourage BIPAP use at night     Acute encephalopathy  Assessment & Plan  Remains confused, however, awake today, with inappropriate and mildly sluggish responses to questions   CT of head today noting: "No acute intracranial abnormality. Generalized cerebral greater than cerebellar atrophy. Chronic microangiopathic changes, chronic bilateral thalamic lacunar infarctions, and chronic right parietal cortical infarction. Subcentimeter extra-axial hyperdensity in the right occipital region most likely represents a meningioma.  This is unchanged when compared to the prior study of February 28, 2020."  Per nursing staff, noted to be intermittently impulsive with attempts at removing IV lines and interfering with necessary medical equipment - has required soft limb restraints for safety - virtual observation ordered  Wean off restraints as able  Limit/avoid sedating agents as possible  Mentation improved compared to yesterday as patient is more awake today s/p use of BIPAP machine -> suspect hypercapnia is a significant culprit in altered mentation -> pulmonology ordered repeat blood gas check for tomorrow morning  Currently afebrile without leukocytosis - urinalysis unremarkable - blood cultures from 11/10 remain negative thus far - imaging unremarkable for pneumonia (but noted atelectasis) -> less likely suspicion currently of an infectious process    Atrial fibrillation [facility])  Assessment & Plan  Rate controlled on Coreg  Continue Eliquis for anticoagulation    Chronic obstructive pulmonary disease (HCC)  Assessment & Plan  Stable/asymptomatic  Continue Umeclidinium/Olodaterol - additional PRN Albuterol on board     Essential hypertension  Assessment & Plan  Continue Zestril/Coreg  Sodium restriction    Hypothyroidism  Assessment & Plan  TSH normal  Continue Synthroid    Right elbow pain  Assessment & Plan  Reports mild pain to lateral abduction of right elbow  Denies recent trauma/falls  Check right elbow XR  If XR imaging negative, consider tendinitis/epicondylitis ? ? Compression fractures of T5 and T9 vertebrae  Assessment & Plan  Imaging revealed mild superior endplate X0/I0 compression fractures deemed stable  PRN pain control   PT/OT as tolerated  Continue calcium/vitamin D supplementation    History of stroke  Assessment & Plan  CT of head reveals evidence of chronic bilateral thalamic lacunar and right parietal cortical infarctions  Continue statin/Eliquis  PT/OT as tolerated      DVT Prophylaxis:  Eliquis    Patient Centered Rounds:  I have performed bedside rounds and discussed plan of care with nursing today. Discussions with Specialists or Other Care Team Provider:  see above assessments if applicable    Education and Discussions with Family / Patient:  Patient at bedside - updated daughter, Jason Castellanos, over the phone today     Time Spent for Care:  35 minutes. More than 50% of total time spent on counseling and coordination of care, on one or more of the following: performing physical exam; counseling and coordination of care, obtaining or reviewing history, documenting in the medical record, reviewing/ordering tests/medications/procedures, and communicating with other healthcare professionals.     Current Length of Stay: 1 day(s)  Current Patient Status: Inpatient   Certification Statement:  Patient will continue to require additional hospital stay due to assessments as noted above. Code Status: Level 3 - DNAR and DNI        Subjective:     Encountered earlier in the day. Patient is currently awake but responds inappropriately to even simple questions with some sluggishness. Does note occasional discomfort of his right elbow, but cannot recall any falls or trauma recently. Objective:     Vitals:   Temp (24hrs), Av.9 °F (36.6 °C), Min:96.7 °F (35.9 °C), Max:98.6 °F (37 °C)    Temp:  [96.7 °F (35.9 °C)-98.6 °F (37 °C)] 96.7 °F (35.9 °C)  HR:  [69-99] 69  Resp:  [20-40] 28  BP: (138-205)/(62-99) 140/62  SpO2:  [91 %-99 %] 91 %  Body mass index is 27.27 kg/m². Input and Output Summary (last 24 hours):        Intake/Output Summary (Last 24 hours) at 2023 1641  Last data filed at 2023 1533  Gross per 24 hour   Intake 0 ml   Output 1305 ml   Net -1305 ml       Physical Exam:     GENERAL Weak/fatigued   HEAD   Normocephalic - atraumatic   EYES Nonicteric   MOUTH   Mucosa moist   NECK   Supple - full range of motion   CARDIAC Rate controlled - S1/S2 positive   PULMONARY Diminished bilateral breath sounds - nonlabored respirations at rest on nasal cannula oxygenation   ABDOMEN   Soft - nontender/nondistended - active bowel sounds     MUSCULOSKELETAL   Motor strength/range of motion markedly deconditioned - bilateral distal LE edema noted - reproducible right elbow tenderness to lateral abduction   NEUROLOGIC Awake but disoriented   SKIN   Chronic wrinkles/blemishes    PSYCHIATRIC   Mood/affect flat         Additional Data:     Labs & Recent Cultures:    Results from last 7 days   Lab Units 23  0459 23  0502 11/10/23  2139   WBC Thousand/uL 10.43*   < > 9.42   HEMOGLOBIN g/dL 13.4   < > 13.0   HEMATOCRIT % 43.0   < > 40.4   PLATELETS Thousands/uL 289   < > 248   BANDS PCT %  --   --  9*   NEUTROS PCT % 86*  --   --    LYMPHS PCT % 4*  --   --    LYMPHO PCT %  --   --  5*   MONOS PCT % 9  --   --    MONO PCT %  --   --  9   EOS PCT % 0 --  0    < > = values in this interval not displayed. Results from last 7 days   Lab Units 11/12/23  0459 11/11/23  0502 11/10/23  2139   POTASSIUM mmol/L 4.0   < > 4.1   CHLORIDE mmol/L 106   < > 104   CO2 mmol/L 26   < > 29   BUN mg/dL 30*   < > 25   CREATININE mg/dL 1.00   < > 1.11   CALCIUM mg/dL 9.4   < > 9.2   ALK PHOS U/L  --   --  90   ALT U/L  --   --  18   AST U/L  --   --  18    < > = values in this interval not displayed. Results from last 7 days   Lab Units 11/10/23  2139   INR  1.68*             Results from last 7 days   Lab Units 11/10/23  2139   LACTIC ACID mmol/L 1.3   PROCALCITONIN ng/ml 0.25         Results from last 7 days   Lab Units 11/10/23  2151 11/10/23  2139   BLOOD CULTURE  No Growth at 24 hrs. No Growth at 24 hrs.          Lines/Drains:  Invasive Devices       Peripheral Intravenous Line  Duration             Peripheral IV 10/16/23 Left Antecubital 27 days    Peripheral IV 11/11/23 Right Forearm 1 day              Drain  Duration             External Urinary Catheter 1 day                      Last 24 Hours Medication List:   Current Facility-Administered Medications   Medication Dose Route Frequency Provider Last Rate    albuterol  2.5 mg Nebulization Q6H PRN Linda [place], DO      apixaban  5 mg Oral BID Linda [place], DO      atorvastatin  40 mg Oral HS Linda [place], DO      azithromycin  500 mg Intravenous Q24H Juno Boyle MD      bumetanide  2 mg Intravenous BID Ellis Baig,       calcium carbonate  1 tablet Oral BID With Meals Kiran Edmondson MD      carvedilol  25 mg Oral BID With Meals Lindakelvin Gonzales, DO      cefTRIAXone  1,000 mg Intravenous Q24H Juno Boyle MD      cholecalciferol  1,000 Units Oral Daily Linda [place], DO      finasteride  5 mg Oral Daily Linda [place], DO      fluticasone  1 spray Nasal Daily PRN Linda [place], DO      hydrALAZINE  5 mg Intravenous Q6H PRN Carmen Barcenas PA-C      latanoprost  1 drop Both Eyes HS Linda [place], DO      levothyroxine  100 mcg Oral Early Morning [place], DO      lisinopril  40 mg Oral Daily [place], DO      umeclidinium  1 puff Inhalation Daily Linda [place], DO      And    olodaterol HCl  2 puff Inhalation Daily Linda [place], DO      ondansetron  4 mg Intravenous Q6H PRN Linda [place], DO                        ** Please Note: This note is constructed using a voice recognition dictation system. An occasional wrong word/phrase or “sound-a-like” substitution may have been picked up by dictation device due to the inherent limitations of voice recognition software. Read the chart carefully and recognize, using reasonable context, where substitutions may have occurred. **

## 2023-11-12 NOTE — ASSESSMENT & PLAN NOTE
Imaging revealed mild superior endplate D0/B6 compression fractures deemed stable  PRN pain control   PT/OT as tolerated  Continue calcium/vitamin D supplementation

## 2023-11-12 NOTE — CONSULTS
PULMONOLOGY CONSULT NOTE     Name: Maximo Vigil   Age & Sex: 80 y.o. male   MRN: [id number]  Unit/Bed#: [facility] 406-01   Encounter: [id number]        Reason for consultation: acute hypoxic resp failure    Requesting physician: iKran Edmondson    Assessment and Plan:    Acute hypoxic and hypercapnic resp failure, due to possible hypoventilation  Acute metabolic encephalopathy, slowly improving  HFrEF  COPD? Hypothyroidism  Af on AC  FILOMENA on Cpap  CAD s/p CABG  Cognitive impairment      - continue to wean off oxygen support. Currently tolerating 6L. Keep SpO2 > 88  - I suspect this event might be triggered by oversedation and possible hypoventilation. Will avoid any further benzos, opioids and antipsychotic in house  - continue Cpap at night  - will start 3 days of ceftriaxone and azithromycin to cover possible CAP. Will suggest to recheck another PCT and if continues to be negative can DC abx.   - PRN diuretics to treat HF exacerbation. Diuresis per primary    History of Present Illness   HPI:  Maximo Vigil is a 80 y.o. male with PMHx of FILOMENA on Cpap, HFrEF, post CABG, Af on AC, Hypothyroidism, cognitive impairment, was first presented to ED on 11/10 complaining of worsening shortness of breath and increase swelling. He was found to be in respiratory distress as well as altered mental status. Initial workup showed worsening resp acidosis as well as elevated BNP level int he setting of acute on chronic heart failure. He was placed with bipap for ventilator support however could not tolerated it. Patient received bunch of sedative medication, including ativan, fentanyl as well as olanzapine and was found more hypoxia this morning needing midflow 15L. We were consulted for hypoxic resp failure management. Seen and examined at bedside this morning. Awake and appears to be disoriented. Not in stress. He tolerated N/C 6 L during my interview and not in stress.  He appears to be mild hypervolemic and on exam he does not have wheezing but with rhonchi noted. CT PE study done on 11/11 showed congestive pulmonary vascular without much of consolidation. Patient does not recall any pulmonary underline disease, and stated he smoked in the past.     Review of systems:  12 point review of systems was completed and was otherwise negative except as listed in HPI.       Historical Information   Past Medical History:   Diagnosis Date    Arithmetic disorder     [id number]    Myocardial infarct [facility])     [id number]  LAST ASSESSED     Past Surgical History:   Procedure Laterality Date    BLADDER SURGERY      LAST ASSESSED 12OCT2016      CORONARY ARTERY BYPASS GRAFT      12OCT2016 RESOLVED    SKIN BIOPSY       Family History   Problem Relation Age of Onset    Breast cancer Family         [id number] LAST ASSESSED           Social History: former smoekr  Social History     Tobacco Use   Smoking Status Former   Smokeless Tobacco Never         Meds/Allergies   Current Facility-Administered Medications   Medication Dose Route Frequency    albuterol inhalation solution 2.5 mg  2.5 mg Nebulization Q6H PRN    apixaban (ELIQUIS) tablet 5 mg  5 mg Oral BID    atorvastatin (LIPITOR) tablet 40 mg  40 mg Oral HS    bumetanide (BUMEX) injection 2 mg  2 mg Intravenous BID    carvedilol (COREG) tablet 25 mg  25 mg Oral BID With Meals    cholecalciferol (VITAMIN D3) tablet 1,000 Units  1,000 Units Oral Daily    finasteride (PROSCAR) tablet 5 mg  5 mg Oral Daily    fluticasone (FLONASE) 50 mcg/act nasal spray 1 spray  1 spray Nasal Daily PRN    hydrALAZINE (APRESOLINE) injection 5 mg  5 mg Intravenous Q6H PRN    latanoprost (XALATAN) 0.005 % ophthalmic solution 1 drop  1 drop Both Eyes HS    levothyroxine tablet 100 mcg  100 mcg Oral Early Morning    lisinopril (ZESTRIL) tablet 40 mg  40 mg Oral Daily    umeclidinium 62.5 mcg/actuation inhaler AEPB 1 puff  1 puff Inhalation Daily    And    olodaterol HCl (STRIVERDI RESPIMAT) inhaler 2 puff  2 puff Inhalation Daily    ondansetron (ZOFRAN) injection 4 mg  4 mg Intravenous Q6H PRN     Medications Prior to Admission   Medication    albuterol (PROVENTIL HFA,VENTOLIN HFA) 90 mcg/act inhaler    apixaban (Eliquis) 5 mg    atorvastatin (LIPITOR) 40 mg tablet    benazepril (LOTENSIN) 40 MG tablet    carbamide peroxide (DEBROX) 6.5 % otic solution    carboxymethylcellulose 0.5 % SOLN    carboxymethylcellulose 0.5 % SOLN    carvedilol (COREG) 25 mg tablet    cholecalciferol (VITAMIN D3) 1,000 units tablet    finasteride (PROSCAR) 5 mg tablet    fluticasone (FLONASE) 50 mcg/act nasal spray    latanoprost (XALATAN) 0.005 % ophthalmic solution    levothyroxine 100 mcg tablet    potassium chloride (K-DUR,KLOR-CON) 20 mEq tablet    tiotropium-olodaterol (STIOLTO RESPIMAT) 2.5-2.5 MCG/ACT inhaler    torsemide (DEMADEX) 10 mg tablet    torsemide (DEMADEX) 20 mg tablet     No Known Allergies    Vitals: Blood pressure 138/84, pulse 82, temperature 97.9 °F (36.6 °C), temperature source Oral, resp. rate 22, height 5' 11" (1.803 m), weight 129 kg (285 lb), SpO2 94 %. , 6L N/C, Body mass index is 39.75 kg/m². Intake/Output Summary (Last 24 hours) at 11/12/2023 0947  Last data filed at 11/12/2023 0600  Gross per 24 hour   Intake 0 ml   Output 1605 ml   Net -1605 ml       Physical Exam  Constitutional:       Appearance: He is obese. Cardiovascular:      Rate and Rhythm: Normal rate. Rhythm irregular. Pulmonary:      Breath sounds: Examination of the right-middle field reveals rhonchi. Examination of the left-middle field reveals rhonchi. Examination of the right-lower field reveals rhonchi. Examination of the left-lower field reveals rhonchi. Decreased breath sounds and rhonchi present. No wheezing or rales. Chest:      Chest wall: No edema. Abdominal:      General: Bowel sounds are normal.      Palpations: Abdomen is soft. Tenderness: There is no guarding. Musculoskeletal:         General: Normal range of motion.       Right lower leg: Edema present. Left lower leg: Edema present. Skin:     General: Skin is warm. Labs: I have personally reviewed pertinent lab results. Laboratory and Diagnostics  Results from last 7 days   Lab Units 11/12/23  0459 11/11/23  0502 11/10/23  2139   WBC Thousand/uL 10.43* 9.72 9.42   HEMOGLOBIN g/dL 13.4 12.7 13.0   HEMATOCRIT % 43.0 40.6 40.4   PLATELETS Thousands/uL 289 274 248   NEUTROS PCT % 86*  --   --    BANDS PCT %  --   --  9*   MONOS PCT % 9  --   --    MONO PCT %  --   --  9   EOS PCT % 0  --  0     Results from last 7 days   Lab Units 11/12/23  0459 11/11/23  0502 11/10/23  2139   SODIUM mmol/L 145 140 139   POTASSIUM mmol/L 4.0 4.2 4.1   CHLORIDE mmol/L 106 104 104   CO2 mmol/L 26 29 29   ANION GAP mmol/L 13 7 6   BUN mg/dL 30* 28* 25   CREATININE mg/dL 1.00 1.17 1.11   CALCIUM mg/dL 9.4 8.8 9.2   GLUCOSE RANDOM mg/dL 85 129 127   ALT U/L  --   --  18   AST U/L  --   --  18   ALK PHOS U/L  --   --  90   ALBUMIN g/dL  --   --  3.7   TOTAL BILIRUBIN mg/dL  --   --  2.10*     Results from last 7 days   Lab Units 11/12/23  0459 11/10/23  2139   MAGNESIUM mg/dL 2.5 2.3      Results from last 7 days   Lab Units 11/10/23  2139   INR  1.68*   PTT seconds 44*          Results from last 7 days   Lab Units 11/10/23  2139   LACTIC ACID mmol/L 1.3                     Results from last 7 days   Lab Units 11/10/23  2139   PROCALCITONIN ng/ml 0.25         Imaging and other studies: I have personally reviewed pertinent reports. and I have personally reviewed pertinent films in PACS  CTA ED chest PE Study    Result Date: 2023  Impression: 1. Limited evaluation without evidence of pulmonary embolism 2.   Additional chronic findings as above Workstation performed: [id number]         Pulmonary function testin Moderate restrictive airflow defect and Severe diffusion defect       Code Status: Level 3 - DNAR and DNI      Lexy Perla MD  Pulmonary/Critical Care Fellow  [facility]

## 2023-11-12 NOTE — PHYSICAL THERAPY NOTE
Physical Therapy Cancellation Note         11/12/23 0828   Note Type   Note type Cancelled Session; Evaluation   Cancel Reasons Medical status   Additional Comments Pt currently on BiPAP and not appropirate for PT evaluation. Will continue to follow and evaluate as medically appropirate.      Fidencio Sawyer, PT

## 2023-11-12 NOTE — CONSULTS
Consult Note - Pulmonary   Jesusita Joaquin 80 y.o. male MRN: [id number]  Unit/Bed#: [facility] 406-01 Encounter: [id number]      Reason for consultation: Hypoxia    Requesting physician: Sergio Esparza MD    Assessment/Recommendations:   Acute hypoxic respiratory failure  Acute hypercapnic respiratory failure; likely due to benzodiazepines  Lower respiratory tract infection  Acute toxic metabolic encephalopathy   Restrictive lung disease  Expiratory dynamic airway collapse on CT chest  3.5 RLL opacity seen on 10/16 CT A/P - resolved    - wean supplemental O2 for SpO2 goal>89%  - hold Stiolto for now; started on TID xopenex/atrovent  - started ceftriaxone/azithromycin for tracheobronchitis vs pneumonia, follow up procalcitonin, sputum culture  - hypercapnia likely contributed by Lorazepam and Fentanyl given in ED to tolerate BiPAP. Avoid further benzo/opioids  - c/w BiPAP tonight. Recheck VBG tomorrow. - diuresis per primary team  - out of bed daily  - incentive spiromtery, flutter valve      History of Present Illness   HPI:  Jesusita Joaquin is a 80 y.o. male with a history of cognitive impairment, HFpEF, CAD, hypothyroidism, bicuspid aortic valve, atrial fibrillation on Eliquis, who is presenting with dyspnea x 2-3 days. He has a history of ? COPD but has not been following with pulmonology. PFTs in 2017 showed ratio of 71%, FEV1 66%. He has been on [employer]. In the ED he was encephalopathic and was given total of 1.5mg Lorazepam and 25mcg fentanyl and started on BIPAP for respiratory insufficiency. At bedside he appears mildly disoriented, but pleasant and cooperative. He is having mild dyspnea at rest and a productive cough.         Historical Information   Past Medical History:   Diagnosis Date    Arithmetic disorder     [id number]    Myocardial infarct [facility])     [id number]  LAST ASSESSED     Past Surgical History:   Procedure Laterality Date    BLADDER SURGERY      LAST ASSESSED [id number] CORONARY ARTERY BYPASS GRAFT      [id number] RESOLVED    SKIN BIOPSY       Family History   Problem Relation Age of Onset    Breast cancer Family         [id number] LAST ASSESSED     Social History     Socioeconomic History    Marital status: /Civil Union     Spouse name: Not on file    Number of children: 3    Years of education: Not on file    Highest education level: Not on file   Occupational History    Not on file   Tobacco Use    Smoking status: Former    Smokeless tobacco: Never   Vaping Use    Vaping Use: Never used   Substance and Sexual Activity    Alcohol use: Yes     Comment: OCCASIONAL    Drug use: Not on file    Sexual activity: Not on file   Other Topics Concern    Not on file   Social History Narrative    ACTIVE ADVANCE DIRECTIVE ON FILE  LAST ASSESSED 23 JAN 2018    ALL SCRIPTS SAYS      Social Determinants of Health     Financial Resource Strain: Not on file   Food Insecurity: Not on file   Transportation Needs: Not on file   Physical Activity: Not on file   Stress: Not on file   Social Connections: Not on file   Intimate Partner Violence: Not on file   Housing Stability: Not on file       Meds/Allergies   Current Facility-Administered Medications   Medication Dose Route Frequency    albuterol inhalation solution 2.5 mg  2.5 mg Nebulization Q6H PRN    apixaban (ELIQUIS) tablet 5 mg  5 mg Oral BID    atorvastatin (LIPITOR) tablet 40 mg  40 mg Oral HS    azithromycin (ZITHROMAX) 500 mg in sodium chloride 0.9 % 250 mL IVPB  500 mg Intravenous Q24H    bumetanide (BUMEX) injection 2 mg  2 mg Intravenous BID    calcium carbonate (OYSTER SHELL,OSCAL) 500 mg tablet 1 tablet  1 tablet Oral BID With Meals    carvedilol (COREG) tablet 25 mg  25 mg Oral BID With Meals    cefTRIAXone (ROCEPHIN) 1,000 mg in dextrose 5 % 50 mL IVPB  1,000 mg Intravenous Q24H    cholecalciferol (VITAMIN D3) tablet 1,000 Units  1,000 Units Oral Daily    finasteride (PROSCAR) tablet 5 mg  5 mg Oral Daily    fluticasone (FLONASE) 50 mcg/act nasal spray 1 spray  1 spray Nasal Daily PRN    hydrALAZINE (APRESOLINE) injection 5 mg  5 mg Intravenous Q6H PRN    latanoprost (XALATAN) 0.005 % ophthalmic solution 1 drop  1 drop Both Eyes HS    levothyroxine tablet 100 mcg  100 mcg Oral Early Morning    lisinopril (ZESTRIL) tablet 40 mg  40 mg Oral Daily    umeclidinium 62.5 mcg/actuation inhaler AEPB 1 puff  1 puff Inhalation Daily    And    olodaterol HCl (STRIVERDI RESPIMAT) inhaler 2 puff  2 puff Inhalation Daily    ondansetron (ZOFRAN) injection 4 mg  4 mg Intravenous Q6H PRN     Medications Prior to Admission   Medication    albuterol (PROVENTIL HFA,VENTOLIN HFA) 90 mcg/act inhaler    apixaban (Eliquis) 5 mg    atorvastatin (LIPITOR) 40 mg tablet    benazepril (LOTENSIN) 40 MG tablet    carbamide peroxide (DEBROX) 6.5 % otic solution    carboxymethylcellulose 0.5 % SOLN    carboxymethylcellulose 0.5 % SOLN    carvedilol (COREG) 25 mg tablet    cholecalciferol (VITAMIN D3) 1,000 units tablet    finasteride (PROSCAR) 5 mg tablet    fluticasone (FLONASE) 50 mcg/act nasal spray    latanoprost (XALATAN) 0.005 % ophthalmic solution    levothyroxine 100 mcg tablet    potassium chloride (K-DUR,KLOR-CON) 20 mEq tablet    tiotropium-olodaterol (STIOLTO RESPIMAT) 2.5-2.5 MCG/ACT inhaler    torsemide (DEMADEX) 10 mg tablet    torsemide (DEMADEX) 20 mg tablet     No Known Allergies    Vitals: Vitals personally reviewed  Vitals:    11/12/23 1200 11/12/23 1300 11/12/23 1533 11/12/23 1600   BP:   140/62    BP Location:   Left arm    Pulse:   69    Resp:   (!) 28    Temp:   (!) 96.7 °F (35.9 °C)    TempSrc:   Axillary    SpO2: 94% 95% 91% 92%   Weight:       Height:          Body mass index is 27.27 kg/m².     Intake/Output Summary (Last 24 hours) at 11/12/2023 1835  Last data filed at 11/12/2023 1533  Gross per 24 hour   Intake --   Output 1105 ml   Net -1105 ml     Invasive Devices       Peripheral Intravenous Line  Duration             Peripheral IV 10/16/23 Left Antecubital 27 days    Peripheral IV 23 Right Forearm 1 day              Drain  Duration             External Urinary Catheter 1 day                    Physical Exam  General:    HEET: head is normocephalic, atraumatic. EOMI. No scleral icterus. Neck: Supple, no appreciable JVD, not using accessory muscles  CV:  Regular rhythm, +S1 S2  Lungs: Mild end expiratory wheezing  Abdomen: Soft, non distended  Extremities: No cyanosis. Trace peripheral edema  Neuro: Disoriented, answering questions appropriately. Moving extremities purposefully. Skin: Warm, dry  Lines/tubes:  PIV  Oxygenation: 4LPM    Labs: I have personally reviewed pertinent lab results.   Laboratory and Diagnostics  Results from last 7 days   Lab Units 11/12/23  0459 11/11/23  0502 11/10/23  2139   WBC Thousand/uL 10.43* 9.72 9.42   HEMOGLOBIN g/dL 13.4 12.7 13.0   HEMATOCRIT % 43.0 40.6 40.4   PLATELETS Thousands/uL 289 274 248   NEUTROS PCT % 86*  --   --    BANDS PCT %  --   --  9*   MONOS PCT % 9  --   --    MONO PCT %  --   --  9   EOS PCT % 0  --  0     Results from last 7 days   Lab Units 11/12/23  0459 11/11/23  0502 11/10/23  2139   SODIUM mmol/L 145 140 139   POTASSIUM mmol/L 4.0 4.2 4.1   CHLORIDE mmol/L 106 104 104   CO2 mmol/L 26 29 29   ANION GAP mmol/L 13 7 6   BUN mg/dL 30* 28* 25   CREATININE mg/dL 1.00 1.17 1.11   CALCIUM mg/dL 9.4 8.8 9.2   GLUCOSE RANDOM mg/dL 85 129 127   ALT U/L  --   --  18   AST U/L  --   --  18   ALK PHOS U/L  --   --  90   ALBUMIN g/dL  --   --  3.7   TOTAL BILIRUBIN mg/dL  --   --  2.10*     Results from last 7 days   Lab Units 11/12/23  0459 11/10/23  2139   MAGNESIUM mg/dL 2.5 2.3      Results from last 7 days   Lab Units 11/10/23  2139   INR  1.68*   PTT seconds 44*          Results from last 7 days   Lab Units 11/10/23  2139   LACTIC ACID mmol/L 1.3                     Results from last 7 days   Lab Units 11/10/23  2139   PROCALCITONIN ng/ml 0.25           AB.- >7.31/58    Imaging and other studies: I have personally reviewed pertinent films in PACS  2023 CTA - atelectasis, no effusion/PTX. No focal consolidation. Hypoventilation. Respiratory motion. Chronic T5/T9/T12 chronic compression    Pulmonary function testin2017    Results:  FEV1/FVC Ratio: 71 %  Forced Vital Capacity: 2.45 L    64 % predicted  FEV1: 1.71 L     66 % predicted  After administration of bronchodilator FEV1: 1.89;  FVC:  2.64  Lung volumes by body plethysmography: Total Lung Capacity 80 % predicted Residual volume 97 % predicted  DLCO corrected for patients hemoglobin level: 37 %      Echocardiogram: 23    Left Ventricle: Left ventricular cavity size is normal. Wall thickness is increased. The left ventricular ejection fraction is 55% by visual estimation. Although no diagnostic regional wall motion abnormality was identified, this possibility cannot be completely excluded on the basis of this study. Right Ventricle: Right ventricular cavity size is mildly dilated. Systolic function is mildly reduced. Left Atrium: The atrium is moderately dilated. Right Atrium: The atrium is moderately dilated. Aortic Valve: There is aortic valve sclerosis. Mitral Valve: There is mild regurgitation. Tricuspid Valve: There is mild regurgitation. Code Status: Level 3 - DNAR and DNI      Liseth Thornton MD  Pulmonary, Critical Care and Sleep Medicine  [facility]ly [facility]     Portions of the record may have been created with voice recognition software. Occasional wrong word or "sound a like" substitutions may have occurred due to the inherent limitations of voice recognition software. Please read the chart carefully and recognize, using context, where substitutions have occurred.

## 2023-11-12 NOTE — ASSESSMENT & PLAN NOTE
Per daughter, presented with increased lower extremity edema over the last several days   According revealed an EF of 55% with moderate biatrial dilatation, no evidence of regional LV wall motion abnormalities, and mild MR/TR  Continue IV Bumex BID for diuresis - on Coreg for beta-blockade  Monitor I/Os and daily weights  NT-proBNP elevated at 306 (was 144 last month)  TSH normal (h/o hypothyroidism noted)  Low-sodium/fluid restriction  Monitor/replete electrolyte deficiencies of present  Continue to encourage BIPAP use at night

## 2023-11-12 NOTE — ASSESSMENT & PLAN NOTE
Presented with worsening shortness of breath over the past 2 days per daughter, with respiratory distress on arrival requiring initial BiPAP however patient unable to tolerate and subsequently placed on 10 L [address]  Labs significant for elevated BNP; CTA PE study limited due to motion artifact but no mainstem pulmonary emboli visualized and lungs with hypoinflation and dependent opacities suspected atelectasis  Suspect multifactorial in setting of acute on chronic heart failure, underlying COPD, possible lung mass (was noted on prior CT abdomen/pelvis incidentally however was not reported on current CTA PE study)  Remaining confused as below  Management of the acute on chronic heart failure, COPD as per associated problems with IV diuresis and nebulizers as needed  Continue supplemental oxygen as needed, titrate to maintain appropriate saturations as appropriate  Respiratory protocol, incentive spirometry, pulmonary toileting  Guarded prognosis at this juncture, confirmed patient DNR/DNI as per daughter (POA). We will see if there is any improvement over the weekend, further discussions regarding treatment goals if no significant improvement or particularly if patient should deteriorate. 11/12 - mentation is wax/waning although awake today after use of BIPAP last night - peak daytime oxygen requirement of 15 L now weaned down to 8 L. Continue aggressive intravenous diuresis for CHF exacerbation. Will appreciate pulmonology input -> check repeat blood gas tomorrow morning. Highly suspect both hypoxic and hypercapnic respiratory failure playing a role in altered mentation.

## 2023-11-12 NOTE — SPEECH THERAPY NOTE
Speech-Language Pathology Bedside Swallow Evaluation      Patient Name: Jeannie Ramirez    JYVPO'K Date: 11/12/2023     Problem List  Principal Problem:    Acute respiratory failure with hypoxia and hypercapnia (HCC)  Active Problems:    Atrial fibrillation (HCC) [I48.91]    Benign essential hypertension    Acute on chronic diastolic (congestive) heart failure (HCC)    Hypothyroidism    Chronic obstructive pulmonary disease, unspecified COPD type ([address])    Acute encephalopathy      Past Medical History  Past Medical History:   Diagnosis Date    Arithmetic disorder     07OCT2016    Myocardial infarct [facility])     [id number]  LAST ASSESSED       Past Surgical History  Past Surgical History:   Procedure Laterality Date    BLADDER SURGERY      LAST ASSESSED 12OCT2016      CORONARY ARTERY BYPASS GRAFT      [id number] RESOLVED    SKIN BIOPSY         Summary   Pt presented with functional appearing oral stage of swallowing with  s/s suggestive of suspected mild pharyngeal dysphagia. Symptoms or concerns included coughing with thin liquids. The patient is assessed with puree and soft solids with nectar thick and thin liquids. No overt s/s aspiration observed with large, consecutive sips of nectar thick liquids. Risk/s for Aspiration: mod      Recommended Diet: mechanically altered/level 2 diet and nectar thick liquids   Recommended Form of Meds: crushed with puree   Aspiration precautions and swallowing strategies: upright posture and small bites/sips  Other Recommendations: Continue frequent oral care    Current Medical Status  This is a 45-year-old male with history of CHF, CAD, COPD who is presenting with worsening shortness of breath over the past 2 days. Daughter presents with the patient who reports that last night he was complaining of some shortness of breath and seems like he was having a little bit more of a difficult time breathing than he normally does.   Over the course of the day today patient has gotten significantly worse and is now having some confusion that is worse than his normal demented self. She notes that he continues to have a dry, nonproductive cough which is his baseline. He is anticoagulated on Eliquis. He does note that his right leg will be more swollen than normal.  She has not noticed any fevers at home and denies any known infectious symptoms other than the cough. Patient arrives on CPAP with SPO2 in the 99 to 100% range. Current Precautions:  Fall  Aspiration  Delirium  Allergies:  No known food allergies  Past medical history:  Please see H&P for details    Social/Education/Vocational Hx:  Pt lives in SNF/ECF    Swallow Information   Current Risks for Dysphagia & Aspiration: AMS and increased O2 requirements   Current Symptoms/Concerns: coughing with po  Current Diet: NPO   Baseline Diet:  assume regular with thin liquids     Baseline Assessment   Behavior/Cognition: alert and confused   Speech/Language Status: able to participate in basic conversation and able to follow commands  Patient Positioning: upright in bed  Pain Status/Interventions/Response to Interventions: No report of or nonverbal indications of pain. Swallow Mechanism Exam  Facial: symmetrical  Labial: WFL  Lingual: WFL  Velum: unable to visualize  Mandible: adequate ROM  Dentition: adequate  Vocal quality:clear/adequate   Volitional Cough: strong/productive   Respiratory Status: on 15 L midflow     Consistencies Assessed and Performance   Consistencies Administered: thin liquids, nectar thick, puree, and soft solids  Materials administered included applesauce and zaire crackers with nectar thick and thin liquids     Oral Stage: WFL  Mastication was adequate with the materials administered today. Bolus formation and transfer were functional with no significant oral residue noted. No overt s/s reduced oral control. Pharyngeal Stage: mild  Swallow Mechanics:  Swallowing initiation appeared prompt.   Laryngeal rise was palpated and judged to be within functional limits. Immediate cough observed with thin liquids. Esophageal Concerns: none reported    Summary and Recommendations (see above)    Results Reviewed with: RN     Treatment Recommended: dysphagia therapy      Frequency of treatment: 2-3 x week, as able     Patient Stated Goal: none stated     Dysphagia LTG  -Patient will demonstrate safe and effective oral intake (without overt s/s significant oral/pharyngeal dysphagia including s/s penetration or aspiration) for the highest appropriate diet level.      Short Term Goals:  -Pt will tolerate Dysphagia 2/mechanical soft diet and nectar thick liquid with no significant s/s oral or pharyngeal dysphagia across 1-3 diagnostic session/s.    -Patient will tolerate trials of upgraded food and/or liquid texture with no significant s/s of oral or pharyngeal dysphagia including aspiration across 1-3 diagnostic sessions     -Patient will comply with a Video/Modified Barium Swallow study for more complete assessment of swallowing anatomy/physiology/aspiration risk and to assess efficacy of treatment techniques so as to best guide treatment plan    Speech Therapy Prognosis   Prognosis: fair    Prognosis Considerations: age, medical status, and respiratory status

## 2023-11-12 NOTE — UTILIZATION REVIEW
Initial Clinical Review    Admission: Date/Time/Statement:   Admission Orders (From admission, onward)       Ordered        11/11/23 0407  Inpatient Admission  Once                          Orders Placed This Encounter   Procedures    Inpatient Admission     Standing Status:   Standing     Number of Occurrences:   1     Order Specific Question:   Level of Care     Answer:   Level 2 Stepdown / HOT [14]     Order Specific Question:   Estimated length of stay     Answer:   More than 2 Midnights     Order Specific Question:   Certification     Answer:   I certify that inpatient services are medically necessary for this patient for a duration of greater than two midnights. See H&P and MD Progress Notes for additional information about the patient's course of treatment. ED Arrival Information       Expected   -    Arrival   11/10/2023 20:35    Acuity   Emergent              Means of arrival   Ambulance    Escorted by   [facility]    Service   Hospitalist    Admission type   Emergency              Arrival complaint   SOB             Chief Complaint   Patient presents with    Shortness of Breath     PT brought in by EMS for SOB from independent living facility. Per ems, daughter called 911 bc pt c/o sob. Hx CHF, COPD. PT on CPAP upon arrival O2 98%. Initial Presentation: 80 y.o. male who presented to [address] [place] [address] ED. Inpatient admission for evaluation and treatment of Acute respiratory failure,  due to Acute on chronic diastolic (congestive) heart failure and Acute respiratory failure with hypoxia and hypercapnia. PMHx: He  has a past medical history of Arithmetic disorder and Myocardial infarct, CAD s/p CABG, with chronic diastolic heart failure, COPD, Afib on Eliquis, reported dementia   Presented w/ worsening SOB and respiratory distress, over the past 2-3 days, additionally reported increasing lower extremity edema with an unclear weight gain.  He now has some confusion which prompted his presentation. On arrival to the ED he was noted to be in respiratory distress required CPAP transitioned to BiPAP then to NC as he was intolerant of BiPAP. His agitation required wrist restraints and multiple rounds of chemical sedation. Date: 11/12/2023  Day 2:  Pt with lethargy s/p fentanyl, Ativan, Haldol, Zyprexa over the past 12 hours due to his agitation. Currently in restraints. He was on 15L mid flow O2 sat 99%/ mentation improved from initial evaluation  Plan to d/c lasix - given  poor diuresis - switch to Bumex 2g bid, monitor off abx's, obtain UA. NPO while altered. Continue restraints for now wean as tolerated. 1:1 observation ordered. Date: 11/13/2023    Day 3: Has surpassed a 2nd midnight with active treatments and services, which include   Supplemental O2, BiPap Hs , Hold stiolto, Bumex Iv  diuresis continues. He remains on IV Antibiotics.            ED Triage Vitals   Temperature Pulse Respirations Blood Pressure SpO2   11/10/23 2245 11/10/23 2039 11/10/23 2039 11/10/23 2040 11/10/23 2039   97.7 °F (36.5 °C) 59 (!) 24 144/65 100 %      Temp Source Heart Rate Source Patient Position - Orthostatic VS BP Location FiO2 (%)   11/10/23 2245 11/10/23 2039 11/10/23 2040 11/10/23 2040 11/10/23 2200   Tympanic Monitor Sitting Left arm 60      Pain Score       11/10/23 2221       9          Wt Readings from Last 1 Encounters:   11/13/23 110 kg (241 lb 6.5 oz)     Additional Vital Signs:   Date/Time Temp Pulse Resp BP MAP (mmHg) SpO2 FiO2 (%) O2 Flow Rate (L/min) O2 Device O2 Interface Device Patient Position - Orthostatic VS   11/13/23 1140 97.6 °F (36.4 °C) 69 33 Abnormal  150/91 105 97 % -- -- Nasal cannula -- Lying   11/13/23 0815 -- -- -- -- -- 97 % -- -- -- -- --   11/13/23 0737 98.1 °F (36.7 °C) 72 33 Abnormal  161/84 104 99 % -- -- Nasal cannula -- Lying   11/13/23 0400 -- -- -- -- -- -- -- 2 L/min Nasal cannula -- --   11/13/23 0223 97 °F (36.1 °C) Abnormal  78 49 Abnormal  164/74 97 98 % -- -- Nasal cannula -- Lying   11/13/23 0000 -- -- -- -- -- -- -- 2 L/min Nasal cannula -- --   11/12/23 2339 96.9 °F (36.1 °C) Abnormal  76 18 128/68 92 93 % -- -- None (Room air) -- --   11/12/23 2225 -- -- -- -- -- 97 % -- -- BiPAP Face mask --   11/12/23 2058 -- -- -- -- -- 96 % -- 2 L/min Mid flow nasal cannula -- --   11/12/23 2000 -- -- -- -- -- -- -- 8 L/min Mid flow nasal cannula -- --   11/12/23 1700 98.4 °F (36.9 °C) 74 23 Abnormal  111/58 64 Abnormal  95 % -- -- -- -- --   11/12/23 1600 -- -- -- -- -- 92 % -- 8 L/min Mid flow nasal cannula -- --   11/12/23 1533 96.7 °F (35.9 °C) Abnormal  69 28 Abnormal  140/62 89 91 % -- -- -- -- --   11/12/23 1300 -- -- -- -- -- 95 % -- 8 L/min Mid flow nasal cannula -- --   11/12/23 1200 -- -- -- -- -- 94 % -- 10 L/min Mid flow nasal cannula -- --   11/12/23 1015 -- 82 -- 138/84 -- -- -- -- -- -- --     Date/Time Temp Pulse Resp BP MAP (mmHg) SpO2 FiO2 (%) O2 Flow Rate (L/min) O2 Device O2 Interface Device Patient Position - Orthostatic VS   11/12/23 0908 -- -- -- -- -- 94 % -- 15 L/min Mid flow nasal cannula -- --   11/12/23 0700 -- 82 22 138/84 104 97 % -- -- -- -- Lying   11/12/23 0400 -- -- -- -- -- -- -- -- BiPAP -- --   11/12/23 0256 -- -- -- -- -- 94 % -- -- -- Face mask --   11/12/23 0211 97.9 °F (36.6 °C) 99 40 Abnormal  163/91 120 92 % -- -- Mid flow nasal cannula -- Lying   11/12/23 0000 -- -- -- -- -- -- -- 6 L/min Mid flow nasal cannula -- --   11/11/23 2348 98.3 °F (36.8 °C) 99 22 172/82 Abnormal  113 91 % -- -- Mid flow nasal cannula -- Lying   11/11/23 2303 -- -- -- 205/99 Abnormal  -- -- -- -- -- -- --   11/11/23 2000 -- -- -- -- -- -- -- 6 L/min Mid flow nasal cannula -- --   11/11/23 1946 98.6 °F (37 °C) 75 -- 141/82 102 96 % -- -- Mid flow nasal cannula -- Lying   11/1934 -- 85 20 -- -- 99 % -- -- -- -- --   11/11/23 1719 -- -- -- -- -- 99 % -- -- -- -- --   11/11/23 1600 -- -- -- -- -- 99 % -- 6 L/min Nasal cannula -- --   11/11/23 1436 -- -- -- -- -- 97 % -- 6 L/min Mid flow nasal cannula -- --   11/11/23 1200 -- -- -- -- -- 96 % -- 10 L/min Mid flow nasal cannula -- --   11/11/23 1106 97.4 °F (36.3 °C) Abnormal  85 24 Abnormal  149/92 108 98 % -- 15 L/min Mid flow nasal cannula -- --   11/11/23 1028 -- -- -- -- -- 97 % -- -- -- Face mask --   11/11/23 0915 -- 63 -- 115/62 78 99 % -- 15 L/min Mid flow nasal cannula -- --   11/11/23 0847 -- 56 -- 130/68 88 99 % -- 15 L/min Mid flow nasal cannula -- --   11/11/23 0640 -- 65 20 140/57 89 99 % -- -- Mid flow nasal cannula -- Sitting   11/11/23 0630 -- 62 20 -- -- 99 % 60 -- Mid flow nasal cannula -- --   11/11/23 0515 -- 78 22 149/90 126 97 % -- -- Mid flow nasal cannula -- --   11/11/23 0400 -- 70 22 -- -- 97 % -- -- Mid flow nasal cannula MFNC prongs --   11/11/23 0300 -- 62 20 -- -- 96 % -- -- Mid flow nasal cannula -- --   11/11/23 0100 -- -- -- -- -- 97 % -- -- -- MFNC prongs --   11/10/23 2355 -- -- -- 150/52 -- -- -- -- -- -- --   11/10/23 2245 97.7 °F (36.5 °C) -- -- -- -- -- -- -- -- -- --   11/10/23 2200 -- -- -- -- -- 99 % 60 -- BiPAP -- --   11/10/23 2100 -- -- -- -- -- 98 % -- -- -- Face mask --   11/10/23 2041 -- -- -- -- -- -- -- -- CPAP -- --   11/10/23 2040 -- -- -- 144/65 -- -- -- -- -- -- Sitting   11/10/23 2039 -- 59 24 Abnormal  -- -- 100 % -- -- CPAP -- --         Pertinent Labs/Diagnostic Test Results:   CTA ED chest PE Study   Final Result by Alyssa Agee MD (11/11 0257)      1. Limited evaluation without evidence of pulmonary embolism   2. Additional chronic findings - Lungs - hypoinflation, dependent opacities, favored to represent atelectasis - motion artifact limits evaluation. CT head - 11/12 - 1. No acute intracranial abnormality. 2.  Generalized cerebral greater than cerebellar atrophy. 3.  Chronic microangiopathic changes, chronic bilateral thalamic lacunar infarctions, and chronic right parietal cortical infarction. 4.  Subcentimeter extra-axial hyperdensity in the right occipital region most likely represents a meningioma. This is unchanged when compared to the prior study of February 28, 2020.        ECG - 11/10 - Possible Atrial fibrillation with premature ventricular or aberrantly conducted complexes  Baseline Artifact  Abnormal ECG     ECHO - 11/10 - read pending     Results from last 7 days   Lab Units 11/10/23  2139   SARS-COV-2  Negative     Results from last 7 days   Lab Units 11/13/23  0600 11/12/23  0459 11/11/23  0502 11/10/23  2139   WBC Thousand/uL 8.38 10.43* 9.72 9.42   HEMOGLOBIN g/dL 12.7 13.4 12.7 13.0   HEMATOCRIT % 41.6 43.0 40.6 40.4   PLATELETS Thousands/uL 272 289 274 248   NEUTROS ABS Thousands/µL 6.62 8.98*  --   --    BANDS PCT %  --   --   --  9*         Results from last 7 days   Lab Units 11/13/23  0600 11/12/23  0459 11/11/23  0502 11/10/23  2139   SODIUM mmol/L 145 145 140 139   POTASSIUM mmol/L 3.5 4.0 4.2 4.1   CHLORIDE mmol/L 106 106 104 104   CO2 mmol/L 29 26 29 29   ANION GAP mmol/L 10 13 7 6   BUN mg/dL 37* 30* 28* 25   CREATININE mg/dL 1.13 1.00 1.17 1.11   EGFR ml/min/1.73sq m 54 63 52 55   CALCIUM mg/dL 9.3 9.4 8.8 9.2   MAGNESIUM mg/dL 2.6 2.5  --  2.3     Results from last 7 days   Lab Units 11/10/23  2139   AST U/L 18   ALT U/L 18   ALK PHOS U/L 90   TOTAL PROTEIN g/dL 6.6   ALBUMIN g/dL 3.7   TOTAL BILIRUBIN mg/dL 2.10*         Results from last 7 days   Lab Units 11/13/23  0600 11/12/23  0459 11/11/23  0502 11/10/23  2139   GLUCOSE RANDOM mg/dL 114 85 129 127       Results from last 7 days   Lab Units 11/13/23  0600 11/11/23  0304 11/10/23  2139   PH KIM  7.389 7.313 7.367   PCO2 KIM mm Hg 46.7 58.2* 51.7*   PO2 KIM mm Hg 174.7* 132.7* 32.0*   HCO3 KIM mmol/L 27.6 28.8 29.0   BASE EXC KIM mmol/L 2.0 1.5 2.7   O2 CONTENT KIM ml/dL 18.2 16.9 10.6   O2 HGB, VENOUS % 94.9* 97.2* 54.3*       Results from last 7 days   Lab Units 11/10/23  2139   PROTIME seconds 19.5*   INR  1.68*   PTT seconds 44*     Results from last 7 days   Lab Units 11/12/23  0459   TSH 3RD GENERATON uIU/mL 0.860     Results from last 7 days   Lab Units 11/10/23  2139   PROCALCITONIN ng/ml 0.25     Results from last 7 days   Lab Units 11/10/23  2139   LACTIC ACID mmol/L 1.3       Results from last 7 days   Lab Units 11/10/23  2139   BNP pg/mL 306*       Results from last 7 days   Lab Units 11/10/23  2139   LIPASE u/L 46       Results from last 7 days   Lab Units 11/11/23  1825   CLARITY UA  Clear   COLOR UA  Light Yellow   SPEC GRAV UA  1.014   PH UA  5.0   GLUCOSE UA mg/dl Negative   KETONES UA mg/dl Negative   BLOOD UA  Negative   PROTEIN UA mg/dl Negative   NITRITE UA  Negative   BILIRUBIN UA  Negative   UROBILINOGEN UA (BE) mg/dl <2.0   LEUKOCYTES UA  Negative     Results from last 7 days   Lab Units 11/10/23  2139   INFLUENZA A PCR  Negative   INFLUENZA B PCR  Negative   RSV PCR  Negative       Results from last 7 days   Lab Units 11/10/23  2151 11/10/23  2139   BLOOD CULTURE  No Growth at 48 hrs. No Growth at 48 hrs.          ED Treatment:   Medication Administration from 11/10/2023 2035 to 11/11/2023 0910         Date/Time Order Dose Route Action Comments     11/10/2023 2200 EST albuterol inhalation solution 10 mg 10 mg Nebulization Given --     11/10/2023 2200 EST ipratropium (ATROVENT) 0.02 % inhalation solution 1 mg 1 mg Nebulization Given --     11/10/2023 2200 EST sodium chloride 0.9 % inhalation solution 12 mL 12 mL Nebulization Given --     11/10/2023 2222 EST LORazepam (ATIVAN) injection 0.5 mg 0.5 mg Intravenous Given --     11/10/2023 2221 EST fentanyl citrate (PF) 100 MCG/2ML 25 mcg 25 mcg Intravenous Given --     11/10/2023 2333 EST LORazepam (ATIVAN) injection 0.5 mg 0.5 mg Intravenous Given --     11/11/2023 0308 EST ceftriaxone (ROCEPHIN) 1 g/50 mL in dextrose IVPB 1,000 mg Intravenous New Bag --     11/11/2023 0349 EST azithromycin (ZITHROMAX) 500 mg in sodium chloride 0.9% 250mL IVPB 500 mg 500 mg Intravenous New Bag --     11/11/2023 0112 EST OLANZapine (ZyPREXA) IM injection 10 mg 10 mg Intramuscular Given --     11/11/2023 0112 EST sterile water injection **ADS Override Pull** 10 mL  Given --     11/11/2023 0142 EST LORazepam (ATIVAN) injection 0.5 mg 0.5 mg Intravenous Given --     11/11/2023 0217 EST iohexol (OMNIPAQUE) 350 MG/ML injection (SINGLE-DOSE) 85 mL 85 mL Intravenous Given --     11/11/2023 0846 EST furosemide (LASIX) injection 40 mg 40 mg Intravenous Given per md parrish give dose     11/11/2023 0456 EST furosemide (LASIX) injection 40 mg 40 mg Intravenous Given --     11/11/2023 0422 EST haloperidol lactate (HALDOL) injection 0.25 mg 0.25 mg Intramuscular Given --     11/11/2023 0557 EST OLANZapine (ZyPREXA) IM injection 2.5 mg 2.5 mg Intramuscular Given --     11/11/2023 0558 EST sterile water injection **ADS Override Pull** --  Given --          Past Medical History:   Diagnosis Date    Arithmetic disorder     [id number]    Myocardial infarct [facility])     [id number]  LAST ASSESSED     Present on Admission:   Acute respiratory failure with hypoxia and hypercapnia (HCC)   Acute on chronic diastolic CHF (HCC)   Chronic obstructive pulmonary disease (HCC)   Acute encephalopathy   Atrial fibrillation ([address])   Essential hypertension   Hypothyroidism      Admitting Diagnosis: Acute respiratory failure (HCC) [J96.00]  Shortness of breath [R06.02]  Acute on chronic diastolic (congestive) heart failure ([address]) [I50.33]  Age/Sex: 80 y.o. male      Admission Orders:    Telem - 1;1 continual observation - Titrate O2 - dysphagia evaluation - ambulate tid  with assist  SCD's to le's - incentive spirometry - up as tolerated - strict I & O - restraints  soft limb - diet NPO     Scheduled Medications:  Azithromax 500mg po qd - started 11/12  Ceftriaxone 1G IV qd - started 11/12  apixaban, 5 mg, Oral, BID  atorvastatin, 40 mg, Oral, HS  bumetanide, 2 mg, Intravenous, BID  carvedilol, 25 mg, Oral, BID With Meals  cholecalciferol, 1,000 Units, Oral, Daily  finasteride, 5 mg, Oral, Daily  latanoprost, 1 drop, Both Eyes, HS  levothyroxine, 100 mcg, Oral, Early Morning  lisinopril, 40 mg, Oral, Daily  umeclidinium, 1 puff, Inhalation, Daily   And  olodaterol HCl, 2 puff, Inhalation, Daily      Continuous IV Infusions:     PRN Meds:  albuterol, 2.5 mg, Nebulization, Q6H PRN-11/11 x 1   fluticasone, 1 spray, Nasal, Daily PRN  hydrALAZINE, 5 mg, Intravenous, Q6H PRN-11/11 x 1   ondansetron, 4 mg, Intravenous, Q6H PRN          Network Utilization Review Department  ATTENTION: Please call with any questions or concerns to [phone] and carefully listen to the prompts so that you are directed to the right person. All voicemails are confidential.   For Discharge needs, contact Care Management DC Support Team at [phone] opt. 2  Send all requests for admission clinical reviews, approved or denied determinations and any other requests to dedicated fax number below belonging to the campus where the patient is receiving treatment.  List of dedicated fax numbers for the Facilities:  [place] DENIALS (Administrative/Medical Necessity) [phone]   DISCHARGE SUPPORT TEAM (NETWORK) [id number] (Maternity/NICU/Pediatrics) [phone]   [facility] Social Strategy 1 15[address] [facility] 1000 [facility] [phone]   [address] 5220 [facility] [address] [address] [facility] [address] [phone]

## 2023-11-12 NOTE — ASSESSMENT & PLAN NOTE
Remains confused, however, awake today, with inappropriate and mildly sluggish responses to questions   CT of head today noting: "No acute intracranial abnormality. Generalized cerebral greater than cerebellar atrophy. Chronic microangiopathic changes, chronic bilateral thalamic lacunar infarctions, and chronic right parietal cortical infarction. Subcentimeter extra-axial hyperdensity in the right occipital region most likely represents a meningioma.  This is unchanged when compared to the prior study of February 28, 2020."  Per nursing staff, noted to be intermittently impulsive with attempts at removing IV lines and interfering with necessary medical equipment - has required soft limb restraints for safety - virtual observation ordered  Wean off restraints as able  Limit/avoid sedating agents as possible  Mentation improved compared to yesterday as patient is more awake today s/p use of BIPAP machine -> suspect hypercapnia is a significant culprit in altered mentation -> pulmonology ordered repeat blood gas check for tomorrow morning  Currently afebrile without leukocytosis - urinalysis unremarkable - blood cultures from 11/10 remain negative thus far - imaging unremarkable for pneumonia (but noted atelectasis) -> less likely suspicion currently of an infectious process

## 2023-11-12 NOTE — ASSESSMENT & PLAN NOTE
Reports mild pain to lateral abduction of right elbow  Denies recent trauma/falls  Check right elbow XR  If XR imaging negative, consider tendinitis/epicondylitis ? ?

## 2023-11-13 LAB
ANION GAP SERPL CALCULATED.3IONS-SCNC: 10 MMOL/L
ATRIAL RATE: 136 BPM
ATRIAL RATE: 65 BPM
BASE EX.OXY STD BLDV CALC-SCNC: 94.9 % (ref 60–80)
BASE EXCESS BLDV CALC-SCNC: 2 MMOL/L
BASOPHILS # BLD AUTO: 0.03 THOUSANDS/ÂΜL (ref 0–0.1)
BASOPHILS NFR BLD AUTO: 0 % (ref 0–1)
BUN SERPL-MCNC: 37 MG/DL (ref 5–25)
CALCIUM SERPL-MCNC: 9.3 MG/DL (ref 8.4–10.2)
CHLORIDE SERPL-SCNC: 106 MMOL/L (ref 96–108)
CO2 SERPL-SCNC: 29 MMOL/L (ref 21–32)
CREAT SERPL-MCNC: 1.13 MG/DL (ref 0.6–1.3)
EOSINOPHIL # BLD AUTO: 0.07 THOUSAND/ÂΜL (ref 0–0.61)
EOSINOPHIL NFR BLD AUTO: 1 % (ref 0–6)
ERYTHROCYTE [DISTWIDTH] IN BLOOD BY AUTOMATED COUNT: 15 % (ref 11.6–15.1)
GFR SERPL CREATININE-BSD FRML MDRD: 54 ML/MIN/1.73SQ M
GLUCOSE SERPL-MCNC: 114 MG/DL (ref 65–140)
HCO3 BLDV-SCNC: 27.6 MMOL/L (ref 24–30)
HCT VFR BLD AUTO: 41.6 % (ref 36.5–49.3)
HGB BLD-MCNC: 12.7 G/DL (ref 12–17)
IMM GRANULOCYTES # BLD AUTO: 0.03 THOUSAND/UL (ref 0–0.2)
IMM GRANULOCYTES NFR BLD AUTO: 0 % (ref 0–2)
LYMPHOCYTES # BLD AUTO: 0.62 THOUSANDS/ÂΜL (ref 0.6–4.47)
LYMPHOCYTES NFR BLD AUTO: 7 % (ref 14–44)
MAGNESIUM SERPL-MCNC: 2.6 MG/DL (ref 1.9–2.7)
MCH RBC QN AUTO: 30.6 PG (ref 26.8–34.3)
MCHC RBC AUTO-ENTMCNC: 30.5 G/DL (ref 31.4–37.4)
MCV RBC AUTO: 100 FL (ref 82–98)
MONOCYTES # BLD AUTO: 1.01 THOUSAND/ÂΜL (ref 0.17–1.22)
MONOCYTES NFR BLD AUTO: 12 % (ref 4–12)
NEUTROPHILS # BLD AUTO: 6.62 THOUSANDS/ÂΜL (ref 1.85–7.62)
NEUTS SEG NFR BLD AUTO: 80 % (ref 43–75)
NRBC BLD AUTO-RTO: 0 /100 WBCS
O2 CT BLDV-SCNC: 18.2 ML/DL
PCO2 BLDV: 46.7 MM HG (ref 42–50)
PH BLDV: 7.39 [PH] (ref 7.3–7.4)
PLATELET # BLD AUTO: 272 THOUSANDS/UL (ref 149–390)
PMV BLD AUTO: 10.5 FL (ref 8.9–12.7)
PO2 BLDV: 174.7 MM HG (ref 35–45)
POTASSIUM SERPL-SCNC: 3.5 MMOL/L (ref 3.5–5.3)
QRS AXIS: -22 DEGREES
QRS AXIS: -22 DEGREES
QRSD INTERVAL: 134 MS
QRSD INTERVAL: 140 MS
QT INTERVAL: 440 MS
QT INTERVAL: 440 MS
QTC INTERVAL: 481 MS
QTC INTERVAL: 495 MS
RBC # BLD AUTO: 4.15 MILLION/UL (ref 3.88–5.62)
SODIUM SERPL-SCNC: 145 MMOL/L (ref 135–147)
T WAVE AXIS: 122 DEGREES
T WAVE AXIS: 132 DEGREES
VENTRICULAR RATE: 72 BPM
VENTRICULAR RATE: 76 BPM
WBC # BLD AUTO: 8.38 THOUSAND/UL (ref 4.31–10.16)

## 2023-11-13 PROCEDURE — 93005 ELECTROCARDIOGRAM TRACING: CPT

## 2023-11-13 PROCEDURE — 94760 N-INVAS EAR/PLS OXIMETRY 1: CPT

## 2023-11-13 PROCEDURE — 97166 OT EVAL MOD COMPLEX 45 MIN: CPT

## 2023-11-13 PROCEDURE — 85025 COMPLETE CBC W/AUTO DIFF WBC: CPT | Performed by: INTERNAL MEDICINE

## 2023-11-13 PROCEDURE — 93010 ELECTROCARDIOGRAM REPORT: CPT | Performed by: INTERNAL MEDICINE

## 2023-11-13 PROCEDURE — 99232 SBSQ HOSP IP/OBS MODERATE 35: CPT | Performed by: INTERNAL MEDICINE

## 2023-11-13 PROCEDURE — 82805 BLOOD GASES W/O2 SATURATION: CPT | Performed by: INTERNAL MEDICINE

## 2023-11-13 PROCEDURE — 94664 DEMO&/EVAL PT USE INHALER: CPT

## 2023-11-13 PROCEDURE — 80048 BASIC METABOLIC PNL TOTAL CA: CPT | Performed by: INTERNAL MEDICINE

## 2023-11-13 PROCEDURE — 97163 PT EVAL HIGH COMPLEX 45 MIN: CPT

## 2023-11-13 PROCEDURE — 83735 ASSAY OF MAGNESIUM: CPT | Performed by: STUDENT IN AN ORGANIZED HEALTH CARE EDUCATION/TRAINING PROGRAM

## 2023-11-13 PROCEDURE — 94640 AIRWAY INHALATION TREATMENT: CPT

## 2023-11-13 RX ADMIN — BUMETANIDE 2 MG: 0.25 INJECTION INTRAMUSCULAR; INTRAVENOUS at 09:19

## 2023-11-13 RX ADMIN — IPRATROPIUM BROMIDE 0.5 MG: 0.5 SOLUTION RESPIRATORY (INHALATION) at 08:13

## 2023-11-13 RX ADMIN — LATANOPROST 1 DROP: 50 SOLUTION OPHTHALMIC at 21:59

## 2023-11-13 RX ADMIN — CEFTRIAXONE SODIUM 1000 MG: 10 INJECTION, POWDER, FOR SOLUTION INTRAVENOUS at 16:51

## 2023-11-13 RX ADMIN — FINASTERIDE 5 MG: 5 TABLET, FILM COATED ORAL at 09:19

## 2023-11-13 RX ADMIN — CARVEDILOL 25 MG: 25 TABLET, FILM COATED ORAL at 09:19

## 2023-11-13 RX ADMIN — CARVEDILOL 25 MG: 25 TABLET, FILM COATED ORAL at 17:00

## 2023-11-13 RX ADMIN — LISINOPRIL 40 MG: 20 TABLET ORAL at 09:19

## 2023-11-13 RX ADMIN — Medication 1 TABLET: at 17:00

## 2023-11-13 RX ADMIN — APIXABAN 5 MG: 5 TABLET, FILM COATED ORAL at 09:19

## 2023-11-13 RX ADMIN — AZITHROMYCIN MONOHYDRATE 500 MG: 500 INJECTION, POWDER, LYOPHILIZED, FOR SOLUTION INTRAVENOUS at 14:47

## 2023-11-13 RX ADMIN — Medication 1 TABLET: at 09:19

## 2023-11-13 RX ADMIN — APIXABAN 5 MG: 5 TABLET, FILM COATED ORAL at 17:00

## 2023-11-13 RX ADMIN — ATORVASTATIN CALCIUM 40 MG: 40 TABLET, FILM COATED ORAL at 21:59

## 2023-11-13 RX ADMIN — IPRATROPIUM BROMIDE 0.5 MG: 0.5 SOLUTION RESPIRATORY (INHALATION) at 19:10

## 2023-11-13 RX ADMIN — LEVALBUTEROL HYDROCHLORIDE 1.25 MG: 1.25 SOLUTION RESPIRATORY (INHALATION) at 14:25

## 2023-11-13 RX ADMIN — LEVALBUTEROL HYDROCHLORIDE 1.25 MG: 1.25 SOLUTION RESPIRATORY (INHALATION) at 19:10

## 2023-11-13 RX ADMIN — Medication 1000 UNITS: at 09:19

## 2023-11-13 RX ADMIN — IPRATROPIUM BROMIDE 0.5 MG: 0.5 SOLUTION RESPIRATORY (INHALATION) at 14:25

## 2023-11-13 RX ADMIN — BUMETANIDE 2 MG: 0.25 INJECTION INTRAMUSCULAR; INTRAVENOUS at 17:00

## 2023-11-13 RX ADMIN — LEVALBUTEROL HYDROCHLORIDE 1.25 MG: 1.25 SOLUTION RESPIRATORY (INHALATION) at 08:13

## 2023-11-13 NOTE — DISCHARGE INSTR - OTHER ORDERS
Skin Care Plan:  1-Preventative Hydraguard to bilateral sacro-buttocks and heels BID and PRN. 2-Turn/reposition q2h or when medically stable for pressure re-distribution on skin . 3-Elevate heels to offload pressure. 4-Moisturize skin daily with skin nourishing cream  5-Ehob cushion in chair when out of bed.

## 2023-11-13 NOTE — OCCUPATIONAL THERAPY NOTE
Occupational Therapy Evaluation     Patient Name: Mirta Russell  NPDRE'U Date: 11/13/2023  Problem List  Principal Problem:    Acute respiratory failure with hypoxia and hypercapnia (HCC)  Active Problems:    Atrial fibrillation (HCC)    Essential hypertension    Acute on chronic diastolic CHF (HCC)    Hypothyroidism    Chronic obstructive pulmonary disease (HCC)    BPH (benign prostatic hyperplasia)    Acute encephalopathy    Right elbow pain    Compression fractures of T5 and T9 vertebrae    History of stroke    Past Medical History  Past Medical History:   Diagnosis Date    Arithmetic disorder     07OCT2016    Myocardial infarct [facility])     [id number]  LAST ASSESSED     Past Surgical History  Past Surgical History:   Procedure Laterality Date    BLADDER SURGERY      LAST ASSESSED 12OCT2016      CORONARY ARTERY BYPASS GRAFT      12OCT2016 RESOLVED    SKIN BIOPSY             11/13/23 0845   OT Last Visit   OT Visit Date 11/13/23   Note Type   Note type Evaluation   Pain Assessment   Pain Assessment Tool 0-10   Pain Score No Pain   Restrictions/Precautions   Weight Bearing Precautions Per Order No   Other Precautions Agitated; Impulsive;Cognitive; Bed Alarm;Multiple lines;Telemetry; Fall Risk;O2   Home Living   Type of Home Apartment  ([facility])   Home Layout One level   Bathroom Shower/Tub Tub/shower unit   Bathroom Toilet Standard   Bathroom Accessibility Accessible   Home Equipment Walker   Additional Comments Per pt EMR pt lives alone in  ILF, reports using a RW   Prior Function   Level of [place] Independent with ADLs; Independent with functional mobility   Lives With Alone   Receives Help From Family   IADLs   (Pt a questionable historian, will continue to assess)   Falls in the last 6 months 0   Vocational Retired   Lifestyle   Autonomy Pta pt I with ADL and fxnal mobility with RW, pt reports doing everything I although pt a questionable historian on this date   Reciprocal Relationships supportive dtr Intrinsic Gratification none stated on this date   Subjective   Subjective "I want my pants off"- referring to the wet pad   ADL   Where Assessed Supine, bed   Eating Assistance 4  Minimal Assistance   Grooming Assistance 2  Maximal Assistance   UB Bathing Assistance 2  Maximal Assistance   LB Bathing Assistance 2  Maximal Assistance   UB Dressing Assistance 2  Maximal Assistance   LB Dressing Assistance 2  Maximal [address]  2  Maximal Assistance   Functional Assistance 2  Maximal Assistance   Bed Mobility   Rolling R 2  Maximal assistance   Additional items Assist x 2; Increased time required;Verbal cues;LE management   Rolling L 2  Maximal assistance   Additional items Assist x 2; Increased time required;Verbal cues;LE management   Supine to Sit Unable to assess   Additional Comments Pt MAX A x 2 for rolling, unable to progress as pt becomes agitated   Transfers   Sit to Stand Unable to assess  (due to pt becoming agitated)   Functional Mobility   Additional Comments Unable to assess due to pt agitation during bed mobility   Activity Tolerance   Activity Tolerance Patient limited by fatigue;Treatment limited secondary to agitation; Other (Comment)  (Pt becomes agitated during bed mobility when changing pads on bed)   Medical Staff Made Aware Co-eval with DPT due to high medical complexity   Nurse Made Aware RN cleared   RUE Assessment   RUE Assessment WFL   RUE Strength   RUE Overall Strength Within Functional Limits - able to perform ADL tasks with strength   LUE Assessment   LUE Assessment WFL   LUE Strength   LUE Overall Strength Within Functional Limits - able to perform ADL tasks with strength   Hand Function   Gross Motor Coordination Functional   Fine Motor Coordination Functional   Sensation   Light Touch No apparent deficits   Psychosocial   Psychosocial (WDL) X   Patient Behaviors/Mood Anxious;Irritable  (confused, uncomfortable attempts to remove O2)   Cognition   Overall Cognitive Status Impaired   Arousal/Participation Responsive;Lethargic   Attention Difficulty attending to directions   Orientation Level Oriented to person   Memory Decreased recall of biographical information;Decreased short term memory;Decreased recall of precautions;Decreased recall of recent events   Following Commands Follows one step commands inconsistently   Comments Pt lethargic and seemingly uncomfortable throughout session, confusion noted. Pt attempts to remove O2 throughout session, found with gown and sheets removed and will not accept a gown over his body throughout session. Requests taking off his "pants" referring to his wet pads although becomes agitated during rolling to change pads. Reports he is at home, a questionable historian throughout session. Assessment   Limitation Decreased ADL status; Decreased UE ROM; Decreased UE strength;Decreased Safe judgement during ADL;Decreased cognition;Decreased endurance;Decreased self-care trans;Decreased high-level ADLs   Prognosis Fair   Assessment Pt is a 80 y.o. male who was admitted to [facility] on 11/10/2023 with Acute respiratory failure with hypoxia and hypercapnia (HCC) . Pt seen for an OT evaluation per active OT orders. Pt  has a past medical history of Arithmetic disorder and Myocardial infarct ([address]). Pt lives alone in [facility]. Pta, pt reports being independent w/ ADL and functional mobility with RW, will continue to assess PLOF as pt is a questionable historian on this date. Currently, pt is Max Ax1 for UB ADL, Max Ax1 for LB ADL, and completed rolling in bed w Max Ax2, unable to progress due to confusion and agitation. Pt currently presents with impairments in the following categories -limited home support, behavioral pattern, difficulty performing ADLS, difficulty performing IADLS , and limited insight into deficits activity tolerance, endurance, arousal, memory, insight, safety , judgement , and attention .  These impairments, as well as pt's fatigue, SOB, decreased caregiver support, and risk for falls  limit pt's ability to safely engage in all baseline areas of occupation, includingeating, grooming, bathing, dressing, toileting, and functional mobility/transfers. The patient's raw score on the AM-PAC Daily Activity Inpatient Short Form is 13. A raw score of less than 19 suggests the patient may benefit from discharge to post-acute rehabilitation services. Please refer to the recommendation of the Occupational Therapist for safe discharge planning. Pt would benefit from continued acute OT services throughout hospital course and following D/C. Plan for OT interventions 2-3x per week. From OT standpoint, recommend post acute rehab services upon D/C. Pt was left supine in bed with alarm on and all needs within reach. Goals   Patient Goals none stated   Plan   Treatment Interventions ADL retraining;Functional transfer training;UE strengthening/ROM; Endurance training;Cognitive reorientation;Patient/family training;Equipment evaluation/education;Continued evaluation; Energy conservation; Activityengagement   Goal Expiration Date 11/27/23   OT Frequency 2-3x/wk   Discharge Recommendation   Rehab Resource Intensity Level, OT I (Maximum Resource Intensity)   AM-PAC Daily Activity Inpatient   Lower Body Dressing 2   Bathing 2   Toileting 2   Upper Body Dressing 2   Grooming 2   Eating 3   Daily Activity Raw Score 13   Daily Activity Standardized Score (Calc for Raw Score >=11) 32.03   AM-[facility] Applied Cognition Inpatient   Following a Speech/Presentation 2   Understanding Ordinary Conversation 2   Taking Medications 2   Remembering Where Things Are Placed or Put Away 2   Remembering List of 4-5 Errands 2   Taking Care of Complicated Tasks 2   Applied Cognition Raw Score 12   Applied Cognition Standardized Score 28.82   Modified Hamilton Scale   Modified Hamilton Scale 5   End of Consult   Education Provided Yes   Patient Position at End of Consult Supine; All needs within reach;Bed/Chair alarm activated   Nurse Communication Nurse aware of consult     OT Goals  - Pt will be Mod I with LB ADL by end of hospital course. - Pt will be Mod I with UB ADL by end of hospital course. - Pt will be Mod I with all functional transfers required for patient safety by end of hospital course. - Pt will be Mod I with functional mobility to/from bathroom for increased independence with toileting tasks. - Pt activity tolerance will increase to 30 minutes in order to safely engage in ADL and transfers.     - Pt standing tolerance will increase to 10 minutes  in order to promote sink side ADLs and IADL activities. - Pt will consistently follow one-step directions with min to no vc or prompting.     - Pt will attend to functional tasks for 10 minutes with min to no vc for attention/redirection.    - Pt will attend to and complete continuous cognitive assessment throughout hospital course to inform safe d/c planning.    - Pt UE strength will improve to 4+/5 by end of hospital course in order to promote UB ADL and safe transfers.         Lakeisha Olvera, YOUNG, OTR/L

## 2023-11-13 NOTE — ASSESSMENT & PLAN NOTE
Presented with worsening shortness of breath over the past 2 days per daughter, with respiratory distress on arrival requiring initial BiPAP however patient unable to tolerate and subsequently placed on 10 L [address]  Labs significant for elevated BNP; CTA PE study limited due to motion artifact but no mainstem pulmonary emboli visualized and lungs with hypoinflation and dependent opacities suspected atelectasis  Suspect multifactorial in setting of acute on chronic heart failure, underlying COPD, possible lung mass (was noted on prior CT abdomen/pelvis incidentally however was not reported on current CTA PE study)  Remaining confused as below  Management of the acute on chronic heart failure, COPD as per associated problems with IV diuresis and nebulizers as needed  Continue supplemental oxygen as needed, titrate to maintain appropriate saturations as appropriate  Respiratory protocol, incentive spirometry, pulmonary toileting  Guarded prognosis at this juncture, confirmed patient DNR/DNI as per daughter (POA). We will see if there is any improvement over the weekend, further discussions regarding treatment goals if no significant improvement or particularly if patient should deteriorate. 11/12 - mentation is wax/waning although awake today after use of BIPAP last night - peak daytime oxygen requirement of 15 L now weaned down to 8 L. Continue aggressive intravenous diuresis for CHF exacerbation. Will appreciate pulmonology input -> check repeat blood gas tomorrow morning. Highly suspect both hypoxic and hypercapnic respiratory failure playing a role in altered mentation. 11/13 - Oxygen requirements weaned down to 4 L with repeat blood gas this morning showing normalization of pCO2. Appreciate pulmonology input with recommendation to trial an antibiotic course for a suspected respiratory infection. Continue to wean down oxygen requirements as tolerated.

## 2023-11-13 NOTE — PLAN OF CARE
Problem: OCCUPATIONAL THERAPY ADULT  Goal: Performs self-care activities at highest level of function for planned discharge setting. See evaluation for individualized goals. Description: Treatment Interventions: ADL retraining, Functional transfer training, UE strengthening/ROM, Endurance training, Cognitive reorientation, Patient/family training, Equipment evaluation/education, Continued evaluation, Energy conservation, Activityengagement          See flowsheet documentation for full assessment, interventions and recommendations. Outcome: Progressing  Note: Limitation: Decreased ADL status, Decreased UE ROM, Decreased UE strength, Decreased Safe judgement during ADL, Decreased cognition, Decreased endurance, Decreased self-care trans, Decreased high-level ADLs  Prognosis: Fair  Assessment: Pt is a 80 y.o. male who was admitted to [address] [place] [address] on 11/10/2023 with Acute respiratory failure with hypoxia and hypercapnia ([address]) . Pt seen for an OT evaluation per active OT orders. Pt  has a past medical history of Arithmetic disorder and Myocardial infarct ([address]). Pt lives alone in [facility]. Pta, pt reports being independent w/ ADL and functional mobility with RW, will continue to assess PLOF as pt is a questionable historian on this date. Currently, pt is Max Ax1 for UB ADL, Max Ax1 for LB ADL, and completed rolling in bed w Max Ax2, unable to progress due to confusion and agitation. Pt currently presents with impairments in the following categories -limited home support, behavioral pattern, difficulty performing ADLS, difficulty performing IADLS , and limited insight into deficits activity tolerance, endurance, arousal, memory, insight, safety , judgement , and attention .  These impairments, as well as pt's fatigue, SOB, decreased caregiver support, and risk for falls  limit pt's ability to safely engage in all baseline areas of occupation, includingeating, grooming, bathing, dressing, toileting, and functional mobility/transfers. The patient's raw score on the AM-PAC Daily Activity Inpatient Short Form is 13. A raw score of less than 19 suggests the patient may benefit from discharge to post-acute rehabilitation services. Please refer to the recommendation of the Occupational Therapist for safe discharge planning. Pt would benefit from continued acute OT services throughout hospital course and following D/C. Plan for OT interventions 2-3x per week. From OT standpoint, recommend post acute rehab services upon D/C. Pt was left supine in bed with alarm on and all needs within reach.      Rehab Resource Intensity Level, OT: I (Maximum Resource Intensity)

## 2023-11-13 NOTE — ASSESSMENT & PLAN NOTE
Per daughter, presented with increased lower extremity edema over the last several days   According revealed an EF of 55% with moderate biatrial dilatation, no evidence of regional LV wall motion abnormalities, and mild MR/TR  Continue IV Bumex BID for diuresis - on Coreg for beta-blockade  Monitor I/Os and daily weights  NT-proBNP elevated at 306 (was 144 last month)  TSH normal (h/o hypothyroidism noted)  Low-sodium/fluid restriction  Monitor/replete electrolyte deficiencies of present  Has previously required BIPAP use at night -> as pCO2 normalizing, can hopefully not become dependent

## 2023-11-13 NOTE — UTILIZATION REVIEW
NOTIFICATION OF INPATIENT ADMISSION   AUTHORIZATION REQUEST   SERVICING FACILITY:   [address] [place] [address]  Address: [address] [place] [address], [address] [place] [address]  Tax ID: [id number]  NPI: [id number] ATTENDING PROVIDER:  Attending Name and NPI#: Rufina Cerrato Md [[id number]]  Address: [address] [place] [address]  Phone: [phone]   ADMISSION INFORMATION:  Place of Service: Inpatient [address]  Place of Service Code: 21  Inpatient Admission Date/Time: 11/11/23  4:07 AM  Discharge Date/Time: No discharge date for patient encounter. Admitting Diagnosis Code/Description:  Acute respiratory failure ([facility]) [J96.00]  Shortness of breath [R06.02]  Acute on chronic diastolic (congestive) heart failure ([address] [facility]) [I50.33]     UTILIZATION REVIEW CONTACT:  Kourtney Alonso Utilization   Network Utilization Review Department  Phone: [phone]  Fax: [phone]  Email: [email or web address]  Contact for approvals/pending authorizations, clinical reviews, and discharge. PHYSICIAN ADVISORY SERVICES:  Medical Necessity Denial & Wsnd-db-Ppyl Review  Phone: [phone]  Fax: [phone]  Email: [email or web address]     DISCHARGE SUPPORT TEAM:  For Patients Discharge Needs & Updates  Phone: [phone] opt. 2 Fax: [phone]  Email: [email or web address]

## 2023-11-13 NOTE — ASSESSMENT & PLAN NOTE
Imaging revealed mild superior endplate M8/B5 compression fractures deemed stable  PRN pain control   PT/OT as tolerated  Continue calcium/vitamin D supplementation

## 2023-11-13 NOTE — ASSESSMENT & PLAN NOTE
Remains confused, however, awake today, with inappropriate and mildly sluggish responses to questions   CT of head today noting: "No acute intracranial abnormality. Generalized cerebral greater than cerebellar atrophy. Chronic microangiopathic changes, chronic bilateral thalamic lacunar infarctions, and chronic right parietal cortical infarction. Subcentimeter extra-axial hyperdensity in the right occipital region most likely represents a meningioma.  This is unchanged when compared to the prior study of February 28, 2020."  Per nursing staff, noted to be intermittently impulsive with attempts at removing IV lines and interfering with necessary medical equipment - has required soft limb restraints for safety - virtual observation ordered  Wean off restraints as able  Limit/avoid sedating agents as possible  Mentation improved compared to yesterday as patient is more awake today s/p use of BIPAP machine -> suspect hypercapnia is a significant culprit in altered mentation -> pulmonology ordered repeat blood gas check for tomorrow morning  Currently afebrile without leukocytosis - urinalysis unremarkable - blood cultures from 11/10 remain negative thus far - imaging unremarkable for pneumonia (but noted atelectasis) -> less likely suspicion currently of an infectious process, although pulmonology recommending treatment for a possible upper respiratory infection

## 2023-11-13 NOTE — CASE MANAGEMENT
Case Management Assessment & Discharge Planning Note    Patient name Luci Alatorre  Location [facility] 406/[facility] [id number] MRN [id number]  : 1927 Date 2023       Current Admission Date: 11/10/2023  Current Admission Diagnosis:Acute respiratory failure with hypoxia and hypercapnia [facility])   Patient Active Problem List    Diagnosis Date Noted    Right elbow pain 2023    Compression fractures of T5 and T9 vertebrae 2023    History of stroke 2023    Acute respiratory failure with hypoxia and hypercapnia ([address]) 2023    Acute encephalopathy 2023    Abnormal CT scan 10/23/2023    Urinary frequency 10/02/2023    Vitamin D deficiency 10/02/2023    BPH (benign prostatic hyperplasia) 10/02/2023    Shortness of breath 2023    Pigmented skin lesion 2023    Dry eyes 2023    CKD (chronic kidney disease) stage 2, GFR 60-89 ml/min 2022    Chronic obstructive pulmonary disease ([address]) 2022    Sensorineural hearing loss (SNHL) of both ears 2022    Hypernatremia 2022    Ambulatory dysfunction 2022    Change in mole 2022    Seasonal allergic rhinitis 2021    Restrictive lung disease 2021    Malignant neoplasm of urinary bladder ([address]) 10/06/2020    Class 2 severe obesity with serious comorbidity in adult [facility]) 10/06/2020    Chronic anticoagulation 09/15/2020    Mild cognitive impairment with memory loss 09/15/2020    Atrial fibrillation ([address]) 2018    Acute on chronic diastolic CHF ([address])     Coronary artery disease 10/12/2016    Essential hypertension 10/12/2016    Bicuspid aortic valve 10/12/2016    Hypothyroidism 10/12/2016    Obstructive sleep apnea 10/12/2016      LOS (days): 2  Geometric Mean LOS (GMLOS) (days):   Days to GMLOS:     OBJECTIVE:    Risk of Unplanned Readmission Score: 24.34         Current admission status: Inpatient       Preferred Pharmacy:   [address] [place] [address] Miguel[place] [address] [facility] [address]  Phone: [phone] Fax: [phone]    Primary Care Provider: Romana Crandall, DO    Primary Insurance: Chrissie  Secondary Insurance: [employer] [facility] REP    ASSESSMENT:  Simeon Proxies    There are no active Health Care Proxies on file. Advance Directives  Does patient have a Health Care POA?: Yes  Does patient have Advance Directives?: Yes  Advance Directives: Living will  Primary Contact: kirill Carrera              Patient Information  Admitted from[de-identified] Home  Mental Status: Confused  During Assessment patient was accompanied by: Daughter  Assessment information provided by[de-identified] Daughter  Primary Caregiver: Other (Comment)  Caregiver's Name<[facility]> [facility] staff  Home entry access options.  Select all that apply.: Ramp  Type of Current Residence: Apartment  Floor Level: 1  Upon entering residence, is there a bedroom on the main floor (no further steps)?: Yes  Upon entering residence, is there a bathroom on the main floor (no further steps)?: Yes  In the last 12 months, was there a time when you were not able to pay the mortgage or rent on time?: No  In the last 12 months, how many places have you lived?: 1  In the last 12 months, was there a time when you did not have a steady place to sleep or slept in a shelter (including now)?: No  Living Arrangements: Lives Alone    Activities of Daily Living Prior to Admission  Functional Status: Assistance  Completes ADLs independently?: No  Level of ADL dependence: Assistance  Ambulates independently?: Yes  Does patient use assisted devices?: Yes  Assisted Devices (DME) used: [employer]  Does patient currently own DME?: Yes  What DME does the patient currently own?: [employer]aya  Does patient have a history of Outpatient Therapy (PT/OT)?: No  Does the patient have a history of Short-Term Rehab?: No  Does patient have a history of HHC?: No         Patient Information Continued  Income Source: Pension/alf  Does patient have prescription coverage?: Yes  Within the past 12 months, you worried that your food would run out before you got the money to buy more.: Never true  Within the past 12 months, the food you bought just didn't last and you didn't have money to get more.: Never true  Does patient receive dialysis treatments?: No  Does patient have a history of substance abuse?: No  Does patient have a history of Mental Health Diagnosis?: No         Means of Transportation  Means of Transport to Appts[de-identified] Family transport  In the past 12 months, has lack of transportation kept you from medical appointments or from getting medications?: No  In the past 12 months, has lack of transportation kept you from meetings, work, or from getting things needed for daily living?: No        DISCHARGE DETAILS:    Discharge planning discussed with[de-identified] kirill Ely Schools of Choice: Yes  Comments - Freedom of Choice: requests MV for STR, has LTC insurance. Contacts  Patient Contacts: kirill Suarez  Relationship to Patient[de-identified] Family  Contact Method: Phone, In Person  Phone Number: [phone]  Reason/Outcome: Continuity of Care, Discharge Planning, Emergency Contact                   Would you like to participate in our 1948 [employer] service program?  : No - Declined    Treatment Team Recommendation: Short Term Rehab                                            Additional Comments: resides  [place]ap[place]    A post acute care recommendation was made by your care team for STR. Discussed Freedom of Choice with POA.   Choice is to make a new referral.   Referral via [address] to LISA

## 2023-11-13 NOTE — PHYSICAL THERAPY NOTE
Physical Therapy Evaluation     Patient's Name: Jose Manuel Bolaños    Admitting Diagnosis  Acute respiratory failure ([address]) [J96.00]  Shortness of breath [R06.02]  Acute on chronic diastolic (congestive) heart failure ([address]) [I50.33]    Problem List  Patient Active Problem List   Diagnosis    Atrial fibrillation ([address])    Coronary artery disease    Essential hypertension    Bicuspid aortic valve    Acute on chronic diastolic CHF (HCC)    Hypothyroidism    Obstructive sleep apnea    Chronic anticoagulation    Mild cognitive impairment with memory loss    Malignant neoplasm of urinary bladder (HCC)    Class 2 severe obesity with serious comorbidity in adult [facility])    Restrictive lung disease    Seasonal allergic rhinitis    Change in mole    Hypernatremia    Ambulatory dysfunction    Sensorineural hearing loss (SNHL) of both ears    Chronic obstructive pulmonary disease (HCC)    CKD (chronic kidney disease) stage 2, GFR 60-89 ml/min    Dry eyes    Shortness of breath    Pigmented skin lesion    Urinary frequency    Vitamin D deficiency    BPH (benign prostatic hyperplasia)    Abnormal CT scan    Acute respiratory failure with hypoxia and hypercapnia (HCC)    Acute encephalopathy    Right elbow pain    Compression fractures of T5 and T9 vertebrae    History of stroke       Past Medical History  Past Medical History:   Diagnosis Date    Arithmetic disorder     [id number]    Myocardial infarct [facility])     [id number]  LAST ASSESSED       Past Surgical History  Past Surgical History:   Procedure Laterality Date    BLADDER SURGERY      LAST ASSESSED 12OCT2016      CORONARY ARTERY BYPASS GRAFT      12OCT2016 RESOLVED    SKIN BIOPSY          11/13/23 0844   PT Last Visit   PT Visit Date 11/13/23   Note Type   Note type Evaluation   Pain Assessment   Pain Assessment Tool 0-10   Pain Score No Pain   Restrictions/Precautions   Other Precautions Impulsive;Agitated;Cognitive;Multiple lines;Telemetry; Fall Risk;O2  (bed alarm on at the end of session)   Home Living   Type of Home Apartment  ([facility])   Home Layout One level   [place]   Prior Function   Level of [place] Independent with functional mobility  (amb w/ rw)   Lives With Alone   General   Additional Pertinent History cleared for assessment by sara   Cognition   Overall Cognitive Status Impaired   Arousal/Participation Responsive   Orientation Level Oriented to person   Memory Decreased recall of biographical information;Decreased recall of recent events;Decreased recall of precautions   Following Commands Follows one step commands inconsistently   Subjective   Subjective Pt is observed in bed; noted to be incontinent w/ soiled pad; generally disoriented and occasionally irritable and mildly agitated;   RUE Assessment   RUE Assessment WFL  (AROM)   RUE Strength   RUE Overall Strength Within Functional Limits - able to perform ADL tasks with strength   LUE Assessment   LUE Assessment WFL  (AROM)   LUE Strength   LUE Overall Strength Within Functional Limits - able to perform ADL tasks with strength   RLE Assessment   RLE Assessment WFL  (AROM)   Strength RLE   RLE Overall Strength   (fair/ fair + (grossly))   LLE Assessment   LLE Assessment WFL  (AROM)   Strength LLE   LLE Overall Strength   (fair/ fair + (grossly))   Bed Mobility   Rolling R 2  Maximal assistance   Additional items Assist x 2; Increased time required;Verbal cues;LE management   Rolling L 2  Maximal assistance   Additional items Assist x 2; Increased time required;Verbal cues;LE management   Supine to Sit Unable to assess  (not appropriate at this time)   Additional Comments repositioned higher in bed w/ (A)x2   Transfers   Sit to Stand Unable to assess  (not appropriate at this time)   Activity Tolerance   Activity Tolerance Patient limited by fatigue;Treatment limited secondary to agitation; Other (Comment)  (cog status)   Medical Staff Made Aware Co-eval performed w/ OTR due to complexity of medical status and multiple comorbidities   Nurse Made Aware spoke to [address]   Assessment   Prognosis Fair   Problem List Decreased strength;Decreased endurance;Decreased mobility; Decreased cognition;Decreased safety awareness; Obesity   Assessment Limited bed level functional mobility assessment/training initiated. Pt is 80 y.o. male admitted with hx of shortness of breath/respiratory distress and Dx of Acute respiratory failure with hypoxia and hypercapnia. Pt 's comorbidities affecting POC include: CAD s/p CABG, chronic diastolic heart failure, COPD, atrial fibrillation and FILOMENA and personal factors of: advanced age and living alone. Pt's clinical presentation is currently unstable/unpredictable which is evident in ongoing telem monitoring, suppl O2 needs, and inability to progress further w/ mobilization due to overal cog state/intermittent agitation and inconsistent ability to follow directions. Pt presents w/ overall weakness, incl decreased LE strength, decreased endurance and activity tolerance, impaired cognition, decreased safety awareness/insight into deficits, bed mob deficits, inability to progress to OOB mobility at this time and fall risk. Will cont to follow pt in PT for bed level activities at this time w/ progression to OOB mobility trials when clinically appropriate. Currently, based on overall status, recommend rehab upon D/C. Will cont to follow until then. Goals   Patient Goals none expressed   STG Expiration Date 11/23/23   Short Term Goal #1 7-10 days. Pt will achieve min (A)x1 level w/ bed mob in order to facilitate safety with OOB and back to bed transitions in prior living environment to decrease burden of care. Pt will perform transfers w/ mod (A)x1 to assure safety w/ functional mobility/transitions w/ all aspects of mobility/locomotion. Pt will sustain at least poor + standing supported balance x 1 min to facilitate progression w/ SPT transitions.  Pt will participate in LE therex to max progression w/ mobility skills. Assess amb when clinically appropriate. PT Treatment Day 0   Plan   Treatment/Interventions Functional transfer training;LE strengthening/ROM; Therapeutic exercise; Endurance training;Bed mobility;Continued evaluation;Spoke to nursing;Spoke to case management;OT   PT Frequency 2-3x/wk   Discharge Recommendation   Rehab Resource Intensity Level, PT I (Maximum Resource Intensity)   AM-PAC Basic Mobility Inpatient   Turning in Flat Bed Without Bedrails 1   Lying on Back to Sitting on Edge of Flat Bed Without Bedrails 1   Moving Bed to Chair 1   Standing Up From Chair Using Arms 1   Walk in Room 1   Climb 3-5 Stairs With Railing 1   Basic Mobility Inpatient Raw Score 6   Turning Head Towards Sound 3   Follow Simple Instructions 2   Low Function Basic Mobility Raw Score  11   Low Function Basic Mobility Standardized Score  16.55   Highest Level Of Mobility   -HLM Goal 2: Bed activities/Dependent transfer   -HLM Achieved 2: Bed activities/Dependent transfer   Modified Kenzie Scale   Modified Kenzie Scale 5   End of Consult   Patient Position at End of Consult Supine;Bed/Chair alarm activated; All needs within reach         [facility], PT

## 2023-11-13 NOTE — WOUND OSTOMY CARE
Consult Note - Wound   Luci Alatorre 80 y.o. male MRN: [id number]  Unit/Bed#: [facility] 406-01 Encounter: [id number]        History and Present Illness:  Patient is a 79 yo male that was admitted to [facility] for treatment of acute respiratory failure with hypoxia and hypercapnia. Patient has a PMH of CAD s/p CABG, chronic diastolic heart failure, COPD, atrial fibrillation anticoagulated on Eliquis, reported dementia. Son present at bedside for assessment. Patient is a moderate/ max assist for turning and repositioning. Patient is incontinent of bowel and bladder. On assessment, patient is lying on regular mattress. Wound Care was consulted for left leg wound. Assessment Findings:   B/L heels are dry intact and isabella with no skin loss or wounds present. Recommend preventative Hydraguard Cream and proper offloading/ repositioning. Left anterior lower leg: intact, well adhered scab. No skin loss or drainage present. Recommend leaving scab DYAN. No induration, fluctuance, odor, warmth/temperature differences, redness, or purulence noted to the above noted wounds and skin areas assessed. New dressings applied per orders listed below. Patient tolerated well- no s/s of non-verbal pain or discomfort observed during the encounter. Bedside nurse aware of plan of care. See flow sheets for more detailed assessment findings. Orders listed below and wound care will sign off, call or tiger text with questions. Skin Care Plan:  1-Preventative Hydraguard to bilateral sacro-buttocks and heels BID and PRN. 2-Turn/reposition q2h or when medically stable for pressure re-distribution on skin . 3-Elevate heels to offload pressure. 4-Moisturize skin daily with skin nourishing cream  5-Ehob cushion in chair when out of bed.       Wounds:  Wound 11/11/23 Venous Ulcer Pretibial Left (Active)   Wound Image   11/13/23 1408   Wound Description Intact 11/13/23 1408   Maye-wound Assessment Intact 11/13/23 1408   Wound Length (cm) 0 cm 11/13/23 1408   Wound Width (cm) 0 cm 11/13/23 1408   Wound Depth (cm) 0 cm 11/13/23 1408   Wound Surface Area (cm^2) 0 cm^2 11/13/23 1408   Wound Volume (cm^3) 0 cm^3 11/13/23 1408   Calculated Wound Volume (cm^3) 0 cm^3 11/13/23 1408   Drainage Amount None 11/13/23 1408   Dressing Open to air 11/13/23 1408   Dressing Changed Other (Comment) 11/13/23 1408   Patient Tolerance Tolerated well 11/13/23 1408   Dressing Status Other (Comment) 11/13/23 FAWAD HallN, Malvin & Neptali

## 2023-11-13 NOTE — PLAN OF CARE
Problem: PHYSICAL THERAPY ADULT  Goal: Performs mobility at highest level of function for planned discharge setting. See evaluation for individualized goals. Description: Treatment/Interventions: Functional transfer training, LE strengthening/ROM, Therapeutic exercise, Endurance training, Bed mobility, Continued evaluation, Spoke to nursing, Spoke to case management, OT          See flowsheet documentation for full assessment, interventions and recommendations. Note: Prognosis: Fair  Problem List: Decreased strength, Decreased endurance, Decreased mobility, Decreased cognition, Decreased safety awareness, Obesity  Assessment: Limited bed level functional mobility assessment/training initiated. Pt is 80 y.o. male admitted with hx of shortness of breath/respiratory distress and Dx of Acute respiratory failure with hypoxia and hypercapnia. Pt 's comorbidities affecting POC include: CAD s/p CABG, chronic diastolic heart failure, COPD, atrial fibrillation and FILOMENA and personal factors of: advanced age and living alone. Pt's clinical presentation is currently unstable/unpredictable which is evident in ongoing telem monitoring, suppl O2 needs, and inability to progress further w/ mobilization due to overal cog state/intermittent agitation and inconsistent ability to follow directions. Pt presents w/ overall weakness, incl decreased LE strength, decreased endurance and activity tolerance, impaired cognition, decreased safety awareness/insight into deficits, bed mob deficits, inability to progress to OOB mobility at this time and fall risk. Will cont to follow pt in PT for bed level activities at this time w/ progression to OOB mobility trials when clinically appropriate. Currently, based on overall status, recommend rehab upon D/C. Will cont to follow until then. Rehab Resource Intensity Level, PT: I (Maximum Resource Intensity)    See flowsheet documentation for full assessment.

## 2023-11-13 NOTE — PROGRESS NOTES
[facility]  Progress Note  Name: Virgen Vargas I  MRN: [id number]  Unit/Bed#: [facility] 406-01 I Date of Admission: 11/10/2023   Date of Service: 11/13/2023 I Hospital Day: 2      Assessment & Plan:    * Acute respiratory failure with hypoxia and hypercapnia (HCC)  Assessment & Plan  Presented with worsening shortness of breath over the past 2 days per daughter, with respiratory distress on arrival requiring initial BiPAP however patient unable to tolerate and subsequently placed on 10 L [address]  Labs significant for elevated BNP; CTA PE study limited due to motion artifact but no mainstem pulmonary emboli visualized and lungs with hypoinflation and dependent opacities suspected atelectasis  Suspect multifactorial in setting of acute on chronic heart failure, underlying COPD, possible lung mass (was noted on prior CT abdomen/pelvis incidentally however was not reported on current CTA PE study)  Remaining confused as below  Management of the acute on chronic heart failure, COPD as per associated problems with IV diuresis and nebulizers as needed  Continue supplemental oxygen as needed, titrate to maintain appropriate saturations as appropriate  Respiratory protocol, incentive spirometry, pulmonary toileting  Guarded prognosis at this juncture, confirmed patient DNR/DNI as per daughter (POA). We will see if there is any improvement over the weekend, further discussions regarding treatment goals if no significant improvement or particularly if patient should deteriorate. 11/12 - mentation is wax/waning although awake today after use of BIPAP last night - peak daytime oxygen requirement of 15 L now weaned down to 8 L. Continue aggressive intravenous diuresis for CHF exacerbation. Will appreciate pulmonology input -> check repeat blood gas tomorrow morning. Highly suspect both hypoxic and hypercapnic respiratory failure playing a role in altered mentation.      11/13 - Oxygen requirements weaned down to 4 L with repeat blood gas this morning showing normalization of pCO2. Appreciate pulmonology input with recommendation to trial an antibiotic course for a suspected respiratory infection. Continue to wean down oxygen requirements as tolerated. Acute on chronic diastolic CHF ([address])  Assessment & Plan  Per daughter, presented with increased lower extremity edema over the last several days   According revealed an EF of 55% with moderate biatrial dilatation, no evidence of regional LV wall motion abnormalities, and mild MR/TR  Continue IV Bumex BID for diuresis - on Coreg for beta-blockade  Monitor I/Os and daily weights  NT-proBNP elevated at 306 (was 144 last month)  TSH normal (h/o hypothyroidism noted)  Low-sodium/fluid restriction  Monitor/replete electrolyte deficiencies of present  Has previously required BIPAP use at night -> as pCO2 normalizing, can hopefully not become dependent    Acute encephalopathy  Assessment & Plan  Remains confused, however, awake today, with inappropriate and mildly sluggish responses to questions   CT of head today noting: "No acute intracranial abnormality. Generalized cerebral greater than cerebellar atrophy. Chronic microangiopathic changes, chronic bilateral thalamic lacunar infarctions, and chronic right parietal cortical infarction. Subcentimeter extra-axial hyperdensity in the right occipital region most likely represents a meningioma.  This is unchanged when compared to the prior study of February 28, 2020."  Per nursing staff, noted to be intermittently impulsive with attempts at removing IV lines and interfering with necessary medical equipment - has required soft limb restraints for safety - virtual observation ordered  Wean off restraints as able  Limit/avoid sedating agents as possible  Mentation improved compared to yesterday as patient is more awake today s/p use of BIPAP machine -> suspect hypercapnia is a significant culprit in altered mentation -> pulmonology ordered repeat blood gas check for tomorrow morning  Currently afebrile without leukocytosis - urinalysis unremarkable - blood cultures from 11/10 remain negative thus far - imaging unremarkable for pneumonia (but noted atelectasis) -> less likely suspicion currently of an infectious process, although pulmonology recommending treatment for a possible upper respiratory infection    Atrial fibrillation ([address])  Assessment & Plan  Rate controlled on Coreg  Continue Eliquis for anticoagulation    Chronic obstructive pulmonary disease ([address])  Assessment & Plan  Stable/asymptomatic  Holding Stiolto per pulmonology - continue Xopenex/Atrovent nebulization with additional PRN Albuterol on board     Essential hypertension  Assessment & Plan  Continue Zestril/Coreg  Sodium restriction    Hypothyroidism  Assessment & Plan  TSH normal  Continue Synthroid    Right elbow pain  Assessment & Plan  Reports mild pain to lateral abduction of right elbow  Denies recent trauma/falls  Check right elbow XR  If XR imaging negative, consider tendinitis/epicondylitis ? ? Compression fractures of T5 and T9 vertebrae  Assessment & Plan  Imaging revealed mild superior endplate B8/S4 compression fractures deemed stable  PRN pain control   PT/OT as tolerated  Continue calcium/vitamin D supplementation    History of stroke  Assessment & Plan  CT of head reveals evidence of chronic bilateral thalamic lacunar and right parietal cortical infarctions  Continue statin/Eliquis  PT/OT as tolerated    BPH (benign prostatic hyperplasia)  Assessment & Plan  C/w Proscar  Outpatient urology followup      DVT Prophylaxis:  Eliquis    Patient Centered Rounds:  I have performed bedside rounds and discussed plan of care with nursing today.   Discussions with Specialists or Other Care Team Provider:  see above assessments if applicable    Education and Discussions with Family / Patient:  Patient, along with daughter, at bedside today    Time Spent for Care:  35 minutes. More than 50% of total time spent on counseling and coordination of care, on one or more of the following: performing physical exam; counseling and coordination of care, obtaining or reviewing history, documenting in the medical record, reviewing/ordering tests/medications/procedures, and communicating with other healthcare professionals. Current Length of Stay: 2 day(s)  Current Patient Status: Inpatient   Certification Statement:  Patient will continue to require additional hospital stay due to assessments as noted above. Code Status: Level 3 - DNAR and DNI        Subjective:     Encountered earlier in the day. Daughter present at bedside. Remains lethargic this morning. Did wake up for morning medications and breakfast per 1:1 observer in room. Objective:     Vitals:   Temp (24hrs), Av.5 °F (36.4 °C), Min:96.7 °F (35.9 °C), Max:98.4 °F (36.9 °C)    Temp:  [96.7 °F (35.9 °C)-98.4 °F (36.9 °C)] 97.6 °F (36.4 °C)  HR:  [69-78] 69  Resp:  [18-49] 33  BP: (111-164)/(58-91) 150/91  SpO2:  [91 %-99 %] 98 %  Body mass index is 36.17 kg/m². Input and Output Summary (last 24 hours):        Intake/Output Summary (Last 24 hours) at 2023 1352  Last data filed at 2023 1140  Gross per 24 hour   Intake 1490 ml   Output 750 ml   Net 740 ml       Physical Exam:     GENERAL Weak/fatigued   HEAD   Normocephalic - atraumatic   EYES Nonicteric   MOUTH   Mucosa moist   NECK   Supple - full range of motion   CARDIAC Rate controlled - S1/S2 positive   PULMONARY Diminished bilateral breath sounds - nonlabored respirations at rest on nasal cannula oxygenation   ABDOMEN   Soft - nontender/nondistended - active bowel sounds      MUSCULOSKELETAL   Motor strength/range of motion markedly deconditioned - bilateral distal LE edema noted - reproducible right elbow tenderness to lateral abduction   NEUROLOGIC Lethargic   SKIN   Chronic wrinkles/blemishes           Additional Data: Labs & Recent Cultures:    Results from last 7 days   Lab Units 11/13/23  0600 11/11/23  0502 11/10/23  2139   WBC Thousand/uL 8.38   < > 9.42   HEMOGLOBIN g/dL 12.7   < > 13.0   HEMATOCRIT % 41.6   < > 40.4   PLATELETS Thousands/uL 272   < > 248   BANDS PCT %  --   --  9*   NEUTROS PCT % 80*   < >  --    LYMPHS PCT % 7*   < >  --    LYMPHO PCT %  --   --  5*   MONOS PCT % 12   < >  --    MONO PCT %  --   --  9   EOS PCT % 1   < > 0    < > = values in this interval not displayed. Results from last 7 days   Lab Units 11/13/23  0600 11/11/23  0502 11/10/23  2139   POTASSIUM mmol/L 3.5   < > 4.1   CHLORIDE mmol/L 106   < > 104   CO2 mmol/L 29   < > 29   BUN mg/dL 37*   < > 25   CREATININE mg/dL 1.13   < > 1.11   CALCIUM mg/dL 9.3   < > 9.2   ALK PHOS U/L  --   --  90   ALT U/L  --   --  18   AST U/L  --   --  18    < > = values in this interval not displayed. Results from last 7 days   Lab Units 11/10/23  2139   INR  1.68*             Results from last 7 days   Lab Units 11/10/23  2139   LACTIC ACID mmol/L 1.3   PROCALCITONIN ng/ml 0.25         Results from last 7 days   Lab Units 11/10/23  2151 11/10/23  2139   BLOOD CULTURE  No Growth at 48 hrs. No Growth at 48 hrs.          Lines/Drains:  Invasive Devices       Peripheral Intravenous Line  Duration             Peripheral IV 11/11/23 Right Forearm 2 days                      Last 24 Hours Medication List:   Current Facility-Administered Medications   Medication Dose Route Frequency Provider Last Rate    albuterol  2.5 mg Nebulization Q6H PRN Phil Tyshawn, DO      apixaban  5 mg Oral BID Phil Tyshawn, DO      atorvastatin  40 mg Oral HS Phil Tyshawn, DO      azithromycin  500 mg Intravenous Q24H Nash Leonard MD Stopped (11/12/23 1951)    bumetanide  2 mg Intravenous BID Ellis Baig,       calcium carbonate  1 tablet Oral BID With Meals Sophy Murray MD      carvedilol  25 mg Oral BID With Meals Phil Villagran,       cefTRIAXone  1,000 mg Intravenous Q24H Kelsy Alfaro MD Stopped (11/12/23 3418)    cholecalciferol  1,000 Units Oral Daily [facility], DO      finasteride  5 mg Oral Daily [facility], DO      fluticasone  1 spray Nasal Daily PRN [facility], DO      hydrALAZINE  5 mg Intravenous Q6H PRN Santos Alex PA-C      ipratropium  0.5 mg Nebulization TID Kelsy Alfaro MD      latanoprost  1 drop Both Eyes HS [facility], DO      levalbuterol  1.25 mg Nebulization TID Kelsy Alfaro MD      levothyroxine  100 mcg Oral Early Morning [facility], DO      lisinopril  40 mg Oral Daily [facility], DO      ondansetron  4 mg Intravenous Q6H PRN [facility], DO                      ** Please Note: This note is constructed using a voice recognition dictation system. An occasional wrong word/phrase or “sound-a-like” substitution may have been picked up by dictation device due to the inherent limitations of voice recognition software. Read the chart carefully and recognize, using reasonable context, where substitutions may have occurred. **

## 2023-11-13 NOTE — PROGRESS NOTES
PULMONOLOGY PROGRESS NOTE     Name: Idris Sands   Age & Sex: 80 y.o. male   MRN: [id number]  Unit/Bed#: [facility] 406-01   Encounter: [id number]    PATIENT INFORMATION     Name: Idris Sands   Age & Sex: 80 y.o. male   MRN: [id number]  Hospital Stay Days: 2    ASSESSMENT/PLAN     Assessment:   Patient is a 20-year-old male with a past medical history of cognitive impairment, diastolic heart failure, CAD, hypothyroidism, bicuspid aortic valve, A-fib on Eliquis who initially presented on 11/10/2023 with dyspnea for the past 2 to 3 days. He has a questionable history of COPD, does not follow with pulmonology, has been taking Stiolto as an outpatient. He required BiPAP on admission secondary to encephalopathy. Pulmonology consulted for further recommendations. Acute hypoxic and hypercapnic respiratory failure  Lower respiratory tract infection  Acute toxic metabolic encephalopathy  Restrictive lung disease  Expiratory dynamic airway collapse  Abnormal CT chest    Plan:  Continue to wean oxygen for an SPO2 goal greater than 88%  Hold Stiolto  Continue nebulizers while inpatient  Continue antibiotics  Continue to avoid benzodiazepine/opioids  BiPAP nightly  Diuresis per primary  Airway clearance protocol  Rest of care per primary      SUBJECTIVE     Patient seen and examined. No acute events overnight. Continues to feel better from a respiratory standpoint.     OBJECTIVE     Vitals:    23 2339 23 0223 23 0600 23 0737   BP: 128/68 164/74  161/84   BP Location:  Right arm  Right arm   Pulse: 76 78  72   Resp: 18 (!) 49  (!) 33   Temp: (!) 96.9 °F (36.1 °C) (!) 97 °F (36.1 °C)  98.1 °F (36.7 °C)   TempSrc: Axillary Axillary  Oral   SpO2: 93% 98%  99%   Weight:   110 kg (241 lb 6.5 oz)    Height:          Temperature:   Temp (24hrs), Av.4 °F (36.3 °C), Min:96.7 °F (35.9 °C), Max:98.4 °F (36.9 °C)    Temperature: 98.1 °F (36.7 °C)  Intake & Output:  I/O          0701   0700 11/12 0701  11/13 0700 11/13 0701  11/14 0700    P. O. 0      I.V. (mL/kg)  80 (0.7)     IV Piggyback  550     Total Intake(mL/kg) 0 (0) 630 (5.8)     Urine (mL/kg/hr) 1705 (0.6) 650 (0.2)     Total Output 1705 650     Net -1705 -20            Unmeasured Urine Occurrence 2 x            Weights:   IBW (Ideal Body Weight): 69.55 kg    Body mass index is 36.17 kg/m². Weight (last 2 days)       Date/Time Weight    11/13/23 0600 110 (241.4)    11/12/23 1153 82.6 (182)    11/12/23 1015 82.6 (182)    11/11/23 1500 129 (285)          Physical Exam  Vitals reviewed. Constitutional:       General: He is not in acute distress. HENT:      Head: Normocephalic and atraumatic. Mouth/Throat:      Mouth: Mucous membranes are moist.      Pharynx: Oropharynx is clear. Eyes:      Extraocular Movements: Extraocular movements intact. Pupils: Pupils are equal, round, and reactive to light. Cardiovascular:      Rate and Rhythm: Normal rate and regular rhythm. Pulmonary:      Effort: Pulmonary effort is normal.      Breath sounds: Examination of the right-lower field reveals decreased breath sounds. Examination of the left-lower field reveals decreased breath sounds. Decreased breath sounds present. Chest:      Chest wall: No tenderness. Abdominal:      General: Bowel sounds are normal.      Palpations: Abdomen is soft. Musculoskeletal:      Cervical back: Normal range of motion and neck supple. Right lower leg: No edema. Left lower leg: No edema. Skin:     General: Skin is warm and dry. Capillary Refill: Capillary refill takes less than 2 seconds. Neurological:      General: No focal deficit present. Mental Status: He is alert. LABORATORY DATA     Labs: I have personally reviewed pertinent reports.   Results from last 7 days   Lab Units 11/13/23  0600 11/12/23  0459 11/11/23  0502 11/10/23  2139   WBC Thousand/uL 8.38 10.43* 9.72 9.42   HEMOGLOBIN g/dL 12.7 13.4 12.7 13.0 HEMATOCRIT % 41.6 43.0 40.6 40.4   PLATELETS Thousands/uL 272 289 274 248   NEUTROS PCT % 80* 86*  --   --    MONOS PCT % 12 9  --   --    MONO PCT %  --   --   --  9   EOS PCT % 1 0  --  0      Results from last 7 days   Lab Units 11/13/23  0600 11/12/23  0459 11/11/23  0502 11/10/23  2139   POTASSIUM mmol/L 3.5 4.0 4.2 4.1   CHLORIDE mmol/L 106 106 104 104   CO2 mmol/L 29 26 29 29   BUN mg/dL 37* 30* 28* 25   CREATININE mg/dL 1.13 1.00 1.17 1.11   CALCIUM mg/dL 9.3 9.4 8.8 9.2   ALK PHOS U/L  --   --   --  90   ALT U/L  --   --   --  18   AST U/L  --   --   --  18     Results from last 7 days   Lab Units 11/13/23  0600 11/12/23  0459 11/10/23  2139   MAGNESIUM mg/dL 2.6 2.5 2.3          Results from last 7 days   Lab Units 11/10/23  2139   INR  1.68*   PTT seconds 44*     Results from last 7 days   Lab Units 11/10/23  2139   LACTIC ACID mmol/L 1.3     Micro:   Results from last 7 days   Lab Units 11/10/23  2151 11/10/23  2139   BLOOD CULTURE  No Growth at 24 hrs. No Growth at 24 hrs. IMAGING & DIAGNOSTIC TESTING     Radiology Results: I have personally reviewed pertinent reports. CT head wo contrast    Result Date: 11/12/2023  Impression: 1. No acute intracranial abnormality. 2.  Generalized cerebral greater than cerebellar atrophy. 3.  Chronic microangiopathic changes, chronic bilateral thalamic lacunar infarctions, and chronic right parietal cortical infarction. 4.  Subcentimeter extra-axial hyperdensity in the right occipital region most likely represents a meningioma. This is unchanged when compared to the prior study of February 28, 2020. Workstation performed: [id number]     CTA ED chest PE Study    Result Date: 11/11/2023  Impression: 1. Limited evaluation without evidence of pulmonary embolism 2. Additional chronic findings as above Workstation performed: [id number]     Other Diagnostic Testing: I have personally reviewed pertinent reports.     ACTIVE MEDICATIONS     Current Facility-Administered Medications   Medication Dose Route Frequency    albuterol inhalation solution 2.5 mg  2.5 mg Nebulization Q6H PRN    apixaban (ELIQUIS) tablet 5 mg  5 mg Oral BID    atorvastatin (LIPITOR) tablet 40 mg  40 mg Oral HS    azithromycin (ZITHROMAX) 500 mg in sodium chloride 0.9 % 250 mL IVPB  500 mg Intravenous Q24H    bumetanide (BUMEX) injection 2 mg  2 mg Intravenous BID    calcium carbonate (OYSTER SHELL,OSCAL) 500 mg tablet 1 tablet  1 tablet Oral BID With Meals    carvedilol (COREG) tablet 25 mg  25 mg Oral BID With Meals    cefTRIAXone (ROCEPHIN) 1,000 mg in dextrose 5 % 50 mL IVPB  1,000 mg Intravenous Q24H    cholecalciferol (VITAMIN D3) tablet 1,000 Units  1,000 Units Oral Daily    finasteride (PROSCAR) tablet 5 mg  5 mg Oral Daily    fluticasone (FLONASE) 50 mcg/act nasal spray 1 spray  1 spray Nasal Daily PRN    hydrALAZINE (APRESOLINE) injection 5 mg  5 mg Intravenous Q6H PRN    ipratropium (ATROVENT) 0.02 % inhalation solution 0.5 mg  0.5 mg Nebulization TID    latanoprost (XALATAN) 0.005 % ophthalmic solution 1 drop  1 drop Both Eyes HS    levalbuterol (XOPENEX) inhalation solution 1.25 mg  1.25 mg Nebulization TID    levothyroxine tablet 100 mcg  100 mcg Oral Early Morning    lisinopril (ZESTRIL) tablet 40 mg  40 mg Oral Daily    ondansetron (ZOFRAN) injection 4 mg  4 mg Intravenous Q6H PRN         Disclaimer: Portions of the record may have been created with voice recognition software. Occasional wrong word or "sound a like" substitutions may have occurred due to the inherent limitations of voice recognition software. Careful consideration should be taken to recognize, using context, where substitutions have occurred.     Omi Alicea DO, MS  Pulmonary and Critical Care Fellow, PGY-IV  [facility]

## 2023-11-13 NOTE — ED ATTENDING ATTESTATION
11/10/2023  I, Julian Blanton MD, saw and evaluated the patient. I have discussed the patient with the resident/non-physician practitioner and agree with the resident's/non-physician practitioner's findings, Plan of Care, and MDM as documented in the resident's/non-physician practitioner's note, except where noted. All available labs and Radiology studies were reviewed. I was present for key portions of any procedure(s) performed by the resident/non-physician practitioner and I was immediately available to provide assistance. At this point I agree with the current assessment done in the Emergency Department. I have conducted an independent evaluation of this patient a history and physical is as follows:    OA: 81 y/o m with h/o CAD, CHF, atrial fibrillation and COPD who presents with SOB and LINDA. Pt from nursing facility, noted to be confused and hypoxic. Initiated on BiPAP prior to arrival by EMS, continued in the ED. + cough. No report of fevers/chills. Daughter at bedside expresses concern for worsening edema, particularly R> L. No report of medication changes. PE, doing well on BiPAP, hard of hearing, tachypnea, mild accessory muscle use, irregularly irregular rate/rhythm, decreased breath sounds, abd soft, +Bs, nonttp, + edema b/l LE R> L, follows simple commands. Intact pulses. A/p SOB, concern for CHF v COPD exacerbation. Continue on BiPAP, treat accordingly, labs, imaging, monitor and admit.      ED Course         Critical Care Time  CriticalCare Time    Date/Time: 11/13/2023 9:43 AM    Performed by: Julian Blanton MD  Authorized by: Julian Blanton MD    Critical care provider statement:     Critical care time (minutes):  32    Critical care time was exclusive of:  Separately billable procedures and treating other patients and teaching time    Critical care was necessary to treat or prevent imminent or life-threatening deterioration of the following conditions:  Respiratory failure Critical care was time spent personally by me on the following activities:  Blood draw for specimens, obtaining history from patient or surrogate, development of treatment plan with patient or surrogate, evaluation of patient's response to treatment, examination of patient, ventilator management, review of old charts, re-evaluation of patient's condition, ordering and review of radiographic studies, ordering and review of laboratory studies and ordering and performing treatments and interventions

## 2023-11-13 NOTE — ASSESSMENT & PLAN NOTE
Stable/asymptomatic  Holding Stiolto per pulmonology - continue Xopenex/Atrovent nebulization with additional PRN Albuterol on board

## 2023-11-14 ENCOUNTER — APPOINTMENT (INPATIENT)
Dept: NEUROLOGY | Facility: CLINIC | Age: 88
DRG: 291 | End: 2023-11-14
Payer: COMMERCIAL

## 2023-11-14 ENCOUNTER — APPOINTMENT (INPATIENT)
Dept: RADIOLOGY | Facility: HOSPITAL | Age: 88
DRG: 291 | End: 2023-11-14
Payer: COMMERCIAL

## 2023-11-14 LAB
2HR DELTA HS TROPONIN: 0 NG/L
4HR DELTA HS TROPONIN: 0 NG/L
ANION GAP SERPL CALCULATED.3IONS-SCNC: 4 MMOL/L
ANION GAP SERPL CALCULATED.3IONS-SCNC: 6 MMOL/L
APTT PPP: 43 SECONDS (ref 23–37)
ATRIAL RATE: 74 BPM
BASE EXCESS BLDA CALC-SCNC: 5 MMOL/L (ref -2–3)
BASOPHILS # BLD AUTO: 0.03 THOUSANDS/ÂΜL (ref 0–0.1)
BASOPHILS NFR BLD AUTO: 1 % (ref 0–1)
BUN SERPL-MCNC: 33 MG/DL (ref 5–25)
BUN SERPL-MCNC: 36 MG/DL (ref 5–25)
CA-I BLD-SCNC: 1.23 MMOL/L (ref 1.12–1.32)
CALCIUM SERPL-MCNC: 8.9 MG/DL (ref 8.4–10.2)
CALCIUM SERPL-MCNC: 9 MG/DL (ref 8.4–10.2)
CARDIAC TROPONIN I PNL SERPL HS: 28 NG/L
CHLORIDE SERPL-SCNC: 105 MMOL/L (ref 96–108)
CHLORIDE SERPL-SCNC: 107 MMOL/L (ref 96–108)
CHOLEST SERPL-MCNC: 106 MG/DL
CO2 SERPL-SCNC: 32 MMOL/L (ref 21–32)
CO2 SERPL-SCNC: 34 MMOL/L (ref 21–32)
CREAT SERPL-MCNC: 1.04 MG/DL (ref 0.6–1.3)
CREAT SERPL-MCNC: 1.12 MG/DL (ref 0.6–1.3)
EOSINOPHIL # BLD AUTO: 0.2 THOUSAND/ÂΜL (ref 0–0.61)
EOSINOPHIL NFR BLD AUTO: 3 % (ref 0–6)
ERYTHROCYTE [DISTWIDTH] IN BLOOD BY AUTOMATED COUNT: 14.9 % (ref 11.6–15.1)
ERYTHROCYTE [DISTWIDTH] IN BLOOD BY AUTOMATED COUNT: 15 % (ref 11.6–15.1)
EST. AVERAGE GLUCOSE BLD GHB EST-MCNC: 128 MG/DL
GFR SERPL CREATININE-BSD FRML MDRD: 55 ML/MIN/1.73SQ M
GFR SERPL CREATININE-BSD FRML MDRD: 60 ML/MIN/1.73SQ M
GLUCOSE SERPL-MCNC: 107 MG/DL (ref 65–140)
GLUCOSE SERPL-MCNC: 131 MG/DL (ref 65–140)
GLUCOSE SERPL-MCNC: 135 MG/DL (ref 65–140)
GLUCOSE SERPL-MCNC: 144 MG/DL (ref 65–140)
HBA1C MFR BLD: 6.1 %
HCO3 BLDA-SCNC: 30.1 MMOL/L (ref 22–28)
HCT VFR BLD AUTO: 40.9 % (ref 36.5–49.3)
HCT VFR BLD AUTO: 41.8 % (ref 36.5–49.3)
HCT VFR BLD CALC: 39 % (ref 36.5–49.3)
HDLC SERPL-MCNC: 33 MG/DL
HGB BLD-MCNC: 12.6 G/DL (ref 12–17)
HGB BLD-MCNC: 12.7 G/DL (ref 12–17)
HGB BLDA-MCNC: 13.3 G/DL (ref 12–17)
IMM GRANULOCYTES # BLD AUTO: 0.03 THOUSAND/UL (ref 0–0.2)
IMM GRANULOCYTES NFR BLD AUTO: 1 % (ref 0–2)
INR PPP: 1.64 (ref 0.84–1.19)
LDLC SERPL CALC-MCNC: 59 MG/DL (ref 0–100)
LYMPHOCYTES # BLD AUTO: 0.64 THOUSANDS/ÂΜL (ref 0.6–4.47)
LYMPHOCYTES NFR BLD AUTO: 11 % (ref 14–44)
MCH RBC QN AUTO: 29.7 PG (ref 26.8–34.3)
MCH RBC QN AUTO: 30.6 PG (ref 26.8–34.3)
MCHC RBC AUTO-ENTMCNC: 30.1 G/DL (ref 31.4–37.4)
MCHC RBC AUTO-ENTMCNC: 31.1 G/DL (ref 31.4–37.4)
MCV RBC AUTO: 99 FL (ref 82–98)
MCV RBC AUTO: 99 FL (ref 82–98)
MONOCYTES # BLD AUTO: 0.68 THOUSAND/ÂΜL (ref 0.17–1.22)
MONOCYTES NFR BLD AUTO: 11 % (ref 4–12)
NEUTROPHILS # BLD AUTO: 4.5 THOUSANDS/ÂΜL (ref 1.85–7.62)
NEUTS SEG NFR BLD AUTO: 73 % (ref 43–75)
NRBC BLD AUTO-RTO: 0 /100 WBCS
PCO2 BLD: 31 MMOL/L (ref 21–32)
PCO2 BLD: 46.5 MM HG (ref 36–44)
PH BLD: 7.42 [PH] (ref 7.35–7.45)
PLATELET # BLD AUTO: 252 THOUSANDS/UL (ref 149–390)
PLATELET # BLD AUTO: 296 THOUSANDS/UL (ref 149–390)
PMV BLD AUTO: 10.2 FL (ref 8.9–12.7)
PMV BLD AUTO: 10.4 FL (ref 8.9–12.7)
PO2 BLD: 69 MM HG (ref 75–129)
POTASSIUM BLD-SCNC: 3.8 MMOL/L (ref 3.5–5.3)
POTASSIUM SERPL-SCNC: 3.8 MMOL/L (ref 3.5–5.3)
POTASSIUM SERPL-SCNC: 3.8 MMOL/L (ref 3.5–5.3)
PROCALCITONIN SERPL-MCNC: 0.24 NG/ML
PROTHROMBIN TIME: 19.2 SECONDS (ref 11.6–14.5)
QRS AXIS: -21 DEGREES
QRSD INTERVAL: 142 MS
QT INTERVAL: 432 MS
QTC INTERVAL: 479 MS
RBC # BLD AUTO: 4.15 MILLION/UL (ref 3.88–5.62)
RBC # BLD AUTO: 4.24 MILLION/UL (ref 3.88–5.62)
SAO2 % BLD FROM PO2: 94 % (ref 60–85)
SODIUM BLD-SCNC: 142 MMOL/L (ref 136–145)
SODIUM SERPL-SCNC: 143 MMOL/L (ref 135–147)
SODIUM SERPL-SCNC: 145 MMOL/L (ref 135–147)
SPECIMEN SOURCE: ABNORMAL
T WAVE AXIS: 116 DEGREES
TRIGL SERPL-MCNC: 72 MG/DL
VENTRICULAR RATE: 74 BPM
WBC # BLD AUTO: 6.08 THOUSAND/UL (ref 4.31–10.16)
WBC # BLD AUTO: 8.18 THOUSAND/UL (ref 4.31–10.16)

## 2023-11-14 PROCEDURE — NC001 PR NO CHARGE: Performed by: PHYSICIAN ASSISTANT

## 2023-11-14 PROCEDURE — 70496 CT ANGIOGRAPHY HEAD: CPT

## 2023-11-14 PROCEDURE — 85014 HEMATOCRIT: CPT

## 2023-11-14 PROCEDURE — 84145 PROCALCITONIN (PCT): CPT | Performed by: INTERNAL MEDICINE

## 2023-11-14 PROCEDURE — 92526 ORAL FUNCTION THERAPY: CPT

## 2023-11-14 PROCEDURE — 93005 ELECTROCARDIOGRAM TRACING: CPT

## 2023-11-14 PROCEDURE — 95816 EEG AWAKE AND DROWSY: CPT

## 2023-11-14 PROCEDURE — 83036 HEMOGLOBIN GLYCOSYLATED A1C: CPT | Performed by: PSYCHIATRY & NEUROLOGY

## 2023-11-14 PROCEDURE — 94664 DEMO&/EVAL PT USE INHALER: CPT

## 2023-11-14 PROCEDURE — 94640 AIRWAY INHALATION TREATMENT: CPT

## 2023-11-14 PROCEDURE — 93010 ELECTROCARDIOGRAM REPORT: CPT | Performed by: INTERNAL MEDICINE

## 2023-11-14 PROCEDURE — 80061 LIPID PANEL: CPT | Performed by: PSYCHIATRY & NEUROLOGY

## 2023-11-14 PROCEDURE — 84295 ASSAY OF SERUM SODIUM: CPT

## 2023-11-14 PROCEDURE — 82948 REAGENT STRIP/BLOOD GLUCOSE: CPT

## 2023-11-14 PROCEDURE — 84484 ASSAY OF TROPONIN QUANT: CPT | Performed by: PHYSICIAN ASSISTANT

## 2023-11-14 PROCEDURE — 85730 THROMBOPLASTIN TIME PARTIAL: CPT | Performed by: PHYSICIAN ASSISTANT

## 2023-11-14 PROCEDURE — 82330 ASSAY OF CALCIUM: CPT

## 2023-11-14 PROCEDURE — 99232 SBSQ HOSP IP/OBS MODERATE 35: CPT | Performed by: INTERNAL MEDICINE

## 2023-11-14 PROCEDURE — 85027 COMPLETE CBC AUTOMATED: CPT | Performed by: PHYSICIAN ASSISTANT

## 2023-11-14 PROCEDURE — 99223 1ST HOSP IP/OBS HIGH 75: CPT | Performed by: PSYCHIATRY & NEUROLOGY

## 2023-11-14 PROCEDURE — 80048 BASIC METABOLIC PNL TOTAL CA: CPT | Performed by: PHYSICIAN ASSISTANT

## 2023-11-14 PROCEDURE — 82803 BLOOD GASES ANY COMBINATION: CPT

## 2023-11-14 PROCEDURE — 94760 N-INVAS EAR/PLS OXIMETRY 1: CPT

## 2023-11-14 PROCEDURE — 85025 COMPLETE CBC W/AUTO DIFF WBC: CPT | Performed by: INTERNAL MEDICINE

## 2023-11-14 PROCEDURE — 99222 1ST HOSP IP/OBS MODERATE 55: CPT | Performed by: NURSE PRACTITIONER

## 2023-11-14 PROCEDURE — 84132 ASSAY OF SERUM POTASSIUM: CPT

## 2023-11-14 PROCEDURE — 82947 ASSAY GLUCOSE BLOOD QUANT: CPT

## 2023-11-14 PROCEDURE — 70498 CT ANGIOGRAPHY NECK: CPT

## 2023-11-14 PROCEDURE — 80048 BASIC METABOLIC PNL TOTAL CA: CPT | Performed by: INTERNAL MEDICINE

## 2023-11-14 PROCEDURE — 85610 PROTHROMBIN TIME: CPT | Performed by: PHYSICIAN ASSISTANT

## 2023-11-14 RX ORDER — AMOXICILLIN 250 MG
2 CAPSULE ORAL 2 TIMES DAILY
Status: DISCONTINUED | OUTPATIENT
Start: 2023-11-14 | End: 2023-11-21 | Stop reason: HOSPADM

## 2023-11-14 RX ORDER — BISACODYL 10 MG
10 SUPPOSITORY, RECTAL RECTAL DAILY PRN
Status: DISCONTINUED | OUTPATIENT
Start: 2023-11-14 | End: 2023-11-21 | Stop reason: HOSPADM

## 2023-11-14 RX ORDER — POLYETHYLENE GLYCOL 3350 17 G/17G
17 POWDER, FOR SOLUTION ORAL DAILY
Status: DISCONTINUED | OUTPATIENT
Start: 2023-11-14 | End: 2023-11-21 | Stop reason: HOSPADM

## 2023-11-14 RX ORDER — OLANZAPINE 10 MG/2ML
2.5 INJECTION, POWDER, FOR SOLUTION INTRAMUSCULAR ONCE AS NEEDED
Status: DISCONTINUED | OUTPATIENT
Start: 2023-11-14 | End: 2023-11-21 | Stop reason: HOSPADM

## 2023-11-14 RX ORDER — LANOLIN ALCOHOL/MO/W.PET/CERES
6 CREAM (GRAM) TOPICAL
Status: DISCONTINUED | OUTPATIENT
Start: 2023-11-14 | End: 2023-11-21 | Stop reason: HOSPADM

## 2023-11-14 RX ADMIN — IPRATROPIUM BROMIDE 0.5 MG: 0.5 SOLUTION RESPIRATORY (INHALATION) at 13:40

## 2023-11-14 RX ADMIN — SENNOSIDES, DOCUSATE SODIUM 2 TABLET: 8.6; 5 TABLET ORAL at 18:12

## 2023-11-14 RX ADMIN — BUMETANIDE 2 MG: 0.25 INJECTION INTRAMUSCULAR; INTRAVENOUS at 18:12

## 2023-11-14 RX ADMIN — Medication 1000 UNITS: at 10:00

## 2023-11-14 RX ADMIN — LISINOPRIL 40 MG: 20 TABLET ORAL at 10:00

## 2023-11-14 RX ADMIN — CARVEDILOL 25 MG: 25 TABLET, FILM COATED ORAL at 10:00

## 2023-11-14 RX ADMIN — MELATONIN 6 MG: at 21:11

## 2023-11-14 RX ADMIN — APIXABAN 5 MG: 5 TABLET, FILM COATED ORAL at 10:00

## 2023-11-14 RX ADMIN — LEVALBUTEROL HYDROCHLORIDE 1.25 MG: 1.25 SOLUTION RESPIRATORY (INHALATION) at 20:06

## 2023-11-14 RX ADMIN — ATORVASTATIN CALCIUM 40 MG: 40 TABLET, FILM COATED ORAL at 21:12

## 2023-11-14 RX ADMIN — FINASTERIDE 5 MG: 5 TABLET, FILM COATED ORAL at 10:00

## 2023-11-14 RX ADMIN — APIXABAN 5 MG: 5 TABLET, FILM COATED ORAL at 18:12

## 2023-11-14 RX ADMIN — IPRATROPIUM BROMIDE 0.5 MG: 0.5 SOLUTION RESPIRATORY (INHALATION) at 08:04

## 2023-11-14 RX ADMIN — CARVEDILOL 25 MG: 25 TABLET, FILM COATED ORAL at 18:12

## 2023-11-14 RX ADMIN — Medication 1 TABLET: at 10:00

## 2023-11-14 RX ADMIN — SENNOSIDES, DOCUSATE SODIUM 2 TABLET: 8.6; 5 TABLET ORAL at 10:00

## 2023-11-14 RX ADMIN — Medication 1 TABLET: at 18:12

## 2023-11-14 RX ADMIN — IPRATROPIUM BROMIDE 0.5 MG: 0.5 SOLUTION RESPIRATORY (INHALATION) at 20:06

## 2023-11-14 RX ADMIN — IOHEXOL 100 ML: 350 INJECTION, SOLUTION INTRAVENOUS at 16:29

## 2023-11-14 RX ADMIN — BUMETANIDE 2 MG: 0.25 INJECTION INTRAMUSCULAR; INTRAVENOUS at 10:02

## 2023-11-14 RX ADMIN — LEVALBUTEROL HYDROCHLORIDE 1.25 MG: 1.25 SOLUTION RESPIRATORY (INHALATION) at 08:04

## 2023-11-14 RX ADMIN — LEVALBUTEROL HYDROCHLORIDE 1.25 MG: 1.25 SOLUTION RESPIRATORY (INHALATION) at 13:40

## 2023-11-14 NOTE — ASSESSMENT & PLAN NOTE
Presented with worsening shortness of breath over the past 2 days per daughter, with respiratory distress on arrival requiring initial BiPAP however patient unable to tolerate and subsequently placed on 10 L [address]  Labs significant for elevated BNP; CTA PE study limited due to motion artifact but no mainstem pulmonary emboli visualized and lungs with hypoinflation and dependent opacities suspected atelectasis  Suspect multifactorial in setting of acute on chronic heart failure, underlying COPD, possible lung mass (was noted on prior CT abdomen/pelvis incidentally however was not reported on current CTA PE study)  Remaining confused as below  Management of the acute on chronic heart failure, COPD as per associated problems with IV diuresis and nebulizers as needed  Continue supplemental oxygen as needed, titrate to maintain appropriate saturations as appropriate  Respiratory protocol, incentive spirometry, pulmonary toileting    11/12 - mentation is wax/waning although awake today after use of BIPAP last night - peak daytime oxygen requirement of 15 L now weaned down to 8 L. Continue aggressive intravenous diuresis for CHF exacerbation. Will appreciate pulmonology input -> check repeat blood gas tomorrow morning. Highly suspect both hypoxic and hypercapnic respiratory failure playing a role in altered mentation. 11/13 - Oxygen requirements weaned down to 4 L with repeat blood gas this morning showing normalization of pCO2. Appreciate pulmonology input with recommendation  Continue to wean down oxygen requirements as tolerated.     Pulmonology signed off    Patient continues to be on 4 L of oxygen  Status post 3 days of IV antibiotics  Negative procalcitonin x2  Discontinue antibiotic and observe

## 2023-11-14 NOTE — CONSULTS
Consultation - Geriatrics   Luci Alatorre 80 y.o. male MRN: [id number]  Unit/Bed#: [facility] 406-01 Encounter: [id number]      Assessment/Plan  Encephalopathy  Oriented x 1, periods of confusion  At risk for delirium secondary to hospitalization, insomnia, constipation, hypoxia  Continue supplemental oxygenation, CPAP QHS  Pt reportedly not sleeping QHS  No BM since admission  Urinary retention  Maintain delirium precautions:  Provide frequent redirection, reorientation, distraction techniques  Avoid deliriogenic medications such as tramadol, benzodiazepines, anticholinergics,  Benadryl  Treat pain, See geriatric pain protocol  Monitor for constipation and urinary retention  Encourage early and frequent moblization, OOB  Encourage Hydration/ Nutrition  Implement sleep hygiene, limit night time interuptions, group activities    Mild cognitive impairment with memory loss  MMSE 27/30 (2017)  TSH 0.860 (11/12/230  Vitamin B 12 level 353 (11/11/23) goal level greater than 400  CT head (11/12/23): Mild periventricular hypoattenuation is suggestive of chronic microangiopathic disease. Chronic right parietal infarction with cortical and subcortical gliosis. Chronic bilateral thalamic lacunar infarctions. Bilateral intracranial carotid and vertebral artery atherosclerotic calcifications.     FILOMENA  With chronic use of CPAP at home  Recommend CPAP QHS    Acute respiratory failure  Multifactorial:COPD, acute on chronic HF, possible lung mass on prior imaging  Continue supplemental oxygen  Completed IV abx x 3 days  IV bumex  Pulmonary was consulted, now signed off    Insomnia  Chronic  Continue CPAP  Melatonin QHS prn  Encourage daytime activity    BPH/urinary retention  Urinary retention protocol  Maintain home proscar  Resolve constipation, no BM since admission (started on daily miralax and senna)    Ambulatory dysfunction  Fall precautions  PT/OT  Rehab post hospitalization  Encourage mobilization, oob    Frailty  Clinical Frail Scale: 6- Moderately Frail   Albumin 3.7, recommend protein supplementation  Lives at [facility], has supportive daughters  PT/OT    Advanced Care Planning  DNR/DNI    Home medication review  Medications from [place]  Current scanned list from facility in media    History of Present Illness   Physician Requesting Consult: Misty Rome DO  Reason for Consult / Principal Problem: acute encephalopathy  Hx and PE limited by:NA  HPI: Martha Cooper is a 80y.o. year old male who presents with increased shortness of breath, BLE edema, weight gain and confusion. She is brought in by her daughter who reports symptoms started 2-3 days ago. She has COPD on CPAP, HF, Afib, memory loss, hypothyroidism, BPH    Prior to arrival he lives at [address] [place] [address], receives assistance from personal care. He needs assistance with IADLs and ADLs. He ambulates with a roller walker. He is lying in bed, alert oriented x 1. 1:1 at bedside who reports he is not pulling at his lines. He did not sleep well last night. No BM since admission. Daughter at bedside to review HPI. .     Inpatient consult to Gerontology  Consult performed by: LIBORIO Barksdale  Consult ordered by: Misty Rome DO          Review of Systems   Constitutional:  Positive for unexpected weight change. HENT:  Positive for hearing loss. Eyes:  Negative for visual disturbance. Respiratory:  Positive for shortness of breath. Cardiovascular:  Negative for chest pain. Gastrointestinal:  Positive for constipation. Genitourinary:  Positive for difficulty urinating. Musculoskeletal:  Positive for gait problem. Neurological:  Positive for weakness. Psychiatric/Behavioral:  Positive for confusion and sleep disturbance.         Historical Information   Past Medical History:   Diagnosis Date    Arithmetic disorder     [id number]    Myocardial infarct [facility])     [id number]  LAST ASSESSED     Past Surgical History:   Procedure Laterality Date    BLADDER SURGERY      LAST ASSESSED 12OCT2016      CORONARY ARTERY BYPASS GRAFT      12OCT2016 RESOLVED    SKIN BIOPSY       Social History   Social History     Substance and Sexual Activity   Alcohol Use Yes    Comment: OCCASIONAL     Social History     Substance and Sexual Activity   Drug Use Not on file     Social History     Tobacco Use   Smoking Status Former   Smokeless Tobacco Never         Family History:   Family History   Problem Relation Age of Onset    Breast cancer Family         [id number] LAST ASSESSED       Meds/Allergies   Current meds:   Current Facility-Administered Medications   Medication Dose Route Frequency    albuterol inhalation solution 2.5 mg  2.5 mg Nebulization Q6H PRN    apixaban (ELIQUIS) tablet 5 mg  5 mg Oral BID    atorvastatin (LIPITOR) tablet 40 mg  40 mg Oral HS    bumetanide (BUMEX) injection 2 mg  2 mg Intravenous BID    calcium carbonate (OYSTER SHELL,OSCAL) 500 mg tablet 1 tablet  1 tablet Oral BID With Meals    carvedilol (COREG) tablet 25 mg  25 mg Oral BID With Meals    cholecalciferol (VITAMIN D3) tablet 1,000 Units  1,000 Units Oral Daily    finasteride (PROSCAR) tablet 5 mg  5 mg Oral Daily    fluticasone (FLONASE) 50 mcg/act nasal spray 1 spray  1 spray Nasal Daily PRN    hydrALAZINE (APRESOLINE) injection 5 mg  5 mg Intravenous Q6H PRN    ipratropium (ATROVENT) 0.02 % inhalation solution 0.5 mg  0.5 mg Nebulization TID    latanoprost (XALATAN) 0.005 % ophthalmic solution 1 drop  1 drop Both Eyes HS    levalbuterol (XOPENEX) inhalation solution 1.25 mg  1.25 mg Nebulization TID    levothyroxine tablet 100 mcg  100 mcg Oral Early Morning    lisinopril (ZESTRIL) tablet 40 mg  40 mg Oral Daily    ondansetron (ZOFRAN) injection 4 mg  4 mg Intravenous Q6H PRN    polyethylene glycol (MIRALAX) packet 17 g  17 g Oral Daily    senna-docusate sodium (SENOKOT S) 8.6-50 mg per tablet 2 tablet  2 tablet Oral BID      Current PTA meds:  Medications Prior to Admission   Medication    albuterol (PROVENTIL HFA,VENTOLIN HFA) 90 mcg/act inhaler    apixaban (Eliquis) 5 mg    atorvastatin (LIPITOR) 40 mg tablet    benazepril (LOTENSIN) 40 MG tablet    carboxymethylcellulose 0.5 % SOLN    carvedilol (COREG) 25 mg tablet    cholecalciferol (VITAMIN D3) 1,000 units tablet    finasteride (PROSCAR) 5 mg tablet    fluticasone (FLONASE) 50 mcg/act nasal spray    latanoprost (XALATAN) 0.005 % ophthalmic solution    levothyroxine 100 mcg tablet    potassium chloride (K-DUR,KLOR-CON) 20 mEq tablet    tiotropium-olodaterol (STIOLTO RESPIMAT) 2.5-2.5 MCG/ACT inhaler    torsemide (DEMADEX) 20 mg tablet        No Known Allergies    Objective   Vitals: Blood pressure 135/76, pulse 73, temperature 97.6 °F (36.4 °C), temperature source Oral, resp. rate 22, height 5' 8.5" (1.74 m), weight 110 kg (241 lb 10 oz), SpO2 98 %. ,Body mass index is 36.2 kg/m². Physical Exam  Vitals and nursing note reviewed. HENT:      Head: Normocephalic. Nose: No congestion. Mouth/Throat:      Mouth: Mucous membranes are moist.   Eyes:      General:         Right eye: No discharge. Left eye: No discharge. Cardiovascular:      Rate and Rhythm: Normal rate. Pulses: Normal pulses. Pulmonary:      Effort: Pulmonary effort is normal.      Breath sounds: Normal breath sounds. Abdominal:      General: Bowel sounds are normal.      Palpations: Abdomen is soft. Musculoskeletal:         General: Normal range of motion. Cervical back: Normal range of motion. Skin:     General: Skin is warm and dry. Neurological:      Mental Status: He is alert. He is disoriented.    Psychiatric:      Comments: Periods of confusion         Lab Results:   Results from last 7 days   Lab Units 11/14/23  0435   WBC Thousand/uL 6.08   HEMOGLOBIN g/dL 12.7   HEMATOCRIT % 40.9   PLATELETS Thousands/uL 252        Results from last 7 days   Lab Units 11/14/23  0435 11/11/23  0502 11/10/23  2139   POTASSIUM mmol/L 3.8   < > 4.1   CHLORIDE mmol/L 107   < > 104   CO2 mmol/L 32   < > 29   BUN mg/dL 36*   < > 25   CREATININE mg/dL 1.12   < > 1.11   CALCIUM mg/dL 8.9   < > 9.2   ALK PHOS U/L  --   --  90   ALT U/L  --   --  18   AST U/L  --   --  18    < > = values in this interval not displayed. Imaging Studies: I have personally reviewed pertinent films in PACS  EKG, Pathology, and Other Studies: I have personally reviewed pertinent reports.     VTE Prophylaxis: Sequential compression device (Venodyne)     Code Status: Level 3 - DNAR and DNI

## 2023-11-14 NOTE — ASSESSMENT & PLAN NOTE
Imaging revealed mild superior endplate E0/H1 compression fractures deemed stable  PRN pain control   PT/OT as tolerated  Continue calcium/vitamin D supplementation

## 2023-11-14 NOTE — ASSESSMENT & PLAN NOTE
Patient with underlying reported dementia   Negative head CT scan for acute abnormality   Currently patient on one-on-one observation due to agitation and intermittent impulsiveness   Continue with supportive care  Add stool softeners  Geriatric eval

## 2023-11-14 NOTE — SPEECH THERAPY NOTE
Speech Language/Pathology    Speech/Language Pathology Progress Note    Patient Name: Darshan Mckeon  ADAJJ'D Date: 11/14/2023     Problem List  Principal Problem:    Acute respiratory failure with hypoxia and hypercapnia (HCC)  Active Problems:    Atrial fibrillation (HCC)    Essential hypertension    Acute on chronic diastolic CHF (HCC)    Hypothyroidism    Chronic obstructive pulmonary disease (HCC)    BPH (benign prostatic hyperplasia)    Acute encephalopathy    Right elbow pain    Compression fractures of T5 and T9 vertebrae    History of stroke       Past Medical History  Past Medical History:   Diagnosis Date    Arithmetic disorder     07OCT2016    Myocardial infarct [facility])     [id number]  LAST ASSESSED        Past Surgical History  Past Surgical History:   Procedure Laterality Date    BLADDER SURGERY      LAST ASSESSED 12OCT2016      CORONARY ARTERY BYPASS GRAFT      [id number] RESOLVED    SKIN BIOPSY           Subjective:  "Yeah, maybe you'll bring me water tomorrow" Patient awake and alert. Easily agitated. Objective: The patient has been requesting water. He continues on a mechanical soft diet with nectar thick liquids. The patient takes large, consecutive sips via straw. Initially, he tolerates well. The patient then has regular cookie. Bite strength is adequate. Mastication is timely. No obvious oral residue observed. The patient takes thin liquids and presents with significant coughing episode. He continues to cough on remaining sips of thin liquids. Assessment:  Patient has significant coughing with thin liquids today. Plan/Recommendations:  Continue mechanical soft diet with nectar thick liquids. Continue ST to further assess tolerance of thin liquids.

## 2023-11-14 NOTE — RAPID RESPONSE
Rapid Response Note  Maximo Vigil 80 y.o. male MRN: [id number]  Unit/Bed#: [facility] 406-01 Encounter: [id number]    Rapid Response Notification(s):   Response called date/time:  11/14/2023 4:00 PM  Response team arrival date/time:  11/14/2023 4:04 PM  Response end date/time:  11/14/2023 4:20 PM  Level of care:  Lowella Even  Primary reason for rapid response call:  Acute change in neuro status    Rapid Response Intervention(s):   Airway:  None  Breathing:  None  Circulation:  None  Fluids administered:  None  Medications administered:  None       Assessment:   Acute Change in Mental Status     Plan:   Stroke Alert- Stat CTH and CTA  NIHSS9, last known well 1555  POC Glucose normal, I stat ABG wnl   Neurology Consult, To remain on the floor   On Eliquis, not TPA candidate     Rapid Response Outcome:   Transfer:  Remain on floor  Provider notification: Bedside. Code Status: Level 3 (DNAR and DNI)      Family notified of transfer: To be notified by slim        Background/Situation:   Maximo Vigil is a 80 y.o. male who is currently admitted for acute respiratory failure in the setting of CHF exacerbation. Today patient had acute change in mental status. Alert and oriented x 0 intermittently responsive to pain. Stat stroke alert ordered. Patient arousal and alert to name while in scan, maintaining airway. Possible RUE deficits in addition to aphasia. Neurology consulted. Patient stable to remain on floor with neurology consult. No TNK due to Eliquis. Review of Systems   Unable to perform ROS: Mental status change       Objective:   Vitals:    11/14/23 1000 11/14/23 1344 11/14/23 1605 11/14/23 1612   BP: 135/76  121/81 129/70   BP Location:       Pulse:   69 77   Resp:       Temp:       TempSrc:       SpO2:  98% 98% 98%   Weight:       Height:         Physical Exam  Vitals and nursing note reviewed. Constitutional:       Appearance: He is ill-appearing. HENT:      Head: Normocephalic.       Nose: Nose normal. Mouth/Throat:      Mouth: Mucous membranes are moist.   Eyes:      Pupils: Pupils are equal, round, and reactive to light. Cardiovascular:      Rate and Rhythm: Normal rate and regular rhythm. Pulses: Normal pulses. Pulmonary:      Effort: Pulmonary effort is normal.   Abdominal:      General: Abdomen is flat. Palpations: Abdomen is soft. Musculoskeletal:         General: Normal range of motion. Cervical back: Normal range of motion. Skin:     General: Skin is warm. Capillary Refill: Capillary refill takes less than 2 seconds. Neurological:      Mental Status: He is alert. Comments: Awakes and says name, NIHSS 9. Portions of the record may have been created with voice recognition software. Occasional wrong word or "sound a like" substitutions may have occurred due to the inherent limitations of voice recognition software. Read the chart carefully and recognize, using context, where substitutions have occurred.     Luis Armando Granados PA-C

## 2023-11-14 NOTE — CONSULTS
Consultation - Stroke Alert  Natalie Dior 80 y.o. male MRN: [id number]  Unit/Bed#: [facility] 406-01 Encounter: [id number]      Assessment/Plan     *Stroke-like symptoms  Assessment & Plan  Natalie Dior is a 80 y.o. male w/ complex PMH including R parietal stroke and CABG currently on [facility] who p/w acute change in level of consciousness and reported right sided weakness most concerning for acute on chronic delirium in the setting of prior cognitive deficits and hospital stay with disrupted sleep cycle but cannot r/o stroke in setting of known cerebrovascular risk factors and cannot r/o seizure given encephalomalacia in the region of his prior stroke. At this point patient was not a TNK candidate due to [facility] and had no LVO for endovascular intervention. He would benefit from delirium precautions and would not put him on the stroke pathway for now given his baseline agitation and sleep disruption. rEEG during day to check for region of irritability. Would not empirically treat with ASM unless patient has similar recurrence concerning for ongoing seizure activity. Presenting sx: altered level of consciousness; NIHSS: 13; TNK not given 2/2 current AC use  Workup:  CTH/CTA: negative for acute blood, signs of ischemia, LVO or critical stenosis - right parietal stroke sequelae observed  Labs: Hgb A1c pending, LDL pending  MRI: pending if tolerated  TTE (11/12):  LVEF 55%, RV dilation, moderate biatrial dilation, AV sclerosis, mild MR/TR    Plan:  Would not initiate stroke pathway at this time - frequent neuro check disrupting the sleep cycle are likely to worsen this patient's delirium  Atorvastatin 40 mg (can continue prior medication, check lipid panel  F/u labs: A1c, FLP  F/u MRI if tolerated   Continue monitoring on telemetry, frequent neuro checks, stat CTH for acute change  rEEG - obtain routine EEG during daytime hours to check for cortical irritability  Replete lytes, goal euglycemia (gluc < 180) and normothermia  PT/OT/PMR/ST w/ swallow eval  Stroke education if stroke found but currently symptoms are non-localizing  Case and plan d/w attending. See attestation additional/updated recommendations    Thrombolytic Decision: Patient not a candidate. Bleeding risk. Recommendations for outpatient neurological follow up have yet to be determined. History of Present Illness     Reason for Consult / Principal Problem: stroke alert  Hx and PE limited by: AMS  Patient last known well: 15:33  Stroke alert called: 16:05  Neurology time of arrival: 16:07  HPI: Jose Manuel Bolaños is a 80 y.o. male w/ PMH stroke, CAD s/p CABG, on [facility] who presents with acute onset change in mental status. Patient was reportedly at his hospital baseline (delirious, not clearly oriented but conversant) and suddenly became unresponsive when being helped back into bed. Did not respond to aggressive noxious stimulation including sternal rub. Was leaning to the right side. Did seem to have some right-sided deficits on first exam during RRT. No significant alteration in heart rate, blood pressure, or other explanatory changes in his vitals. Given patient's risk factors and sudden change in LOC, stroke alert activated. On neurology arrival patient was yelling, clearly disoriented, but not aphasic per se. He was not following any commands. He does respond to stimulation BUE/BLE. EOMI. NIHSS 13 on our exam, though 7 earlier. CTH/CTA negative for acute blood, signs of acute ischemia, LVO or critical stenosis. Chronic right parietal stroke observed. No history of epilepsy or seizure disorder, but patient has had cognitive decline and is currently living in a facility. He has been altered since his arrival at the hospital couple of days ago. He has ongoing respiratory issues requiring diuresis.   Patient moving [address] spontaneously and purposefully by the time of our exam.  Not moving BLE to command, or noxious stim, but avoiding reflex testing purposefully RLE > LLE. Able to articulate many things including "their kidney offers! They are kidnapping me", and "I am dying, goodbye world!"  while in the scanner. Stated "you're DEAD! D-E-A-D" in reponse to noxious stim to R hallux.     Inpatient consult to Neurology  Consult performed by: Destinee Morales MD  Consult ordered by: Franky Gallardo PA-C        Review of Systems   Unable to perform ROS: Mental status change     Historical Information   Past Medical History:   Diagnosis Date    Arithmetic disorder     [id number]    Myocardial infarct [facility])     [id number]  LAST ASSESSED     Past Surgical History:   Procedure Laterality Date    BLADDER SURGERY      LAST ASSESSED [id number]      CORONARY ARTERY BYPASS GRAFT      [id number] RESOLVED    SKIN BIOPSY       Social History   Social History     Substance and Sexual Activity   Alcohol Use Yes    Comment: OCCASIONAL     Social History     Substance and Sexual Activity   Drug Use Not on file     E-Cigarette/Vaping    E-Cigarette Use Never User      E-Cigarette/Vaping Substances    Nicotine No     THC No     CBD No     Flavoring No     Other No      Social History     Tobacco Use   Smoking Status Former   Smokeless Tobacco Never     Family History:   Family History   Problem Relation Age of Onset    Breast cancer Family         [id number] LAST ASSESSED       Review of previous medical records underway    Meds/Allergies   all current active meds have been reviewed, current meds:   Current Facility-Administered Medications   Medication Dose Route Frequency    albuterol inhalation solution 2.5 mg  2.5 mg Nebulization Q6H PRN    apixaban (ELIQUIS) tablet 5 mg  5 mg Oral BID    atorvastatin (LIPITOR) tablet 40 mg  40 mg Oral HS    bisacodyl (DULCOLAX) rectal suppository 10 mg  10 mg Rectal Daily PRN    bumetanide (BUMEX) injection 2 mg  2 mg Intravenous BID    calcium carbonate (OYSTER SHELL,OSCAL) 500 mg tablet 1 tablet  1 tablet Oral BID With Meals    carvedilol (COREG) tablet 25 mg  25 mg Oral BID With Meals    cholecalciferol (VITAMIN D3) tablet 1,000 Units  1,000 Units Oral Daily    finasteride (PROSCAR) tablet 5 mg  5 mg Oral Daily    fluticasone (FLONASE) 50 mcg/act nasal spray 1 spray  1 spray Nasal Daily PRN    hydrALAZINE (APRESOLINE) injection 5 mg  5 mg Intravenous Q6H PRN    ipratropium (ATROVENT) 0.02 % inhalation solution 0.5 mg  0.5 mg Nebulization TID    latanoprost (XALATAN) 0.005 % ophthalmic solution 1 drop  1 drop Both Eyes HS    levalbuterol (XOPENEX) inhalation solution 1.25 mg  1.25 mg Nebulization TID    levothyroxine tablet 100 mcg  100 mcg Oral Early Morning    lisinopril (ZESTRIL) tablet 40 mg  40 mg Oral Daily    melatonin tablet 6 mg  6 mg Oral HS PRN    ondansetron (ZOFRAN) injection 4 mg  4 mg Intravenous Q6H PRN    polyethylene glycol (MIRALAX) packet 17 g  17 g Oral Daily    senna-docusate sodium (SENOKOT S) 8.6-50 mg per tablet 2 tablet  2 tablet Oral BID   , and PTA meds:   Prior to Admission Medications   Prescriptions Last Dose Informant Patient Reported? Taking?    albuterol (PROVENTIL HFA,VENTOLIN HFA) 90 mcg/act inhaler   No No   Sig: Inhale 2 puffs every 4 (four) hours as needed (shortness of breath)   apixaban (Eliquis) 5 mg   No No   Sig: Take 1 tablet (5 mg total) by mouth 2 (two) times a day   atorvastatin (LIPITOR) 40 mg tablet   No No   Sig: Take 1 tablet (40 mg total) by mouth daily at bedtime   benazepril (LOTENSIN) 40 MG tablet   No No   Sig: Take 1 tablet (40 mg total) by mouth daily   carboxymethylcellulose 0.5 % SOLN   No No   Sig: Administer 1 drop to both eyes 4 (four) times a day   carvedilol (COREG) 25 mg tablet   No No   Sig: Take 1 tablet (25 mg total) by mouth 2 (two) times a day with meals   cholecalciferol (VITAMIN D3) 1,000 units tablet   No No   Sig: Take one tablet by mouth at noon with lunch for vitamin d deficiency   finasteride (PROSCAR) 5 mg tablet   No No   Sig: Take 1 tablet (5 mg total) by mouth daily fluticasone (FLONASE) 50 mcg/act nasal spray   No No   Si spray into each nostril daily as needed for rhinitis   latanoprost (XALATAN) 0.005 % ophthalmic solution   No No   Sig: Apply 1 drop to eye daily   levothyroxine 100 mcg tablet   No No   Sig: Take one tablet by mouth at bedtime for hypothyroid   potassium chloride (K-DUR,KLOR-CON) 20 mEq tablet   No No   Sig: Take 1 tablet (20 mEq total) by mouth daily   tiotropium-olodaterol (STIOLTO RESPIMAT) 2.5-2.5 MCG/ACT inhaler   Yes No   Sig: Inhale 2 puffs daily Ordered by [facility] for COPD   torsemide (DEMADEX) 20 mg tablet   No No   Sig: TAKE 1 AND 1/2 TABLETS BY MOUTH EVERY DAY WITH LUNCH   Patient taking differently: Take 20 mg by mouth daily      Facility-Administered Medications: None       No Known Allergies    Objective   Vitals:Blood pressure 129/70, pulse 77, temperature 97.6 °F (36.4 °C), temperature source Oral, resp. rate 22, height 5' 8.5" (1.74 m), weight 110 kg (241 lb 10 oz), SpO2 98 %. ,Body mass index is 36.2 kg/m². Intake/Output Summary (Last 24 hours) at 2023 1702  Last data filed at 2023 1209  Gross per 24 hour   Intake 596 ml   Output 700 ml   Net -104 ml       Invasive Devices: Invasive Devices       Peripheral Intravenous Line  Duration             Peripheral IV 23 Distal;Dorsal (posterior); Left Forearm <1 day              Drain  Duration             External Urinary Catheter 3 days                  Physical Exam Vitals reviewed. Constitutional:    In acute distress. Elevated BMI. Mildly ill-appearing, but not clearly toxic-appearing or diaphoretic. HENT:   Normocephalic and atraumatic. External ear normal b/l. Nose normal. No congestion or rhinorrhea. Mucous membranes are mildly dry. Eyes:    No scleral icterus. No discharge b/l. Conjunctivae normal.   Pulmonary: mild respiratory distress with rare use of accessory muscles  GI: abdomen not distended  Musculoskeletal: no gross deformities  Skin:   Skin is pale. Scattered bruises  Psychiatric:  confused    Neurologic Exam:   Mental Status arousable to verbal and noxious stim. Speech is fluent w/o clear dysarthria but patient is clearly confused  Cranial Nerves blink to threat intact b/l. EOMI, no obvious nystagmus. Facial expression symmetric. Trapezius strength symmetric. Tongue w/o atrophy or fasciculations, no dysarthria  Motor Exam Muscle bulk and tone grossly normal; resists exam symmetrically BUE. Resists exam asymmetrically RLE > LLE  Sensory Exam response to noxious stim BUE/BLE  Gait, Coordination, and Reflexes no significant tremor observed. Some negative myoclonus observed during parts of exam. Reflexes: Brachioradialis: 2+ b/l    Biceps: 2+ b/l    Patellar: 0-1 b/l     Plantar response mute b/l    NIHSS:  1a.Level of Consciousness: 1 = Not alert, but arousable with minimal stimulation   1b. LOC Questions: 2 = Answers neither correctly   1c. LOC Commands: 2 = Obeys neither correctly   2. Best Gaze: 0 = Normal   3. Visual: 0 = No visual field loss   4. Facial Palsy: 0=Normal symmetric movement   5a. Motor Right Arm: 2=Some effort against gravity, limb cannot get to or maintain (if cured) 90 (or 45) degrees, drifts down to bed, but has some effort against gravity   5b. Motor Left Arm: 2=Some effort against gravity, limb cannot get to or maintain (if cured) 90 (or 45) degrees, drifts down to bed, but has some effort against gravity   6a. Motor Right Le=Some effort against gravity, limb cannot get to or maintain (if cured) 90 (or 45) degrees, drifts down to bed, but has some effort against gravity   6b. Motor Left Le=Some effort against gravity, limb cannot get to or maintain (if cured) 90 (or 45) degrees, drifts down to bed, but has some effort against gravity   7. Limb Ataxia:  0=Absent   8. Sensory: 0=Normal; no sensory loss   9. Best Language:  0=No aphasia, normal   10. Dysarthria: 0=Normal articulation   11.  Extinction and Inattention (formerly Neglect): 0=No abnormality   Total Score: 13     Time NIHSS was completed: 16:17    Modified Jackson Score: 3-4  3 (Moderate disability; requiring some help, but able to walk without assistance)    Lab Results: I have personally reviewed pertinent reports. , CBC:   Results from last 7 days   Lab Units 11/14/23 1617 11/14/23  1610 11/14/23 0435 11/13/23  0600   WBC Thousand/uL  --  8.18 6.08 8.38   RBC Million/uL  --  4.24 4.15 4.15   HEMOGLOBIN g/dL  --  12.6 12.7 12.7   I STAT HEMOGLOBIN g/dl 13.3  --   --   --    HEMATOCRIT %  --  41.8 40.9 41.6   HEMATOCRIT, ISTAT % 39  --   --   --    MCV fL  --  99* 99* 100*   PLATELETS Thousands/uL  --  296 252 272   , BMP/CMP:   Results from last 7 days   Lab Units 11/14/23 1617 11/14/23  1610 11/14/23 0435 11/13/23  0600 11/11/23  0502 11/10/23  2139   SODIUM mmol/L  --  143 145 145   < > 139   POTASSIUM mmol/L  --  3.8 3.8 3.5   < > 4.1   CHLORIDE mmol/L  --  105 107 106   < > 104   CO2 mmol/L  --  34* 32 29   < > 29   CO2, I-STAT mmol/L 31  --   --   --   --   --    BUN mg/dL  --  33* 36* 37*   < > 25   CREATININE mg/dL  --  1.04 1.12 1.13   < > 1.11   GLUCOSE, ISTAT mg/dl 144*  --   --   --   --   --    CALCIUM mg/dL  --  9.0 8.9 9.3   < > 9.2   AST U/L  --   --   --   --   --  18   ALT U/L  --   --   --   --   --  18   ALK PHOS U/L  --   --   --   --   --  90   EGFR ml/min/1.73sq m  --  60 55 54   < > 55    < > = values in this interval not displayed.    , Vitamin B12:   Results from last 7 days   Lab Units 11/11/23  0502   VITAMIN B 12 pg/mL 353   , HgBA1C:   , TSH:   Results from last 7 days   Lab Units 11/12/23  0459   TSH 3RD GENERATON uIU/mL 0.860   , Coagulation:   Results from last 7 days   Lab Units 11/14/23  1610   INR  1.64*   , Lipid Profile:   , Ammonia:   , Urinalysis:   Results from last 7 days   Lab Units 11/11/23  1825   COLOR UA  Light Yellow   CLARITY UA  Clear   SPEC GRAV UA  1.014   PH UA  5.0   LEUKOCYTES UA  Negative   NITRITE UA  Negative GLUCOSE UA mg/dl Negative   KETONES UA mg/dl Negative   BILIRUBIN UA  Negative   BLOOD UA  Negative   , Drug Screen:   , Medication Drug Levels:       Invalid input(s): "CARBAMAZEPINE", "LACOSAMIDE", "OXCARBAZEPINE"  Imaging Studies: I have personally reviewed pertinent reports.  , I have personally reviewed pertinent films in PACS, and I have personally reviewed pertinent films in PACS with a Radiologist.  CT stroke alert brain   Final Result by Denia Chavez MD (11/14 1630)      No acute intracranial abnormality. Chronic microangiopathic changes. I personally communicated this study with Dr. Yara Cooney on 11/14/2023 4:23 PM.            Workstation performed: [id number]         CTA stroke alert (head/neck)   Final Result by Denia Chavez MD (11/14 1646)      No evidence of hemodynamic significant stenosis, aneurysm or dissection. I personally communicated this study with Dr. Yara Cooney on 11/14/2023 4:23 PM.                           Workstation performed: [id number]         XR elbow 3+ vw right   Final Result by Yana Diallo MD (11/13 1413)      1. Soft tissue swelling of the dorsal aspect of the proximal forearm without other acute osseous abnormalities. 2. Degenerative changes as noted. Resident: Toyin Daniels, the attending radiologist, have reviewed the images and agree with the final report above. Workstation performed: [id number]         CT head wo contrast   Final Result by Paulina Piper MD (11/12 1211)      1. No acute intracranial abnormality. 2.  Generalized cerebral greater than cerebellar atrophy. 3.  Chronic microangiopathic changes, chronic bilateral thalamic lacunar infarctions, and chronic right parietal cortical infarction. 4.  Subcentimeter extra-axial hyperdensity in the right occipital region most likely represents a meningioma. This is unchanged when compared to the prior study of February 28, 2020. Workstation performed: [id number]         CTA ED chest PE Study   Final Result by Talia Almonte MD (11/11 0257)      1. Limited evaluation without evidence of pulmonary embolism   2. Additional chronic findings as above               Workstation performed: [id number]           EKG, Pathology, and Other Studies: I have personally reviewed pertinent reports. VTE Prophylaxis: Sequential compression device Kenny Vu)     Code Status: Level 3 - DNAR and DNI  Advance Directive and Living Will: Yes    Power of :    POLST:      Counseling / Coordination of Care  Total time spent today 47 minutes. Greater than 50% of total time was spent with the patient and / or family counseling and / or coordination of care.  A description of the counseling / coordination of care: stroke alert    ~~~~~~~~~~~~~~~~~~~  Chriss Lucia MD  Neurology Resident, PGY-4  [address] [facility]

## 2023-11-14 NOTE — NURSING NOTE
Patient became unresponsive. Sternal rub was completed with still no response from patient. Rapid response called at 1600. Vitals signs stable. Sinus rhythm, HR 70, /81, O2 96. Blood sugar 135. EKG done. Patient not following commands or responding to verbal stimulus. Pt confused and screaming "help, they are kidnapping me". Stroke alert called Robert MIRANDA. Labs sent. CT head w &w/o contrast done.

## 2023-11-14 NOTE — ASSESSMENT & PLAN NOTE
Per daughter, presented with increased lower extremity edema over the last several days   Cardiac echo revealed an EF of 55% with moderate biatrial dilatation, no evidence of regional LV wall motion abnormalities, and mild MR/TR  Continue IV Bumex BID for diuresis   Monitor I/Os and daily weights  NT-proBNP elevated at 306 (was 144 last month)  TSH normal (h/o hypothyroidism noted)  Low-sodium/fluid restriction  Monitor/replete electrolyte deficiencies of present  Has previously required BIPAP use at night -> as pCO2 normalizing, can hopefully not become dependent

## 2023-11-14 NOTE — PROGRESS NOTES
[address] [facility]  Progress Note  Name: Luigi Damon I  MRN: [id number]  Unit/Bed#: PPHP 406-01 I Date of Admission: 11/10/2023   Date of Service: 11/14/2023 I Hospital Day: 3    Assessment/Plan   * Acute respiratory failure with hypoxia and hypercapnia (HCC)  Assessment & Plan  Presented with worsening shortness of breath over the past 2 days per daughter, with respiratory distress on arrival requiring initial BiPAP however patient unable to tolerate and subsequently placed on 10 L [address]  Labs significant for elevated BNP; CTA PE study limited due to motion artifact but no mainstem pulmonary emboli visualized and lungs with hypoinflation and dependent opacities suspected atelectasis  Suspect multifactorial in setting of acute on chronic heart failure, underlying COPD, possible lung mass (was noted on prior CT abdomen/pelvis incidentally however was not reported on current CTA PE study)  Remaining confused as below  Management of the acute on chronic heart failure, COPD as per associated problems with IV diuresis and nebulizers as needed  Continue supplemental oxygen as needed, titrate to maintain appropriate saturations as appropriate  Respiratory protocol, incentive spirometry, pulmonary toileting    11/12 - mentation is wax/waning although awake today after use of BIPAP last night - peak daytime oxygen requirement of 15 L now weaned down to 8 L. Continue aggressive intravenous diuresis for CHF exacerbation. Will appreciate pulmonology input -> check repeat blood gas tomorrow morning. Highly suspect both hypoxic and hypercapnic respiratory failure playing a role in altered mentation. 11/13 - Oxygen requirements weaned down to 4 L with repeat blood gas this morning showing normalization of pCO2. Appreciate pulmonology input with recommendation  Continue to wean down oxygen requirements as tolerated.     Pulmonology signed off    Patient continues to be on 4 L of oxygen  Status post 3 days of IV antibiotics  Negative procalcitonin x2  Discontinue antibiotic and observe    Acute on chronic diastolic CHF ([address])  Assessment & Plan  Per daughter, presented with increased lower extremity edema over the last several days   Cardiac echo revealed an EF of 55% with moderate biatrial dilatation, no evidence of regional LV wall motion abnormalities, and mild MR/TR  Continue IV Bumex BID for diuresis   Monitor I/Os and daily weights  NT-proBNP elevated at 306 (was 144 last month)  TSH normal (h/o hypothyroidism noted)  Low-sodium/fluid restriction  Monitor/replete electrolyte deficiencies of present  Has previously required BIPAP use at night -> as pCO2 normalizing, can hopefully not become dependent    Chronic obstructive pulmonary disease ([address])  Assessment & Plan  Stable/asymptomatic  Holding Stiolto per pulmonology - continue Xopenex/Atrovent nebulization with additional PRN Albuterol on board     History of stroke  Assessment & Plan  CT of head reveals evidence of chronic bilateral thalamic lacunar and right parietal cortical infarctions  Continue statin/Eliquis  PT/OT as tolerated    Compression fractures of T5 and T9 vertebrae  Assessment & Plan  Imaging revealed mild superior endplate G9/D7 compression fractures deemed stable  PRN pain control   PT/OT as tolerated  Continue calcium/vitamin D supplementation    Right elbow pain  Assessment & Plan  Reports mild pain to lateral abduction of right elbow  Denies recent trauma/falls  Patient denied pain or tenderness  X-ray with soft tissue swelling of the dorsal aspect of the proximal forearm without other acute osseous abnormalities.    Discontinue IV site  Monitor  Continue supportive care    Acute encephalopathy  Assessment & Plan  Patient with underlying reported dementia   Negative head CT scan for acute abnormality   Currently patient on one-on-one observation due to agitation and intermittent impulsiveness   Continue with supportive care  Add stool softeners  Geriatric eval    BPH (benign prostatic hyperplasia)  Assessment & Plan  C/w Proscar  Outpatient urology followup    Hypothyroidism  Assessment & Plan  TSH normal  Continue Synthroid    Essential hypertension  Assessment & Plan  Continue Zestril/Coreg  Sodium restriction    Atrial fibrillation (HCC)  Assessment & Plan  Rate controlled on Coreg  Continue Eliquis for anticoagulation             VTE Pharmacologic Prophylaxis: VTE Score: 6 High Risk (Score >/= 5) - Pharmacological DVT Prophylaxis Ordered: apixaban (Eliquis). Sequential Compression Devices Ordered. Patient Centered Rounds: I performed bedside rounds with nursing staff today. Discussions with Specialists or Other Care Team Provider: [facility]    Education and Discussions with Family / Patient: Discussed with patient's daughter    Total Time Spent on Date of Encounter in care of patient: 35 mins. This time was spent on one or more of the following: performing physical exam; counseling and coordination of care; obtaining or reviewing history; documenting in the medical record; reviewing/ordering tests, medications or procedures; communicating with other healthcare professionals and discussing with patient's family/caregivers. Current Length of Stay: 3 day(s)  Current Patient Status: Inpatient   Certification Statement: The patient will continue to require additional inpatient hospital stay due to management of CHF and encephalopathy  Discharge Plan: Anticipate discharge in 48-72 hrs to rehab facility.     Code Status: Level 3 - DNAR and DNI    Subjective:   Patient is comfortable in bed  Denies chest pain or shortness of breath  Wants to see an chair  No event overnight    Objective:     Vitals:   Temp (24hrs), Av.8 °F (36.6 °C), Min:97.4 °F (36.3 °C), Max:98.3 °F (36.8 °C)    Temp:  [97.4 °F (36.3 °C)-98.3 °F (36.8 °C)] 97.6 °F (36.4 °C)  HR:  [62-73] 73  Resp:  [20-33] 22  BP: ()/(45-91) 146/81  SpO2:  [95 %-100 %] 98 %  Body mass index is 36.2 kg/m². Input and Output Summary (last 24 hours): Intake/Output Summary (Last 24 hours) at 11/14/2023 0902  Last data filed at 11/14/2023 0601  Gross per 24 hour   Intake 460 ml   Output 700 ml   Net -240 ml       Physical Exam:   Physical Exam   Patient is awake alert comfortable in bed no acute distress  Lung with decreased breath sound bilateral and mild wheezing  Heart pulse S1-S2 no murmur  Abdomen soft nontender  Right upper extremity edema, no tenderness  Right lower extremity edema    Additional Data:     Labs:  Results from last 7 days   Lab Units 11/14/23  0435 11/11/23  0502 11/10/23  2139   WBC Thousand/uL 6.08   < > 9.42   HEMOGLOBIN g/dL 12.7   < > 13.0   HEMATOCRIT % 40.9   < > 40.4   PLATELETS Thousands/uL 252   < > 248   BANDS PCT %  --   --  9*   NEUTROS PCT % 73   < >  --    LYMPHS PCT % 11*   < >  --    LYMPHO PCT %  --   --  5*   MONOS PCT % 11   < >  --    MONO PCT %  --   --  9   EOS PCT % 3   < > 0    < > = values in this interval not displayed. Results from last 7 days   Lab Units 11/14/23  0435 11/11/23  0502 11/10/23  2139   SODIUM mmol/L 145   < > 139   POTASSIUM mmol/L 3.8   < > 4.1   CHLORIDE mmol/L 107   < > 104   CO2 mmol/L 32   < > 29   BUN mg/dL 36*   < > 25   CREATININE mg/dL 1.12   < > 1.11   ANION GAP mmol/L 6   < > 6   CALCIUM mg/dL 8.9   < > 9.2   ALBUMIN g/dL  --   --  3.7   TOTAL BILIRUBIN mg/dL  --   --  2.10*   ALK PHOS U/L  --   --  90   ALT U/L  --   --  18   AST U/L  --   --  18   GLUCOSE RANDOM mg/dL 107   < > 127    < > = values in this interval not displayed.      Results from last 7 days   Lab Units 11/10/23  2139   INR  1.68*             Results from last 7 days   Lab Units 11/14/23  0435 11/10/23  2139   LACTIC ACID mmol/L  --  1.3   PROCALCITONIN ng/ml 0.24 0.25       Lines/Drains:  Invasive Devices       Peripheral Intravenous Line  Duration             Peripheral IV 11/11/23 Right Forearm 3 days              Drain Duration             External Urinary Catheter 3 days                      Telemetry:  Telemetry Orders (From admission, onward)               24 Hour Telemetry Monitoring  Continuous x 24 Hours (Telem)        Expiring   Question:  Reason for 24 Hour Telemetry  Answer:  Acute respiratory failure on BiPAP                     Telemetry Reviewed:  yes  Indication for Continued Telemetry Use: No indication for continued use. Will discontinue. Imaging: No pertinent imaging reviewed. Recent Cultures (last 7 days):   Results from last 7 days   Lab Units 11/10/23  2151 11/10/23  2139   BLOOD CULTURE  No Growth at 72 hrs. No Growth at 72 hrs.        Last 24 Hours Medication List:   Current Facility-Administered Medications   Medication Dose Route Frequency Provider Last Rate    albuterol  2.5 mg Nebulization Q6H PRN Lisette Gabrielle, DO      apixaban  5 mg Oral BID Lisette Lawrence, DO      atorvastatin  40 mg Oral HS Lisette Lawrence, DO      bumetanide  2 mg Intravenous BID Ellis Baig, DO      calcium carbonate  1 tablet Oral BID With Meals Rufina Cerrato MD      carvedilol  25 mg Oral BID With Meals Lisette Lawrence, DO      cholecalciferol  1,000 Units Oral Daily Lisette Gabrielle, DO      finasteride  5 mg Oral Daily Lisette Lawrence, DO      fluticasone  1 spray Nasal Daily PRN Lisette Lawrence, DO      hydrALAZINE  5 mg Intravenous Q6H PRN Sobia Fox PA-C      ipratropium  0.5 mg Nebulization TID Romulo Harper MD      latanoprost  1 drop Both Eyes HS Lisette Gabrielle, DO      levalbuterol  1.25 mg Nebulization TID Romulo Harper MD      levothyroxine  100 mcg Oral Early Morning Lisette Gabrielle, DO      lisinopril  40 mg Oral Daily Lisette Lawrence, DO      ondansetron  4 mg Intravenous Q6H PRN Lisette Gabrielle, DO      polyethylene glycol  17 g Oral Daily Ella Lee,       senna-docusate sodium  2 tablet Oral BID Ella Lee DO          Today, Patient Was Seen By: Ella Lee DO    **Please Note: This note may have been constructed using a voice recognition system. **

## 2023-11-14 NOTE — ASSESSMENT & PLAN NOTE
Reports mild pain to lateral abduction of right elbow  Denies recent trauma/falls  Patient denied pain or tenderness  X-ray with soft tissue swelling of the dorsal aspect of the proximal forearm without other acute osseous abnormalities.    Discontinue IV site  Monitor  Continue supportive care

## 2023-11-15 ENCOUNTER — APPOINTMENT (INPATIENT)
Dept: RADIOLOGY | Facility: HOSPITAL | Age: 88
DRG: 291 | End: 2023-11-15
Attending: INTERNAL MEDICINE
Payer: COMMERCIAL

## 2023-11-15 PROBLEM — K59.09 OTHER CONSTIPATION: Status: ACTIVE | Noted: 2023-11-15

## 2023-11-15 LAB
ANION GAP SERPL CALCULATED.3IONS-SCNC: 5 MMOL/L
BUN SERPL-MCNC: 31 MG/DL (ref 5–25)
CALCIUM SERPL-MCNC: 9.1 MG/DL (ref 8.4–10.2)
CHLORIDE SERPL-SCNC: 107 MMOL/L (ref 96–108)
CO2 SERPL-SCNC: 34 MMOL/L (ref 21–32)
CREAT SERPL-MCNC: 0.98 MG/DL (ref 0.6–1.3)
GFR SERPL CREATININE-BSD FRML MDRD: 64 ML/MIN/1.73SQ M
GLUCOSE SERPL-MCNC: 125 MG/DL (ref 65–140)
POTASSIUM SERPL-SCNC: 3.7 MMOL/L (ref 3.5–5.3)
SODIUM SERPL-SCNC: 146 MMOL/L (ref 135–147)

## 2023-11-15 PROCEDURE — 92526 ORAL FUNCTION THERAPY: CPT

## 2023-11-15 PROCEDURE — 95816 EEG AWAKE AND DROWSY: CPT | Performed by: PSYCHIATRY & NEUROLOGY

## 2023-11-15 PROCEDURE — 97530 THERAPEUTIC ACTIVITIES: CPT

## 2023-11-15 PROCEDURE — 74230 X-RAY XM SWLNG FUNCJ C+: CPT

## 2023-11-15 PROCEDURE — 99233 SBSQ HOSP IP/OBS HIGH 50: CPT | Performed by: PSYCHIATRY & NEUROLOGY

## 2023-11-15 PROCEDURE — 99232 SBSQ HOSP IP/OBS MODERATE 35: CPT | Performed by: INTERNAL MEDICINE

## 2023-11-15 PROCEDURE — 94760 N-INVAS EAR/PLS OXIMETRY 1: CPT

## 2023-11-15 PROCEDURE — 80048 BASIC METABOLIC PNL TOTAL CA: CPT | Performed by: INTERNAL MEDICINE

## 2023-11-15 PROCEDURE — 92611 MOTION FLUOROSCOPY/SWALLOW: CPT

## 2023-11-15 PROCEDURE — 94660 CPAP INITIATION&MGMT: CPT

## 2023-11-15 PROCEDURE — 94664 DEMO&/EVAL PT USE INHALER: CPT

## 2023-11-15 PROCEDURE — 94640 AIRWAY INHALATION TREATMENT: CPT

## 2023-11-15 RX ORDER — LACTULOSE 10 G/15ML
20 SOLUTION ORAL 2 TIMES DAILY
Status: DISCONTINUED | OUTPATIENT
Start: 2023-11-15 | End: 2023-11-17

## 2023-11-15 RX ORDER — GUAIFENESIN 600 MG/1
600 TABLET, EXTENDED RELEASE ORAL EVERY 12 HOURS SCHEDULED
Status: DISCONTINUED | OUTPATIENT
Start: 2023-11-15 | End: 2023-11-21 | Stop reason: HOSPADM

## 2023-11-15 RX ORDER — LACTULOSE 10 G/15ML
20 SOLUTION ORAL DAILY
Status: DISCONTINUED | OUTPATIENT
Start: 2023-11-15 | End: 2023-11-15

## 2023-11-15 RX ORDER — ENEMA 19; 7 G/133ML; G/133ML
1 ENEMA RECTAL ONCE
Status: COMPLETED | OUTPATIENT
Start: 2023-11-15 | End: 2023-11-15

## 2023-11-15 RX ADMIN — LACTULOSE 20 G: 20 SOLUTION ORAL at 18:10

## 2023-11-15 RX ADMIN — IPRATROPIUM BROMIDE 0.5 MG: 0.5 SOLUTION RESPIRATORY (INHALATION) at 07:18

## 2023-11-15 RX ADMIN — APIXABAN 5 MG: 5 TABLET, FILM COATED ORAL at 09:25

## 2023-11-15 RX ADMIN — LISINOPRIL 40 MG: 20 TABLET ORAL at 09:25

## 2023-11-15 RX ADMIN — LEVOTHYROXINE SODIUM 100 MCG: 100 TABLET ORAL at 06:05

## 2023-11-15 RX ADMIN — GUAIFENESIN 600 MG: 600 TABLET, EXTENDED RELEASE ORAL at 18:09

## 2023-11-15 RX ADMIN — SENNOSIDES, DOCUSATE SODIUM 2 TABLET: 8.6; 5 TABLET ORAL at 09:25

## 2023-11-15 RX ADMIN — IPRATROPIUM BROMIDE 0.5 MG: 0.5 SOLUTION RESPIRATORY (INHALATION) at 19:54

## 2023-11-15 RX ADMIN — Medication 1 TABLET: at 18:09

## 2023-11-15 RX ADMIN — CARVEDILOL 25 MG: 25 TABLET, FILM COATED ORAL at 09:25

## 2023-11-15 RX ADMIN — GUAIFENESIN 600 MG: 600 TABLET, EXTENDED RELEASE ORAL at 21:23

## 2023-11-15 RX ADMIN — LEVALBUTEROL HYDROCHLORIDE 1.25 MG: 1.25 SOLUTION RESPIRATORY (INHALATION) at 14:12

## 2023-11-15 RX ADMIN — MELATONIN 6 MG: at 21:24

## 2023-11-15 RX ADMIN — APIXABAN 5 MG: 5 TABLET, FILM COATED ORAL at 18:10

## 2023-11-15 RX ADMIN — CARVEDILOL 25 MG: 25 TABLET, FILM COATED ORAL at 18:09

## 2023-11-15 RX ADMIN — ATORVASTATIN CALCIUM 40 MG: 40 TABLET, FILM COATED ORAL at 21:23

## 2023-11-15 RX ADMIN — BUMETANIDE 2 MG: 0.25 INJECTION INTRAMUSCULAR; INTRAVENOUS at 09:25

## 2023-11-15 RX ADMIN — IPRATROPIUM BROMIDE 0.5 MG: 0.5 SOLUTION RESPIRATORY (INHALATION) at 14:12

## 2023-11-15 RX ADMIN — BUMETANIDE 2 MG: 0.25 INJECTION INTRAMUSCULAR; INTRAVENOUS at 18:09

## 2023-11-15 RX ADMIN — LEVALBUTEROL HYDROCHLORIDE 1.25 MG: 1.25 SOLUTION RESPIRATORY (INHALATION) at 19:55

## 2023-11-15 RX ADMIN — POLYETHYLENE GLYCOL 3350 17 G: 17 POWDER, FOR SOLUTION ORAL at 09:25

## 2023-11-15 RX ADMIN — LEVALBUTEROL HYDROCHLORIDE 1.25 MG: 1.25 SOLUTION RESPIRATORY (INHALATION) at 07:18

## 2023-11-15 RX ADMIN — SODIUM PHOSPHATE 1 ENEMA: 7; 19 ENEMA RECTAL at 09:53

## 2023-11-15 RX ADMIN — Medication 1 TABLET: at 09:25

## 2023-11-15 RX ADMIN — SENNOSIDES, DOCUSATE SODIUM 2 TABLET: 8.6; 5 TABLET ORAL at 18:09

## 2023-11-15 RX ADMIN — Medication 1000 UNITS: at 09:25

## 2023-11-15 RX ADMIN — FINASTERIDE 5 MG: 5 TABLET, FILM COATED ORAL at 09:25

## 2023-11-15 NOTE — ASSESSMENT & PLAN NOTE
Per daughter, presented with increased lower extremity edema over the last several days   Cardiac echo revealed an EF of 55% with moderate biatrial dilatation, no evidence of regional LV wall motion abnormalities, and mild MR/TR  Monitor I/Os and daily weights  NT-proBNP elevated at 306 (was 144 last month)  TSH normal (h/o hypothyroidism noted)  Low-sodium/fluid restriction  Monitor/replete electrolyte deficiencies of present  Has previously required BIPAP use at night   Continue diuresis with IV Bumex

## 2023-11-15 NOTE — PLAN OF CARE
Problem: SAFETY,RESTRAINT: NV/NON-SELF DESTRUCTIVE BEHAVIOR  Goal: Remains free of harm/injury (restraint for non violent/non self-detsructive behavior)  Description: INTERVENTIONS:  - Instruct patient/family regarding restraint use   - Assess and monitor physiologic and psychological status   - Provide interventions and comfort measures to meet assessed patient needs   - Identify and implement measures to help patient regain control  - Assess readiness for release of restraint   Outcome: Progressing  Goal: Returns to optimal restraint-free functioning  Description: INTERVENTIONS:  - Assess the patient's behavior and symptoms that indicate continued need for restraint  - Identify and implement measures to help patient regain control  - Assess readiness for release of restraint   Outcome: Progressing     Problem: MOBILITY - ADULT  Goal: Maintain or return to baseline ADL function  Description: INTERVENTIONS:  -  Assess patient's ability to carry out ADLs; assess patient's baseline for ADL function and identify physical deficits which impact ability to perform ADLs (bathing, care of mouth/teeth, toileting, grooming, dressing, etc.)  - Assess/evaluate cause of self-care deficits   - Assess range of motion  - Assess patient's mobility; develop plan if impaired  - Assess patient's need for assistive devices and provide as appropriate  - Encourage maximum independence but intervene and supervise when necessary  - Involve family in performance of ADLs  - Assess for home care needs following discharge   - Consider OT consult to assist with ADL evaluation and planning for discharge  - Provide patient education as appropriate  Outcome: Progressing  Goal: Maintains/Returns to pre admission functional level  Description: INTERVENTIONS:  - Perform BMAT or MOVE assessment daily.   - Set and communicate daily mobility goal to care team and patient/family/caregiver.    - Collaborate with rehabilitation services on mobility goals if consulted  - Perform Range of Motion  times a day. - Reposition patient every  hours. - Dangle patient  times a day  - Stand patient  times a day  - Ambulate patient  times a day  - Out of bed to chair  times a day   - Out of bed for meal times a day  - Out of bed for toileting  - Record patient progress and toleration of activity level   Outcome: Progressing     Problem: Prexisting or High Potential for Compromised Skin Integrity  Goal: Skin integrity is maintained or improved  Description: INTERVENTIONS:  - Identify patients at risk for skin breakdown  - Assess and monitor skin integrity  - Assess and monitor nutrition and hydration status  - Monitor labs   - Assess for incontinence   - Turn and reposition patient  - Assist with mobility/ambulation  - Relieve pressure over bony prominences  - Avoid friction and shearing  - Provide appropriate hygiene as needed including keeping skin clean and dry  - Evaluate need for skin moisturizer/barrier cream  - Collaborate with interdisciplinary team   - Patient/family teaching  - Consider wound care consult   Outcome: Progressing     Problem: Nutrition/Hydration-ADULT  Goal: Nutrient/Hydration intake appropriate for improving, restoring or maintaining nutritional needs  Description: Monitor and assess patient's nutrition/hydration status for malnutrition. Collaborate with interdisciplinary team and initiate plan and interventions as ordered. Monitor patient's weight and dietary intake as ordered or per policy. Utilize nutrition screening tool and intervene as necessary. Determine patient's food preferences and provide high-protein, high-caloric foods as appropriate.      INTERVENTIONS:  - Monitor oral intake, urinary output, labs, and treatment plans  - Assess nutrition and hydration status and recommend course of action  - Evaluate amount of meals eaten  - Assist patient with eating if necessary   - Allow adequate time for meals  - Recommend/ encourage appropriate diets, oral nutritional supplements, and vitamin/mineral supplements  - Order, calculate, and assess calorie counts as needed  - Recommend, monitor, and adjust tube feedings and TPN/PPN based on assessed needs  - Assess need for intravenous fluids  - Provide specific nutrition/hydration education as appropriate  - Include patient/family/caregiver in decisions related to nutrition  Outcome: Progressing

## 2023-11-15 NOTE — PLAN OF CARE
Problem: OCCUPATIONAL THERAPY ADULT  Goal: Performs self-care activities at highest level of function for planned discharge setting. See evaluation for individualized goals. Description: Treatment Interventions: ADL retraining, Functional transfer training, UE strengthening/ROM, Endurance training, Cognitive reorientation, Patient/family training, Equipment evaluation/education, Continued evaluation, Energy conservation, Activityengagement          See flowsheet documentation for full assessment, interventions and recommendations. Outcome: Progressing  Note: Limitation: Decreased ADL status, Decreased UE ROM, Decreased UE strength, Decreased Safe judgement during ADL, Decreased cognition, Decreased endurance, Decreased self-care trans, Decreased high-level ADLs  Prognosis: Fair  Assessment: Pt seen for OT treatment session day 1 on this date focused on ADL retraining, functional transfers and mobility, energy conservation, functional endurance, recall of safety precautions, and patient education. Pt was greeted in chair and was cooperative throughout session. Following session, pt was left in chair with all needs within reach, son present and 1:1 present. Pt continues to be limited by functional status related to ADLs and transfers requiring MOD A x 2 with chair follow for short fxnal mobility, although demonstrates improvements in seated grooming requiring setup, S and vc to wash face. The patient's raw score on the AM-PAC Daily Activity Inpatient Short Form is 14. A raw score of less than 19 suggests the patient may benefit from discharge to post-acute rehabilitation services. Please refer to the recommendation of the Occupational Therapist for safe discharge planning. Pt would benefit from continued acute OT intervention with plan to continue OT treatment sessions 2-3x per week. Recommend d/c to post acute rehab services.      Rehab Resource Intensity Level, OT: I (Maximum Resource Intensity)

## 2023-11-15 NOTE — SPEECH THERAPY NOTE
Speech Language/Pathology    Speech/Language Pathology Progress Note    Patient Name: Martha Cooper  JZUDP'R Date: 11/15/2023     Problem List  Principal Problem:    Acute respiratory failure with hypoxia and hypercapnia (HCC)  Active Problems:    Atrial fibrillation (HCC)    Essential hypertension    Acute on chronic diastolic CHF (HCC)    Hypothyroidism    Chronic obstructive pulmonary disease (HCC)    BPH (benign prostatic hyperplasia)    Acute encephalopathy    Right elbow pain    Compression fractures of T5 and T9 vertebrae    History of stroke    Other constipation       Past Medical History  Past Medical History:   Diagnosis Date    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 He is sitting upright in recliner. Objective: The patient is seen for dysphagia therapy at lunch meal. Daughter is at bedside. The patient is assessed with mechanical soft solids and is trialed with thin liquids. He is intermittently able to feed himself, but also takes assistance from SLP. Oral closure for tsp is adequate. Mastication is min prolonged, but efficient. No oral residue observed. The patient takes sips of thin liquids via straw and cup. He has immediate cough with straw sip of thin liquids and intermittent coughing with cup sips. The patient appears SOB with some wheezing during session. O2 is adequate at 90-95% on RA. Discussed risk of aspiration and possibility of VBS with daughter. She is agreeable to study to r/o aspiration and determine most appropriate diet. Assessment:  Patient continues with coughing with trials of thin liquids.      Plan/Recommendations:  Continue mechanical soft diet with nectar thick liquids. Recommend VBS when able to assess for aspiration.

## 2023-11-15 NOTE — ASSESSMENT & PLAN NOTE
Presented with worsening shortness of breath over the past 2 days per daughter, with respiratory distress on arrival requiring initial BiPAP however patient unable to tolerate and subsequently placed on 10 L [address] [facility] significant for elevated BNP; CTA PE study limited due to motion artifact but no mainstem pulmonary emboli visualized and lungs with hypoinflation and dependent opacities suspected atelectasis  Suspect multifactorial in setting of acute on chronic heart failure, underlying COPD, possible lung mass (was noted on prior CT abdomen/pelvis incidentally however was not reported on current CTA PE study)    Management of the acute on chronic heart failure, COPD as per associated problems with IV diuresis and nebulizers as needed  Continue supplemental oxygen as needed, titrate to maintain appropriate saturations as appropriate  Respiratory protocol, incentive spirometry, pulmonary toileting      11/12 - mentation is wax/waning although awake today after use of BIPAP last night - peak daytime oxygen requirement of 15 L now weaned down to 8 L. Continue aggressive intravenous diuresis for CHF exacerbation. Will appreciate pulmonology input -> check repeat blood gas tomorrow morning. Highly suspect both hypoxic and hypercapnic respiratory failure playing a role in altered mentation. 11/13 - Oxygen requirements weaned down to 4 L with repeat blood gas this morning showing normalization of pCO2. Appreciate pulmonology input with recommendation  Continue to wean down oxygen requirements as tolerated.     Pulmonology signed off    Status post 3 days of IV antibiotics  Negative procalcitonin x2    Continue to wean oxygen as able

## 2023-11-15 NOTE — PROCEDURES
Speech Pathology - Modified Barium Swallow Study    Patient Name: Luci Alatorre    OMJQW'X Date: 11/15/2023     Problem List  Principal Problem:    Acute respiratory failure with hypoxia and hypercapnia (HCC)  Active Problems:    Atrial fibrillation ([address])    Essential hypertension    Acute on chronic diastolic CHF (HCC)    Hypothyroidism    Chronic obstructive pulmonary disease (HCC)    BPH (benign prostatic hyperplasia)    Acute encephalopathy    Right elbow pain    Compression fractures of T5 and T9 vertebrae    History of stroke    Other constipation      Past Medical History  Past Medical History:   Diagnosis Date    Arithmetic disorder     07OCT2016    Myocardial infarct [facility])     [id number]  LAST ASSESSED       Past Surgical History  Past Surgical History:   Procedure Laterality Date    BLADDER SURGERY      LAST ASSESSED 12OCT2016      CORONARY ARTERY BYPASS GRAFT      12OCT2016 RESOLVED    SKIN BIOPSY         Assessment Summary:    Pt presents with mild oropharyngeal dysphagia characterized by min prolonged AP transfer time with trace amount of oral residue. Swallow initiation time is min delayed to the valleculae across all consistencies. Pharyngeal constriction is min reduced, with a min amount of valleculae retention with some PPW coating. Transient penetration observed with sips of thin liquids. No aspiration events observed. Brief scan of the esophagus reveals some stasis. Note: Images are available for review in PACS as desired. Recommendations:   Recommended Diet: mechanically altered/level 2 diet and thin liquids   Recommended Form of Medications: whole with liquid   Aspiration precautions and compensatory swallowing strategies: upright posture and small bites/sips  Consider referral to: none   SLP Dysphagia therapy recommended: continue in acute care    Results Reviewed with: RN and MD   Pt/Family Education: initiated.  Pt and caregivers would benefit from/require continued education. Patient Stated Goal: none stated    Dysphagia Goals per SLP: pt will tolerate mechanical soft solids (and upgraded solids) with thin liquids without s/s of aspiration x2-3 sessions    General Information;  Current Medical Status  This is a 63-year-old male with history of CHF, CAD, COPD who is presenting with worsening shortness of breath over the past 2 days. Daughter presents with the patient who reports that last night he was complaining of some shortness of breath and seems like he was having a little bit more of a difficult time breathing than he normally does. Over the course of the day today patient has gotten significantly worse and is now having some confusion that is worse than his normal demented self. She notes that he continues to have a dry, nonproductive cough which is his baseline. He is anticoagulated on Eliquis. He does note that his right leg will be more swollen than normal.  She has not noticed any fevers at home and denies any known infectious symptoms other than the cough. Patient arrives on CPAP with SPO2 in the 99 to 100% range. Current concerns for dysphagia include coughing with thin liquids at bedside. MBS was recommended to assess oropharyngeal stage swallowing skills at this time. Prior MBS: none    Oral Mechanism Exam  Not formally assessed, but adequate   Respiratory Status: on RA      Pt was viewed sitting upright in the lateral and AP positions. Due to concerns for patient safety / patient refusal, trials provided deviated from the MBSImP Validated Protocol. Pt was given pudding and regular solids with honey thick, nectar thick and thin liquids via tsp and nectar thick and thin via straw. Pt was also given barium tablet with thin liquid.      Initial view observations/comments: Clear view of the upper airway and Limited view secondary to body habitus      8-Point Penetration-Aspiration Scale   Thin liquid 2 - Material enters the airway, remains above the vocal folds, and is ejected  from the airway   Nectar thick liquid 1 - Material does not enter the airway   Honey thick liquid 1 - Material does not enter the airway   Puree (pudding) 1 - Material does not enter the airway   Solid 1 - Material does not enter the airway     Strategies and Efficacy: n/a    Aspiration Response and Efficacy: no aspiration observed    MBS IMP Rating    ORAL Impairment  Compinent 1--Lip Closure  Judged at any point during the swallow. 0 - No labial escape    Component 2--Tongue Control During Bolus Hold  Judged on held liquid boluses only and prior to productive tongue movement. 0 - Cohesive bolus between tongue to palatal seal    Component 3--Bolus Preparation/Mastication  Judged only during presentation of 1/2 shortbread cookie coated in pudding. 1 - Slow prolonged chewing/mashing with complete re-collection    Component 4--Bolus Transport/Lingual Motion  Judged after first productive tongue movement for oral bolus transport. 0 - Brisk tongue motion    Component 5--Oral Residue  Judged after first swallow or after the last swallow of the sequential swallow task. 1 - Trace residue lining oral structures   Location   B - Palate and C - Tongue    Component 6--Initiation of Pharyngeal Swallow  Judged at first movement of the brisk superior-anterior hyoid trajectory. 1 - Bolus Head in valleculae      PHARYNGEAL Impairment  Component 7--Soft Palate Elevation  Judged during maximum displacement of soft palate. 0 - No bolus between the soft palate (SP)/pharyngeal wall (PW)    Component 8--Laryngeal Elevation  Judged when epiglottis is in its most horizontal position. 0 - Complete superior movement of thyroid cartilage with complete approximation of arytenoids to epiglottic petiole    Component 9--Anterior Hyoid Excursion  Judged at height of swallow/maximal anterior hyoid displacement.   0 - Complete anterior movement    Component 10--Epiglottic Movement  Judged at height of swallow/maximal anterior hyoid displacement. 0 - Complete inversion    Component 11--Laryngeal Vestibular Closure  Judged at height of swallow/maximal anterior hyoid displacement. 0 - Complete; no air/contrast in laryngeal vestibule    Component 12--Pharyngeal Stripping Wave  Judged during the full duration of the pharyngeal swallow. 1 - Present - diminished    Component 13--Pharyngeal Contraction  Judged in AP view at rest and throughout maximum movement of structures. N/A AP view not completed    Component 14--Pharyngoesophageal Segment Opening  Judged during maximum distension of PES and throughout opening and closure. 0 - Complete distension and complete duration; no obstruction of flow    Component 15--Tongue Base (TB) Retraction  Judged during maximum retraction of the tongue base. 1 - Trace column of contrast or air between TB and PW    Component 16--Pharyngeal Residue  Judged after first swallow or after the last swallow of the sequential swallow task.   2 - Collection of residue within or on pharyngeal structures   Location   B - Valleculae and C - Pharyngeal wall      ESOPHAGEAL Impairment  Component 17--Esophageal Clearance Upright Position  Judged in AP view during bolus transit through the oral cavity to the LES  1 - Esophageal retention

## 2023-11-15 NOTE — PLAN OF CARE
Problem: PHYSICAL THERAPY ADULT  Goal: Performs mobility at highest level of function for planned discharge setting. See evaluation for individualized goals. Description: Treatment/Interventions: Functional transfer training, LE strengthening/ROM, Therapeutic exercise, Endurance training, Bed mobility, Continued evaluation, Spoke to nursing, Spoke to case management, OT          See flowsheet documentation for full assessment, interventions and recommendations. Outcome: Progressing  Note: Prognosis: Fair  Problem List: Decreased strength, Decreased endurance, Impaired balance, Decreased mobility, Decreased cognition, Decreased safety awareness, Obesity  Assessment: Functional mobility assessment completed; pt appears to be more appropriate and being able to follow directions more consistently progressing to transfers and amb trial w/ rw; pt still remains to be generally disoriented, weak and deconditioned w/ mod (A)x2 required for transfers and amb trial w/ rw; LE therex initiated w/ rest periods provided as needed; overall, cont to recommend rehab upon D/C when medically cleared; will follow        Rehab Resource Intensity Level, PT: I (Maximum Resource Intensity)    See flowsheet documentation for full assessment.

## 2023-11-15 NOTE — PROGRESS NOTES
Pastoral Care Progress Note    2023  Patient: Astrid Love : 1927  Admission Date & Time: 11/10/2023 2036  MRN: [id number] CSN: [id number]       provided supportive follow-up visit to pt for whom RR alert had been cancelled. 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 He welcomed prayer. I later saw pt's daughter at his bedside and visited further, making sure they knew availability of  and ongoing support. 23   Clinical Encounter Type   Visited With Patient;Patient and family together   Routine Visit Follow-up   Referral From Navin Lunsford Needs Prayer   Patient Spiritual Encounters   Spiritual Encounter Notes  provided supportive follow up after RR alert had been cancelled. Pt's accounts were difficult to make sense of, but pt was open to prayer.

## 2023-11-15 NOTE — ASSESSMENT & PLAN NOTE
Imaging revealed mild superior endplate X2/T4 compression fractures deemed stable  PRN pain control   PT/OT as tolerated  Continue calcium/vitamin D supplementation

## 2023-11-15 NOTE — PHYSICAL THERAPY NOTE
PHYSICAL THERAPY NOTE          Patient Name: Valente Melgar  WWKYJ'Y Date: 11/15/2023         11/15/23 1357   PT Last Visit   PT Visit Date 11/15/23   Note Type   Note Type Treatment   Pain Assessment   Pain Assessment Tool 0-10   Pain Score No Pain   Restrictions/Precautions   Other Precautions Cognitive;Multiple lines;Telemetry;O2;Fall Risk;1:1   General   Chart Reviewed Yes   Additional Pertinent History cleared for Tx session by nsg   Response to Previous Treatment Patient with no complaints from previous session. Family/Caregiver Present Yes   Cognition   Overall Cognitive Status Impaired   Arousal/Participation Responsive; Cooperative   Attention Difficulty attending to directions   Orientation Level Oriented to person   Memory Decreased recall of recent events;Decreased recall of precautions;Decreased recall of biographical information   Following Commands Follows one step commands with increased time or repetition   Subjective   Subjective Pt is observed resting in the chair; arousable but intermittently somnolent; agreeable to mobilize;   Transfers   Sit to Stand 3  Moderate assistance   Additional items Assist x 2; Increased time required;Verbal cues  (2 trials)   Stand to Sit 3  Moderate assistance   Additional items Assist x 2; Increased time required;Verbal cues  (2 trials)   Ambulation/Elevation   Gait pattern Excessively slow; Short stride; Foward flexed; Inconsistent harjit; Shuffling   Gait Assistance 3  Moderate assist   Additional items Assist x 2;Verbal cues; Tactile cues   Assistive Device Rolling walker   Distance 4 ft total distance   Balance   Static Sitting Fair -   Dynamic Sitting Poor +   Static Standing Poor   Dynamic Standing Poor -   Ambulatory Poor -   Activity Tolerance   Activity Tolerance Patient limited by fatigue   Medical Staff Made Aware Co-Tx for mobility performed w/ OTR due to complexity of medical status and level of skilled assistance required; Exercises   Hip Abduction Sitting;15 reps;AAROM; Bilateral   Knee AROM Long Arc Quad Sitting;15 reps;AAROM;AROM; Bilateral   Ankle Pumps Sitting;15 reps;AAROM;AROM; Bilateral  (decreased AROM (L) ankle DF (family reports pt wore a brace; ? AFO))   Marching Sitting;10 reps;AROM; Bilateral   Balance training  standing marching x 4 reps w/ rw prior to amb   Assessment   Prognosis Fair   Problem List Decreased strength;Decreased endurance; Impaired balance;Decreased mobility; Decreased cognition;Decreased safety awareness; Obesity   Assessment Functional mobility assessment completed; pt appears to be more appropriate and being able to follow directions more consistently progressing to transfers and amb trial w/ rw; pt still remains to be generally disoriented, weak and deconditioned w/ mod (A)x2 required for transfers and amb trial w/ rw; LE therex initiated w/ rest periods provided as needed; overall, cont to recommend rehab upon D/C when medically cleared; will follow   Goals   Patient Goals to rest   STG Expiration Date 11/23/23   PT Treatment Day 1   Plan   Treatment/Interventions Functional transfer training;LE strengthening/ROM; Therapeutic exercise; Endurance training;Cognitive reorientation; Bed mobility;Gait training;Spoke to nursing;Spoke to case management;OT;Family   Progress Progressing toward goals   PT Frequency 2-3x/wk   Discharge Recommendation   Rehab Resource Intensity Level, PT I (Maximum Resource Intensity)   Equipment Recommended Melissa Leung Package Recommended Wheeled walker   AM-PAC Basic Mobility Inpatient   Turning in Flat Bed Without Bedrails 2   Lying on Back to Sitting on Edge of Flat Bed Without Bedrails 2   Moving Bed to Chair 2   Standing Up From Chair Using Arms 2   Walk in Room 1   Climb 3-5 Stairs With Railing 1   Basic Mobility Inpatient Raw Score 10   Turning Head Towards Sound 3   Follow Simple Instructions 3   Low Function Basic Mobility Raw Score  16   Low Function Basic Mobility Standardized Score  25.72   Highest Level Of Mobility   -HLM Goal 4: Move to chair/commode   -HLM Achieved 4: Move to chair/commode   Education   Education Provided Mobility training;Assistive device   Patient Reinforcement needed   End of Consult   Patient Position at End of Consult Bedside chair; All needs within reach  (1:1 obs in place)     [employer]

## 2023-11-15 NOTE — PROGRESS NOTES
Progress Note - Martha Cooper 80 y.o. male MRN: [id number]    Unit/Bed#: [facility] 406-01 Encounter: [id number]      Assessment/Plan:  Encephalopathy  Improving, Oriented x 2, periods of confusion  At risk for delirium secondary to hospitalization, insomnia, constipation, hypoxia  Continue supplemental oxygenation, CPAP QHS  Pt reportedly not sleeping QHS  No BM since admission  Urinary retention  Maintain delirium precautions:  Provide frequent redirection, reorientation, distraction techniques  Avoid deliriogenic medications such as tramadol, benzodiazepines, anticholinergics,  Benadryl  Treat pain, See geriatric pain protocol  Monitor for constipation and urinary retention  Encourage early and frequent moblization, OOB  Encourage Hydration/ Nutrition  Implement sleep hygiene, limit night time interuptions, group activities     Mild cognitive impairment with memory loss  MMSE 27/30 (2017)  TSH 0.860 (11/12/230  Vitamin B 12 level 353 (11/11/23) goal level greater than 400  CT head (11/12/23): Mild periventricular hypoattenuation is suggestive of chronic microangiopathic disease. Chronic right parietal infarction with cortical and subcortical gliosis. Chronic bilateral thalamic lacunar infarctions. Bilateral intracranial carotid and vertebral artery atherosclerotic calcifications.      FILOMENA  With chronic use of CPAP at home  Continue home CPAP QHS     Acute respiratory failure  Multifactorial:COPD, acute on chronic HF, possible lung mass on prior imaging  Continue supplemental oxygen  Completed IV abx x 3 days  IV bumex  Pulmonary was consulted, now signed off     Insomnia  Chronic  Continue CPAP  Melatonin QHS prn  Encourage daytime activity     BPH/urinary retention  Urinary retention protocol  Maintain home proscar  Resolve constipation, no BM since admission (started on daily miralax and senna)     Ambulatory dysfunction  Fall precautions  PT/OT  Rehab post hospitalization  Encourage mobilization, oob Frailty  Clinical Frail Scale: 6- Moderately Frail   Albumin 3.7, recommend protein supplementation  Lives at [facility], has supportive daughters  PT/OT     Subjective:   He is oob in recliner chair. He reports he slept well last night. Per nursing pt is significantly better today. He denies BM. Pt was rapid response yesterday, unresponsive, neurology consulted. Objective:     Vitals: Blood pressure 158/81, pulse 69, temperature 98.2 °F (36.8 °C), temperature source Oral, resp. rate 22, height 5' 8.5" (1.74 m), weight 107 kg (235 lb 10.8 oz), SpO2 92 %. ,Body mass index is 35.31 kg/m². Intake/Output Summary (Last 24 hours) at 11/15/2023 1017  Last data filed at 11/15/2023 0830  Gross per 24 hour   Intake 716 ml   Output 1500 ml   Net -784 ml       Physical Exam:   General : NAD  HEENT : MMM   Heart : Normal rate, no murmur rub or gallop  Lungs : CTA no wheezes, rales or rhonchi  Abdomen : Soft, NT/ND, BS auscultated in all 4 quads. Ext :  no edema  Skin : Pink, warm, dry, age appropriate turgor and mobility  Neuro : Nonfocal  Psych : Alert and O x 2    Invasive Devices       Peripheral Intravenous Line  Duration             Peripheral IV 11/14/23 Distal;Dorsal (posterior); Left Forearm <1 day              Drain  Duration             External Urinary Catheter 4 days                    Lab, Imaging and other studies: I have personally reviewed pertinent films in PACS  VTE Pharmacologic Prophylaxis: Sequential compression device (Venodyne) eliquis  VTE Mechanical Prophylaxis: sequential compression device

## 2023-11-15 NOTE — OCCUPATIONAL THERAPY NOTE
Occupational Therapy Progress Note     Patient Name: Cindy Fields  JJARC'C Date: 11/15/2023  Problem List  Principal Problem:    Acute respiratory failure with hypoxia and hypercapnia (HCC)  Active Problems:    Atrial fibrillation (HCC)    Essential hypertension    Acute on chronic diastolic CHF (HCC)    Hypothyroidism    Chronic obstructive pulmonary disease (HCC)    BPH (benign prostatic hyperplasia)    Acute encephalopathy    Right elbow pain    Compression fractures of T5 and T9 vertebrae    History of stroke    Other constipation          11/15/23 1349   OT Last Visit   OT Visit Date 11/15/23   Note Type   Note Type Treatment   Pain Assessment   Pain Assessment Tool 0-10   Pain Score No Pain   Restrictions/Precautions   Weight Bearing Precautions Per Order No   Other Precautions Cognitive; Chair Alarm; Bed Alarm;1:1;Telemetry;O2;Fall Risk;Hard of hearing;Aspiration   Lifestyle   Autonomy Pta pt I with ADL and fxnal mobility with RW, pt reports doing everything I although pt a questionable historian on this date   Reciprocal Relationships supportive dtr   Service to Others retired    [address] [facility] enjoys watching TV   ADL   Where Assessed Chair   Grooming Assistance 5  Supervision/Setup   Grooming Deficit Verbal cueing;Setup; Wash/dry face   Grooming Comments Pt requiring setup, vc to wash face comprehensively with washcloth   Bed Mobility   Additional Comments Pt OOB in chair throughout session, left in chair with 1:1 and family present and all needs within reach   Transfers   Sit to Stand 3  Moderate assistance   Additional items Assist x 2; Increased time required;Verbal cues   Stand to Sit 3  Moderate assistance   Additional items Assist x 2; Increased time required;Verbal cues   Additional Comments with RW, STS x 2   Functional Mobility   Functional Mobility 3  Moderate assistance   Additional Comments Pt performs a couple steps fowards in room with MOD A x 2 with RW Additional items Rolling walker   Cognition   Overall Cognitive Status Impaired   Arousal/Participation Cooperative;Lethargic   Attention Attends with cues to redirect   Orientation Level Oriented to person  (oriented to month, disoriented to year, place, situation)   Memory Decreased recall of biographical information;Decreased short term memory;Decreased recall of recent events;Decreased recall of precautions   Following Commands Follows one step commands with increased time or repetition   Comments Pt cooperative although lethargic throughout therapy. Pt confused although pleasant during session, at first reporting the year as 1940, oriented to time and place with vc. Pt able to recall he was a [employer]  with vc, requiring frequent vc for direction to task. Will continue to assess. Activity Tolerance   Activity Tolerance Patient limited by fatigue   Medical Staff Made Aware Co-tx with DPT due to high medical complexity and assist x 2, assist from 1:1 for chair follow   Assessment   Assessment Pt seen for OT treatment session day 1 on this date focused on ADL retraining, functional transfers and mobility, energy conservation, functional endurance, recall of safety precautions, and patient education. Pt was greeted in chair and was cooperative throughout session. Following session, pt was left in chair with all needs within reach, son present and 1:1 present. Pt continues to be limited by functional status related to ADLs and transfers requiring MOD A x 2 with chair follow for short fxnal mobility, although demonstrates improvements in seated grooming requiring setup, S and vc to wash face. The patient's raw score on the AM-PAC Daily Activity Inpatient Short Form is 14. A raw score of less than 19 suggests the patient may benefit from discharge to post-acute rehabilitation services. Please refer to the recommendation of the Occupational Therapist for safe discharge planning.  Pt would benefit from continued acute OT intervention with plan to continue OT treatment sessions 2-3x per week. Recommend d/c to post acute rehab services. Plan   Treatment Interventions ADL retraining;Functional transfer training; Endurance training;Cognitive reorientation;Patient/family training;Equipment evaluation/education;Continued evaluation; Energy conservation; Activityengagement   Goal Expiration Date 11/27/23   OT Treatment Day 1   OT Frequency 2-3x/wk   Discharge Recommendation   Rehab Resource Intensity Level, OT I (Maximum Resource Intensity)   AM-PAC Daily Activity Inpatient   Lower Body Dressing 2   Bathing 2   Toileting 2   Upper Body Dressing 2   Grooming 3   Eating 3   Daily Activity Raw Score 14   Daily Activity Standardized Score (Calc for Raw Score >=11) 33.39   AM-PAC Applied Cognition Inpatient   Following a Speech/Presentation 3   Understanding Ordinary Conversation 3   Taking Medications 2   Remembering Where Things Are Placed or Put Away 2   Remembering List of 4-5 Errands 2   Taking Care of Complicated Tasks 2   Applied Cognition Raw Score 14   Applied Cognition Standardized Score 32.02   Modified Ector Scale   Modified Ector Scale 4   End of Consult   Education Provided Yes;Family or social support of family present for education by provider   Patient Position at End of Consult Bedside chair; All needs within reach; Other (comment)  (1:1 present)   Nurse Communication Nurse aware of consult       Wandra Oneida, OTD, OTR/L

## 2023-11-15 NOTE — ASSESSMENT & PLAN NOTE
Patient with underlying mild cognitive impairment with memory loss  Negative head CT scan for acute abnormality   Rapid response called 11/14 due to strokelike symptoms, change in level of consciousness  Head and neck CTA without acute abnormality   Evaluated by neurology, possibly orthostatic hypotension/hypoperfusion, unlikely acute CVA  Follow on brain MRI and EEG  Geriatric input appreciated  Currently patient on one-on-one observation due to agitation and intermittent impulsiveness   IM Zyprexa as needed  Continue with supportive care

## 2023-11-15 NOTE — PROGRESS NOTES
[address] [facility]  Progress Note  Name: Valente Melgar I  MRN: [id number]  Unit/Bed#: [facility] 406-01 I Date of Admission: 11/10/2023   Date of Service: 11/15/2023 I Hospital Day: 4    Assessment/Plan   * Acute respiratory failure with hypoxia and hypercapnia (HCC)  Assessment & Plan  Presented with worsening shortness of breath over the past 2 days per daughter, with respiratory distress on arrival requiring initial BiPAP however patient unable to tolerate and subsequently placed on 10 L [address]  Labs significant for elevated BNP; CTA PE study limited due to motion artifact but no mainstem pulmonary emboli visualized and lungs with hypoinflation and dependent opacities suspected atelectasis  Suspect multifactorial in setting of acute on chronic heart failure, underlying COPD, possible lung mass (was noted on prior CT abdomen/pelvis incidentally however was not reported on current CTA PE study)    Management of the acute on chronic heart failure, COPD as per associated problems with IV diuresis and nebulizers as needed  Continue supplemental oxygen as needed, titrate to maintain appropriate saturations as appropriate  Respiratory protocol, incentive spirometry, pulmonary toileting      11/12 - mentation is wax/waning although awake today after use of BIPAP last night - peak daytime oxygen requirement of 15 L now weaned down to 8 L. Continue aggressive intravenous diuresis for CHF exacerbation. Will appreciate pulmonology input -> check repeat blood gas tomorrow morning. Highly suspect both hypoxic and hypercapnic respiratory failure playing a role in altered mentation. 11/13 - Oxygen requirements weaned down to 4 L with repeat blood gas this morning showing normalization of pCO2. Appreciate pulmonology input with recommendation  Continue to wean down oxygen requirements as tolerated.     Pulmonology signed off    Status post 3 days of IV antibiotics  Negative procalcitonin x2    Continue to wean oxygen as able    Acute on chronic diastolic CHF ([address])  Assessment & Plan  Per daughter, presented with increased lower extremity edema over the last several days   Cardiac echo revealed an EF of 55% with moderate biatrial dilatation, no evidence of regional LV wall motion abnormalities, and mild MR/TR  Monitor I/Os and daily weights  NT-proBNP elevated at 306 (was 144 last month)  TSH normal (h/o hypothyroidism noted)  Low-sodium/fluid restriction  Monitor/replete electrolyte deficiencies of present  Has previously required BIPAP use at night   Continue diuresis with IV Bumex    Acute encephalopathy  Assessment & Plan  Patient with underlying mild cognitive impairment with memory loss  Negative head CT scan for acute abnormality   Rapid response called 11/14 due to strokelike symptoms, change in level of consciousness  Head and neck CTA without acute abnormality   Evaluated by neurology, possibly orthostatic hypotension/hypoperfusion, unlikely acute CVA  Follow on brain MRI and EEG  Geriatric input appreciated  Currently patient on one-on-one observation due to agitation and intermittent impulsiveness   IM Zyprexa as needed  Continue with supportive care    Chronic obstructive pulmonary disease ([address])  Assessment & Plan  Stable/asymptomatic  Holding Stiolto per pulmonology - continue Xopenex/Atrovent nebulization with additional PRN Albuterol on board     Other constipation  Assessment & Plan  Stool softeners were added    History of stroke  Assessment & Plan  CT of head reveals evidence of chronic bilateral thalamic lacunar and right parietal cortical infarctions  Continue statin/Eliquis  PT/OT as tolerated    Compression fractures of T5 and T9 vertebrae  Assessment & Plan  Imaging revealed mild superior endplate E6/M6 compression fractures deemed stable  PRN pain control   PT/OT as tolerated  Continue calcium/vitamin D supplementation    Right elbow pain  Assessment & Plan  Reports mild pain to lateral abduction of right elbow  Denies recent trauma/falls  Patient denied pain or tenderness  X-ray with soft tissue swelling of the dorsal aspect of the proximal forearm without other acute osseous abnormalities. Discontinue IV site  Monitor  Continue supportive care    BPH (benign prostatic hyperplasia)  Assessment & Plan  C/w Proscar  Outpatient urology followup    Hypothyroidism  Assessment & Plan  TSH normal  Continue Synthroid    Essential hypertension  Assessment & Plan  Stable BP  Continue Zestril/Coreg    Atrial fibrillation (HCC)  Assessment & Plan  Rate controlled on Coreg  Continue Eliquis for anticoagulation             VTE Pharmacologic Prophylaxis: VTE Score: 6 High Risk (Score >/= 5) - Pharmacological DVT Prophylaxis Ordered: apixaban (Eliquis). Sequential Compression Devices Ordered. Mobility:   Basic Mobility Inpatient Raw Score: 6  -HLM Goal: 2: Bed activities/Dependent transfer  JH-HLM Achieved: 2: Bed activities/Dependent transfer  HLM Goal NOT achieved. Continue with multidisciplinary rounding and encourage appropriate mobility to improve upon [facility] goals. Patient Centered Rounds: I performed bedside rounds with nursing staff today. Discussions with Specialists or Other Care Team Provider: CM    Education and Discussions with Family / Patient:  will Updated daughter. Total Time Spent on Date of Encounter in care of patient: 35 mins. This time was spent on one or more of the following: performing physical exam; counseling and coordination of care; obtaining or reviewing history; documenting in the medical record; reviewing/ordering tests, medications or procedures; communicating with other healthcare professionals and discussing with patient's family/caregivers.     Current Length of Stay: 4 day(s)  Current Patient Status: Inpatient   Certification Statement: The patient will continue to require additional inpatient hospital stay due to treatment of encephalopathy and respiratory failure  Discharge Plan: Anticipate discharge in 48-72 hrs to rehab facility. Code Status: Level 3 - DNAR and DNI    Subjective:   Patient seen and examined  Comfortable sitting in chair  Denied chest pain or shortness of breath  Continues to have intermittent cough  Denied pain in the right arm     Yesterday's event review    Objective:     Vitals:   Temp (24hrs), Av.7 °F (36.5 °C), Min:97 °F (36.1 °C), Max:98.2 °F (36.8 °C)    Temp:  [97 °F (36.1 °C)-98.2 °F (36.8 °C)] 98.2 °F (36.8 °C)  HR:  [64-77] 69  Resp:  [20-22] 22  BP: ()/(59-92) 158/81  SpO2:  [91 %-99 %] 92 %  Body mass index is 35.31 kg/m². Input and Output Summary (last 24 hours): Intake/Output Summary (Last 24 hours) at 11/15/2023 0925  Last data filed at 11/15/2023 0830  Gross per 24 hour   Intake 716 ml   Output 1100 ml   Net -384 ml       Physical Exam:   Physical Exam   Patient is awake alert comfortable sitting in chair  Disoriented, confused  Lung with decreased breath sound bilateral  Heart positive S1-S2 no murmur  Abdomen soft nontender  Improving right upper extremity edema, no tenderness  Right lower extremity edema       Additional Data:     Labs:  Results from last 7 days   Lab Units 23  1617 23  1610 23  0435 23  0502 11/10/23  2139   WBC Thousand/uL  --  8.18 6.08   < > 9.42   HEMOGLOBIN g/dL  --  12.6 12.7   < > 13.0   I STAT HEMOGLOBIN g/dl 13.3  --   --   --   --    HEMATOCRIT %  --  41.8 40.9   < > 40.4   HEMATOCRIT, ISTAT % 39  --   --   --   --    PLATELETS Thousands/uL  --  296 252   < > 248   BANDS PCT %  --   --   --   --  9*   NEUTROS PCT %  --   --  73   < >  --    LYMPHS PCT %  --   --  11*   < >  --    LYMPHO PCT %  --   --   --   --  5*   MONOS PCT %  --   --  11   < >  --    MONO PCT %  --   --   --   --  9   EOS PCT %  --   --  3   < > 0    < > = values in this interval not displayed.      Results from last 7 days   Lab Units 11/15/23  0619 23  0509 11/10/23  2139   SODIUM mmol/L 146   < > 139   POTASSIUM mmol/L 3.7   < > 4.1   CHLORIDE mmol/L 107   < > 104   CO2 mmol/L 34*   < > 29   CO2, I-STAT   --    < >  --    BUN mg/dL 31*   < > 25   CREATININE mg/dL 0.98   < > 1.11   ANION GAP mmol/L 5   < > 6   CALCIUM mg/dL 9.1   < > 9.2   ALBUMIN g/dL  --   --  3.7   TOTAL BILIRUBIN mg/dL  --   --  2.10*   ALK PHOS U/L  --   --  90   ALT U/L  --   --  18   AST U/L  --   --  18   GLUCOSE RANDOM mg/dL 125   < > 127    < > = values in this interval not displayed. Results from last 7 days   Lab Units 11/14/23  1610   INR  1.64*     Results from last 7 days   Lab Units 11/14/23  1558   POC GLUCOSE mg/dl 135     Results from last 7 days   Lab Units 11/14/23  1610   HEMOGLOBIN A1C % 6.1*     Results from last 7 days   Lab Units 11/14/23  0435 11/10/23  2139   LACTIC ACID mmol/L  --  1.3   PROCALCITONIN ng/ml 0.24 0.25       Lines/Drains:  Invasive Devices       Peripheral Intravenous Line  Duration             Peripheral IV 11/14/23 Distal;Dorsal (posterior); Left Forearm <1 day              Drain  Duration             External Urinary Catheter 4 days                      Telemetry:  Telemetry Orders (From admission, onward)               24 Hour Telemetry Monitoring  Continuous x 24 Hours (Telem)        Question:  Reason for 24 Hour Telemetry  Answer:  TIA/Suspected CVA/ Confirmed CVA                     Telemetry Reviewed: yes  Indication for Continued Telemetry Use: No indication for continued use. Will discontinue. Imaging: Reviewed    Recent Cultures (last 7 days):   Results from last 7 days   Lab Units 11/10/23  2151 11/10/23  2139   BLOOD CULTURE  No Growth After 4 Days. No Growth After 4 Days.        Last 24 Hours Medication List:   Current Facility-Administered Medications   Medication Dose Route Frequency Provider Last Rate    albuterol  2.5 mg Nebulization Q6H PRN Helio Edith, DO      apixaban  5 mg Oral BID [place] Edith, DO      atorvastatin 40 mg Oral HS Nimesh Sing, DO      bisacodyl  10 mg Rectal Daily PRN Jean Marie Conteh, DO      bumetanide  2 mg Intravenous BID Ellis Baig, DO      calcium carbonate  1 tablet Oral BID With Meals Rafael Downey MD      carvedilol  25 mg Oral BID With Meals Nimesh Sing, DO      cholecalciferol  1,000 Units Oral Daily Nimesh Sing, DO      finasteride  5 mg Oral Daily Nimesh Sing, DO      fluticasone  1 spray Nasal Daily PRN Nimesh Sing, DO      guaiFENesin  600 mg Oral Q12H [facility] Jean Marie Conteh, DO      hydrALAZINE  5 mg Intravenous Q6H PRN Jose Adams PA-C      ipratropium  0.5 mg Nebulization TID Ana Paula Dhaliwal, MD      lactulose  20 g Oral Daily Jean Marie Conteh, DO      latanoprost  1 drop Both Eyes HS Nimesh Sing, DO      levalbuterol  1.25 mg Nebulization TID Ana Paula Dhaliwal MD      levothyroxine  100 mcg Oral Early Morning Nimesh Sing, DO      lisinopril  40 mg Oral Daily Nimesh Sing, DO      melatonin  6 mg Oral HS PRN Tuesday M LIBORIO Baker      OLANZapine  2.5 mg Intramuscular Once PRN Jose Adams PA-C      ondansetron  4 mg Intravenous Q6H PRN Nimesh Sing, DO      polyethylene glycol  17 g Oral Daily Jean Marie Conteh, DO      senna-docusate sodium  2 tablet Oral BID Jean Marie Conteh, DO      sodium phosphate-biphosphate  1 enema Rectal Once Jean Marie Conteh, DO          Today, Patient Was Seen By: Jean Marie Conteh,     **Please Note: This note may have been constructed using a voice recognition system. **

## 2023-11-15 NOTE — PROGRESS NOTES
NEUROLOGY RESIDENCY PROGRESS NOTE     Name: Bhumi Rodriguez   Age & Sex: 80 y.o. male   MRN: [id number]  Unit/Bed#: [facility] 406-01   Encounter: [id number]    ASSESSMENT & PLAN     *AMS  Assessment & Plan  Bhumi Rodriguez is a 80 y.o. male w/ complex PMH including R parietal stroke and CABG currently on [facility] [place] who p/w acute change in level of consciousness and reported right sided weakness most concerning for orthostatic/hypoperfusion event and/or acute on chronic delirium in the setting of prior cognitive deficits and hospital stay with disrupted sleep cycle but cannot completely r/o stroke in setting of known cerebrovascular risk factors and cannot completely r/o seizure given encephalomalacia in the region of his prior stroke. At this point patient would benefit from delirium precautions and would not put him on the stroke pathway for now given his baseline agitation and prior problems with sleep disruption. Stroke risks already mitigated given ongoing AC. rEEG overall w/o focal dysfunction, most consistent with generalized encephalopathy like could be expected with his baseline cognitive dysfunction. Would not empirically treat with seizure medications unless patient has event recurrence (no prior history of staring or seizures per family) concerning for ongoing seizure activity. Presenting sx: altered level of consciousness; NIHSS: 13; TNK not given 2/2 current AC use  Workup:  CTH/CTA: negative for acute blood, signs of ischemia, LVO or critical stenosis - right parietal stroke sequelae observed  Labs: Hgb A1c pending, LDL pending  MRI: pending if tolerated  TTE (11/12):  LVEF 55%, RV dilation, moderate biatrial dilation, AV sclerosis, mild MR/TR  rEEG (11/15/23): slow PDR, occasional generalized delta consistent w/ mild nonspecific cerebral dysfunction     Plan:  Would not initiate stroke pathway at this time - frequent neuro check disrupting the sleep cycle are likely to worsen this patient's delirium  Atorvastatin 40 mg (can continue prior medication, check lipid panel)  F/u MRI if tolerated - at this point given patient's rapid and significant recovery, could hold off on MRI during this admission given would not significantly   Check orthostatics, avoid hypotension  Continue monitoring on telemetry, frequent neuro checks, stat CTH for acute change  Replete lytes, goal euglycemia (gluc < 180) and normothermia  PT/OT/PMR  Stroke education if stroke found but currently symptoms are non-localizing  No further inpatient neurology recommendations but please don't hesitate to call/TT for questions   Case and plan d/w attending. See attestation additional/updated recommendations    Natalie Dior will need follow up in in 6 weeks with general neurology attending/AP/resident . He will not require outpatient neurological testing. Pending for d/c: N/A    SUBJECTIVE     Patient was seen and examined. No critical events overnight. Patient slept well and overall appears to be improving. No recurrence of events. Patient conversant and w/ symmetric strength on exam.     ROS negative unless otherwise noted    OBJECTIVE     Patient ID: Natalie Dior is a 80 y.o. male. Vitals:    23 1800 23 1815 233   BP: 165/91 (!) 178/92  137/76   BP Location:    Right arm   Pulse:  74  64   Resp:    20   Temp:    (!) 97 °F (36.1 °C)   TempSrc:    Axillary   SpO2:   98% 96%   Weight:       Height:            Temperature: Temp (24hrs), Av.7 °F (36.5 °C), Min:97 °F (36.1 °C), Max:98.3 °F (36.8 °C)   Temperature: (!) 97 °F (36.1 °C)    Physical Exam Vitals reviewed. Constitutional:    no distress. Elevated BMI. not ill-appearing, not toxic-appearing or diaphoretic. HENT:   Normocephalic and atraumatic. External ear normal b/l. Nose normal. No congestion or rhinorrhea. Mucous membranes are mildly dry. Eyes:    No scleral icterus. No discharge b/l. Conjunctivae normal.   Pulmonary: mild respiratory distress with rare use of accessory muscles  GI: abdomen not distended  Musculoskeletal: no gross deformities  Skin:   Skin is pale. Scattered bruises  Psychiatric:  confused     Neurologic Exam:   Mental Status awake and alert. Oriented to person and vaguely situation. Speech is fluent w/o dysarthria and patient is now conversant appropriately  Cranial Nerves visual fields intact b/l. EOMI, no obvious nystagmus. Facial expression and sensation symmetric. Trapezius strength symmetric. Tongue w/o atrophy or fasciculations, no dysarthria  Motor Exam Muscle bulk and tone normal; strength intact and symmetric BUE/BLE  Sensory Exam symmetric response to tactile stim BUE/BLE  Gait, Coordination, and Reflexes no significant tremor observed. Some negative myoclonus observed during parts of exam. Reflexes: Brachioradialis: 2+ b/l    Biceps: 2+ b/l       LABORATORY DATA     Labs: I have personally reviewed pertinent reports.       Results from last 7 days   Lab Units 11/14/23 1617 11/14/23 1610 11/14/23 0435 11/13/23  0600 11/12/23  0459   WBC Thousand/uL  --  8.18 6.08 8.38 10.43*   HEMOGLOBIN g/dL  --  12.6 12.7 12.7 13.4   I STAT HEMOGLOBIN g/dl 13.3  --   --   --   --    HEMATOCRIT %  --  41.8 40.9 41.6 43.0   HEMATOCRIT, ISTAT % 39  --   --   --   --    PLATELETS Thousands/uL  --  296 252 272 289   NEUTROS PCT %  --   --  73 80* 86*   MONOS PCT %  --   --  11 12 9   EOS PCT %  --   --  3 1 0      Results from last 7 days   Lab Units 11/14/23 1617 11/14/23  1610 11/14/23 0435 11/13/23  0600 11/11/23  0502 11/10/23  2139   POTASSIUM mmol/L  --  3.8 3.8 3.5   < > 4.1   CHLORIDE mmol/L  --  105 107 106   < > 104   CO2 mmol/L  --  34* 32 29   < > 29   CO2, I-STAT mmol/L 31  --   --   --   --   --    BUN mg/dL  --  33* 36* 37*   < > 25   CREATININE mg/dL  --  1.04 1.12 1.13   < > 1.11   CALCIUM mg/dL  --  9.0 8.9 9.3   < > 9.2   ALK PHOS U/L  --   --   --   --   --  90 ALT U/L  --   --   --   --   --  18   AST U/L  --   --   --   --   --  18   GLUCOSE, ISTAT mg/dl 144*  --   --   --   --   --     < > = values in this interval not displayed. Results from last 7 days   Lab Units 11/13/23  0600 11/12/23  0459 11/10/23  2139   MAGNESIUM mg/dL 2.6 2.5 2.3          Results from last 7 days   Lab Units 11/14/23  1610 11/10/23  2139   INR  1.64* 1.68*   PTT seconds 43* 44*     Lab Results   Component Value Date    HGBA1C 6.1 (H) 11/14/2023    LDLCALC 59 11/14/2023     Results from last 7 days   Lab Units 11/10/23  2139   LACTIC ACID mmol/L 1.3         ABG:No results found for: "PHART", "ZKJ0JCT", "PO2ART", "KQA4FQV", "L4XOEFYS", "BEART", "SOURCE"    IMAGING & DIAGNOSTIC TESTING     Radiology Results: I have personally reviewed pertinent reports. and I have personally reviewed pertinent films in PACS  CT stroke alert brain   Final Result by Anderson Knapp MD (11/14 1630)      No acute intracranial abnormality. Chronic microangiopathic changes. I personally communicated this study with Dr. Reynaldo Mackey on 11/14/2023 4:23 PM.            Workstation performed: [id number]         CTA stroke alert (head/neck)   Final Result by Anderson Knapp MD (11/14 1646)      No evidence of hemodynamic significant stenosis, aneurysm or dissection. I personally communicated this study with Dr. Reynaldo Mackey on 11/14/2023 4:23 PM.                           Workstation performed: [id number]         XR elbow 3+ vw right   Final Result by Carmella Marina MD (11/13 1413)      1. Soft tissue swelling of the dorsal aspect of the proximal forearm without other acute osseous abnormalities. 2. Degenerative changes as noted. Resident: Jen Merchant, the attending radiologist, have reviewed the images and agree with the final report above. Workstation performed: [id number]         CT head wo contrast   Final Result by Prateek Barrera MD (11/12 1211)      1.   No acute intracranial abnormality. 2.  Generalized cerebral greater than cerebellar atrophy. 3.  Chronic microangiopathic changes, chronic bilateral thalamic lacunar infarctions, and chronic right parietal cortical infarction. 4.  Subcentimeter extra-axial hyperdensity in the right occipital region most likely represents a meningioma. This is unchanged when compared to the prior study of February 28, 2020. Workstation performed: [id number]         CTA ED chest PE Study   Final Result by Mario Doshi MD (11/11 0257)      1. Limited evaluation without evidence of pulmonary embolism   2. Additional chronic findings as above               Workstation performed: [id number]         MRI brain wo contrast    (Results Pending)       Other Diagnostic Testing: I have personally reviewed pertinent reports.       ACTIVE MEDICATIONS     Current Facility-Administered Medications   Medication Dose Route Frequency    albuterol inhalation solution 2.5 mg  2.5 mg Nebulization Q6H PRN    apixaban (ELIQUIS) tablet 5 mg  5 mg Oral BID    atorvastatin (LIPITOR) tablet 40 mg  40 mg Oral HS    bisacodyl (DULCOLAX) rectal suppository 10 mg  10 mg Rectal Daily PRN    bumetanide (BUMEX) injection 2 mg  2 mg Intravenous BID    calcium carbonate (OYSTER SHELL,OSCAL) 500 mg tablet 1 tablet  1 tablet Oral BID With Meals    carvedilol (COREG) tablet 25 mg  25 mg Oral BID With Meals    cholecalciferol (VITAMIN D3) tablet 1,000 Units  1,000 Units Oral Daily    finasteride (PROSCAR) tablet 5 mg  5 mg Oral Daily    fluticasone (FLONASE) 50 mcg/act nasal spray 1 spray  1 spray Nasal Daily PRN    hydrALAZINE (APRESOLINE) injection 5 mg  5 mg Intravenous Q6H PRN    ipratropium (ATROVENT) 0.02 % inhalation solution 0.5 mg  0.5 mg Nebulization TID    latanoprost (XALATAN) 0.005 % ophthalmic solution 1 drop  1 drop Both Eyes HS    levalbuterol (XOPENEX) inhalation solution 1.25 mg  1.25 mg Nebulization TID    levothyroxine tablet 100 mcg  100 mcg Oral Early Morning    lisinopril (ZESTRIL) tablet 40 mg  40 mg Oral Daily    melatonin tablet 6 mg  6 mg Oral HS PRN    OLANZapine (ZyPREXA) IM injection 2.5 mg  2.5 mg Intramuscular Once PRN    ondansetron (ZOFRAN) injection 4 mg  4 mg Intravenous Q6H PRN    polyethylene glycol (MIRALAX) packet 17 g  17 g Oral Daily    senna-docusate sodium (SENOKOT S) 8.6-50 mg per tablet 2 tablet  2 tablet Oral BID       VTE Pharmacologic Prophylaxis: Apixaban (Eliquis)  VTE Mechanical Prophylaxis: sequential compression device    ~~~~~~~~~~~~~~~~~~~  Noe Mfcadden MD  Neurology Resident, PGY-4  [address] [facility]

## 2023-11-16 LAB
ANION GAP SERPL CALCULATED.3IONS-SCNC: 7 MMOL/L
BACTERIA BLD CULT: NORMAL
BACTERIA BLD CULT: NORMAL
BUN SERPL-MCNC: 28 MG/DL (ref 5–25)
CALCIUM SERPL-MCNC: 9.3 MG/DL (ref 8.4–10.2)
CHLORIDE SERPL-SCNC: 103 MMOL/L (ref 96–108)
CO2 SERPL-SCNC: 34 MMOL/L (ref 21–32)
CREAT SERPL-MCNC: 0.99 MG/DL (ref 0.6–1.3)
GFR SERPL CREATININE-BSD FRML MDRD: 64 ML/MIN/1.73SQ M
GLUCOSE SERPL-MCNC: 106 MG/DL (ref 65–140)
GLUCOSE SERPL-MCNC: 170 MG/DL (ref 65–140)
MAGNESIUM SERPL-MCNC: 2.4 MG/DL (ref 1.9–2.7)
POTASSIUM SERPL-SCNC: 3.8 MMOL/L (ref 3.5–5.3)
SODIUM SERPL-SCNC: 144 MMOL/L (ref 135–147)

## 2023-11-16 PROCEDURE — 94664 DEMO&/EVAL PT USE INHALER: CPT

## 2023-11-16 PROCEDURE — 80048 BASIC METABOLIC PNL TOTAL CA: CPT | Performed by: INTERNAL MEDICINE

## 2023-11-16 PROCEDURE — 94640 AIRWAY INHALATION TREATMENT: CPT

## 2023-11-16 PROCEDURE — 82948 REAGENT STRIP/BLOOD GLUCOSE: CPT

## 2023-11-16 PROCEDURE — 83735 ASSAY OF MAGNESIUM: CPT | Performed by: INTERNAL MEDICINE

## 2023-11-16 PROCEDURE — 94760 N-INVAS EAR/PLS OXIMETRY 1: CPT

## 2023-11-16 PROCEDURE — 99232 SBSQ HOSP IP/OBS MODERATE 35: CPT | Performed by: INTERNAL MEDICINE

## 2023-11-16 PROCEDURE — 99232 SBSQ HOSP IP/OBS MODERATE 35: CPT | Performed by: NURSE PRACTITIONER

## 2023-11-16 RX ORDER — POTASSIUM CHLORIDE 20 MEQ/1
20 TABLET, EXTENDED RELEASE ORAL DAILY
Status: DISCONTINUED | OUTPATIENT
Start: 2023-11-16 | End: 2023-11-21 | Stop reason: HOSPADM

## 2023-11-16 RX ADMIN — LISINOPRIL 40 MG: 20 TABLET ORAL at 08:45

## 2023-11-16 RX ADMIN — LEVOTHYROXINE SODIUM 100 MCG: 100 TABLET ORAL at 06:20

## 2023-11-16 RX ADMIN — SENNOSIDES, DOCUSATE SODIUM 2 TABLET: 8.6; 5 TABLET ORAL at 08:44

## 2023-11-16 RX ADMIN — BUMETANIDE 2 MG: 0.25 INJECTION INTRAMUSCULAR; INTRAVENOUS at 08:44

## 2023-11-16 RX ADMIN — LEVALBUTEROL HYDROCHLORIDE 1.25 MG: 1.25 SOLUTION RESPIRATORY (INHALATION) at 07:32

## 2023-11-16 RX ADMIN — POLYETHYLENE GLYCOL 3350 17 G: 17 POWDER, FOR SOLUTION ORAL at 08:44

## 2023-11-16 RX ADMIN — CARVEDILOL 25 MG: 25 TABLET, FILM COATED ORAL at 08:44

## 2023-11-16 RX ADMIN — LEVALBUTEROL HYDROCHLORIDE 1.25 MG: 1.25 SOLUTION RESPIRATORY (INHALATION) at 13:36

## 2023-11-16 RX ADMIN — POTASSIUM CHLORIDE 20 MEQ: 1500 TABLET, EXTENDED RELEASE ORAL at 12:40

## 2023-11-16 RX ADMIN — GUAIFENESIN 600 MG: 600 TABLET, EXTENDED RELEASE ORAL at 22:22

## 2023-11-16 RX ADMIN — IPRATROPIUM BROMIDE 0.5 MG: 0.5 SOLUTION RESPIRATORY (INHALATION) at 13:36

## 2023-11-16 RX ADMIN — Medication 1000 UNITS: at 08:45

## 2023-11-16 RX ADMIN — LACTULOSE 20 G: 20 SOLUTION ORAL at 08:45

## 2023-11-16 RX ADMIN — IPRATROPIUM BROMIDE 0.5 MG: 0.5 SOLUTION RESPIRATORY (INHALATION) at 19:37

## 2023-11-16 RX ADMIN — LATANOPROST 1 DROP: 50 SOLUTION OPHTHALMIC at 22:22

## 2023-11-16 RX ADMIN — GUAIFENESIN 600 MG: 600 TABLET, EXTENDED RELEASE ORAL at 08:45

## 2023-11-16 RX ADMIN — ATORVASTATIN CALCIUM 40 MG: 40 TABLET, FILM COATED ORAL at 22:22

## 2023-11-16 RX ADMIN — BUMETANIDE 2 MG: 0.25 INJECTION INTRAMUSCULAR; INTRAVENOUS at 17:50

## 2023-11-16 RX ADMIN — IPRATROPIUM BROMIDE 0.5 MG: 0.5 SOLUTION RESPIRATORY (INHALATION) at 07:32

## 2023-11-16 RX ADMIN — APIXABAN 5 MG: 5 TABLET, FILM COATED ORAL at 08:45

## 2023-11-16 RX ADMIN — LEVALBUTEROL HYDROCHLORIDE 1.25 MG: 1.25 SOLUTION RESPIRATORY (INHALATION) at 19:37

## 2023-11-16 RX ADMIN — Medication 1 TABLET: at 08:48

## 2023-11-16 RX ADMIN — FINASTERIDE 5 MG: 5 TABLET, FILM COATED ORAL at 08:45

## 2023-11-16 NOTE — PROGRESS NOTES
[address] [facility]  Progress Note  Name: Maximo Vigil I  MRN: [id number]  Unit/Bed#: [facility] 406-01 I Date of Admission: 11/10/2023   Date of Service: 11/16/2023 I Hospital Day: 5    Assessment/Plan   * Acute respiratory failure with hypoxia and hypercapnia (HCC)  Assessment & Plan  Presented with worsening shortness of breath over the past 2 days per daughter, with respiratory distress on arrival requiring initial BiPAP however patient unable to tolerate and subsequently placed on 10 L [address]  Labs significant for elevated BNP; CTA PE study limited due to motion artifact but no mainstem pulmonary emboli visualized and lungs with hypoinflation and dependent opacities suspected atelectasis  Suspect multifactorial in setting of acute on chronic heart failure, underlying COPD, possible lung mass (was noted on prior CT abdomen/pelvis incidentally however was not reported on current CTA PE study)    Management of the acute on chronic heart failure, COPD as per associated problems with IV diuresis and nebulizers as needed  Continue supplemental oxygen as needed, titrate to maintain appropriate saturations as appropriate  Respiratory protocol, incentive spirometry, pulmonary toileting      11/12 - mentation is wax/waning although awake today after use of BIPAP last night - peak daytime oxygen requirement of 15 L now weaned down to 8 L. Continue aggressive intravenous diuresis for CHF exacerbation. Will appreciate pulmonology input -> check repeat blood gas tomorrow morning. Highly suspect both hypoxic and hypercapnic respiratory failure playing a role in altered mentation. 11/13 - Oxygen requirements weaned down to 4 L with repeat blood gas this morning showing normalization of pCO2. Appreciate pulmonology input with recommendation  Continue to wean down oxygen requirements as tolerated.     Pulmonology signed off    Status post 3 days of IV antibiotics  Negative procalcitonin x2    Currently on room air    Acute on chronic diastolic CHF ([address])  Assessment & Plan  Per daughter, presented with increased lower extremity edema over the last several days   Cardiac echo revealed an EF of 55% with moderate biatrial dilatation, no evidence of regional LV wall motion abnormalities, and mild MR/TR  Monitor I/Os and daily weights  NT-proBNP elevated at 306 (was 144 last month)  TSH normal (h/o hypothyroidism noted)  Low-sodium/fluid restriction  Monitor/replete electrolyte deficiencies of present  Has previously required BIPAP use at night   Continue diuresis with IV Bumex, negative fluid balance   Currently on room air      Acute encephalopathy  Assessment & Plan  Patient with underlying mild cognitive impairment with memory loss  Negative head CT scan for acute abnormality   Rapid response called 11/14 due to strokelike symptoms, change in level of consciousness  Head and neck CTA without acute abnormality   Evaluated by neurology, possibly orthostatic hypotension/hypoperfusion, unlikely acute CVA  Geriatric input appreciated  Currently patient on one-on-one observation due to agitation and intermittent impulsiveness   IM Zyprexa as needed  Mental status is improving today patient is more calm, , discontinue one-on-one observation and monitor  Continue with supportive care    Chronic obstructive pulmonary disease ([address])  Assessment & Plan  Stable/asymptomatic  Holding Stiolto per pulmonology - continue Xopenex/Atrovent nebulization with additional PRN Albuterol on board     Other constipation  Assessment & Plan  Had multiple bowel movements  Continue with stool softeners    History of stroke  Assessment & Plan  CT of head reveals evidence of chronic bilateral thalamic lacunar and right parietal cortical infarctions  Continue statin/Eliquis  PT/OT as tolerated    Compression fractures of T5 and T9 vertebrae  Assessment & Plan  Imaging revealed mild superior endplate Q1/N6 compression fractures deemed stable  PRN pain control   PT/OT as tolerated  Continue calcium/vitamin D supplementation    Right elbow pain  Assessment & Plan  Reports mild pain to lateral abduction of right elbow  Denies recent trauma/falls  Patient denied pain or tenderness  X-ray with soft tissue swelling of the dorsal aspect of the proximal forearm without other acute osseous abnormalities. Discontinue IV site  Monitor  Continue supportive care  Resolved    BPH (benign prostatic hyperplasia)  Assessment & Plan  C/w Proscar  Outpatient urology followup    Hypothyroidism  Assessment & Plan  TSH normal  Continue Synthroid    Essential hypertension  Assessment & Plan  Stable BP  Continue Zestril/Coreg    Atrial fibrillation (HCC)  Assessment & Plan  Rate controlled on Coreg  Continue Eliquis for anticoagulation             VTE Pharmacologic Prophylaxis: VTE Score: 6 High Risk (Score >/= 5) - Pharmacological DVT Prophylaxis Ordered: apixaban (Eliquis). Sequential Compression Devices Ordered. Mobility:   Basic Mobility Inpatient Raw Score: 14  JH-HLM Goal: 4: Move to chair/commode  JH-HLM Achieved: 4: Move to chair/commode  HLM Goal achieved. Continue to encourage appropriate mobility. Patient Centered Rounds: I performed bedside rounds with nursing staff today. Discussions with Specialists or Other Care Team Provider: SHAN    Education and Discussions with Family / Patient: Discussed with patient's daughter at bedside    Total Time Spent on Date of Encounter in care of patient: 35 mins. This time was spent on one or more of the following: performing physical exam; counseling and coordination of care; obtaining or reviewing history; documenting in the medical record; reviewing/ordering tests, medications or procedures; communicating with other healthcare professionals and discussing with patient's family/caregivers.     Current Length of Stay: 5 day(s)  Current Patient Status: Inpatient   Certification Statement: The patient will continue to require additional inpatient hospital stay due to diuresis  Discharge Plan: Anticipate discharge tomorrow to rehab facility. Code Status: Level 3 - DNAR and DNI    Subjective:   Patient seen and examined  Comfortable sitting in chair  No event overnight  More calm today  Intermittent cough  Had multiple bowel movements    Daughter does not want to have brain MRI    Objective:     Vitals:   Temp (24hrs), Av.1 °F (36.7 °C), Min:97.7 °F (36.5 °C), Max:98.7 °F (37.1 °C)    Temp:  [97.7 °F (36.5 °C)-98.7 °F (37.1 °C)] 98.1 °F (36.7 °C)  HR:  [] 102  Resp:  [16-21] 21  BP: (101-175)/(57-99) 110/57  SpO2:  [90 %-99 %] 95 %  Body mass index is 35.11 kg/m². Input and Output Summary (last 24 hours): Intake/Output Summary (Last 24 hours) at 2023 0945  Last data filed at 2023 0726  Gross per 24 hour   Intake 870 ml   Output 1375 ml   Net -505 ml       Physical Exam:   Physical Exam   Patient is awake alert comfortable sitting in chair  Disoriented, more calm today  Lung with decreased breath sound bilateral, no wheezing  Heart positive S1-S2 no murmur  Abdomen soft nontender  Improving right upper extremity edema, no tenderness  Bilateral lower extremities edema R>L       Additional Data:     Labs:  Results from last 7 days   Lab Units 23  1617 23  1610 23  0435 23  0502 11/10/23  2139   WBC Thousand/uL  --  8.18 6.08   < > 9.42   HEMOGLOBIN g/dL  --  12.6 12.7   < > 13.0   I STAT HEMOGLOBIN g/dl 13.3  --   --   --   --    HEMATOCRIT %  --  41.8 40.9   < > 40.4   HEMATOCRIT, ISTAT % 39  --   --   --   --    PLATELETS Thousands/uL  --  296 252   < > 248   BANDS PCT %  --   --   --   --  9*   NEUTROS PCT %  --   --  73   < >  --    LYMPHS PCT %  --   --  11*   < >  --    LYMPHO PCT %  --   --   --   --  5*   MONOS PCT %  --   --  11   < >  --    MONO PCT %  --   --   --   --  9   EOS PCT %  --   --  3   < > 0    < > = values in this interval not displayed. Results from last 7 days   Lab Units 11/16/23  0435 11/11/23  0502 11/10/23  2139   SODIUM mmol/L 144   < > 139   POTASSIUM mmol/L 3.8   < > 4.1   CHLORIDE mmol/L 103   < > 104   CO2 mmol/L 34*   < > 29   CO2, I-STAT   --    < >  --    BUN mg/dL 28*   < > 25   CREATININE mg/dL 0.99   < > 1.11   ANION GAP mmol/L 7   < > 6   CALCIUM mg/dL 9.3   < > 9.2   ALBUMIN g/dL  --   --  3.7   TOTAL BILIRUBIN mg/dL  --   --  2.10*   ALK PHOS U/L  --   --  90   ALT U/L  --   --  18   AST U/L  --   --  18   GLUCOSE RANDOM mg/dL 106   < > 127    < > = values in this interval not displayed. Results from last 7 days   Lab Units 11/14/23  1610   INR  1.64*     Results from last 7 days   Lab Units 11/14/23  1558   POC GLUCOSE mg/dl 135     Results from last 7 days   Lab Units 11/14/23  1610   HEMOGLOBIN A1C % 6.1*     Results from last 7 days   Lab Units 11/14/23  0435 11/10/23  2139   LACTIC ACID mmol/L  --  1.3   PROCALCITONIN ng/ml 0.24 0.25       Lines/Drains:  Invasive Devices       Peripheral Intravenous Line  Duration             Peripheral IV 11/14/23 Distal;Dorsal (posterior); Left Forearm 1 day                          Imaging: No pertinent imaging reviewed. Recent Cultures (last 7 days):   Results from last 7 days   Lab Units 11/10/23  2151 11/10/23  2139   BLOOD CULTURE  No Growth After 5 Days. No Growth After 5 Days.        Last 24 Hours Medication List:   Current Facility-Administered Medications   Medication Dose Route Frequency Provider Last Rate    albuterol  2.5 mg Nebulization Q6H PRN Magdiel Crooks DO      apixaban  5 mg Oral BID Magdiel Crooks DO      atorvastatin  40 mg Oral HS Magdiel Crooks DO      bisacodyl  10 mg Rectal Daily PRN Jason Funez DO      bumetanide  2 mg Intravenous BID Ellis Baig, DO      calcium carbonate  1 tablet Oral BID With Meals Darrick Montemayor MD      carvedilol  25 mg Oral BID With Meals Magdiel Crooks DO      cholecalciferol  1,000 Units Oral Daily Magdiel Crooks DO finasteride  5 mg Oral Daily Vickye Board, DO      fluticasone  1 spray Nasal Daily PRN Vickye Board, DO      guaiFENesin  600 mg Oral Q12H [address] Skip Carrillo, DO      hydrALAZINE  5 mg Intravenous Q6H PRN Joan Cadet, MENG      ipratropium  0.5 mg Nebulization TID Indu Glez MD      lactulose  20 g Oral BID Skip Carrillo,       latanoprost  1 drop Both Eyes HS Vickye Board, DO      levalbuterol  1.25 mg Nebulization TID Indu Glez MD      levothyroxine  100 mcg Oral Early Morning Vickye Board, DO      lisinopril  40 mg Oral Daily Vickye Board, DO      melatonin  6 mg Oral HS PRN Tuesday M LIBORIO Baker      OLANZapine  2.5 mg Intramuscular Once PRN Joan Cadet PA-C      ondansetron  4 mg Intravenous Q6H PRN Vickye Board, DO      polyethylene glycol  17 g Oral Daily Skip Carrillo,       senna-docusate sodium  2 tablet Oral BID Skip Carrillo,           Today, Patient Was Seen By: Skip Carrillo DO    **Please Note: This note may have been constructed using a voice recognition system. **

## 2023-11-16 NOTE — RESPIRATORY THERAPY NOTE
Resp Care   11/15/23 1957   Respiratory Assessment   Assessment Type Pre-treatment   General Appearance Alert; Awake   Respiratory Pattern Normal   Chest Assessment Chest expansion symmetrical   Bilateral Breath Sounds Diminished   Cough None   Resp Comments Pt's daughter brought pt's home Bipap machine in. Pt's daughter stated that his home [address] [address] [address] settings are 19/15.

## 2023-11-16 NOTE — ASSESSMENT & PLAN NOTE
Patient with underlying mild cognitive impairment with memory loss  Negative head CT scan for acute abnormality   Rapid response called 11/14 due to strokelike symptoms, change in level of consciousness  Head and neck CTA without acute abnormality   Evaluated by neurology, possibly orthostatic hypotension/hypoperfusion, unlikely acute CVA  Geriatric input appreciated  Currently patient on one-on-one observation due to agitation and intermittent impulsiveness   IM Zyprexa as needed  Mental status is improving today patient is more calm, , discontinue one-on-one observation and monitor  Continue with supportive care

## 2023-11-16 NOTE — QUICK NOTE
Contacted by [facility] to reevaluate patient. Earlier this evening, patient was suddenly less interactive. Per RN, would be talking and then suddenly go limp for a few seconds. Evaluated patient at bedside, he was able to answer simple questions appropriately. Held left arm antigravity for a full 10 seconds. With the right arm, after raising it in the air for him, he was able to keep it up for a second and then it fell down to the bed. Resisted when I tried to elevate his arm again. He could tell me that he was in a bed in the hospital but otherwise did not have insight to his condition. Reviewed case with attending, Dr. Zachariah Sweeney. This event is consistent with his prior events earlier in his stay. Highly suspicious for behavioral and waxing/waning mental status associated with dementia. While I cannot fully exclude seizure, this is lower on my differential. Recommend watching clinically for now.

## 2023-11-16 NOTE — ASSESSMENT & PLAN NOTE
Reports mild pain to lateral abduction of right elbow  Denies recent trauma/falls  Patient denied pain or tenderness  X-ray with soft tissue swelling of the dorsal aspect of the proximal forearm without other acute osseous abnormalities.    Discontinue IV site  Monitor  Continue supportive care  Resolved

## 2023-11-16 NOTE — PHYSICAL THERAPY NOTE
Physical Therapy Cancellation Note             11/16/23 1045   Note Type   Note Type Cancelled Session   Cancel Reasons Other     Pt was noted in the room w/ multiple nsg staff present, trying to reposition the pt higher in the chair (pt was sliding out of the chair); pt appears to be agitated and intermittently combative; PT assisted the nsg to reposition the pt higher in the chair after redirecting him at which pt seemed to be able to follow some directions and perform sit <--> stand transfers x 3 w/ (A)x2; attempted to facilitate transition back to bed w/ rw and (A)x2 but pt was unable to follow directions for that transition and became agitated again at that point indicating he wanted to stay in the chair; further mobilization was therefore not attempted and pt appeared to be calm and comfortable once back in the chair and repoisitioned; nsg staff remained w/ the pt; will hold further mobilization/therapy session at this time due to overall mental status; will follow    [facility]

## 2023-11-16 NOTE — ASSESSMENT & PLAN NOTE
Imaging revealed mild superior endplate T9/M8 compression fractures deemed stable  PRN pain control   PT/OT as tolerated  Continue calcium/vitamin D supplementation

## 2023-11-16 NOTE — PLAN OF CARE
Problem: SAFETY,RESTRAINT: NV/NON-SELF DESTRUCTIVE BEHAVIOR  Goal: Remains free of harm/injury (restraint for non violent/non self-detsructive behavior)  Description: INTERVENTIONS:  - Instruct patient/family regarding restraint use   - Assess and monitor physiologic and psychological status   - Provide interventions and comfort measures to meet assessed patient needs   - Identify and implement measures to help patient regain control  - Assess readiness for release of restraint   Outcome: Progressing  Goal: Returns to optimal restraint-free functioning  Description: INTERVENTIONS:  - Assess the patient's behavior and symptoms that indicate continued need for restraint  - Identify and implement measures to help patient regain control  - Assess readiness for release of restraint   Outcome: Progressing     Problem: MOBILITY - ADULT  Goal: Maintain or return to baseline ADL function  Description: INTERVENTIONS:  -  Assess patient's ability to carry out ADLs; assess patient's baseline for ADL function and identify physical deficits which impact ability to perform ADLs (bathing, care of mouth/teeth, toileting, grooming, dressing, etc.)  - Assess/evaluate cause of self-care deficits   - Assess range of motion  - Assess patient's mobility; develop plan if impaired  - Assess patient's need for assistive devices and provide as appropriate  - Encourage maximum independence but intervene and supervise when necessary  - Involve family in performance of ADLs  - Assess for home care needs following discharge   - Consider OT consult to assist with ADL evaluation and planning for discharge  - Provide patient education as appropriate  Outcome: Progressing  Goal: Maintains/Returns to pre admission functional level  Description: INTERVENTIONS:  - Perform BMAT or MOVE assessment daily.   - Set and communicate daily mobility goal to care team and patient/family/caregiver.    - Collaborate with rehabilitation services on mobility goals if consulted  - Perform Range of Motion 4 times a day. - Reposition patient every 2 hours. - Dangle patient 4 times a day  - Stand patient 4 times a day  - Ambulate patient 3 times a day  - Out of bed to chair 3 times a day   - Out of bed for meals 3 times a day  - Out of bed for toileting  - Record patient progress and toleration of activity level   Outcome: Progressing     Problem: Prexisting or High Potential for Compromised Skin Integrity  Goal: Skin integrity is maintained or improved  Description: INTERVENTIONS:  - Identify patients at risk for skin breakdown  - Assess and monitor skin integrity  - Assess and monitor nutrition and hydration status  - Monitor labs   - Assess for incontinence   - Turn and reposition patient  - Assist with mobility/ambulation  - Relieve pressure over bony prominences  - Avoid friction and shearing  - Provide appropriate hygiene as needed including keeping skin clean and dry  - Evaluate need for skin moisturizer/barrier cream  - Collaborate with interdisciplinary team   - Patient/family teaching  - Consider wound care consult   Outcome: Progressing     Problem: Nutrition/Hydration-ADULT  Goal: Nutrient/Hydration intake appropriate for improving, restoring or maintaining nutritional needs  Description: Monitor and assess patient's nutrition/hydration status for malnutrition. Collaborate with interdisciplinary team and initiate plan and interventions as ordered. Monitor patient's weight and dietary intake as ordered or per policy. Utilize nutrition screening tool and intervene as necessary. Determine patient's food preferences and provide high-protein, high-caloric foods as appropriate.      INTERVENTIONS:  - Monitor oral intake, urinary output, labs, and treatment plans  - Assess nutrition and hydration status and recommend course of action  - Evaluate amount of meals eaten  - Assist patient with eating if necessary   - Allow adequate time for meals  - Recommend/ encourage appropriate diets, oral nutritional supplements, and vitamin/mineral supplements  - Order, calculate, and assess calorie counts as needed  - Recommend, monitor, and adjust tube feedings and TPN/PPN based on assessed needs  - Assess need for intravenous fluids  - Provide specific nutrition/hydration education as appropriate  - Include patient/family/caregiver in decisions related to nutrition  Outcome: Progressing

## 2023-11-16 NOTE — ASSESSMENT & PLAN NOTE
Presented with worsening shortness of breath over the past 2 days per daughter, with respiratory distress on arrival requiring initial BiPAP however patient unable to tolerate and subsequently placed on 10 L [id number] [facility] significant for elevated BNP; CTA PE study limited due to motion artifact but no mainstem pulmonary emboli visualized and lungs with hypoinflation and dependent opacities suspected atelectasis  Suspect multifactorial in setting of acute on chronic heart failure, underlying COPD, possible lung mass (was noted on prior CT abdomen/pelvis incidentally however was not reported on current CTA PE study)    Management of the acute on chronic heart failure, COPD as per associated problems with IV diuresis and nebulizers as needed  Continue supplemental oxygen as needed, titrate to maintain appropriate saturations as appropriate  Respiratory protocol, incentive spirometry, pulmonary toileting      11/12 - mentation is wax/waning although awake today after use of BIPAP last night - peak daytime oxygen requirement of 15 L now weaned down to 8 L. Continue aggressive intravenous diuresis for CHF exacerbation. Will appreciate pulmonology input -> check repeat blood gas tomorrow morning. Highly suspect both hypoxic and hypercapnic respiratory failure playing a role in altered mentation. 11/13 - Oxygen requirements weaned down to 4 L with repeat blood gas this morning showing normalization of pCO2. Appreciate pulmonology input with recommendation  Continue to wean down oxygen requirements as tolerated.     Pulmonology signed off    Status post 3 days of IV antibiotics  Negative procalcitonin x2    Currently on room air

## 2023-11-16 NOTE — PROGRESS NOTES
Progress Note - Jose Manuel Bolaños 80 y.o. male MRN: [id number]    Unit/Bed#: [facility] 406-01 Encounter: [id number]      Assessment/Plan:  Encephalopathy  Waxing and waning, oriented to self only this am, states the year is "68", and place is "a brick building"  At risk for delirium secondary to hospitalization, insomnia, constipation, hypoxia  Continue supplemental oxygenation, CPAP QHS  Urinary retention  Maintain delirium precautions:  Provide frequent redirection, reorientation, distraction techniques  Avoid deliriogenic medications such as tramadol, benzodiazepines, anticholinergics,  Benadryl  Treat pain, See geriatric pain protocol  Monitor for constipation and urinary retention  Encourage early and frequent moblization, OOB  Encourage Hydration/ Nutrition  Implement sleep hygiene, limit night time interuptions, group activities     Mild cognitive impairment with memory loss  MMSE 27/30 (2017)  TSH 0.860 (11/12/230  Vitamin B 12 level 353 (11/11/23) goal level greater than 400  CT head (11/12/23): Mild periventricular hypoattenuation is suggestive of chronic microangiopathic disease. Chronic right parietal infarction with cortical and subcortical gliosis. Chronic bilateral thalamic lacunar infarctions. Bilateral intracranial carotid and vertebral artery atherosclerotic calcifications.      FILOMENA  With chronic use of CPAP at home  Continue home CPAP QHS     Acute respiratory failure  Multifactorial:COPD, acute on chronic HF, possible lung mass on prior imaging  Continue supplemental oxygen  Completed IV abx x 3 days  IV bumex  Pulmonary was consulted, now signed off     Insomnia  Chronic  Continue CPAP  Melatonin QHS prn  Encourage daytime activity     BPH/urinary retention  Urinary retention protocol  Maintain home proscar  Resolve constipation, no BM since admission (started on daily miralax and senna)     Ambulatory dysfunction  Fall precautions  PT/OT  Rehab post hospitalization  Encourage mobilization, oob Frailty  Clinical Frail Scale: 6- Moderately Frail   Albumin 3.7, recommend protein supplementation  Lives at [facility], has supportive daughters  PT/OT     Subjective:   He is oob in recliner chair. He has increased confusion this am, oriented to name only  Had BM 11/16/23. Objective:     Vitals: Blood pressure 110/57, pulse 102, temperature 98.1 °F (36.7 °C), temperature source Oral, resp. rate 21, height 5' 8.5" (1.74 m), weight 106 kg (234 lb 5.6 oz), SpO2 95 %. ,Body mass index is 35.11 kg/m². Intake/Output Summary (Last 24 hours) at 11/16/2023 1034  Last data filed at 11/16/2023 0726  Gross per 24 hour   Intake 870 ml   Output 1375 ml   Net -505 ml       Physical Exam:   General : NAD  HEENT : MMM   Heart : Normal rate, no murmur rub or gallop  Lungs : CTA no wheezes, rales or rhonchi  Abdomen : Soft, NT/ND, BS auscultated in all 4 quads. Ext :  no edema  Skin : Pink, warm, dry, age appropriate turgor and mobility  Neuro : Nonfocal  Psych : Alert and O x 2    Invasive Devices       Peripheral Intravenous Line  Duration             Peripheral IV 11/14/23 Distal;Dorsal (posterior); Left Forearm 1 day                    Lab, Imaging and other studies: I have personally reviewed pertinent films in PACS  VTE Pharmacologic Prophylaxis: Sequential compression device (Venodyne) eliquis  VTE Mechanical Prophylaxis: sequential compression device

## 2023-11-16 NOTE — ASSESSMENT & PLAN NOTE
Per daughter, presented with increased lower extremity edema over the last several days   Cardiac echo revealed an EF of 55% with moderate biatrial dilatation, no evidence of regional LV wall motion abnormalities, and mild MR/TR  Monitor I/Os and daily weights  NT-proBNP elevated at 306 (was 144 last month)  TSH normal (h/o hypothyroidism noted)  Low-sodium/fluid restriction  Monitor/replete electrolyte deficiencies of present  Has previously required BIPAP use at night   Continue diuresis with IV Bumex, negative fluid balance   Currently on room air

## 2023-11-16 NOTE — QUICK NOTE
Alerted by nurse that patient had change in mental status from earlier. All of a sudden he is lethargic and not interactive. Patient assessed at bedside. He awakens to voice and touch however does not follow any commands. He resists me when trying to elevate his hand but does not  when asked. He kept his eyes open for maybe 30 seconds and then suddenly closed them and neck went limp. He again awakened to voice easily but no response and just stared at RN and myself. Similar episode 2 days ago with negative stroke workup but MRI was deferred. Suspect waxing and waning from delirium. Will discuss with neurology for further recommendations.

## 2023-11-17 PROCEDURE — 94760 N-INVAS EAR/PLS OXIMETRY 1: CPT

## 2023-11-17 PROCEDURE — 99232 SBSQ HOSP IP/OBS MODERATE 35: CPT | Performed by: INTERNAL MEDICINE

## 2023-11-17 PROCEDURE — 94664 DEMO&/EVAL PT USE INHALER: CPT

## 2023-11-17 PROCEDURE — 92526 ORAL FUNCTION THERAPY: CPT

## 2023-11-17 PROCEDURE — 97535 SELF CARE MNGMENT TRAINING: CPT

## 2023-11-17 PROCEDURE — 94640 AIRWAY INHALATION TREATMENT: CPT

## 2023-11-17 RX ORDER — TEMAZEPAM 7.5 MG/1
7.5 CAPSULE ORAL
Status: DISCONTINUED | OUTPATIENT
Start: 2023-11-17 | End: 2023-11-21 | Stop reason: HOSPADM

## 2023-11-17 RX ORDER — BUMETANIDE 2 MG/1
2 TABLET ORAL 2 TIMES DAILY
Status: DISCONTINUED | OUTPATIENT
Start: 2023-11-17 | End: 2023-11-21 | Stop reason: HOSPADM

## 2023-11-17 RX ADMIN — LEVALBUTEROL HYDROCHLORIDE 1.25 MG: 1.25 SOLUTION RESPIRATORY (INHALATION) at 15:19

## 2023-11-17 RX ADMIN — APIXABAN 5 MG: 5 TABLET, FILM COATED ORAL at 09:35

## 2023-11-17 RX ADMIN — SENNOSIDES, DOCUSATE SODIUM 2 TABLET: 8.6; 5 TABLET ORAL at 09:35

## 2023-11-17 RX ADMIN — LACTULOSE 20 G: 20 SOLUTION ORAL at 09:42

## 2023-11-17 RX ADMIN — BUMETANIDE 2 MG: 2 TABLET ORAL at 17:45

## 2023-11-17 RX ADMIN — HYDRALAZINE HYDROCHLORIDE 5 MG: 20 INJECTION, SOLUTION INTRAMUSCULAR; INTRAVENOUS at 04:11

## 2023-11-17 RX ADMIN — POLYETHYLENE GLYCOL 3350 17 G: 17 POWDER, FOR SOLUTION ORAL at 09:42

## 2023-11-17 RX ADMIN — CARVEDILOL 25 MG: 25 TABLET, FILM COATED ORAL at 16:26

## 2023-11-17 RX ADMIN — FINASTERIDE 5 MG: 5 TABLET, FILM COATED ORAL at 09:35

## 2023-11-17 RX ADMIN — BUMETANIDE 2 MG: 2 TABLET ORAL at 09:35

## 2023-11-17 RX ADMIN — SENNOSIDES, DOCUSATE SODIUM 2 TABLET: 8.6; 5 TABLET ORAL at 17:45

## 2023-11-17 RX ADMIN — LEVALBUTEROL HYDROCHLORIDE 1.25 MG: 1.25 SOLUTION RESPIRATORY (INHALATION) at 07:43

## 2023-11-17 RX ADMIN — CARVEDILOL 25 MG: 25 TABLET, FILM COATED ORAL at 06:47

## 2023-11-17 RX ADMIN — POTASSIUM CHLORIDE 20 MEQ: 1500 TABLET, EXTENDED RELEASE ORAL at 09:41

## 2023-11-17 RX ADMIN — LEVOTHYROXINE SODIUM 100 MCG: 100 TABLET ORAL at 06:47

## 2023-11-17 RX ADMIN — Medication 1 TABLET: at 06:47

## 2023-11-17 RX ADMIN — MELATONIN 6 MG: at 21:10

## 2023-11-17 RX ADMIN — GUAIFENESIN 600 MG: 600 TABLET, EXTENDED RELEASE ORAL at 09:35

## 2023-11-17 RX ADMIN — Medication 1000 UNITS: at 09:35

## 2023-11-17 RX ADMIN — IPRATROPIUM BROMIDE 0.5 MG: 0.5 SOLUTION RESPIRATORY (INHALATION) at 07:43

## 2023-11-17 RX ADMIN — APIXABAN 5 MG: 5 TABLET, FILM COATED ORAL at 17:45

## 2023-11-17 RX ADMIN — IPRATROPIUM BROMIDE 0.5 MG: 0.5 SOLUTION RESPIRATORY (INHALATION) at 15:19

## 2023-11-17 RX ADMIN — LISINOPRIL 40 MG: 20 TABLET ORAL at 09:35

## 2023-11-17 RX ADMIN — Medication 1 TABLET: at 16:27

## 2023-11-17 NOTE — CASE MANAGEMENT
Case Management Discharge Planning Note    Patient name Meir Vasquez  Location [facility] 372-99 MRN [id number]  : 1927 Date 2023       Current Admission Date: 11/10/2023  Current Admission Diagnosis:Acute respiratory failure with hypoxia and hypercapnia [facility])   Patient Active Problem List    Diagnosis Date Noted    Other constipation 11/15/2023    Right elbow pain 2023    Compression fractures of T5 and T9 vertebrae 2023    History of stroke 2023    Acute respiratory failure with hypoxia and hypercapnia (HCC) 2023    Acute encephalopathy 2023    Abnormal CT scan 10/23/2023    Urinary frequency 10/02/2023    Vitamin D deficiency 10/02/2023    BPH (benign prostatic hyperplasia) 10/02/2023    Shortness of breath 2023    Pigmented skin lesion 2023    Dry eyes 2023    CKD (chronic kidney disease) stage 2, GFR 60-89 ml/min 2022    Chronic obstructive pulmonary disease ([address]) 2022    Sensorineural hearing loss (SNHL) of both ears 2022    Hypernatremia 2022    Ambulatory dysfunction 2022    Change in mole 2022    Seasonal allergic rhinitis 2021    Restrictive lung disease 2021    Malignant neoplasm of urinary bladder ([address]) 10/06/2020    Class 2 severe obesity with serious comorbidity in adult [facility]) 10/06/2020    Chronic anticoagulation 09/15/2020    Mild cognitive impairment with memory loss 09/15/2020    Atrial fibrillation ([address]) 2018    Acute on chronic diastolic CHF ([address])     Coronary artery disease 10/12/2016    Essential hypertension 10/12/2016    Bicuspid aortic valve 10/12/2016    Hypothyroidism 10/12/2016    Obstructive sleep apnea 10/12/2016      LOS (days): 6  Geometric Mean LOS (GMLOS) (days):   Days to GMLOS:     OBJECTIVE:  Risk of Unplanned Readmission Score: 30.15         Current admission status: Inpatient   Preferred Pharmacy:   [address] [place], [place] - [address]  [address] [facility]  Timothy Rutherford  Phone: [phone] Fax: [phone]    Primary Care Provider: Francia Tian DO    Primary Insurance: Chrissie  Secondary Insurance: [employer] [facility] REP    DISCHARGE DETAILS:    Other Referral/Resources/Interventions Provided:  Interventions: Short Term Rehab  Referral Comments: Confrimed with Nursing that pt is on 1 liter oxygen and has been off 1:1 obs since 11/16 5pm. updated Katie Westfall with this info. She is debating wether pt should go to memory care unit instead of skilled unit, she wishes to speak with nursing for more info. Requested RN Venu Duran call. Per Roshan Adame she would like CM to call her over the weekend if Linh Pacheco comes in. Additional Comments: Updated daughter Gala Bansal that we have not yet received insurance auth so we will f/u tomorrow and if insurance closed then will f/u monday. CM will be in contact with her is insurance auth comes in over the weekend. Dtr Gala Bansal verbalized understanding.

## 2023-11-17 NOTE — CASE MANAGEMENT
[address] received request for authorization from Care Manager.   Authorization request for: SNF  Facility Name: [facility] NPI: [id number]  Facility MD: Bertin Ruiz NPI: [id number]  Authorization initiated by contacting insurance: [employer] Via: Availity   Pending Reference #: [id number]   Clinicals submitted via: Availity attachment

## 2023-11-17 NOTE — SPEECH THERAPY NOTE
Speech Language/Pathology    Speech/Language Pathology Progress Note    Patient Name: Nathalie Ayala  HCIHW'A Date: 11/17/2023     Problem List  Principal Problem:    Acute respiratory failure with hypoxia and hypercapnia (HCC)  Active Problems:    Atrial fibrillation (HCC)    Essential hypertension    Acute on chronic diastolic CHF (HCC)    Hypothyroidism    Chronic obstructive pulmonary disease (HCC)    BPH (benign prostatic hyperplasia)    Acute encephalopathy    Right elbow pain    Compression fractures of T5 and T9 vertebrae    History of stroke    Other constipation       Past Medical History  Past Medical History:   Diagnosis Date    Arithmetic disorder     07OCT2016    Myocardial infarct [facility])     [id number]  LAST ASSESSED        Past Surgical History  Past Surgical History:   Procedure Laterality Date    BLADDER SURGERY      LAST ASSESSED 12OCT2016      CORONARY ARTERY BYPASS GRAFT      [id number] RESOLVED    SKIN BIOPSY           Subjective:  "You eat it" Patient awake and alert. Family at bedside. Objective:  Reviewed results of VBS with patient's daughter. Patient is set up for lunch meal. He is able to feed himself 2 bites of mechanical soft solids, but immediately states dislike. Mastication is timely and efficient with no oral residue. The patient is trialed with regular peanut butter and jelly sandwich. Bite strength is adequate, with timely mastication. The patient takes small, consecutive sips of thin liquids via straw. No overt s/s aspiration observed today. Patient is somewhat SOB, but daughter reports this is baseline. O2 remains stable at 97% throughout session. Assessment:  The patient tolerated upgraded trial well. Plan/Recommendations:  Recommend diet change to level 3 solids with thin liquids. Continue ST as able in acute care.

## 2023-11-17 NOTE — ASSESSMENT & PLAN NOTE
Imaging revealed mild superior endplate Q1/J5 compression fractures deemed stable  PRN pain control   PT/OT as tolerated  Continue calcium/vitamin D supplementation

## 2023-11-17 NOTE — RESTORATIVE TECHNICIAN NOTE
Restorative Technician Note      Patient Name: Jeannie Ramirez     Restorative Tech Visit Date: 11/17/23  Note Type: Mobility  Patient Position Upon Consult: Supine  Activity Performed: Transferred  Assistive Device: Roller walker  Patient Position at End of Consult: All needs within reach; Bed/Chair alarm activated;  Bedside chair

## 2023-11-17 NOTE — ASSESSMENT & PLAN NOTE
Presented with worsening shortness of breath over the past 2 days per daughter, with respiratory distress on arrival requiring initial BiPAP however patient unable to tolerate and subsequently placed on 10 L [id number] [facility] significant for elevated BNP; CTA PE study limited due to motion artifact but no mainstem pulmonary emboli visualized and lungs with hypoinflation and dependent opacities suspected atelectasis  Suspect multifactorial in setting of acute on chronic heart failure, underlying COPD, possible lung mass (was noted on prior CT abdomen/pelvis incidentally however was not reported on current CTA PE study)    Management of the acute on chronic heart failure, COPD as per associated problems with IV diuresis and nebulizers as needed  Continue supplemental oxygen as needed, titrate to maintain appropriate saturations as appropriate  Respiratory protocol, incentive spirometry, pulmonary toileting      11/12 - mentation is wax/waning although awake today after use of BIPAP last night - peak daytime oxygen requirement of 15 L now weaned down to 8 L. Continue aggressive intravenous diuresis for CHF exacerbation. Will appreciate pulmonology input -> check repeat blood gas tomorrow morning. Highly suspect both hypoxic and hypercapnic respiratory failure playing a role in altered mentation. 11/13 - Oxygen requirements weaned down to 4 L with repeat blood gas this morning showing normalization of pCO2. Appreciate pulmonology input with recommendation  Continue to wean down oxygen requirements as tolerated.     Pulmonology signed off    Status post 3 days of IV antibiotics  Negative procalcitonin x2    Currently on room air

## 2023-11-17 NOTE — ASSESSMENT & PLAN NOTE
Per daughter, presented with increased lower extremity edema over the last several days   Cardiac echo revealed an EF of 55% with moderate biatrial dilatation, no evidence of regional LV wall motion abnormalities, and mild MR/TR  Monitor I/Os and daily weights  NT-proBNP elevated at 306 (was 144 last month)  TSH normal (h/o hypothyroidism noted)  Low-sodium/fluid restriction  Monitor/replete electrolyte deficiencies of present  Has previously required BIPAP use at night   Patient was diuresed with IV Bumex with negative fluid balance  Currently on room air  Transition to oral Bumex today patient will be discharged on Bumex 2 mg twice daily with oral fluid restrictions

## 2023-11-17 NOTE — ASSESSMENT & PLAN NOTE
Patient with underlying mild cognitive impairment with memory loss  Negative head CT scan for acute abnormality   Rapid response called 11/14 due to strokelike symptoms, change in level of consciousness  Head and neck CTA without acute abnormality   Evaluated by neurology, possibly orthostatic hypotension/hypoperfusion, unlikely acute CVA  Geriatric input appreciated  Currently patient was one-on-one observation due to agitation and intermittent impulsiveness   IM Zyprexa as needed  Patient continues to have fluctuating mental status behavior changes secondary to underlying cognitive decline/dementia  Continue with supportive care

## 2023-11-17 NOTE — PROGRESS NOTES
[address] [facility]  Progress Note  Name: Elida Tejeda I  MRN: [id number]  Unit/Bed#: [facility] 404-01 I Date of Admission: 11/10/2023   Date of Service: 11/17/2023 I Hospital Day: 6    Assessment/Plan   * Acute respiratory failure with hypoxia and hypercapnia (HCC)  Assessment & Plan  Presented with worsening shortness of breath over the past 2 days per daughter, with respiratory distress on arrival requiring initial BiPAP however patient unable to tolerate and subsequently placed on 10 L [address]  Labs significant for elevated BNP; CTA PE study limited due to motion artifact but no mainstem pulmonary emboli visualized and lungs with hypoinflation and dependent opacities suspected atelectasis  Suspect multifactorial in setting of acute on chronic heart failure, underlying COPD, possible lung mass (was noted on prior CT abdomen/pelvis incidentally however was not reported on current CTA PE study)    Management of the acute on chronic heart failure, COPD as per associated problems with IV diuresis and nebulizers as needed  Continue supplemental oxygen as needed, titrate to maintain appropriate saturations as appropriate  Respiratory protocol, incentive spirometry, pulmonary toileting      11/12 - mentation is wax/waning although awake today after use of BIPAP last night - peak daytime oxygen requirement of 15 L now weaned down to 8 L. Continue aggressive intravenous diuresis for CHF exacerbation. Will appreciate pulmonology input -> check repeat blood gas tomorrow morning. Highly suspect both hypoxic and hypercapnic respiratory failure playing a role in altered mentation. 11/13 - Oxygen requirements weaned down to 4 L with repeat blood gas this morning showing normalization of pCO2. Appreciate pulmonology input with recommendation  Continue to wean down oxygen requirements as tolerated.     Pulmonology signed off    Status post 3 days of IV antibiotics  Negative procalcitonin x2    Currently on room air    Acute on chronic diastolic CHF ([address])  Assessment & Plan  Per daughter, presented with increased lower extremity edema over the last several days   Cardiac echo revealed an EF of 55% with moderate biatrial dilatation, no evidence of regional LV wall motion abnormalities, and mild MR/TR  Monitor I/Os and daily weights  NT-proBNP elevated at 306 (was 144 last month)  TSH normal (h/o hypothyroidism noted)  Low-sodium/fluid restriction  Monitor/replete electrolyte deficiencies of present  Has previously required BIPAP use at night   Patient was diuresed with IV Bumex with negative fluid balance  Currently on room air  Transition to oral Bumex today patient will be discharged on Bumex 2 mg twice daily with oral fluid restrictions      Acute encephalopathy  Assessment & Plan  Patient with underlying mild cognitive impairment with memory loss  Negative head CT scan for acute abnormality   Rapid response called 11/14 due to strokelike symptoms, change in level of consciousness  Head and neck CTA without acute abnormality   Evaluated by neurology, possibly orthostatic hypotension/hypoperfusion, unlikely acute CVA  Geriatric input appreciated  Currently patient was one-on-one observation due to agitation and intermittent impulsiveness   IM Zyprexa as needed  Patient continues to have fluctuating mental status behavior changes secondary to underlying cognitive decline/dementia  Continue with supportive care    Chronic obstructive pulmonary disease ([address])  Assessment & Plan  Stable/asymptomatic  Holding Stiolto per pulmonology - continue Xopenex/Atrovent nebulization with additional PRN Albuterol on board     Other constipation  Assessment & Plan  Had multiple bowel movements  Continue with stool softeners    History of stroke  Assessment & Plan  CT of head reveals evidence of chronic bilateral thalamic lacunar and right parietal cortical infarctions  Continue statin/Eliquis  PT/OT as tolerated    Compression fractures of T5 and T9 vertebrae  Assessment & Plan  Imaging revealed mild superior endplate Z8/N1 compression fractures deemed stable  PRN pain control   PT/OT as tolerated  Continue calcium/vitamin D supplementation    Right elbow pain  Assessment & Plan  Reports mild pain to lateral abduction of right elbow  Denies recent trauma/falls  Patient denied pain or tenderness  X-ray with soft tissue swelling of the dorsal aspect of the proximal forearm without other acute osseous abnormalities. Discontinue IV site  Monitor  Continue supportive care  Resolved    BPH (benign prostatic hyperplasia)  Assessment & Plan  C/w Proscar  Outpatient urology followup    Hypothyroidism  Assessment & Plan  TSH normal  Continue Synthroid    Essential hypertension  Assessment & Plan  Stable BP  Continue Zestril/Coreg    Atrial fibrillation (HCC)  Assessment & Plan  Rate controlled on Coreg  Continue Eliquis for anticoagulation             VTE Pharmacologic Prophylaxis: VTE Score: 6 High Risk (Score >/= 5) - Pharmacological DVT Prophylaxis Ordered: apixaban (Eliquis). Sequential Compression Devices Ordered. Mobility:   Basic Mobility Inpatient Raw Score: 14  -HLM Goal: 4: Move to chair/commode  JH-HLM Achieved: 5: Stand (1 or more minutes)  HLM Goal achieved. Continue to encourage appropriate mobility. Patient Centered Rounds: I performed bedside rounds with nursing staff today. Discussions with Specialists or Other Care Team Provider: CM    Education and Discussions with Family / Patient: Discussed with patient's daughter at bedside    Total Time Spent on Date of Encounter in care of patient: 35 mins.  This time was spent on one or more of the following: performing physical exam; counseling and coordination of care; obtaining or reviewing history; documenting in the medical record; reviewing/ordering tests, medications or procedures; communicating with other healthcare professionals and discussing with patient's family/caregivers. Current Length of Stay: 6 day(s)  Current Patient Status: Inpatient   Certification Statement: Awaiting rehab placement  Discharge Plan: Awaiting rehab placement    Code Status: Level 3 - DNAR and DNI    Subjective:   Patient seen and examined  Comfortable in bed  Last evening event reviewed  Patient continues to have fluctuating mental status and behavioral changes  Currently off one-on-one observation    Objective:     Vitals:   Temp (24hrs), Av.5 °F (36.9 °C), Min:98.2 °F (36.8 °C), Max:98.8 °F (37.1 °C)    Temp:  [98.2 °F (36.8 °C)-98.8 °F (37.1 °C)] 98.8 °F (37.1 °C)  HR:  [65-92] 92  Resp:  [18] 18  BP: (143-178)/(75-97) 153/97  SpO2:  [94 %-99 %] 98 %  Body mass index is 36.83 kg/m². Input and Output Summary (last 24 hours):      Intake/Output Summary (Last 24 hours) at 2023 1152  Last data filed at 2023 1134  Gross per 24 hour   Intake 600 ml   Output 1453 ml   Net -853 ml       Physical Exam:   Physical Exam   Patient is awake alert comfortable sitting in chair  Disoriented, calm  Lung with decreased breath sound bilateral, no wheezing  Heart positive S1-S2 no murmur  Abdomen soft nontender  Improving right upper extremity edema, no tenderness  Improving bilateral lower extremities edema    Additional Data:     Labs:  Results from last 7 days   Lab Units 23  1617 23  1610 23  0435 23  0502 11/10/23  2139   WBC Thousand/uL  --  8.18 6.08   < > 9.42   HEMOGLOBIN g/dL  --  12.6 12.7   < > 13.0   I STAT HEMOGLOBIN g/dl 13.3  --   --   --   --    HEMATOCRIT %  --  41.8 40.9   < > 40.4   HEMATOCRIT, ISTAT % 39  --   --   --   --    PLATELETS Thousands/uL  --  296 252   < > 248   BANDS PCT %  --   --   --   --  9*   NEUTROS PCT %  --   --  73   < >  --    LYMPHS PCT %  --   --  11*   < >  --    LYMPHO PCT %  --   --   --   --  5*   MONOS PCT %  --   --  11   < >  --    MONO PCT %  --   --   --   --  9   EOS PCT %  --   --  3   < > 0 < > = values in this interval not displayed. Results from last 7 days   Lab Units 11/16/23  0435 11/11/23  0502 11/10/23  2139   SODIUM mmol/L 144   < > 139   POTASSIUM mmol/L 3.8   < > 4.1   CHLORIDE mmol/L 103   < > 104   CO2 mmol/L 34*   < > 29   CO2, I-STAT   --    < >  --    BUN mg/dL 28*   < > 25   CREATININE mg/dL 0.99   < > 1.11   ANION GAP mmol/L 7   < > 6   CALCIUM mg/dL 9.3   < > 9.2   ALBUMIN g/dL  --   --  3.7   TOTAL BILIRUBIN mg/dL  --   --  2.10*   ALK PHOS U/L  --   --  90   ALT U/L  --   --  18   AST U/L  --   --  18   GLUCOSE RANDOM mg/dL 106   < > 127    < > = values in this interval not displayed. Results from last 7 days   Lab Units 11/14/23  1610   INR  1.64*     Results from last 7 days   Lab Units 11/16/23  1804 11/14/23  1558   POC GLUCOSE mg/dl 170* 135     Results from last 7 days   Lab Units 11/14/23  1610   HEMOGLOBIN A1C % 6.1*     Results from last 7 days   Lab Units 11/14/23  0435 11/10/23  2139   LACTIC ACID mmol/L  --  1.3   PROCALCITONIN ng/ml 0.24 0.25       Lines/Drains:  Invasive Devices       Peripheral Intravenous Line  Duration             Peripheral IV 11/14/23 Distal;Dorsal (posterior); Left Forearm 3 days                          Imaging: No pertinent imaging reviewed. Recent Cultures (last 7 days):   Results from last 7 days   Lab Units 11/10/23  2151 11/10/23  2139   BLOOD CULTURE  No Growth After 5 Days. No Growth After 5 Days.        Last 24 Hours Medication List:   Current Facility-Administered Medications   Medication Dose Route Frequency Provider Last Rate    albuterol  2.5 mg Nebulization Q6H PRN Hermann Rhodes, DO      apixaban  5 mg Oral BID Hermann Rhodes, DO      atorvastatin  40 mg Oral HS Hermann Rhodes,       bisacodyl  10 mg Rectal Daily PRN Cody Drop, DO      bumetanide  2 mg Oral BID Cody Drop, DO      calcium carbonate  1 tablet Oral BID With Meals Ricardo Mckeon MD      carvedilol  25 mg Oral BID With Meals Hermann Rhodes,  cholecalciferol  1,000 Units Oral Daily Roseanne Gaytan, DO      finasteride  5 mg Oral Daily Roseanne Gaytan, DO      fluticasone  1 spray Nasal Daily PRN Roseanne Gaytan, DO      guaiFENesin  600 mg Oral Q12H [facility] Idania Sullivan DO      hydrALAZINE  5 mg Intravenous Q6H PRN Carmelo Burnett PA-C      ipratropium  0.5 mg Nebulization TID Nathan Lea MD      latanoprost  1 drop Both Eyes HS Roseanne Gaytan, DO      levalbuterol  1.25 mg Nebulization TID Nathan Lea MD      levothyroxine  100 mcg Oral Early Morning Roseanne Gaytan, DO      lisinopril  40 mg Oral Daily Roseanne Gaytan, DO      melatonin  6 mg Oral HS PRN Tuesday M LIBORIO Baker      OLANZapine  2.5 mg Intramuscular Once PRN Carmelolouie Burnett PA-C      ondansetron  4 mg Intravenous Q6H PRN Roseanne Gaytan,       polyethylene glycol  17 g Oral Daily Idania Sullivan DO      potassium chloride  20 mEq Oral Daily Idania Sullivan DO      senna-docusate sodium  2 tablet Oral BID Idania Sullivan DO          Today, Patient Was Seen By: Idania Sullivan DO    **Please Note: This note may have been constructed using a voice recognition system. **

## 2023-11-17 NOTE — PLAN OF CARE
Problem: OCCUPATIONAL THERAPY ADULT  Goal: Performs self-care activities at highest level of function for planned discharge setting. See evaluation for individualized goals. Description: Treatment Interventions: ADL retraining, Functional transfer training, UE strengthening/ROM, Endurance training, Cognitive reorientation, Patient/family training, Equipment evaluation/education, Continued evaluation, Energy conservation, Activityengagement          See flowsheet documentation for full assessment, interventions and recommendations. Outcome: Progressing  Note: Limitation: Decreased ADL status, Decreased UE ROM, Decreased UE strength, Decreased Safe judgement during ADL, Decreased cognition, Decreased endurance, Decreased self-care trans, Decreased high-level ADLs  Prognosis: Fair  Assessment: Pt seen for OT treatment session day 2 on this date focused on ADL retraining, functional transfers and mobility, energy conservation, functional endurance, recall of safety precautions, and patient education. Pt was greeted in chair and demonstrated increased agitation, impulsivity, and decreased safety awareness on this date. Pt completed functional txfs with mod A, RW in stance. Pt required mod Ax2 for functional mobility 2/2 decreased safety awareness. Pt completed ADL session focused on toileting. Pt required min A for toileting and clothing management. Following session, pt was left in chair with chair alarm on and all needs within reach. Pt continues to be limited by functional status related to ADLs and functional transfers although demonstrates improvements in functional mobility. Patient's raw score on the AM-PAC Daily Activity Inpatient Short Form is 14. A raw score of less than 19 suggests the patient may benefit from discharge to post-acute rehabilitation services. Please refer to the recommendation of the Occupational Therapist for safe discharge planning.  Pt would benefit from continued acute OT intervention with plan to continue OT treatment sessions 2-3x per week. Continue recommending d/c to post acute rehab services.      Rehab Resource Intensity Level, OT: I (Maximum Resource Intensity)

## 2023-11-17 NOTE — CASE MANAGEMENT
Case Management Discharge Planning Note    Patient name Idris Sands  Location [facility] 404/PPHP 738-86 MRN [id number]  : 1927 Date 2023       Current Admission Date: 11/10/2023  Current Admission Diagnosis:Acute respiratory failure with hypoxia and hypercapnia [facility])   Patient Active Problem List    Diagnosis Date Noted    Other constipation 11/15/2023    Right elbow pain 2023    Compression fractures of T5 and T9 vertebrae 2023    History of stroke 2023    Acute respiratory failure with hypoxia and hypercapnia (HCC) 2023    Acute encephalopathy 2023    Abnormal CT scan 10/23/2023    Urinary frequency 10/02/2023    Vitamin D deficiency 10/02/2023    BPH (benign prostatic hyperplasia) 10/02/2023    Shortness of breath 2023    Pigmented skin lesion 2023    Dry eyes 2023    CKD (chronic kidney disease) stage 2, GFR 60-89 ml/min 2022    Chronic obstructive pulmonary disease ([address]) 2022    Sensorineural hearing loss (SNHL) of both ears 2022    Hypernatremia 2022    Ambulatory dysfunction 2022    Change in mole 2022    Seasonal allergic rhinitis 2021    Restrictive lung disease 2021    Malignant neoplasm of urinary bladder ([address]) 10/06/2020    Class 2 severe obesity with serious comorbidity in adult [facility]) 10/06/2020    Chronic anticoagulation 09/15/2020    Mild cognitive impairment with memory loss 09/15/2020    Atrial fibrillation ([address]) 2018    Acute on chronic diastolic CHF ([address])     Coronary artery disease 10/12/2016    Essential hypertension 10/12/2016    Bicuspid aortic valve 10/12/2016    Hypothyroidism 10/12/2016    Obstructive sleep apnea 10/12/2016      LOS (days): 6  Geometric Mean LOS (GMLOS) (days):   Days to GMLOS:     OBJECTIVE:  Risk of Unplanned Readmission Score: 30.08         Current admission status: Inpatient   Preferred Pharmacy:   [facility] [place], [place] - [address]  [address] [facility]  Phone: [phone] Fax: [phone]    Primary Care Provider: Lucinda Freed DO    Primary Insurance: Chrissie  Secondary Insurance: [employer] [facility] REP    DISCHARGE DETAILS:              Other Referral/Resources/Interventions Provided:  Interventions: Short Term Rehab  Referral Comments: Met with dtr Aminta Contreras ( Vidhya Jamil) at bedside with patient. Per [facility] rounds with Dr Jaylene Singletary pt is medically cleared for dc. Dtr states she spoke with "someone at [facility] that a bed is ready for him" . Made her aware insurance Deyanne Ballard is needed. pt has BIPAP on table from his home, made dtr aware that pt needs take with him. dtr Aminta Contreras verbalized understanding. Messaged MV on AIDIN and called and s/w Guadalupe Sarkar as well. Concerns expressed reagrding Neurology note from last night regarding pt confusion/mental status/ . TT SLIM provider Dr Jaylene Singletary reagrding this , he states pt is clear to leave. Called Guadalupe Sarkar back and read note from today from [facility]IM proivider regarding mental status issue from last night and this is likely per the note r/t dementia. per Winnie Morataya they are not contracted with [facility] and will need auth from Conifer Elsie NPI # [id number] accepting MD Dr Lucinda Freed NPI # [id number]. Send request to [address] [place] [address] for auth today fro . Received confirmation via TT of pending auth from MAUREEN Brandon. Dtr Aminta Contreras made aware. Per Guadalupe Sarkar at  pt will need Covid testing within 24 hours of hospital dc, antigen testing or PCR either is fine. She needs to know if pt is on supplemental oxygen and what time 1:1 was discontinued yesterday. TT nurse Stephanie Luis for updates.

## 2023-11-17 NOTE — CASE MANAGEMENT
Case Management Discharge Planning Note    Patient name Elida Tejeda  Location [facility] 404/[facility] [id number] MRN [id number]  : 1927 Date 2023       Current Admission Date: 11/10/2023  Current Admission Diagnosis:Acute respiratory failure with hypoxia and hypercapnia [facility])   Patient Active Problem List    Diagnosis Date Noted    Other constipation 11/15/2023    Right elbow pain 2023    Compression fractures of T5 and T9 vertebrae 2023    History of stroke 2023    Acute respiratory failure with hypoxia and hypercapnia (HCC) 2023    Acute encephalopathy 2023    Abnormal CT scan 10/23/2023    Urinary frequency 10/02/2023    Vitamin D deficiency 10/02/2023    BPH (benign prostatic hyperplasia) 10/02/2023    Shortness of breath 2023    Pigmented skin lesion 2023    Dry eyes 2023    CKD (chronic kidney disease) stage 2, GFR 60-89 ml/min 2022    Chronic obstructive pulmonary disease ([address]) 2022    Sensorineural hearing loss (SNHL) of both ears 2022    Hypernatremia 2022    Ambulatory dysfunction 2022    Change in mole 2022    Seasonal allergic rhinitis 2021    Restrictive lung disease 2021    Malignant neoplasm of urinary bladder ([address]) 10/06/2020    Class 2 severe obesity with serious comorbidity in adult [facility]) 10/06/2020    Chronic anticoagulation 09/15/2020    Mild cognitive impairment with memory loss 09/15/2020    Atrial fibrillation ([address]) 2018    Acute on chronic diastolic CHF ([address])     Coronary artery disease 10/12/2016    Essential hypertension 10/12/2016    Bicuspid aortic valve 10/12/2016    Hypothyroidism 10/12/2016    Obstructive sleep apnea 10/12/2016      LOS (days): 6  Geometric Mean LOS (GMLOS) (days):   Days to GMLOS:     OBJECTIVE:  Risk of Unplanned Readmission Score: 30.15         Current admission status: Inpatient   Preferred Pharmacy:   [facility] [place], [place] - [address]  [address]  Edith Rutherford  Phone: [phone] Fax: [phone]    Primary Care Provider: Bertin Ruiz DO    Primary Insurance: Chrissie  Secondary Insurance: [facility] REP    DISCHARGE DETAILS:        Other Referral/Resources/Interventions Provided:  Interventions: Short Term Rehab  Referral Comments: Confrimed with Nursing that pt is on 1 liter oxygen and has been off 1:1 obs since 11/16 5pm. updated Tolu Dubose with this info. She is debating wether pt should go to memory care unit instead of skilled unit, she wishes to speak with nursing for more info. Requested RN Shiv Salinas call. Per Gilberto Hernandes she would like CM to call her over the weekend if Jackie De La Torre comes in.

## 2023-11-17 NOTE — OCCUPATIONAL THERAPY NOTE
Occupational Therapy Progress Note     Patient Name: Merari Cleary  HKCZD'T Date: 11/17/2023  Problem List  Principal Problem:    Acute respiratory failure with hypoxia and hypercapnia (HCC)  Active Problems:    Atrial fibrillation (HCC)    Essential hypertension    Acute on chronic diastolic CHF (HCC)    Hypothyroidism    Chronic obstructive pulmonary disease (HCC)    BPH (benign prostatic hyperplasia)    Acute encephalopathy    Right elbow pain    Compression fractures of T5 and T9 vertebrae    History of stroke    Other constipation          11/17/23 1355   OT Last Visit   OT Visit Date 11/17/23   Note Type   Note Type Treatment   Pain Assessment   Pain Assessment Tool 0-10   Pain Score No Pain   Patient's Stated Pain Goal No pain   Hospital Pain Intervention(s) Repositioned; Ambulation/increased activity; Emotional support; Rest   Restrictions/Precautions   Weight Bearing Precautions Per Order No   Other Precautions Cognitive; Chair Alarm; Bed Alarm; Impulsive;Agitated;Multiple lines;Telemetry;O2;Fall Risk;Hard of hearing   Lifestyle   Autonomy Pta pt I with ADL and fxnal mobility with RW, pt reports doing everything I although pt a questionable historian on this date   Reciprocal Relationships supportive dtr   Service to Others retired    [address] [place] [address] enjoys watching TV   ADL   Where Assessed Chair   Grooming Comments declines grooming, becoming agitated at times when requesting ADL   Toileting Assistance  4  Minimal Assistance   Toileting Deficit Setup;Verbal cueing; Impulsive; Increased time to complete   Toileting Comments Pt completes toileting using urinal while seated in chair with assist to position urinal   Bed Mobility   Additional Comments Pt OOB in recliner pre/post session, chair alarm on, RN present at end of session   Transfers   Sit to Stand 3  Moderate assistance   Additional items Assist x 1; Impulsive;Verbal cues   Stand to Sit 3  Moderate assistance   Additional items Assist x 1; Impulsive;Verbal cues   Additional Comments w RW, Pt attempted to stand impulsively multiple times prior to STS, beomcing irritable when therapist is "too close", successful STS x 1 prior to fxnal mobility   Functional Mobility   Functional Mobility 3  Moderate assistance   Additional Comments Pt completed short household distance functional mobility in hallway, with Mod Ax2 with RW, and chair follow. Pt does not verbalize when feeling fatigued, with slight knee buckling requiring seated rest.   Additional items Rolling walker   Subjective   Subjective "Get out of may way"- when goes to STS   Cognition   Overall Cognitive Status Impaired   Arousal/Participation Uncooperative;Arousable   Attention Difficulty attending to directions   Orientation Level Oriented to person;Disoriented to place; Disoriented to time;Disoriented to situation   Memory Decreased recall of recent events;Decreased recall of precautions;Decreased short term memory   Following Commands Follows one step commands inconsistently   Comments Pt demonstrated increased agitation on this date with increased time and verbal cues to complete tasks. Pt impulsive, attempting to stand without assistance, and attempting to pull out cords from wall. RN present and aware. Activity Tolerance   Activity Tolerance Treatment limited secondary to agitation;Patient limited by fatigue  (Pt becoming irritable/agitated when therapists attempt to assist during STS, also becoming agitated at end of session, pulling at cords and wires, requiring assist from RN and therapists to calm.)   Medical Staff Made Aware Assist x2-3 from restorative tech Cesar Garrido 2/2 increased medical complexity and agitation, with assist to follow with chair while walking.    Assessment   Assessment Pt seen for OT treatment session day 2 on this date focused on ADL retraining, functional transfers and mobility, energy conservation, functional endurance, recall of safety precautions, and patient education. Pt was greeted in chair and demonstrated increased agitation, impulsivity, and decreased safety awareness on this date. Pt completed functional txfs with mod A, RW in stance. Pt required mod Ax2 for functional mobility 2/2 decreased safety awareness. Pt completed ADL session focused on toileting. Pt required min A for toileting and clothing management. Following session, pt was left in chair with chair alarm on and all needs within reach. Pt continues to be limited by functional status related to ADLs and functional transfers although demonstrates improvements in functional mobility. Patient's raw score on the AM-PAC Daily Activity Inpatient Short Form is 14. A raw score of less than 19 suggests the patient may benefit from discharge to post-acute rehabilitation services. Please refer to the recommendation of the Occupational Therapist for safe discharge planning. Pt would benefit from continued acute OT intervention with plan to continue OT treatment sessions 2-3x per week. Continue recommending d/c to post acute rehab services. Plan   Treatment Interventions ADL retraining;Functional transfer training; Activityengagement; Endurance training;Cognitive reorientation;Continued evaluation; Energy conservation   Goal Expiration Date 11/27/23   OT Treatment Day 2   OT Frequency 2-3x/wk   Discharge Recommendation   Rehab Resource Intensity Level, OT I (Maximum Resource Intensity)   AM-PAC Daily Activity Inpatient   Lower Body Dressing 2   Bathing 2   Toileting 2   Upper Body Dressing 2   Grooming 3   Eating 3   Daily Activity Raw Score 14   Daily Activity Standardized Score (Calc for Raw Score >=11) 33.39   AM-PAC Applied Cognition Inpatient   Following a Speech/Presentation 2   Understanding Ordinary Conversation 3   Taking Medications 2   Remembering Where Things Are Placed or Put Away 2   Remembering List of 4-5 Errands 2   Taking Care of Complicated Tasks 2   Applied Cognition Raw Score 13   Applied Cognition Standardized Score 30.46   Modified Cherry Scale   Modified Kenzie Scale 4   End of Consult   Education Provided Yes   Patient Position at End of Consult Bedside chair;Bed/Chair alarm activated; All needs within reach   Nurse Communication Nurse aware of consult         YOUNG Amanda, OTR/L

## 2023-11-18 LAB
ANION GAP SERPL CALCULATED.3IONS-SCNC: 7 MMOL/L
BASOPHILS # BLD AUTO: 0.03 THOUSANDS/ÂΜL (ref 0–0.1)
BASOPHILS NFR BLD AUTO: 0 % (ref 0–1)
BUN SERPL-MCNC: 23 MG/DL (ref 5–25)
CALCIUM SERPL-MCNC: 9.5 MG/DL (ref 8.4–10.2)
CHLORIDE SERPL-SCNC: 101 MMOL/L (ref 96–108)
CO2 SERPL-SCNC: 35 MMOL/L (ref 21–32)
CREAT SERPL-MCNC: 0.95 MG/DL (ref 0.6–1.3)
EOSINOPHIL # BLD AUTO: 0.08 THOUSAND/ÂΜL (ref 0–0.61)
EOSINOPHIL NFR BLD AUTO: 1 % (ref 0–6)
ERYTHROCYTE [DISTWIDTH] IN BLOOD BY AUTOMATED COUNT: 14.6 % (ref 11.6–15.1)
GFR SERPL CREATININE-BSD FRML MDRD: 67 ML/MIN/1.73SQ M
GLUCOSE SERPL-MCNC: 135 MG/DL (ref 65–140)
HCT VFR BLD AUTO: 43.3 % (ref 36.5–49.3)
HGB BLD-MCNC: 13.8 G/DL (ref 12–17)
IMM GRANULOCYTES # BLD AUTO: 0.03 THOUSAND/UL (ref 0–0.2)
IMM GRANULOCYTES NFR BLD AUTO: 0 % (ref 0–2)
LYMPHOCYTES # BLD AUTO: 0.72 THOUSANDS/ÂΜL (ref 0.6–4.47)
LYMPHOCYTES NFR BLD AUTO: 8 % (ref 14–44)
MAGNESIUM SERPL-MCNC: 2.3 MG/DL (ref 1.9–2.7)
MCH RBC QN AUTO: 30.5 PG (ref 26.8–34.3)
MCHC RBC AUTO-ENTMCNC: 31.9 G/DL (ref 31.4–37.4)
MCV RBC AUTO: 96 FL (ref 82–98)
MONOCYTES # BLD AUTO: 0.66 THOUSAND/ÂΜL (ref 0.17–1.22)
MONOCYTES NFR BLD AUTO: 8 % (ref 4–12)
NEUTROPHILS # BLD AUTO: 7.05 THOUSANDS/ÂΜL (ref 1.85–7.62)
NEUTS SEG NFR BLD AUTO: 83 % (ref 43–75)
NRBC BLD AUTO-RTO: 0 /100 WBCS
PLATELET # BLD AUTO: 329 THOUSANDS/UL (ref 149–390)
PMV BLD AUTO: 9.7 FL (ref 8.9–12.7)
POTASSIUM SERPL-SCNC: 3.9 MMOL/L (ref 3.5–5.3)
RBC # BLD AUTO: 4.53 MILLION/UL (ref 3.88–5.62)
SODIUM SERPL-SCNC: 143 MMOL/L (ref 135–147)
WBC # BLD AUTO: 8.57 THOUSAND/UL (ref 4.31–10.16)

## 2023-11-18 PROCEDURE — 85025 COMPLETE CBC W/AUTO DIFF WBC: CPT | Performed by: INTERNAL MEDICINE

## 2023-11-18 PROCEDURE — 94760 N-INVAS EAR/PLS OXIMETRY 1: CPT

## 2023-11-18 PROCEDURE — 94664 DEMO&/EVAL PT USE INHALER: CPT

## 2023-11-18 PROCEDURE — 83735 ASSAY OF MAGNESIUM: CPT | Performed by: INTERNAL MEDICINE

## 2023-11-18 PROCEDURE — 94640 AIRWAY INHALATION TREATMENT: CPT

## 2023-11-18 PROCEDURE — 80048 BASIC METABOLIC PNL TOTAL CA: CPT | Performed by: INTERNAL MEDICINE

## 2023-11-18 PROCEDURE — 99232 SBSQ HOSP IP/OBS MODERATE 35: CPT | Performed by: INTERNAL MEDICINE

## 2023-11-18 RX ORDER — PREDNISONE 20 MG/1
40 TABLET ORAL DAILY
Status: DISCONTINUED | OUTPATIENT
Start: 2023-11-18 | End: 2023-11-20

## 2023-11-18 RX ADMIN — IPRATROPIUM BROMIDE 0.5 MG: 0.5 SOLUTION RESPIRATORY (INHALATION) at 14:39

## 2023-11-18 RX ADMIN — CARVEDILOL 25 MG: 25 TABLET, FILM COATED ORAL at 06:34

## 2023-11-18 RX ADMIN — BUMETANIDE 2 MG: 2 TABLET ORAL at 09:02

## 2023-11-18 RX ADMIN — IPRATROPIUM BROMIDE 0.5 MG: 0.5 SOLUTION RESPIRATORY (INHALATION) at 07:25

## 2023-11-18 RX ADMIN — SENNOSIDES, DOCUSATE SODIUM 2 TABLET: 8.6; 5 TABLET ORAL at 17:14

## 2023-11-18 RX ADMIN — LISINOPRIL 40 MG: 20 TABLET ORAL at 09:03

## 2023-11-18 RX ADMIN — LEVALBUTEROL HYDROCHLORIDE 1.25 MG: 1.25 SOLUTION RESPIRATORY (INHALATION) at 14:39

## 2023-11-18 RX ADMIN — ATORVASTATIN CALCIUM 40 MG: 40 TABLET, FILM COATED ORAL at 21:06

## 2023-11-18 RX ADMIN — BUMETANIDE 2 MG: 2 TABLET ORAL at 17:14

## 2023-11-18 RX ADMIN — Medication 1 TABLET: at 06:34

## 2023-11-18 RX ADMIN — LEVALBUTEROL HYDROCHLORIDE 1.25 MG: 1.25 SOLUTION RESPIRATORY (INHALATION) at 07:25

## 2023-11-18 RX ADMIN — APIXABAN 5 MG: 5 TABLET, FILM COATED ORAL at 17:14

## 2023-11-18 RX ADMIN — GUAIFENESIN 600 MG: 600 TABLET, EXTENDED RELEASE ORAL at 21:06

## 2023-11-18 RX ADMIN — IPRATROPIUM BROMIDE 0.5 MG: 0.5 SOLUTION RESPIRATORY (INHALATION) at 19:29

## 2023-11-18 RX ADMIN — Medication 1000 UNITS: at 09:02

## 2023-11-18 RX ADMIN — FINASTERIDE 5 MG: 5 TABLET, FILM COATED ORAL at 09:03

## 2023-11-18 RX ADMIN — PREDNISONE 40 MG: 20 TABLET ORAL at 09:09

## 2023-11-18 RX ADMIN — POLYETHYLENE GLYCOL 3350 17 G: 17 POWDER, FOR SOLUTION ORAL at 09:02

## 2023-11-18 RX ADMIN — CARVEDILOL 25 MG: 25 TABLET, FILM COATED ORAL at 16:47

## 2023-11-18 RX ADMIN — Medication 1 TABLET: at 16:48

## 2023-11-18 RX ADMIN — POTASSIUM CHLORIDE 20 MEQ: 1500 TABLET, EXTENDED RELEASE ORAL at 09:02

## 2023-11-18 RX ADMIN — SENNOSIDES, DOCUSATE SODIUM 2 TABLET: 8.6; 5 TABLET ORAL at 09:03

## 2023-11-18 RX ADMIN — GUAIFENESIN 600 MG: 600 TABLET, EXTENDED RELEASE ORAL at 09:03

## 2023-11-18 RX ADMIN — LEVOTHYROXINE SODIUM 100 MCG: 100 TABLET ORAL at 06:34

## 2023-11-18 RX ADMIN — LATANOPROST 1 DROP: 50 SOLUTION OPHTHALMIC at 21:06

## 2023-11-18 RX ADMIN — LEVALBUTEROL HYDROCHLORIDE 1.25 MG: 1.25 SOLUTION RESPIRATORY (INHALATION) at 19:29

## 2023-11-18 RX ADMIN — APIXABAN 5 MG: 5 TABLET, FILM COATED ORAL at 09:09

## 2023-11-18 NOTE — ASSESSMENT & PLAN NOTE
With mild expiratory wheezing today  Holding Stiolto per pulmonology - continue Xopenex/Atrovent nebulization with additional PRN Albuterol on board   Add oral prednisone  Monitor

## 2023-11-18 NOTE — ASSESSMENT & PLAN NOTE
Presented with worsening shortness of breath over the past 2 days per daughter, with respiratory distress on arrival requiring initial BiPAP however patient unable to tolerate and subsequently placed on 10 L [address] [facility] significant for elevated BNP; CTA PE study limited due to motion artifact but no mainstem pulmonary emboli visualized and lungs with hypoinflation and dependent opacities suspected atelectasis  Suspect multifactorial in setting of acute on chronic heart failure, underlying COPD, possible lung mass (was noted on prior CT abdomen/pelvis incidentally however was not reported on current CTA PE study)    Management of the acute on chronic heart failure, COPD as per associated problems with IV diuresis and nebulizers as needed  Continue supplemental oxygen as needed, titrate to maintain appropriate saturations as appropriate  Respiratory protocol, incentive spirometry, pulmonary toileting      11/12 - mentation is wax/waning although awake today after use of BIPAP last night - peak daytime oxygen requirement of 15 L now weaned down to 8 L. Continue aggressive intravenous diuresis for CHF exacerbation. Will appreciate pulmonology input -> check repeat blood gas tomorrow morning. Highly suspect both hypoxic and hypercapnic respiratory failure playing a role in altered mentation. 11/13 - Oxygen requirements weaned down to 4 L with repeat blood gas this morning showing normalization of pCO2. Appreciate pulmonology input with recommendation  Continue to wean down oxygen requirements as tolerated.     Pulmonology signed off    Status post 3 days of IV antibiotics  Negative procalcitonin x2    Currently on room air

## 2023-11-18 NOTE — ASSESSMENT & PLAN NOTE
Per daughter, presented with increased lower extremity edema over the last several days   Cardiac echo revealed an EF of 55% with moderate biatrial dilatation, no evidence of regional LV wall motion abnormalities, and mild MR/TR  Monitor I/Os and daily weights  NT-proBNP elevated at 306 (was 144 last month)  TSH normal (h/o hypothyroidism noted)  Low-sodium/fluid restriction  Monitor/replete electrolyte deficiencies of present  Has previously required BIPAP use at night   Patient was diuresed with IV Bumex with negative fluid balance  Currently on room air  Transitioned to oral Bumex on 11/17 patient will be discharged on oral Bumex with oral fluid restrictions

## 2023-11-18 NOTE — ASSESSMENT & PLAN NOTE
Imaging revealed mild superior endplate K2/Y9 compression fractures deemed stable  PRN pain control   PT/OT as tolerated  Continue calcium/vitamin D supplementation

## 2023-11-18 NOTE — PROGRESS NOTES
[address] [facility]  Progress Note  Name: Wilber Malagon I  MRN: [id number]  Unit/Bed#: [facility] 404-01 I Date of Admission: 11/10/2023   Date of Service: 11/18/2023 I Hospital Day: 7    Assessment/Plan   * Acute respiratory failure with hypoxia and hypercapnia (HCC)  Assessment & Plan  Presented with worsening shortness of breath over the past 2 days per daughter, with respiratory distress on arrival requiring initial BiPAP however patient unable to tolerate and subsequently placed on 10 L [address]  Labs significant for elevated BNP; CTA PE study limited due to motion artifact but no mainstem pulmonary emboli visualized and lungs with hypoinflation and dependent opacities suspected atelectasis  Suspect multifactorial in setting of acute on chronic heart failure, underlying COPD, possible lung mass (was noted on prior CT abdomen/pelvis incidentally however was not reported on current CTA PE study)    Management of the acute on chronic heart failure, COPD as per associated problems with IV diuresis and nebulizers as needed  Continue supplemental oxygen as needed, titrate to maintain appropriate saturations as appropriate  Respiratory protocol, incentive spirometry, pulmonary toileting      11/12 - mentation is wax/waning although awake today after use of BIPAP last night - peak daytime oxygen requirement of 15 L now weaned down to 8 L. Continue aggressive intravenous diuresis for CHF exacerbation. Will appreciate pulmonology input -> check repeat blood gas tomorrow morning. Highly suspect both hypoxic and hypercapnic respiratory failure playing a role in altered mentation. 11/13 - Oxygen requirements weaned down to 4 L with repeat blood gas this morning showing normalization of pCO2. Appreciate pulmonology input with recommendation  Continue to wean down oxygen requirements as tolerated.     Pulmonology signed off    Status post 3 days of IV antibiotics  Negative procalcitonin x2    Currently on room air    Acute on chronic diastolic CHF ([address])  Assessment & Plan  Per daughter, presented with increased lower extremity edema over the last several days   Cardiac echo revealed an EF of 55% with moderate biatrial dilatation, no evidence of regional LV wall motion abnormalities, and mild MR/TR  Monitor I/Os and daily weights  NT-proBNP elevated at 306 (was 144 last month)  TSH normal (h/o hypothyroidism noted)  Low-sodium/fluid restriction  Monitor/replete electrolyte deficiencies of present  Has previously required BIPAP use at night   Patient was diuresed with IV Bumex with negative fluid balance  Currently on room air  Transitioned to oral Bumex on 11/17 patient will be discharged on oral Bumex with oral fluid restrictions      Acute encephalopathy  Assessment & Plan  Patient with underlying mild cognitive impairment with memory loss  Negative head CT scan for acute abnormality   Rapid response called 11/14 due to strokelike symptoms, change in level of consciousness  Head and neck CTA without acute abnormality   Evaluated by neurology, possibly orthostatic hypotension/hypoperfusion, unlikely acute CVA  Geriatric input appreciated  Currently patient was one-on-one observation due to agitation and intermittent impulsiveness   IM Zyprexa as needed  Patient continues to have fluctuating mental status behavior changes secondary to underlying cognitive decline/dementia  Continue with supportive care    Chronic obstructive pulmonary disease ([address])  Assessment & Plan  With mild expiratory wheezing today  Holding Stiolto per pulmonology - continue Xopenex/Atrovent nebulization with additional PRN Albuterol on board   Add oral prednisone  Monitor    Other constipation  Assessment & Plan  Had multiple bowel movements  Continue with stool softeners    History of stroke  Assessment & Plan  CT of head reveals evidence of chronic bilateral thalamic lacunar and right parietal cortical infarctions  Continue statin/Eliquis  PT/OT as tolerated    Compression fractures of T5 and T9 vertebrae  Assessment & Plan  Imaging revealed mild superior endplate B2/B4 compression fractures deemed stable  PRN pain control   PT/OT as tolerated  Continue calcium/vitamin D supplementation    Right elbow pain  Assessment & Plan  Reports mild pain to lateral abduction of right elbow  Denies recent trauma/falls  Patient denied pain or tenderness  X-ray with soft tissue swelling of the dorsal aspect of the proximal forearm without other acute osseous abnormalities. Discontinue IV site  Monitor  Continue supportive care  Resolved    BPH (benign prostatic hyperplasia)  Assessment & Plan  C/w Proscar  Outpatient urology followup    Hypothyroidism  Assessment & Plan  TSH normal  Continue Synthroid    Essential hypertension  Assessment & Plan  Stable BP  Continue Zestril/Coreg    Atrial fibrillation (HCC)  Assessment & Plan  Rate controlled on Coreg  Continue Eliquis for anticoagulation           VTE Pharmacologic Prophylaxis: VTE Score: 6 High Risk (Score >/= 5) - Pharmacological DVT Prophylaxis Ordered: apixaban (Eliquis). Sequential Compression Devices Ordered. Mobility:   Basic Mobility Inpatient Raw Score: 11  -HLM Goal: 4: Move to chair/commode  -HLM Achieved: 5: Stand (1 or more minutes)  HLM Goal achieved. Continue to encourage appropriate mobility. Patient Centered Rounds: I performed bedside rounds with nursing staff today. Discussions with Specialists or Other Care Team Provider:     Education and Discussions with Family / Patient: We will discuss with daughter. Total Time Spent on Date of Encounter in care of patient: 35 mins.  This time was spent on one or more of the following: performing physical exam; counseling and coordination of care; obtaining or reviewing history; documenting in the medical record; reviewing/ordering tests, medications or procedures; communicating with other healthcare professionals and discussing with patient's family/caregivers. Current Length of Stay: 7 day(s)  Current Patient Status: Inpatient   Certification Statement: The patient will continue to require additional inpatient hospital stay due to awaiting rehab placement  Discharge Plan: Awaiting rehab placement    Code Status: Level 3 - DNAR and DNI    Subjective:   Patient is comfortable in bed  No event overnight  Continues to have waxing and waning mental status    Objective:     Vitals:   Temp (24hrs), Av.9 °F (36.1 °C), Min:96.9 °F (36.1 °C), Max:96.9 °F (36.1 °C)    Temp:  [96.9 °F (36.1 °C)] 96.9 °F (36.1 °C)  HR:  [63-72] 63  Resp:  [16-18] 16  BP: (129-142)/(68-96) 129/68  SpO2:  [92 %-99 %] 97 %  Body mass index is 36.83 kg/m². Input and Output Summary (last 24 hours):      Intake/Output Summary (Last 24 hours) at 2023 0916  Last data filed at 2023 1700  Gross per 24 hour   Intake --   Output 645 ml   Net -645 ml       Physical Exam:   Physical Exam     Patient is awake alert comfortable in bed  Pleasantly  disoriented  Lung with decreased breath sound bilateral, with mild expiratory wheezing  Heart positive S1-S2 no murmur  Abdomen soft nontender  Improving right upper extremity edema, no tenderness  Lower extremities no edema  Additional Data:     Labs:  Results from last 7 days   Lab Units 23  0605   WBC Thousand/uL 8.57   HEMOGLOBIN g/dL 13.8   HEMATOCRIT % 43.3   PLATELETS Thousands/uL 329   NEUTROS PCT % 83*   LYMPHS PCT % 8*   MONOS PCT % 8   EOS PCT % 1     Results from last 7 days   Lab Units 23  0605   SODIUM mmol/L 143   POTASSIUM mmol/L 3.9   CHLORIDE mmol/L 101   CO2 mmol/L 35*   BUN mg/dL 23   CREATININE mg/dL 0.95   ANION GAP mmol/L 7   CALCIUM mg/dL 9.5   GLUCOSE RANDOM mg/dL 135     Results from last 7 days   Lab Units 23  1610   INR  1.64*     Results from last 7 days   Lab Units 23  1804 23  1558   POC GLUCOSE mg/dl 170* 135     Results from last 7 days   Lab Units 11/14/23  1610   HEMOGLOBIN A1C % 6.1*     Results from last 7 days   Lab Units 11/14/23  0435   PROCALCITONIN ng/ml 0.24       Lines/Drains:  Invasive Devices       None                         Imaging: No pertinent imaging reviewed.     Recent Cultures (last 7 days):         Last 24 Hours Medication List:   Current Facility-Administered Medications   Medication Dose Route Frequency Provider Last Rate    albuterol  2.5 mg Nebulization Q6H PRN Lenoria Rook, DO      apixaban  5 mg Oral BID Lenoria Rook, DO      atorvastatin  40 mg Oral HS Lenoria Rook, DO      bisacodyl  10 mg Rectal Daily PRN Pattia Gum, DO      bumetanide  2 mg Oral BID Georgetta Gum, DO      calcium carbonate  1 tablet Oral BID With Meals Jeferson Andrade MD      carvedilol  25 mg Oral BID With Meals Lentristen Allen, DO      cholecalciferol  1,000 Units Oral Daily Lenoria Rook, DO      finasteride  5 mg Oral Daily Lenoria Alejandro, DO      fluticasone  1 spray Nasal Daily PRN Asiya Allen, DO      guaiFENesin  600 mg Oral Q12H [address] Spike Stewart, DO      hydrALAZINE  5 mg Intravenous Q6H PRN Starr Alva PA-C      ipratropium  0.5 mg Nebulization TID Felecia Yeager MD      latanoprost  1 drop Both Eyes HS Lentristen Rook, DO      levalbuterol  1.25 mg Nebulization TID Felecia Yeager MD      levothyroxine  100 mcg Oral Early Morning Lentristen Rook, DO      lisinopril  40 mg Oral Daily Asiya Allen, DO      melatonin  6 mg Oral HS PRN Tuesday M LIBORIO Baker      OLANZapine  2.5 mg Intramuscular Once PRN Starr Alva PA-C      ondansetron  4 mg Intravenous Q6H PRN Lentristen Allen, DO      polyethylene glycol  17 g Oral Daily Georgetta Gum, DO      potassium chloride  20 mEq Oral Daily Georgetta Gum, DO      predniSONE  40 mg Oral Daily Georgetta Gum, DO      senna-docusate sodium  2 tablet Oral BID Georgetta Gum, DO      temazepam  7.5 mg Oral HS PRN Spike Stewart, DO          Today, Patient Was Seen By: Spike Stewart DO    **Please Note: This note may have been constructed using a voice recognition system. **

## 2023-11-19 LAB
FLUAV RNA RESP QL NAA+PROBE: NEGATIVE
FLUBV RNA RESP QL NAA+PROBE: NEGATIVE
RSV RNA RESP QL NAA+PROBE: NEGATIVE
SARS-COV-2 RNA RESP QL NAA+PROBE: NEGATIVE

## 2023-11-19 PROCEDURE — 94664 DEMO&/EVAL PT USE INHALER: CPT

## 2023-11-19 PROCEDURE — 0241U HB NFCT DS VIR RESP RNA 4 TRGT: CPT | Performed by: INTERNAL MEDICINE

## 2023-11-19 PROCEDURE — 94640 AIRWAY INHALATION TREATMENT: CPT

## 2023-11-19 PROCEDURE — 94760 N-INVAS EAR/PLS OXIMETRY 1: CPT

## 2023-11-19 RX ORDER — LACTULOSE 10 G/15ML
20 SOLUTION ORAL DAILY
Status: DISCONTINUED | OUTPATIENT
Start: 2023-11-19 | End: 2023-11-20

## 2023-11-19 RX ADMIN — POTASSIUM CHLORIDE 20 MEQ: 1500 TABLET, EXTENDED RELEASE ORAL at 09:14

## 2023-11-19 RX ADMIN — LEVALBUTEROL HYDROCHLORIDE 1.25 MG: 1.25 SOLUTION RESPIRATORY (INHALATION) at 19:38

## 2023-11-19 RX ADMIN — LISINOPRIL 40 MG: 20 TABLET ORAL at 09:14

## 2023-11-19 RX ADMIN — LATANOPROST 1 DROP: 50 SOLUTION OPHTHALMIC at 21:16

## 2023-11-19 RX ADMIN — FINASTERIDE 5 MG: 5 TABLET, FILM COATED ORAL at 09:14

## 2023-11-19 RX ADMIN — Medication 1 TABLET: at 15:52

## 2023-11-19 RX ADMIN — IPRATROPIUM BROMIDE 0.5 MG: 0.5 SOLUTION RESPIRATORY (INHALATION) at 07:35

## 2023-11-19 RX ADMIN — ATORVASTATIN CALCIUM 40 MG: 40 TABLET, FILM COATED ORAL at 21:13

## 2023-11-19 RX ADMIN — CARVEDILOL 25 MG: 25 TABLET, FILM COATED ORAL at 15:52

## 2023-11-19 RX ADMIN — LEVOTHYROXINE SODIUM 100 MCG: 100 TABLET ORAL at 06:14

## 2023-11-19 RX ADMIN — LEVALBUTEROL HYDROCHLORIDE 1.25 MG: 1.25 SOLUTION RESPIRATORY (INHALATION) at 13:45

## 2023-11-19 RX ADMIN — APIXABAN 5 MG: 5 TABLET, FILM COATED ORAL at 17:16

## 2023-11-19 RX ADMIN — IPRATROPIUM BROMIDE 0.5 MG: 0.5 SOLUTION RESPIRATORY (INHALATION) at 19:38

## 2023-11-19 RX ADMIN — LEVALBUTEROL HYDROCHLORIDE 1.25 MG: 1.25 SOLUTION RESPIRATORY (INHALATION) at 07:35

## 2023-11-19 RX ADMIN — SENNOSIDES, DOCUSATE SODIUM 2 TABLET: 8.6; 5 TABLET ORAL at 17:16

## 2023-11-19 RX ADMIN — MELATONIN 6 MG: at 21:13

## 2023-11-19 RX ADMIN — APIXABAN 5 MG: 5 TABLET, FILM COATED ORAL at 09:15

## 2023-11-19 RX ADMIN — GUAIFENESIN 600 MG: 600 TABLET, EXTENDED RELEASE ORAL at 09:16

## 2023-11-19 RX ADMIN — CARVEDILOL 25 MG: 25 TABLET, FILM COATED ORAL at 09:14

## 2023-11-19 RX ADMIN — BUMETANIDE 2 MG: 2 TABLET ORAL at 17:16

## 2023-11-19 RX ADMIN — SENNOSIDES, DOCUSATE SODIUM 2 TABLET: 8.6; 5 TABLET ORAL at 09:15

## 2023-11-19 RX ADMIN — GUAIFENESIN 600 MG: 600 TABLET, EXTENDED RELEASE ORAL at 21:13

## 2023-11-19 RX ADMIN — Medication 1 TABLET: at 09:16

## 2023-11-19 RX ADMIN — BUMETANIDE 2 MG: 2 TABLET ORAL at 09:15

## 2023-11-19 RX ADMIN — IPRATROPIUM BROMIDE 0.5 MG: 0.5 SOLUTION RESPIRATORY (INHALATION) at 13:45

## 2023-11-19 RX ADMIN — PREDNISONE 40 MG: 20 TABLET ORAL at 09:15

## 2023-11-19 RX ADMIN — Medication 1000 UNITS: at 09:14

## 2023-11-19 NOTE — ASSESSMENT & PLAN NOTE
Imaging revealed mild superior endplate R1/P2 compression fractures deemed stable  PRN pain control   PT/OT as tolerated  Continue calcium/vitamin D supplementation

## 2023-11-19 NOTE — ASSESSMENT & PLAN NOTE
Presented with worsening shortness of breath over the past 2 days per daughter, with respiratory distress on arrival requiring initial BiPAP however patient unable to tolerate and subsequently placed on 10 L [address] [facility] significant for elevated BNP; CTA PE study limited due to motion artifact but no mainstem pulmonary emboli visualized and lungs with hypoinflation and dependent opacities suspected atelectasis  Suspect multifactorial in setting of acute on chronic heart failure, underlying COPD, possible lung mass (was noted on prior CT abdomen/pelvis incidentally however was not reported on current CTA PE study)    Management of the acute on chronic heart failure, COPD as per associated problems with IV diuresis and nebulizers as needed  Continue supplemental oxygen as needed, titrate to maintain appropriate saturations as appropriate  Respiratory protocol, incentive spirometry, pulmonary toileting      11/12 - mentation is wax/waning although awake today after use of BIPAP last night - peak daytime oxygen requirement of 15 L now weaned down to 8 L. Continue aggressive intravenous diuresis for CHF exacerbation. Will appreciate pulmonology input -> check repeat blood gas tomorrow morning. Highly suspect both hypoxic and hypercapnic respiratory failure playing a role in altered mentation. 11/13 - Oxygen requirements weaned down to 4 L with repeat blood gas this morning showing normalization of pCO2. Appreciate pulmonology input with recommendation  Continue to wean down oxygen requirements as tolerated.     Pulmonology signed off    Status post 3 days of IV antibiotics  Negative procalcitonin x2    Currently on room air

## 2023-11-19 NOTE — ASSESSMENT & PLAN NOTE
With mild expiratory wheezing   Holding Stiolto per pulmonology - continue Xopenex/Atrovent nebulization with additional PRN Albuterol on board   Currently on oral prednisone on 11/18  Monitor

## 2023-11-19 NOTE — PROGRESS NOTES
[address] [facility]  Progress Note  Name: Natali Perez I  MRN: [id number]  [facility] Date of Admission: 11/10/2023   Date of Service: 11/19/2023 I Hospital Day: 8    Assessment/Plan   * Acute respiratory failure with hypoxia and hypercapnia (HCC)  Assessment & Plan  Presented with worsening shortness of breath over the past 2 days per daughter, with respiratory distress on arrival requiring initial BiPAP however patient unable to tolerate and subsequently placed on 10 L [address]  Labs significant for elevated BNP; CTA PE study limited due to motion artifact but no mainstem pulmonary emboli visualized and lungs with hypoinflation and dependent opacities suspected atelectasis  Suspect multifactorial in setting of acute on chronic heart failure, underlying COPD, possible lung mass (was noted on prior CT abdomen/pelvis incidentally however was not reported on current CTA PE study)    Management of the acute on chronic heart failure, COPD as per associated problems with IV diuresis and nebulizers as needed  Continue supplemental oxygen as needed, titrate to maintain appropriate saturations as appropriate  Respiratory protocol, incentive spirometry, pulmonary toileting      11/12 - mentation is wax/waning although awake today after use of BIPAP last night - peak daytime oxygen requirement of 15 L now weaned down to 8 L. Continue aggressive intravenous diuresis for CHF exacerbation. Will appreciate pulmonology input -> check repeat blood gas tomorrow morning. Highly suspect both hypoxic and hypercapnic respiratory failure playing a role in altered mentation. 11/13 - Oxygen requirements weaned down to 4 L with repeat blood gas this morning showing normalization of pCO2. Appreciate pulmonology input with recommendation  Continue to wean down oxygen requirements as tolerated.     Pulmonology signed off    Status post 3 days of IV antibiotics  Negative procalcitonin x2    Currently on room air    Acute on chronic diastolic CHF ([address])  Assessment & Plan  Per daughter, presented with increased lower extremity edema over the last several days   Cardiac echo revealed an EF of 55% with moderate biatrial dilatation, no evidence of regional LV wall motion abnormalities, and mild MR/TR  Monitor I/Os and daily weights  NT-proBNP elevated at 306 (was 144 last month)  TSH normal (h/o hypothyroidism noted)  Low-sodium/fluid restriction  Monitor/replete electrolyte deficiencies of present  Has previously required BIPAP use at night   Patient was diuresed with IV Bumex with negative fluid balance  Currently on room air  Transitioned to oral Bumex on 11/17 patient will be discharged on oral Bumex with oral fluid restrictions      Acute encephalopathy  Assessment & Plan  Patient with underlying mild cognitive impairment with memory loss  Negative head CT scan for acute abnormality   Rapid response called 11/14 due to strokelike symptoms, change in level of consciousness  Head and neck CTA without acute abnormality   Evaluated by neurology, possibly orthostatic hypotension/hypoperfusion, unlikely acute CVA  Geriatric input appreciated  Currently patient was one-on-one observation due to agitation and intermittent impulsiveness   IM Zyprexa as needed  Patient continues to have fluctuating mental status behavior changes secondary to underlying cognitive decline/dementia  Continue with supportive care    Chronic obstructive pulmonary disease ([address])  Assessment & Plan  With mild expiratory wheezing   Holding Stiolto per pulmonology - continue Xopenex/Atrovent nebulization with additional PRN Albuterol on board   Currently on oral prednisone on 11/18  Monitor    Other constipation  Assessment & Plan  Had multiple bowel movements  Continue with stool softeners    History of stroke  Assessment & Plan  CT of head reveals evidence of chronic bilateral thalamic lacunar and right parietal cortical infarctions  Continue statin/Eliquis  PT/OT as tolerated    Compression fractures of T5 and T9 vertebrae  Assessment & Plan  Imaging revealed mild superior endplate O9/T2 compression fractures deemed stable  PRN pain control   PT/OT as tolerated  Continue calcium/vitamin D supplementation    Right elbow pain  Assessment & Plan  Reports mild pain to lateral abduction of right elbow  Denies recent trauma/falls  Patient denied pain or tenderness  X-ray with soft tissue swelling of the dorsal aspect of the proximal forearm without other acute osseous abnormalities. Discontinue IV site  Monitor  Continue supportive care  Resolved    BPH (benign prostatic hyperplasia)  Assessment & Plan  C/w Proscar  Outpatient urology followup    Hypothyroidism  Assessment & Plan  TSH normal  Continue Synthroid    Essential hypertension  Assessment & Plan  Stable BP  Continue Zestril/Coreg    Atrial fibrillation (HCC)  Assessment & Plan  Rate controlled on Coreg  Continue Eliquis for anticoagulation             VTE Pharmacologic Prophylaxis: VTE Score: 6 High Risk (Score >/= 5) - Pharmacological DVT Prophylaxis Ordered: apixaban (Eliquis). Sequential Compression Devices Ordered. Mobility:   Basic Mobility Inpatient Raw Score: 7  -HL Goal: 2: Bed activities/Dependent transfer  -HLM Achieved: 4: Move to chair/commode  HLM Goal achieved. Continue to encourage appropriate mobility. Patient Centered Rounds: I performed bedside rounds with nursing staff today. Discussions with Specialists or Other Care Team Provider:     Education and Discussions with Family / Patient:  Patient. Total Time Spent on Date of Encounter in care of patient: 35 mins.  This time was spent on one or more of the following: performing physical exam; counseling and coordination of care; obtaining or reviewing history; documenting in the medical record; reviewing/ordering tests, medications or procedures; communicating with other healthcare professionals and discussing with patient's family/caregivers. Current Length of Stay: 8 day(s)  Current Patient Status: Inpatient   Certification Statement: The patient will continue to require additional inpatient hospital stay due to awaiting rehab placement      Code Status: Level 3 - DNAR and DNI    Subjective:   Patient seen and examined  Comfortable sitting in chair  No event overnight    Objective:     Vitals:   Temp (24hrs), Av.3 °F (36.3 °C), Min:96.9 °F (36.1 °C), Max:97.7 °F (36.5 °C)    Temp:  [96.9 °F (36.1 °C)-97.7 °F (36.5 °C)] 96.9 °F (36.1 °C)  HR:  [72-85] 72  Resp:  [17] 17  BP: (100-153)/() 117/64  SpO2:  [90 %-96 %] 95 %  Body mass index is 36 kg/m². Input and Output Summary (last 24 hours):      Intake/Output Summary (Last 24 hours) at 2023 1359  Last data filed at 2023 1328  Gross per 24 hour   Intake 602 ml   Output 650 ml   Net -48 ml       Physical Exam:   Physical Exam   Patient is awake alert comfortable in chair  Pleasantly disoriented  Lung with decreased breath sound bilateral, with mild expiratory wheezing  Heart positive S1-S2 no murmur  Abdomen soft nontender  Improving right upper extremity edema, no tenderness  Lower extremities no edema    Additional Data:     Labs:  Results from last 7 days   Lab Units 23  0605   WBC Thousand/uL 8.57   HEMOGLOBIN g/dL 13.8   HEMATOCRIT % 43.3   PLATELETS Thousands/uL 329   NEUTROS PCT % 83*   LYMPHS PCT % 8*   MONOS PCT % 8   EOS PCT % 1     Results from last 7 days   Lab Units 23  0605   SODIUM mmol/L 143   POTASSIUM mmol/L 3.9   CHLORIDE mmol/L 101   CO2 mmol/L 35*   BUN mg/dL 23   CREATININE mg/dL 0.95   ANION GAP mmol/L 7   CALCIUM mg/dL 9.5   GLUCOSE RANDOM mg/dL 135     Results from last 7 days   Lab Units 23  1610   INR  1.64*     Results from last 7 days   Lab Units 23  1804 23  1558   POC GLUCOSE mg/dl 170* 135     Results from last 7 days   Lab Units 23  1610   HEMOGLOBIN A1C % 6.1* Results from last 7 days   Lab Units 11/14/23  0435   PROCALCITONIN ng/ml 0.24       Lines/Drains:  Invasive Devices       None                         Imaging: No pertinent imaging reviewed.     Recent Cultures (last 7 days):         Last 24 Hours Medication List:   Current Facility-Administered Medications   Medication Dose Route Frequency Provider Last Rate    albuterol  2.5 mg Nebulization Q6H PRN Alondra Spire, DO      apixaban  5 mg Oral BID Alondra Spire, DO      atorvastatin  40 mg Oral HS Alondra Spire, DO      bisacodyl  10 mg Rectal Daily PRN Magdaline Ask, DO      bumetanide  2 mg Oral BID Magdaline Ask, DO      calcium carbonate  1 tablet Oral BID With Meals Shira Pal MD      carvedilol  25 mg Oral BID With Meals Alondra Spire, DO      cholecalciferol  1,000 Units Oral Daily Alondra Spire, DO      finasteride  5 mg Oral Daily Alondra Spire, DO      fluticasone  1 spray Nasal Daily PRN Alondra Spire, DO      guaiFENesin  600 mg Oral Q12H [address] Magdaline Ask, DO      hydrALAZINE  5 mg Intravenous Q6H PRN Dema Cranker, PA-C      ipratropium  0.5 mg Nebulization TID Nils Kern MD      lactulose  20 g Oral Daily Magdaline Ask, DO      latanoprost  1 drop Both Eyes HS Alondra Spire, DO      levalbuterol  1.25 mg Nebulization TID Nils Kern MD      levothyroxine  100 mcg Oral Early Morning Alondra Spire, DO      lisinopril  40 mg Oral Daily Alondra Spire, DO      melatonin  6 mg Oral HS PRN Tuesday M LIBORIO Baker      OLANZapine  2.5 mg Intramuscular Once PRN Dema Cranker, PA-C      ondansetron  4 mg Intravenous Q6H PRN Alondra Spire, DO      polyethylene glycol  17 g Oral Daily Magdaline Ask, DO      potassium chloride  20 mEq Oral Daily Magdaline Ask, DO      predniSONE  40 mg Oral Daily Magdaline Ask, DO      senna-docusate sodium  2 tablet Oral BID Magdaline Ask, DO      temazepam  7.5 mg Oral HS PRN Magdaline Ask, DO          Today, Patient Was Seen By: Magdaline Ask, DO    **Please Note: This note may have been constructed using a voice recognition system. **

## 2023-11-20 PROCEDURE — 94664 DEMO&/EVAL PT USE INHALER: CPT

## 2023-11-20 PROCEDURE — 97116 GAIT TRAINING THERAPY: CPT

## 2023-11-20 PROCEDURE — 94760 N-INVAS EAR/PLS OXIMETRY 1: CPT

## 2023-11-20 PROCEDURE — 94640 AIRWAY INHALATION TREATMENT: CPT

## 2023-11-20 PROCEDURE — 97530 THERAPEUTIC ACTIVITIES: CPT

## 2023-11-20 PROCEDURE — 99232 SBSQ HOSP IP/OBS MODERATE 35: CPT | Performed by: INTERNAL MEDICINE

## 2023-11-20 PROCEDURE — 92526 ORAL FUNCTION THERAPY: CPT

## 2023-11-20 RX ORDER — PREDNISONE 20 MG/1
40 TABLET ORAL DAILY
Status: DISCONTINUED | OUTPATIENT
Start: 2023-11-21 | End: 2023-11-21 | Stop reason: HOSPADM

## 2023-11-20 RX ADMIN — BUMETANIDE 2 MG: 2 TABLET ORAL at 08:32

## 2023-11-20 RX ADMIN — FINASTERIDE 5 MG: 5 TABLET, FILM COATED ORAL at 08:31

## 2023-11-20 RX ADMIN — PREDNISONE 40 MG: 20 TABLET ORAL at 08:31

## 2023-11-20 RX ADMIN — LEVALBUTEROL HYDROCHLORIDE 1.25 MG: 1.25 SOLUTION RESPIRATORY (INHALATION) at 07:32

## 2023-11-20 RX ADMIN — IPRATROPIUM BROMIDE 0.5 MG: 0.5 SOLUTION RESPIRATORY (INHALATION) at 07:32

## 2023-11-20 RX ADMIN — SENNOSIDES, DOCUSATE SODIUM 2 TABLET: 8.6; 5 TABLET ORAL at 08:32

## 2023-11-20 RX ADMIN — POLYETHYLENE GLYCOL 3350 17 G: 17 POWDER, FOR SOLUTION ORAL at 08:47

## 2023-11-20 RX ADMIN — GUAIFENESIN 600 MG: 600 TABLET, EXTENDED RELEASE ORAL at 21:25

## 2023-11-20 RX ADMIN — LISINOPRIL 40 MG: 20 TABLET ORAL at 08:31

## 2023-11-20 RX ADMIN — LEVALBUTEROL HYDROCHLORIDE 1.25 MG: 1.25 SOLUTION RESPIRATORY (INHALATION) at 19:28

## 2023-11-20 RX ADMIN — LEVALBUTEROL HYDROCHLORIDE 1.25 MG: 1.25 SOLUTION RESPIRATORY (INHALATION) at 13:39

## 2023-11-20 RX ADMIN — APIXABAN 5 MG: 5 TABLET, FILM COATED ORAL at 17:36

## 2023-11-20 RX ADMIN — ATORVASTATIN CALCIUM 40 MG: 40 TABLET, FILM COATED ORAL at 21:25

## 2023-11-20 RX ADMIN — Medication 1 TABLET: at 07:16

## 2023-11-20 RX ADMIN — IPRATROPIUM BROMIDE 0.5 MG: 0.5 SOLUTION RESPIRATORY (INHALATION) at 13:39

## 2023-11-20 RX ADMIN — LATANOPROST 1 DROP: 50 SOLUTION OPHTHALMIC at 21:25

## 2023-11-20 RX ADMIN — CARVEDILOL 25 MG: 25 TABLET, FILM COATED ORAL at 17:37

## 2023-11-20 RX ADMIN — Medication 1 TABLET: at 17:36

## 2023-11-20 RX ADMIN — Medication 1000 UNITS: at 08:32

## 2023-11-20 RX ADMIN — APIXABAN 5 MG: 5 TABLET, FILM COATED ORAL at 08:32

## 2023-11-20 RX ADMIN — CARVEDILOL 25 MG: 25 TABLET, FILM COATED ORAL at 07:16

## 2023-11-20 RX ADMIN — GUAIFENESIN 600 MG: 600 TABLET, EXTENDED RELEASE ORAL at 08:32

## 2023-11-20 RX ADMIN — BUMETANIDE 2 MG: 2 TABLET ORAL at 17:36

## 2023-11-20 RX ADMIN — LEVOTHYROXINE SODIUM 100 MCG: 100 TABLET ORAL at 06:49

## 2023-11-20 RX ADMIN — IPRATROPIUM BROMIDE 0.5 MG: 0.5 SOLUTION RESPIRATORY (INHALATION) at 19:28

## 2023-11-20 RX ADMIN — POTASSIUM CHLORIDE 20 MEQ: 1500 TABLET, EXTENDED RELEASE ORAL at 08:31

## 2023-11-20 NOTE — ASSESSMENT & PLAN NOTE
Presented with worsening shortness of breath over the past 2 days per daughter, with respiratory distress on arrival requiring initial BiPAP however patient unable to tolerate and subsequently placed on 10 L [address] [facility] significant for elevated BNP; CTA PE study limited due to motion artifact but no mainstem pulmonary emboli visualized and lungs with hypoinflation and dependent opacities suspected atelectasis  Suspect multifactorial in setting of acute on chronic heart failure, underlying COPD, possible lung mass (was noted on prior CT abdomen/pelvis incidentally however was not reported on current CTA PE study)    Management of the acute on chronic heart failure, COPD as per associated problems with IV diuresis and nebulizers as needed  Continue supplemental oxygen as needed, titrate to maintain appropriate saturations as appropriate  Respiratory protocol, incentive spirometry, pulmonary toileting      11/12 - mentation is wax/waning although awake today after use of BIPAP last night - peak daytime oxygen requirement of 15 L now weaned down to 8 L. Continue aggressive intravenous diuresis for CHF exacerbation. Will appreciate pulmonology input -> check repeat blood gas tomorrow morning. Highly suspect both hypoxic and hypercapnic respiratory failure playing a role in altered mentation. 11/13 - Oxygen requirements weaned down to 4 L with repeat blood gas this morning showing normalization of pCO2. Appreciate pulmonology input with recommendation  Continue to wean down oxygen requirements as tolerated.     Pulmonology signed off    Status post 3 days of IV antibiotics  Negative procalcitonin x2    Currently on room air

## 2023-11-20 NOTE — RESTORATIVE TECHNICIAN NOTE
Restorative Technician Note      Patient Name: Joseph Solorzano     Restorative Tech Visit Date: 11/20/23  Note Type: Mobility  Patient Position Upon Consult: Supine  Activity Performed: Transferred  Assistive Device: Other (Comment) (HHA x 2)  Patient Position at End of Consult: All needs within reach; Bed/Chair alarm activated;  Bedside chair

## 2023-11-20 NOTE — CASE MANAGEMENT
Case Management Discharge Planning Note    Patient name Mayank [place]  Location PPHP 404/PPHP 311-66 MRN [id number]  : 1927 Date 2023       Current Admission Date: 11/10/2023  Current Admission Diagnosis:Acute respiratory failure with hypoxia and hypercapnia [facility])   Patient Active Problem List    Diagnosis Date Noted    Other constipation 11/15/2023    Right elbow pain 2023    Compression fractures of T5 and T9 vertebrae 2023    History of stroke 2023    Acute respiratory failure with hypoxia and hypercapnia (HCC) 2023    Acute encephalopathy 2023    Abnormal CT scan 10/23/2023    Urinary frequency 10/02/2023    Vitamin D deficiency 10/02/2023    BPH (benign prostatic hyperplasia) 10/02/2023    Shortness of breath 2023    Pigmented skin lesion 2023    Dry eyes 2023    CKD (chronic kidney disease) stage 2, GFR 60-89 ml/min 2022    Chronic obstructive pulmonary disease ([address]) 2022    Sensorineural hearing loss (SNHL) of both ears 2022    Hypernatremia 2022    Ambulatory dysfunction 2022    Change in mole 2022    Seasonal allergic rhinitis 2021    Restrictive lung disease 2021    Malignant neoplasm of urinary bladder ([address]) 10/06/2020    Class 2 severe obesity with serious comorbidity in adult [facility]) 10/06/2020    Chronic anticoagulation 09/15/2020    Mild cognitive impairment with memory loss 09/15/2020    Atrial fibrillation ([address]) 2018    Acute on chronic diastolic CHF ([address])     Coronary artery disease 10/12/2016    Essential hypertension 10/12/2016    Bicuspid aortic valve 10/12/2016    Hypothyroidism 10/12/2016    Obstructive sleep apnea 10/12/2016      LOS (days): 9  Geometric Mean LOS (GMLOS) (days):   Days to GMLOS:     OBJECTIVE:  Risk of Unplanned Readmission Score: 27.71         Current admission status: Inpatient   Preferred Pharmacy:   [facility] [place], [place] - [address]  [address]  [place] [address]  Phone: [phone] Fax: [phone]    Primary Care Provider: Tomi Mason DO    Primary Insurance: Chrissie  Secondary Insurance: [facility] RE[employer]    DISCHARGE DETAILS:                      Additional Comments: Pt is scheduled to be discharged tomorrow to [facility]. Facility is aware. CM requested  time for 10:30 am per [facility] admissions corrdinator at [facility], waiting for confirmation. CM will continue to folloe as needed.

## 2023-11-20 NOTE — SPEECH THERAPY NOTE
Speech Language/Pathology    Speech/Language Pathology Progress Note    Patient Name: Ruthie Dove  CVTFA'T Date: 11/20/2023     Problem List  Principal Problem:    Acute respiratory failure with hypoxia and hypercapnia (HCC)  Active Problems:    Atrial fibrillation (HCC)    Essential hypertension    Acute on chronic diastolic CHF (HCC)    Hypothyroidism    Chronic obstructive pulmonary disease (HCC)    BPH (benign prostatic hyperplasia)    Acute encephalopathy    Right elbow pain    Compression fractures of T5 and T9 vertebrae    History of stroke    Other constipation       Past Medical History  Past Medical History:   Diagnosis Date    Arithmetic disorder     07OCT2016    Myocardial infarct [facility])     [id number]  LAST ASSESSED        Past Surgical History  Past Surgical History:   Procedure Laterality Date    BLADDER SURGERY      LAST ASSESSED 12OCT2016      CORONARY ARTERY BYPASS GRAFT      [id number] RESOLVED    SKIN BIOPSY           Subjective:  "Why am I like this? Why can't I do things for myself?" The patient is awake and alert. Objective: The patient is seen for f/u dysphagia therapy at lunch meal. He is sitting upright in chair. SLP assists with tray set up and cutting food. The patient is able to feed himself. He is trialed with regular solids, including chicken, brussel sprouts and cake. Bite size and rate is adequate. Mastication is timely and efficient with no oral residue. The patient intermittently takes sips of thin liquids. No overt s/s aspiration observed. The patient is on RA. O2 remains stable. Assessment:  The patient tolerated upgraded trials and thin liquids well. Plan/Recommendations:  Recommend diet change to regular with thin liquids. No further ST appears warranted. Please re-consult with concerns.

## 2023-11-20 NOTE — PLAN OF CARE
Problem: PHYSICAL THERAPY ADULT  Goal: Performs mobility at highest level of function for planned discharge setting. See evaluation for individualized goals. Description: Treatment/Interventions: Functional transfer training, LE strengthening/ROM, Therapeutic exercise, Endurance training, Equipment eval/education, Bed mobility, Gait training, Patient/family training  Equipment Recommended: Deonna Menard       See flowsheet documentation for full assessment, interventions and recommendations. Outcome: Progressing  Note: Prognosis: Fair  Problem List: Decreased strength, Decreased endurance, Impaired balance, Decreased mobility, Pain, Decreased coordination  Assessment: Pt seen for session for setup, transfers, gait w/ rest time, repositioning. Pt cooperative w/ session but confused. Needs frequen cues for safety and re-direction to task. Note improving mobility tolerance, balance and strength. Based on mobility decline, AM PAC score, continue to anticipate the need for Level I (maximal) post-acute care theapy resources at d/c.        Rehab Resource Intensity Level, PT: I (Maximum Resource Intensity)    See flowsheet documentation for full assessment. Problem: PHYSICAL THERAPY ADULT  Goal: Performs mobility at highest level of function for planned discharge setting. See evaluation for individualized goals. Description: Treatment/Interventions: Functional transfer training, LE strengthening/ROM, Therapeutic exercise, Endurance training, Equipment eval/education, Bed mobility, Gait training, Patient/family training  Equipment Recommended: Deonna Menard       See flowsheet documentation for full assessment, interventions and recommendations. Outcome: Progressing  Note: Prognosis: Fair  Problem List: Decreased strength, Decreased endurance, Impaired balance, Decreased mobility, Pain, Decreased coordination  Assessment: Pt seen for session for setup, transfers, gait w/ rest time, repositioning.   Pt cooperative w/ session but confused. Needs frequen cues for safety and re-direction to task. Note improving mobility tolerance, balance and strength. Based on mobility decline, AM PAC score, continue to anticipate the need for Level I (maximal) post-acute care theapy resources at d/c.        Rehab Resource Intensity Level, PT: I (Maximum Resource Intensity)    See flowsheet documentation for full assessment.

## 2023-11-20 NOTE — PHYSICAL THERAPY NOTE
Physical Therapy Treatment Note       11/20/23 0941   PT Last Visit   PT Visit Date 11/20/23   Note Type   Note Type Treatment for insurance authorization   Pain Assessment   Pain Assessment Tool 0-10   Pain Score No Pain   Restrictions/Precautions   Weight Bearing Precautions Per Order No   Other Precautions Cognitive; Chair Alarm; Bed Alarm;Multiple lines;Telemetry; Fall Risk;Pain   General   Chart Reviewed Yes   Family/Caregiver Present No   Cognition   Overall Cognitive Status WFL   Arousal/Participation Responsive   Attention Attends with cues to redirect   Orientation Level Oriented X4   Memory Unable to assess   Following Commands Follows one step commands with increased time or repetition   Subjective   Subjective some confusion but cooperative   Transfers   Sit to Stand 3  Moderate assistance   Additional items Assist x 2   Stand to Sit 4  Minimal assistance   Additional items Assist x 2   Additional Comments verbal cues for hand placement w/ transfers   Ambulation/Elevation   Gait pattern   (slow, ataxia, short step length, forward flexion, wide OLINDA)   Gait Assistance 3  Moderate assist   Additional items Assist x 1   Assistive Device Rolling walker   Distance 30'x1+ 20'x1 w/ extended seated rest   Balance   Static Sitting Fair   Dynamic Sitting Poor +   Static Standing Poor +   Dynamic Standing Poor   Ambulatory Poor   Endurance Deficit   Endurance Deficit Yes   Endurance Deficit Description fatigue, weakness, pain   Activity Tolerance   Activity Tolerance Patient tolerated treatment well;Patient limited by fatigue;Patient limited by pain;Treatment limited secondary to medical complications (Comment)   Nurse Made Aware yes   Assessment   Prognosis Fair   Problem List Decreased strength;Decreased endurance; Impaired balance;Decreased mobility;Pain;Decreased coordination   Assessment Pt seen for session for setup, transfers, gait w/ rest time, repositioning. Pt cooperative w/ session but confused.   Needs frequen cues for safety and re-direction to task. Note improving mobility tolerance, balance and strength. Based on mobility decline, AM PAC score, continue to anticipate the need for Level I (maximal) post-acute care theapy resources at d/c. Goals   STG Expiration Date 11/23/23   PT Treatment Day 2   Plan   Treatment/Interventions Functional transfer training;LE strengthening/ROM; Therapeutic exercise; Endurance training;Equipment eval/education; Bed mobility;Gait training;Patient/family training   Progress Progressing toward goals   PT Frequency 2-3x/wk   Discharge Recommendation   Rehab Resource Intensity Level, PT I (Maximum Resource Intensity)   Equipment Recommended Tealet Allyson Package Recommended Wheeled walker   Change/add to [email or web address]?  No   AM-PAC Basic Mobility Inpatient   Turning in Flat Bed Without Bedrails 2   Lying on Back to Sitting on Edge of Flat Bed Without Bedrails 2   Moving Bed to Chair 1   Standing Up From Chair Using Arms 1   Walk in Room 1   Climb 3-5 Stairs With Railing 1   Basic Mobility Inpatient Raw Score 8   Turning Head Towards Sound 3   Follow Simple Instructions 3   Low Function Basic Mobility Raw Score  14   Low Function Basic Mobility Standardized Score  22.01   Highest Level Of Mobility   JH-HLM Goal 3: Sit at edge of bed   JH-HLM Achieved 7: Walk 25 feet or more     Loistine Meals PT, DPT CSRS

## 2023-11-20 NOTE — CASE MANAGEMENT
Case Management Discharge Planning Note    Patient name Mirta Russell  Location PPHP 404/PPHP 429-51 MRN [id number]  : 1927 Date 2023       Current Admission Date: 11/10/2023  Current Admission Diagnosis:Acute respiratory failure with hypoxia and hypercapnia [facility])   Patient Active Problem List    Diagnosis Date Noted    Other constipation 11/15/2023    Right elbow pain 2023    Compression fractures of T5 and T9 vertebrae 2023    History of stroke 2023    Acute respiratory failure with hypoxia and hypercapnia (HCC) 2023    Acute encephalopathy 2023    Abnormal CT scan 10/23/2023    Urinary frequency 10/02/2023    Vitamin D deficiency 10/02/2023    BPH (benign prostatic hyperplasia) 10/02/2023    Shortness of breath 2023    Pigmented skin lesion 2023    Dry eyes 2023    CKD (chronic kidney disease) stage 2, GFR 60-89 ml/min 2022    Chronic obstructive pulmonary disease ([address]) 2022    Sensorineural hearing loss (SNHL) of both ears 2022    Hypernatremia 2022    Ambulatory dysfunction 2022    Change in mole 2022    Seasonal allergic rhinitis 2021    Restrictive lung disease 2021    Malignant neoplasm of urinary bladder ([address]) 10/06/2020    Class 2 severe obesity with serious comorbidity in adult [facility]) 10/06/2020    Chronic anticoagulation 09/15/2020    Mild cognitive impairment with memory loss 09/15/2020    Atrial fibrillation ([address]) 2018    Acute on chronic diastolic CHF ([address])     Coronary artery disease 10/12/2016    Essential hypertension 10/12/2016    Bicuspid aortic valve 10/12/2016    Hypothyroidism 10/12/2016    Obstructive sleep apnea 10/12/2016      LOS (days): 9  Geometric Mean LOS (GMLOS) (days):   Days to GMLOS:     OBJECTIVE:  Risk of Unplanned Readmission Score: 27.9         Current admission status: Inpatient   Preferred Pharmacy:   [address] [place] [address] - [address] [facility] [address]  Phone: [phone] Fax: [phone]    Primary Care Provider: Sherrie Colon DO    Primary Insurance: Chrissie  Secondary Insurance: [address] Number: [id number]

## 2023-11-20 NOTE — CASE MANAGEMENT
Received call from Reinaldo Woodruff at [facility] (P#: [phone] [id number]) requesting updated therapy notes for authorization case. Stated notes can be faxed over to F#: [phone]. CM notified. 11/20 115pm - clinicals faxed. Cm notified.

## 2023-11-20 NOTE — CASE MANAGEMENT
[address] has received approved authorization from insurance: [facility] in by Rebeka López P#  [phone] [id number]   Authorization received for: SNF  Facility: [facility] #: [id number]    Start of Care: 11/20/2023  Next Review Date: 11/27/2023  Continued Stay Care Coordinator: Eliazar Contreras P#: [phone] [id number]    Submit next review to F#: [phone]  Care Manager notified: Willie Horner

## 2023-11-20 NOTE — CASE MANAGEMENT
Case Management Discharge Planning Note    Patient name Wilber Malagon  [facility] [id number]  : 1927 Date 2023       Current Admission Date: 11/10/2023  Current Admission Diagnosis:Acute respiratory failure with hypoxia and hypercapnia [facility])   Patient Active Problem List    Diagnosis Date Noted    Other constipation 11/15/2023    Right elbow pain 2023    Compression fractures of T5 and T9 vertebrae 2023    History of stroke 2023    Acute respiratory failure with hypoxia and hypercapnia (HCC) 2023    Acute encephalopathy 2023    Abnormal CT scan 10/23/2023    Urinary frequency 10/02/2023    Vitamin D deficiency 10/02/2023    BPH (benign prostatic hyperplasia) 10/02/2023    Shortness of breath 2023    Pigmented skin lesion 2023    Dry eyes 2023    CKD (chronic kidney disease) stage 2, GFR 60-89 ml/min 2022    Chronic obstructive pulmonary disease ([address]) 2022    Sensorineural hearing loss (SNHL) of both ears 2022    Hypernatremia 2022    Ambulatory dysfunction 2022    Change in mole 2022    Seasonal allergic rhinitis 2021    Restrictive lung disease 2021    Malignant neoplasm of urinary bladder ([address]) 10/06/2020    Class 2 severe obesity with serious comorbidity in adult [facility]) 10/06/2020    Chronic anticoagulation 09/15/2020    Mild cognitive impairment with memory loss 09/15/2020    Atrial fibrillation ([address]) 2018    Acute on chronic diastolic CHF ([address])     Coronary artery disease 10/12/2016    Essential hypertension 10/12/2016    Bicuspid aortic valve 10/12/2016    Hypothyroidism 10/12/2016    Obstructive sleep apnea 10/12/2016      LOS (days): 9  Geometric Mean LOS (GMLOS) (days):   Days to GMLOS:     OBJECTIVE:  Risk of Unplanned Readmission Score: 27.9         Current admission status: Inpatient   Preferred Pharmacy:   [address] [place] [address] - [address] [facility]  Phone: [phone] Fax: [phone]    Primary Care Provider: Helen Orellana DO    Primary Insurance: Chrissie  Secondary Insurance: [employer] PARSON[employer] REP    DISCHARGE DETAILS:                                [address]         [facility] the [facility] No    DME Referral Provided  Referral made for DME?: No    Other Referral/Resources/Interventions Provided:  Interventions: SNF, Short Term Rehab  Referral Comments: Pt is on room air with CPAP hx. [facility] for [facility], insurance auth was sent but they are asking for updated PT/OT notes. Notes are pending. Covid test is negative. Programs[de-identified] CHF    Would you like to participate in our [address] service program?  : No - Declined    Treatment Team Recommendation: Short Term Rehab  Discharge Destination Plan[de-identified] SNF, Short Term Rehab                                         Additional Comments: Just received updated PT/OT notes and were submitted to NH support for updating insurance. Nicky Richards

## 2023-11-20 NOTE — ASSESSMENT & PLAN NOTE
Imaging revealed mild superior endplate M7/L0 compression fractures deemed stable  PRN pain control   PT/OT as tolerated  Continue calcium/vitamin D supplementation

## 2023-11-20 NOTE — ASSESSMENT & PLAN NOTE
With mild expiratory wheezing   Holding Stiolto per pulmonology - continue Xopenex/Atrovent nebulization with additional PRN Albuterol on board   Started on on oral prednisone on 11/18 with a plan for 5-day course  Monitor

## 2023-11-21 ENCOUNTER — NURSING HOME VISIT (OUTPATIENT)
Dept: GERIATRICS | Facility: OTHER | Age: 88
End: 2023-11-21
Payer: COMMERCIAL

## 2023-11-21 VITALS
OXYGEN SATURATION: 94 % | SYSTOLIC BLOOD PRESSURE: 140 MMHG | HEIGHT: 69 IN | BODY MASS INDEX: 35.36 KG/M2 | RESPIRATION RATE: 20 BRPM | WEIGHT: 238.76 LBS | TEMPERATURE: 97.4 F | DIASTOLIC BLOOD PRESSURE: 76 MMHG | HEART RATE: 68 BPM

## 2023-11-21 DIAGNOSIS — G47.33 OBSTRUCTIVE SLEEP APNEA: ICD-10-CM

## 2023-11-21 DIAGNOSIS — J96.01 ACUTE RESPIRATORY FAILURE WITH HYPOXIA AND HYPERCAPNIA (HCC): Primary | ICD-10-CM

## 2023-11-21 DIAGNOSIS — G31.84 MILD COGNITIVE IMPAIRMENT WITH MEMORY LOSS: ICD-10-CM

## 2023-11-21 DIAGNOSIS — R26.2 AMBULATORY DYSFUNCTION: ICD-10-CM

## 2023-11-21 DIAGNOSIS — H90.3 SENSORINEURAL HEARING LOSS (SNHL) OF BOTH EARS: ICD-10-CM

## 2023-11-21 DIAGNOSIS — I50.33 ACUTE ON CHRONIC DIASTOLIC (CONGESTIVE) HEART FAILURE (HCC): ICD-10-CM

## 2023-11-21 DIAGNOSIS — N18.2 CKD (CHRONIC KIDNEY DISEASE) STAGE 2, GFR 60-89 ML/MIN: ICD-10-CM

## 2023-11-21 DIAGNOSIS — J96.02 ACUTE RESPIRATORY FAILURE WITH HYPOXIA AND HYPERCAPNIA (HCC): Primary | ICD-10-CM

## 2023-11-21 DIAGNOSIS — I10 BENIGN ESSENTIAL HYPERTENSION: ICD-10-CM

## 2023-11-21 DIAGNOSIS — N40.1 BENIGN PROSTATIC HYPERPLASIA WITH LOWER URINARY TRACT SYMPTOMS, SYMPTOM DETAILS UNSPECIFIED: ICD-10-CM

## 2023-11-21 DIAGNOSIS — E03.8 OTHER SPECIFIED HYPOTHYROIDISM: ICD-10-CM

## 2023-11-21 DIAGNOSIS — I25.10 CORONARY ARTERY DISEASE INVOLVING NATIVE CORONARY ARTERY OF NATIVE HEART WITHOUT ANGINA PECTORIS: ICD-10-CM

## 2023-11-21 DIAGNOSIS — J44.9 CHRONIC OBSTRUCTIVE PULMONARY DISEASE, UNSPECIFIED COPD TYPE (HCC): ICD-10-CM

## 2023-11-21 DIAGNOSIS — I48.91 ATRIAL FIBRILLATION, UNSPECIFIED TYPE (HCC): ICD-10-CM

## 2023-11-21 DIAGNOSIS — G93.40 ACUTE ENCEPHALOPATHY: ICD-10-CM

## 2023-11-21 DIAGNOSIS — S22.070D COMPRESSION FRACTURE OF T9 VERTEBRA WITH ROUTINE HEALING, SUBSEQUENT ENCOUNTER: ICD-10-CM

## 2023-11-21 LAB — SARS-COV-2 RNA RESP QL NAA+PROBE: NEGATIVE

## 2023-11-21 PROCEDURE — 99306 1ST NF CARE HIGH MDM 50: CPT | Performed by: FAMILY MEDICINE

## 2023-11-21 PROCEDURE — 99239 HOSP IP/OBS DSCHRG MGMT >30: CPT | Performed by: INTERNAL MEDICINE

## 2023-11-21 PROCEDURE — 99232 SBSQ HOSP IP/OBS MODERATE 35: CPT | Performed by: NURSE PRACTITIONER

## 2023-11-21 PROCEDURE — 94664 DEMO&/EVAL PT USE INHALER: CPT

## 2023-11-21 PROCEDURE — 94640 AIRWAY INHALATION TREATMENT: CPT

## 2023-11-21 PROCEDURE — 94760 N-INVAS EAR/PLS OXIMETRY 1: CPT

## 2023-11-21 PROCEDURE — 87635 SARS-COV-2 COVID-19 AMP PRB: CPT | Performed by: PHYSICIAN ASSISTANT

## 2023-11-21 PROCEDURE — 97530 THERAPEUTIC ACTIVITIES: CPT

## 2023-11-21 PROCEDURE — 97116 GAIT TRAINING THERAPY: CPT

## 2023-11-21 RX ORDER — ALBUTEROL SULFATE 2.5 MG/3ML
2.5 SOLUTION RESPIRATORY (INHALATION) EVERY 6 HOURS PRN
Refills: 0
Start: 2023-11-21 | End: 2023-11-24 | Stop reason: ALTCHOICE

## 2023-11-21 RX ORDER — LEVALBUTEROL INHALATION SOLUTION 1.25 MG/3ML
1.25 SOLUTION RESPIRATORY (INHALATION)
Refills: 0
Start: 2023-11-21

## 2023-11-21 RX ORDER — PREDNISONE 20 MG/1
40 TABLET ORAL DAILY
Qty: 2 TABLET | Refills: 0
Start: 2023-11-22 | End: 2023-11-24 | Stop reason: ALTCHOICE

## 2023-11-21 RX ORDER — BUMETANIDE 2 MG/1
2 TABLET ORAL 2 TIMES DAILY
Refills: 0
Start: 2023-11-21

## 2023-11-21 RX ORDER — POLYETHYLENE GLYCOL 3350 17 G/17G
17 POWDER, FOR SOLUTION ORAL DAILY
Refills: 0
Start: 2023-11-22

## 2023-11-21 RX ADMIN — SENNOSIDES, DOCUSATE SODIUM 2 TABLET: 8.6; 5 TABLET ORAL at 08:23

## 2023-11-21 RX ADMIN — POLYETHYLENE GLYCOL 3350 17 G: 17 POWDER, FOR SOLUTION ORAL at 08:25

## 2023-11-21 RX ADMIN — LEVALBUTEROL HYDROCHLORIDE 1.25 MG: 1.25 SOLUTION RESPIRATORY (INHALATION) at 07:53

## 2023-11-21 RX ADMIN — CARVEDILOL 25 MG: 25 TABLET, FILM COATED ORAL at 08:25

## 2023-11-21 RX ADMIN — PREDNISONE 40 MG: 20 TABLET ORAL at 08:21

## 2023-11-21 RX ADMIN — Medication 1000 UNITS: at 08:25

## 2023-11-21 RX ADMIN — IPRATROPIUM BROMIDE 0.5 MG: 0.5 SOLUTION RESPIRATORY (INHALATION) at 07:53

## 2023-11-21 RX ADMIN — LISINOPRIL 40 MG: 20 TABLET ORAL at 08:24

## 2023-11-21 RX ADMIN — GUAIFENESIN 600 MG: 600 TABLET, EXTENDED RELEASE ORAL at 08:24

## 2023-11-21 RX ADMIN — Medication 1 TABLET: at 08:29

## 2023-11-21 RX ADMIN — LEVOTHYROXINE SODIUM 100 MCG: 100 TABLET ORAL at 05:45

## 2023-11-21 RX ADMIN — BUMETANIDE 2 MG: 2 TABLET ORAL at 08:21

## 2023-11-21 RX ADMIN — APIXABAN 5 MG: 5 TABLET, FILM COATED ORAL at 08:25

## 2023-11-21 RX ADMIN — FINASTERIDE 5 MG: 5 TABLET, FILM COATED ORAL at 08:25

## 2023-11-21 RX ADMIN — POTASSIUM CHLORIDE 20 MEQ: 1500 TABLET, EXTENDED RELEASE ORAL at 08:23

## 2023-11-21 NOTE — ASSESSMENT & PLAN NOTE
Possible acute on chronic-congestive heart failure  2D echo EF 55%  Received IV Bumex with diuresis  Transition to p.o.  Bumex 2 mg twice daily  Low-salt diet  Outpatient cardiology follow-up

## 2023-11-21 NOTE — ASSESSMENT & PLAN NOTE
Holding Stiolto per pulmonology - continue Xopenex/Atrovent nebulization with additional PRN Albuterol on board   Complete p.o. prednisone 5-day course  Respiratory treatments  Outpatient pulmonary follow-up

## 2023-11-21 NOTE — ASSESSMENT & PLAN NOTE
Patient with underlying mild cognitive impairment with memory loss  Negative head CT scan for acute abnormality   Rapid response called 11/14 due to strokelike symptoms, change in level of consciousness  Head and neck CTA without acute abnormality   Evaluated by neurology, possibly orthostatic hypotension/hypoperfusion, unlikely acute CVA  Currently patient was one-on-one observation due to agitation and intermittent impulsiveness   Fluctuating mental status noted  Delirium precautions  Geriatric inputs noted

## 2023-11-21 NOTE — PROGRESS NOTES
1505 [facility]  [address] [place] [address] 751 [facility] [place], 701 [facility] 00  History and Physical    NAME: Daniela Mullen  AGE: 80 y.o. SEX: male [phone]    DATE OF ENCOUNTER: 11/21/2023    Pain: none reported  Rehab Potential:fair  Patient Informed of Medical Condition: yes  Patient is Capable of Understanding Their Right: no. Limited due to cognitive impairment  Prognosis:poor  Discharge Plan: pending OT/PT eval  Surrogate Decision Maker: daughterChristina   Advanced Directives:   Code status:  PCP:  Kaley Lorenzo, DO     Assessment and Plan     Acute respiratory failure with hypoxia and hypercapnia (HCC)  Worsening SOB at home, respiratory distress on ED arrival requiring BiPAP and subsequently on 10L [address] due to intolerance with BiPAP  Multifactorial insetting of acute on chronic HF, underlying COPD, possible lung mass noted incidentally on CT abdomen/pelvis  CTA PE study limited due to motion artifact but no mainstem pulmonary emboli visualized and lungs with hypoinflation and dependent opacities suspected atelectasis. S/p IV diuresis, neb treatment, respiratory protocol, incentive spirometry  Monitor respiratory status closely. Currently no SOB, SpO2 97% with room air  Continue Xopenex treatment  Continue prednisone to complete the course  Continue bumex and potassium supplement    Hypothyroidism  TSH WNL 0.86 (11/12/23)  Continue levothyroxine 100 mcg daily    Chronic obstructive pulmonary disease (HCC)  Continue prednisone to complete 5-day course  Xopenex 3 times daily and albuterol as needed  Incentive spirometry  Encouraged to follow-up with pulmonary as scheduled    Obstructive sleep apnea  Continue CPAP nightly    Acute on chronic diastolic CHF ([address])  Wt Readings from Last 3 Encounters:   11/21/23 107 kg (236 lb 9.6 oz)   11/21/23 108 kg (238 lb 12.1 oz)   11/14/23 110 kg (242 lb 8.1 oz)   S/p IV Bumex in the hospital  Continue p.o.  Bumex 2 mg twice daily, cardiac diet, fluid restriction  Monitor weight, BMP    Atrial fibrillation (HCC)  Continue Coreg and AC with Eliquis    Coronary artery disease  Hx of CABG x3  Stable with no angina  Continue Coreg and statin    Essential hypertension  BP at goal  Continue benazepril, Coreg in the setting of A-fib and Bumex in the setting of CHF  Monitor BP closely with hold parameters. Avoid hypotension. Acute encephalopathy  Acute metabolic encephalopathy, multifactorial in the setting of underlying MCI with memory loss, orthostatic hypotension hypoperfusion  High risk of delirium/mortality  Delirium Precautions   -Maintain normal sleep/wake cycle, lights and tv on during day with blinds open, lights and tv off at bedtime with blinds closed, minimize unnecessary interruptions  -if insomnia develops consider Melatonin 3mg   -continue supportive care and frequent reorientation   -ordered bowel regimen and monitor for adequate urine output as both urine and stool retention may contribute to and worsen delirium      Mild cognitive impairment with memory loss  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 Chronic microangiopathic changes, chronic bilateral thalamic lacunar infarctions, and chronic right parietal cortical infarction. OT/PT  Support with ADLs. Frequent reorientation, redirection      Sensorineural hearing loss (SNHL) of both ears  Encourage patient to wear hearing aids at all appropriate time    Compression fractures of T5 and T9 vertebrae  Stable   Continue PT  Vitamin D and calcium supplement    BPH (benign prostatic hyperplasia)  Maintain on Proscar. Monitor UOP. Toileting schedule. Urinary retention may contribute to delirium.     CKD (chronic kidney disease) stage 2, GFR 60-89 ml/min  Lab Results   Component Value Date    EGFR 67 11/18/2023    EGFR 64 11/16/2023    EGFR 64 11/15/2023 CREATININE 0.95 11/18/2023    CREATININE 0.99 11/16/2023    CREATININE 0.98 11/15/2023   Stable  Avoid hypotension. Avoid NSAIDs    Ambulatory dysfunction  Uses walker  PT  Fall precautions    All medications and routine orders were reviewed and updated as needed. Plan discussed with: Patient. Coordination of care with nursing, OT/PT, SW.    Chief Complaint     Seen for admission at [address] [place] [address]    History of Present Illness     80 y.o. male who is seen today, following hospitalization at [address] [place] [address] from 11/10/2023 to 11/21/2023 for acute respiratory failure with hypoxia and hypercapnia 2/2 to acute on chronic CHF, COPD. BNP was elevated. CTA pe study limited due to motion artifact but no mainstem pulmonary emboli, atelectasis noted. BiPAP was given on admission, subsequently on 10L [address]. The patient was treated with IV diuresis, nebulizers as per respiratory protocol. His hospital course was complicated by acute encephalopathy for which geriatrics was consulted. Rapid Response was activated on 11/14 due to altered mental status and stroke-like symptoms. CT head and CTA head/neck were performed, no acute abnormality. Neurology was consulted, possibly orthostatic hypotension/hypoperfusion, unlikely an acute CVA. One-on-one observation was placed due to agitation and intermittent impulsiveness. His compression fractures T5/T9 was stable with PT. He was d/c to [facility] to continue OT/PT. He is sitting in recliner, comfortably. No oxygen required. He denies HA, CP, SOB, or palpitations. He states he had breakfast, lunch with no nausea or vomiting. Does not recall when his BM was.     HISTORY:  Past Medical History:   Diagnosis Date    Arithmetic disorder     [id number]    Myocardial infarct [facility])     [id number]  LAST ASSESSED     Family History   Problem Relation Age of Onset    Breast cancer Family         [id number] LAST ASSESSED     Social History     Socioeconomic History    Marital status: /Civil Union     Spouse name: None    Number of children: 3    Years of education: None    Highest education level: None   Occupational History    None   Tobacco Use    Smoking status: Former    Smokeless tobacco: Never   Vaping Use    Vaping Use: Never used   Substance and Sexual Activity    Alcohol use: Yes     Comment: OCCASIONAL    Drug use: None    Sexual activity: None   Other Topics Concern    None   Social History Narrative    ACTIVE ADVANCE DIRECTIVE ON FILE  LAST ASSESSED 23 JAN 2018    ALL SCRIPTS SAYS      Social Determinants of Health     Financial Resource Strain: Not on file   Food Insecurity: No Food Insecurity (11/13/2023)    Hunger Vital Sign     Worried About Running Out of Food in the Last Year: Never true     Ran Out of Food in the Last Year: Never true   Transportation Needs: No Transportation Needs (11/13/2023)    PRAPARE - Transportation     Lack of Transportation (Medical): No     Lack of Transportation (Non-Medical): No   Physical Activity: Not on file   Stress: Not on file   Social Connections: Not on file   Intimate Partner Violence: Not on file   Housing Stability: Low Risk  (11/13/2023)    Housing Stability Vital Sign     Unable to Pay for Housing in the Last Year: No     Number of Places Lived in the Last Year: 1     Unstable Housing in the Last Year: No     Allergies:  No Known Allergies    Review of Systems     Review of Systems   Respiratory:  Negative for shortness of breath. Cardiovascular:  Negative for chest pain. Gastrointestinal:  Negative for abdominal pain. Genitourinary:  Negative for dysuria. Musculoskeletal:  Negative for back pain. Neurological:  Negative for headaches. All other systems reviewed and are negative. Medications and orders     All medications reviewed and updated in [facility] EMR.     Objective     Vitals:    11/21/23 1628   BP: 132/72   Pulse: 76   Resp: 18   Temp: 98.1 °F (36.7 °C)   SpO2: 97%   Weight: 107 kg (236 lb 9.6 oz) Physical Exam  Vitals reviewed. Constitutional:       General: He is not in acute distress. Appearance: He is obese. HENT:      Head: Normocephalic. Nose: Nose normal.      Mouth/Throat:      Mouth: Mucous membranes are moist.   Eyes:      Conjunctiva/sclera: Conjunctivae normal.   Cardiovascular:      Rate and Rhythm: Normal rate and regular rhythm. Pulses: Normal pulses. Heart sounds: No murmur heard. Pulmonary:      Effort: Pulmonary effort is normal.      Comments: Diminished breath sounds b/l. Soft crackles left lower base. Abdominal:      General: Bowel sounds are normal. There is no distension. Tenderness: There is abdominal tenderness (mild LLQ tenderness). There is no guarding or rebound. Hernia: No hernia is present. Musculoskeletal:      Cervical back: Normal range of motion. Right lower leg: Edema (2+ pitting) present. Left lower leg: Edema (2+ pitting) present. Skin:     General: Skin is warm. Findings: Bruising (hematoma anterior left forearm. Scattered ecchymoses right wrist, left antecubidal and forearm) present. Comments: Congenital hemangioma posterior left upper arm   Neurological:      Mental Status: He is alert. Comments: Oriented to person only, not place or time. Pleasantly confused. Cooperative, follows commands  States he is in the [employer]. He wants to go to work. Pertinent Laboratory/Diagnostic Studies: The following labs/studies were reviewed please see chart or hospital paperwork for details.     Labs & Imaging:  Lab Results   Component Value Date    WBC 8.57 11/18/2023    HGB 13.8 11/18/2023    HCT 43.3 11/18/2023    MCV 96 11/18/2023     11/18/2023     Lab Results   Component Value Date    SODIUM 143 11/18/2023    K 3.9 11/18/2023     11/18/2023    CO2 35 (H) 11/18/2023    AGAP 7 11/18/2023    BUN 23 11/18/2023    CREATININE 0.95 11/18/2023    GLUC 135 11/18/2023    GLUF 92 09/07/2023    CALCIUM 9.5 11/18/2023    AST 18 11/10/2023    ALT 18 11/10/2023    ALKPHOS 90 11/10/2023    TP 6.6 11/10/2023    TBILI 2.10 (H) 11/10/2023    EGFR 67 11/18/2023     Lab Results   Component Value Date    HGBA1C 6.1 (H) 11/14/2023     Lab Results   Component Value Date    CHOLESTEROL 106 11/14/2023    CHOLESTEROL 98 09/07/2023     Lab Results   Component Value Date    HDL 33 (L) 11/14/2023    HDL 32 (L) 09/07/2023     Lab Results   Component Value Date    TRIG 72 11/14/2023    TRIG 67 09/07/2023     Lab Results   Component Value Date    NONHDLC 66 09/07/2023     Lab Results   Component Value Date    LDLCALC 59 11/14/2023    LDLCALC 53 09/07/2023     Lab Results   Component Value Date    GNCTKKYW06 353 11/11/2023     Lab Results   Component Value Date    QRB4ARYSQZZL 0.860 11/12/2023       Lab Results   Component Value Date    ZTFX29CLTGIL 50.6 09/07/2023      CT head wo contrast 11/10/23 showed generalized cerebral greater than cerebellar atrophy. Chronic microangiopathic changes, chronic bilateral thalamic lacunar infarctions, and chronic right parietal cortical infarction. Subcentimeter extra-axial hyperdensity in the right occipital region most likely represents a meningioma. This is unchanged when compared to the prior study of February 28, 2020. I have spent 60 minutes with Patient  today including taking history from family, patient, review of records, charting, and counseling regarding diagnosis and management of conditions.   Sherie Eagle MD

## 2023-11-21 NOTE — ASSESSMENT & PLAN NOTE
Imaging revealed mild superior endplate X6/P2 compression fractures deemed stable  Analgesics  Physical therapy  Vitamin D/calcium supplementation

## 2023-11-21 NOTE — DISCHARGE SUMMARY
[address] [facility]  Discharge- Jesusita Joaquin 11/4/1927, 80 y.o. male MRN: [id number]  Unit/Bed#: [facility] 404-01 Encounter: [id number]  Primary Care Provider: Umm Marion DO   Date and time admitted to hospital: 11/10/2023  8:36 PM    * Acute respiratory failure with hypoxia and hypercapnia (HCC)  Assessment & Plan  Presented with worsening shortness of breath over the past 2 days per daughter, with respiratory distress on arrival requiring initial BiPAP however patient unable to tolerate and subsequently placed on 10 L [address]  Labs significant for elevated BNP; CTA PE study limited due to motion artifact but no mainstem pulmonary emboli visualized and lungs with hypoinflation and dependent opacities suspected atelectasis  Suspect multifactorial in setting of acute on chronic heart failure, underlying COPD, possible lung mass (was noted on prior CT abdomen/pelvis incidentally however was not reported on current CTA PE study)    Management of the acute on chronic heart failure, COPD as per associated problems with IV diuresis and nebulizers as needed  Continue supplemental oxygen as needed, titrate to maintain appropriate saturations as appropriate  Respiratory protocol, incentive spirometry, pulmonary toileting    Multifactorial  BiPAP compliance encouraged  Supplemental oxygen to keep with sats more than 88 to 90%      History of stroke  Assessment & Plan  CT of head reveals evidence of chronic bilateral thalamic lacunar and right parietal cortical infarctions  Continue statin, Eliquis    Compression fractures of T5 and T9 vertebrae  Assessment & Plan  Imaging revealed mild superior endplate F7/X1 compression fractures deemed stable  Analgesics  Physical therapy  Vitamin D/calcium supplementation    Acute encephalopathy  Assessment & Plan  Patient with underlying mild cognitive impairment with memory loss  Negative head CT scan for acute abnormality   Rapid response called 11/14 due to strokelike symptoms, change in level of consciousness  Head and neck CTA without acute abnormality   Evaluated by neurology, possibly orthostatic hypotension/hypoperfusion, unlikely acute CVA  Currently patient was one-on-one observation due to agitation and intermittent impulsiveness   Fluctuating mental status noted  Delirium precautions  Geriatric inputs noted      BPH (benign prostatic hyperplasia)  Assessment & Plan  C/w Proscar  Outpatient urology follow-up    Chronic obstructive pulmonary disease ([address])  Assessment & Plan  Holding Stiolto per pulmonology - continue Xopenex/Atrovent nebulization with additional PRN Albuterol on board   Complete p.o. prednisone 5-day course  Respiratory treatments  Outpatient pulmonary follow-up    Hypothyroidism  Assessment & Plan  Continue Synthroid    Acute on chronic diastolic CHF ([address])  Assessment & Plan  Possible acute on chronic-congestive heart failure  2D echo EF 55%  Received IV Bumex with diuresis  Transition to p.o.  Bumex 2 mg twice daily  Low-salt diet  Outpatient cardiology follow-up        Essential hypertension  Assessment & Plan  Continue Zestril/Coreg  Monitor blood pressures outpatient follow-up    Atrial fibrillation [facility])  Assessment & Plan  Continue carvedilol  Eliquis anticoagulation          Discharge Summary - [facility]    Patient Information: Wilber Malagon 80 y.o. male MRN: [id number]  Unit/Bed#: [facility] 404-01 Encounter: [id number]    Discharging Physician / Practitioner: Juanjose Ponce MD  PCP: Vicenta Phillips DO  Admission Date: 11/10/2023  Discharge Date: 11/21/23    Disposition:     Home    Reason for Admission:       Shortness of breath      Discharge Diagnoses:     Principal Problem:    Acute respiratory failure with hypoxia and hypercapnia (HCC)  Active Problems:    Atrial fibrillation ([address])    Essential hypertension    Acute on chronic diastolic CHF ([address])    Hypothyroidism    Chronic obstructive pulmonary disease ([address])    BPH (benign prostatic hyperplasia)    Acute encephalopathy    Right elbow pain    Compression fractures of T5 and T9 vertebrae    History of stroke    Other constipation  Resolved Problems:    * No resolved hospital problems. *      Consultations During Hospital Stay:  Pulmonary  Geriatrics  Neurology    Procedures Performed:     CTA chest with evidence of pulm embolism  CT head no acute intracranial abnormality   2D echo EF 55%        Hospital Course:     Terry Pitts is a 80 y.o. male patient who originally presented to the hospital on 11/10/2023 due to shortness of breath. He was diagnosed with acute on chronic hypoxic respiratory failure, acute on chronic-congestive heart failure and COPD exacerbation. Christina Angles He also received IV diuresis, and p.o. prednisone and will complete 5-day course improvement. He was noted to have altered mental status and was a stroke alert. He was eval by neurology. Stroke ruled out. His chronic conditions remained stable. Patient is symptomatically and hemodynamically stable and is deemed ready for discharge today. Kindly review the chart for details.     Condition at Discharge: fair     Discharge Day Visit / Exam:     Subjective:      Sitting up in chair  Reports feeling better  Agreeable to discharge plan      Vitals: Blood Pressure: 140/76 (11/21/23 0823)  Pulse: 68 (11/21/23 0823)  Temperature: (!) 97.4 °F (36.3 °C) (11/21/23 0704)  Temp Source: Oral (11/19/23 1517)  Respirations: 20 (11/21/23 0704)  Height: 5' 8.5" (174 cm) (11/12/23 1015)  Weight - Scale: 108 kg (238 lb 12.1 oz) (11/21/23 0547)  SpO2: 94 % (11/21/23 0900)  Exam:   Physical Exam    Comfortably sitting up in bed  Obese  Short thick neck  Lungs diminished breath sounds bilateral  Lungs emphysematous  Scattered rhonchi  Heart sounds S1 and S2 noted  Heart sounds are distant  Awake obey simple commands  No rash    Discharge instructions/Information to patient and family:   See after visit summary for information provided to patient and family. Discharge plan discussed with the patient, updated daughter Norma Kidd in detail  Outpatient follow-up with pulmonary, primary care physician    Provisions for Follow-Up Care:  See after visit summary for information related to follow-up care and any pertinent home health orders. Planned Readmission: no     Discharge Statement:  I spent 43 minutes discharging the patient. This time was spent on the day of discharge. I had direct contact with the patient on the day of discharge. Greater than 50% of the total time was spent examining patient, answering all patient questions, arranging and discussing plan of care with patient as well as directly providing post-discharge instructions. Additional time then spent on discharge activities. Discharge Medications:  See after visit summary for reconciled discharge medications provided to patient and family.       ** Please Note: This note has been constructed using a voice recognition system **

## 2023-11-21 NOTE — PHYSICAL THERAPY NOTE
Physical Therapy Treatment Note       11/21/23 1010   PT Last Visit   PT Visit Date 11/21/23   Note Type   Note Type Treatment   Pain Assessment   Pain Assessment Tool 0-10   Pain Score No Pain   Restrictions/Precautions   Weight Bearing Precautions Per Order No   Other Precautions Cognitive; Chair Alarm; Bed Alarm;Multiple lines;Telemetry; Fall Risk;Pain   General   Chart Reviewed Yes   Family/Caregiver Present No   Cognition   Overall Cognitive Status Impaired   Arousal/Participation Responsive   Attention Attends with cues to redirect   Orientation Level Oriented to person   Memory Unable to assess   Following Commands Follows one step commands with increased time or repetition   Subjective   Subjective [place] and confused. cooperative w/ session. "I have to pee."   Transfers   Sit to Stand 3  Moderate assistance   Additional items Assist x 1; Increased time required; Impulsive;Verbal cues   Stand to Sit 3  Moderate assistance   Additional items Assist x 1; Increased time required; Impulsive;Verbal cues   Ambulation/Elevation   Gait pattern   (slow, ataxia, short step length, forward flexion)   Gait Assistance 3  Moderate assist   Additional items Assist x 1   Assistive Device Rolling walker   Distance 80'x1, w/ seated rest, followed by 30'x2 w/ brief standing rest.  time spent to assist w/ urination, then reposioning   Balance   Static Sitting Good   Dynamic Sitting Poor +   Static Standing Poor +   Dynamic Standing Poor   Ambulatory Poor   Endurance Deficit   Endurance Deficit Yes   Endurance Deficit Description weakness, fatigue, pain   Activity Tolerance   Activity Tolerance Patient tolerated treatment well;Patient limited by fatigue;Patient limited by pain;Treatment limited secondary to medical complications (Comment)   Nurse Made Aware yes   Assessment   Prognosis Fair   Problem List Decreased strength;Decreased endurance; Impaired balance;Decreased mobility;Pain   Assessment Pt seen for session for setup, transfers, gait w/ rest time, repositioning. Pt cooperative w/ session, but has some confusion. Needs cues for re-direction, safe RW use, pacing. Needs cues for hand placement w/ transfers. continue to anticopate the need for level 1 (maximal) post acute care rehab resources at d/c,   Goals   Patient Goals to urinate   STG Expiration Date 11/23/23   PT Treatment Day 3   Plan   Treatment/Interventions LE strengthening/ROM; Functional transfer training; Therapeutic exercise; Endurance training;Patient/family training;Equipment eval/education; Bed mobility;Gait training   Progress Progressing toward goals   PT Frequency 2-3x/wk   Discharge Recommendation   Rehab Resource Intensity Level, PT I (Maximum Resource Intensity)   Equipment Recommended LLUSTRE Aus Package Recommended Wheeled walker   Change/add to [email or web address]?  No   AM-PAC Basic Mobility Inpatient   Turning in Flat Bed Without Bedrails 2   Lying on Back to Sitting on Edge of Flat Bed Without Bedrails 2   Moving Bed to Chair 2   Standing Up From Chair Using Arms 2   Walk in Room 2   Climb 3-5 Stairs With Railing 1   Basic Mobility Inpatient Raw Score 11   Basic Mobility Standardized Score 30.25   Highest Level Of Mobility   JH-HLM Goal 4: Move to chair/commode   JH-HLM Achieved 7: Walk 25 feet or more     Coreen Kelly PT, DPT CSRS

## 2023-11-21 NOTE — ASSESSMENT & PLAN NOTE
Presented with worsening shortness of breath over the past 2 days per daughter, with respiratory distress on arrival requiring initial BiPAP however patient unable to tolerate and subsequently placed on 10 L [address] significant for elevated BNP; CTA PE study limited due to motion artifact but no mainstem pulmonary emboli visualized and lungs with hypoinflation and dependent opacities suspected atelectasis  Suspect multifactorial in setting of acute on chronic heart failure, underlying COPD, possible lung mass (was noted on prior CT abdomen/pelvis incidentally however was not reported on current CTA PE study)    Management of the acute on chronic heart failure, COPD as per associated problems with IV diuresis and nebulizers as needed  Continue supplemental oxygen as needed, titrate to maintain appropriate saturations as appropriate  Respiratory protocol, incentive spirometry, pulmonary toileting    Multifactorial  BiPAP compliance encouraged  Supplemental oxygen to keep with sats more than 88 to 90%

## 2023-11-21 NOTE — ASSESSMENT & PLAN NOTE
CT of head reveals evidence of chronic bilateral thalamic lacunar and right parietal cortical infarctions  Continue statin, Eliquis

## 2023-11-21 NOTE — PLAN OF CARE
Problem: PHYSICAL THERAPY ADULT  Goal: Performs mobility at highest level of function for planned discharge setting. See evaluation for individualized goals. Description: Treatment/Interventions: Functional transfer training, LE strengthening/ROM, Therapeutic exercise, Endurance training, Equipment eval/education, Bed mobility, Gait training, Patient/family training  Equipment Recommended: Jun Sotelo       See flowsheet documentation for full assessment, interventions and recommendations. Note: Prognosis: Fair  Problem List: Decreased strength, Decreased endurance, Impaired balance, Decreased mobility, Pain  Assessment: Pt seen for session for setup, transfers, gait w/ rest time, repositioning. Pt cooperative w/ session, but has some confusion. Needs cues for re-direction, safe RW use, pacing. Needs cues for hand placement w/ transfers. continue to anticopate the need for level 1 (maximal) post acute care rehab resources at d/c,        Rehab Resource Intensity Level, PT: I (Maximum Resource Intensity)    See flowsheet documentation for full assessment.

## 2023-11-22 VITALS
OXYGEN SATURATION: 97 % | BODY MASS INDEX: 35.45 KG/M2 | HEART RATE: 76 BPM | TEMPERATURE: 98.1 F | RESPIRATION RATE: 18 BRPM | WEIGHT: 236.6 LBS | SYSTOLIC BLOOD PRESSURE: 132 MMHG | DIASTOLIC BLOOD PRESSURE: 72 MMHG

## 2023-11-22 NOTE — ASSESSMENT & PLAN NOTE
Wt Readings from Last 3 Encounters:   11/21/23 107 kg (236 lb 9.6 oz)   11/21/23 108 kg (238 lb 12.1 oz)   11/14/23 110 kg (242 lb 8.1 oz)   S/p IV Bumex in the hospital  Continue p.o.  Bumex 2 mg twice daily, cardiac diet, fluid restriction  Monitor weight, BMP

## 2023-11-22 NOTE — ASSESSMENT & PLAN NOTE
Worsening SOB at home, respiratory distress on ED arrival requiring BiPAP and subsequently on 10L [address] due to intolerance with BiPAP  Multifactorial insetting of acute on chronic HF, underlying COPD, possible lung mass noted incidentally on CT abdomen/pelvis  CTA PE study limited due to motion artifact but no mainstem pulmonary emboli visualized and lungs with hypoinflation and dependent opacities suspected atelectasis. S/p IV diuresis, neb treatment, respiratory protocol, incentive spirometry  Monitor respiratory status closely.   Currently no SOB, SpO2 97% with room air  Continue Xopenex treatment  Continue prednisone to complete the course  Continue bumex and potassium supplement

## 2023-11-22 NOTE — ASSESSMENT & PLAN NOTE
Continue prednisone to complete 5-day course  Xopenex 3 times daily and albuterol as needed  Incentive spirometry  Encouraged to follow-up with pulmonary as scheduled

## 2023-11-22 NOTE — ASSESSMENT & PLAN NOTE
Acute metabolic encephalopathy, multifactorial in the setting of underlying MCI with memory loss, orthostatic hypotension hypoperfusion  High risk of delirium/mortality  Delirium Precautions   -Maintain normal sleep/wake cycle, lights and tv on during day with blinds open, lights and tv off at bedtime with blinds closed, minimize unnecessary interruptions  -if insomnia develops consider Melatonin 3mg   -continue supportive care and frequent reorientation   -ordered bowel regimen and monitor for adequate urine output as both urine and stool retention may contribute to and worsen delirium

## 2023-11-22 NOTE — ASSESSMENT & PLAN NOTE
Lab Results   Component Value Date    EGFR 67 11/18/2023    EGFR 64 11/16/2023    EGFR 64 11/15/2023    CREATININE 0.95 11/18/2023    CREATININE 0.99 11/16/2023    CREATININE 0.98 11/15/2023   Stable  Avoid hypotension.  Avoid NSAIDs

## 2023-11-22 NOTE — ASSESSMENT & PLAN NOTE
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 Chronic microangiopathic changes, chronic bilateral thalamic lacunar infarctions, and chronic right parietal cortical infarction. OT/PT  Support with ADLs.  Frequent reorientation, redirection

## 2023-11-22 NOTE — UTILIZATION REVIEW
NOTIFICATION OF ADMISSION DISCHARGE   This is a Notification of Discharge from [facility]. Please be advised that this patient has been discharge from our facility. Below you will find the admission and discharge date and time including the patient’s disposition. UTILIZATION REVIEW CONTACT:  Telma Wong  Utilization   Network Utilization Review Department  Phone: [phone] x carefully listen to the prompts. All voicemails are confidential.  Email: [email or web address]     ADMISSION INFORMATION  PRESENTATION DATE: 11/10/2023  8:36 PM  OBERVATION ADMISSION DATE:   INPATIENT ADMISSION DATE: 11/11/23  4:07 AM   DISCHARGE DATE: 11/21/2023 10:42 AM   DISPOSITION:[facility] SNF/TCU/SNU    Network Utilization Review Department  ATTENTION: Please call with any questions or concerns to [phone] and carefully listen to the prompts so that you are directed to the right person. All voicemails are confidential.   For Discharge needs, contact Care Management DC Support Team at [phone] opt. 2  Send all requests for admission clinical reviews, approved or denied determinations and any other requests to dedicated fax number below belonging to the campus where the patient is receiving treatment.  List of dedicated fax numbers for the Facilities:  [place] DENIALS (Administrative/Medical Necessity) [phone]   DISCHARGE SUPPORT TEAM (Network) [phone] [facility] [phone] [address] [facility] [address] [place] [address] [address]  [facility] [phone]

## 2023-11-22 NOTE — ASSESSMENT & PLAN NOTE
BP at goal  Continue benazepril, Coreg in the setting of A-fib and Bumex in the setting of CHF  Monitor BP closely with hold parameters. Avoid hypotension.

## 2023-11-24 ENCOUNTER — NURSING HOME VISIT (OUTPATIENT)
Dept: GERIATRICS | Facility: OTHER | Age: 88
End: 2023-11-24
Payer: COMMERCIAL

## 2023-11-24 DIAGNOSIS — N40.1 BENIGN PROSTATIC HYPERPLASIA WITH LOWER URINARY TRACT SYMPTOMS, SYMPTOM DETAILS UNSPECIFIED: ICD-10-CM

## 2023-11-24 DIAGNOSIS — I50.33 ACUTE ON CHRONIC DIASTOLIC (CONGESTIVE) HEART FAILURE (HCC): Primary | ICD-10-CM

## 2023-11-24 DIAGNOSIS — I48.91 ATRIAL FIBRILLATION, UNSPECIFIED TYPE (HCC): ICD-10-CM

## 2023-11-24 DIAGNOSIS — R53.81 PHYSICAL DECONDITIONING: ICD-10-CM

## 2023-11-24 DIAGNOSIS — R93.89 ABNORMAL CT SCAN: ICD-10-CM

## 2023-11-24 DIAGNOSIS — J44.9 CHRONIC OBSTRUCTIVE PULMONARY DISEASE, UNSPECIFIED COPD TYPE (HCC): ICD-10-CM

## 2023-11-24 DIAGNOSIS — I25.10 CORONARY ARTERY DISEASE INVOLVING NATIVE CORONARY ARTERY OF NATIVE HEART WITHOUT ANGINA PECTORIS: ICD-10-CM

## 2023-11-24 DIAGNOSIS — G93.40 ACUTE ENCEPHALOPATHY: ICD-10-CM

## 2023-11-24 PROCEDURE — 99309 SBSQ NF CARE MODERATE MDM 30: CPT | Performed by: NURSE PRACTITIONER

## 2023-11-24 NOTE — ASSESSMENT & PLAN NOTE
Incidental finding as per CT of abdomen and pelvis on 10/16/2023: There is a 2.0 x 3.5 x 2.1 cm right lung base opacity with spiculated margins and a coarse calcification coronary artery calcifications at the heart base  Patient is scheduled for a high-resolution CT of the chest 11/28/2023  Follow-up with pulmonology

## 2023-11-24 NOTE — ASSESSMENT & PLAN NOTE
Wt Readings from Last 3 Encounters:   11/21/23 107 kg (236 lb 9.6 oz)   11/21/23 108 kg (238 lb 12.1 oz)   11/14/23 110 kg (242 lb 8.1 oz)   S/p IV Bumex in the hospital  Continue Bumex 2 mg p.o.  BID   Recent serum potassium 4.1 on 11/24/2023  Continue CHF pathway/daily weights  Continue 1800 mL/24 hours fluid restriction  Follow-up with cardiology outpatient

## 2023-11-24 NOTE — ASSESSMENT & PLAN NOTE
With underlying cognitive and memory impairment  High risk of delirium/mortality in setting of acute and chronic medical conditions  Continue delirium precautions  Avoid deliriogenic medications  Patient has private duty caregivers

## 2023-11-24 NOTE — ASSESSMENT & PLAN NOTE
With recent hospitalization in setting of acute on chronic CHF and underlying COPD, possible lung mass noted incidentally on CT abdomen and pelvis  No pulmonary emboli visualized as per CTA  With underlying COPD  Not requiring oxygen supplementation at SNF  O2 sat 99% on room air

## 2023-11-24 NOTE — ASSESSMENT & PLAN NOTE
Currently stable  Completed prednisone course  Continue ipratropium nebulizer treatments  Continue levalbuterol nebulizer treatments  Follow-up with pulmonology outpatient

## 2023-11-24 NOTE — ASSESSMENT & PLAN NOTE
Heart rate stable  Continue Coreg 25 mg twice daily  Continue Eliquis 5 mg twice daily  Most recent CBC stable on 11/24/2023  Follow-up with cardiology outpatient

## 2023-11-24 NOTE — PROGRESS NOTES
Facility: [facility]  POS: 31 (STR)  Progress Note    Chief Complaint/Reason for visit: STR follow-up visit  Code status: DNI/DNR  History of Present Illness: 80-year-old male seen and examined for STR follow-up of acute and chronic medical conditions. Received patient seated in chair with private duty caregiver nearby. Patient is alert and oriented to self and current situation. Nursing reports that patient has been confused and very forgetful. With chronic labored respirations but does not appear to be in any distress. O2 sat 99% on room air at time of exam.  Denies pain or discomfort. Denies headache, dizziness, abdominal pain, nausea, vomiting, diarrhea, or constipation. Spoke to patient's daughter today regarding clinical assessment findings. Patient's daughter was concerned that patient was refusing CPAP at night and I spoke to patient's nurse regarding her concern. See A/P for additional information  Past Medical History: unchanged from history and physical  Past Medical History:   Diagnosis Date    Arithmetic disorder     [id number]    Myocardial infarct [facility])     [id number]  LAST ASSESSED     Family History: unchanged from history and physical  Social History: unchanged from history and physical  Review of systems: As per review of medical illness, all other systems reviewed and negative. Medications: All medication and routine orders were reviewed and updated  Allergies: NKDA  Consults reviewed:PT and OT  Labs/Diagnostics (reviewed by this provider): Copy in Chart  11/24/2023 CBC without differential, BMP stable  Imaging Reviewed: Hospital imaging  Physical Exam  Weight: 240.4 pounds   Temp: 97.8           BP: 150/73  pulse: 74     resp: 22       O2 Sat: 99% on room air  Orientation:Person     Physical Exam  Vitals and nursing note reviewed. Constitutional:       General: He is not in acute distress. Appearance: He is ill-appearing. He is not toxic-appearing or diaphoretic.    HENT: Head: Normocephalic. Ears:      Comments: Hard of hearing. Wears hearing aids. Mouth/Throat:      Mouth: Mucous membranes are moist.   Eyes:      General:         Right eye: No discharge. Left eye: No discharge. Extraocular Movements: Extraocular movements intact. Conjunctiva/sclera: Conjunctivae normal.   Cardiovascular:      Rate and Rhythm: Normal rate. Pulmonary:      Comments: Chronic labored respirations. With decreased breath sounds throughout bilateral lung fields. Abdominal:      General: Bowel sounds are normal. There is no distension. Tenderness: There is no abdominal tenderness. There is no guarding. Comments: Chronic large abdomen. Musculoskeletal:      Right lower leg: Edema (+2 pitting) present. Left lower leg: Edema (+2 pitting) present. Comments: Moves all 4 extremities. Skin:     General: Skin is warm and dry. Capillary Refill: Capillary refill takes less than 2 seconds. Neurological:      Mental Status: He is alert. Motor: Weakness present. Gait: Gait abnormal.      Comments: With cognitive and memory impairment   Psychiatric:         Mood and Affect: Mood normal.         Behavior: Behavior normal.         Thought Content: Thought content normal.       Assessment/Plan:  66-year-old male with:    Acute respiratory failure with hypoxia and hypercapnia (HCC)  With recent hospitalization in setting of acute on chronic CHF and underlying COPD, possible lung mass noted incidentally on CT abdomen and pelvis  No pulmonary emboli visualized as per CTA  With underlying COPD  Not requiring oxygen supplementation at SNF  O2 sat 99% on room air    Acute on chronic diastolic CHF ([address])  Wt Readings from Last 3 Encounters:   11/21/23 107 kg (236 lb 9.6 oz)   11/21/23 108 kg (238 lb 12.1 oz)   11/14/23 110 kg (242 lb 8.1 oz)   S/p IV Bumex in the hospital  Continue Bumex 2 mg p.o.  BID   Recent serum potassium 4.1 on 11/24/2023  Continue CHF pathway/daily weights  Continue 1800 mL/24 hours fluid restriction  Follow-up with cardiology outpatient    Chronic obstructive pulmonary disease (HCC)  Currently stable  Completed prednisone course  Continue ipratropium nebulizer treatments  Continue levalbuterol nebulizer treatments  Follow-up with pulmonology outpatient    Abnormal CT scan  Incidental finding as per CT of abdomen and pelvis on 10/16/2023: There is a 2.0 x 3.5 x 2.1 cm right lung base opacity with spiculated margins and a coarse calcification coronary artery calcifications at the heart base  Patient is scheduled for a high-resolution CT of the chest 11/28/2023  Follow-up with pulmonology    Atrial fibrillation ([address])  Heart rate stable  Continue Coreg 25 mg twice daily  Continue Eliquis 5 mg twice daily  Most recent CBC stable on 11/24/2023  Follow-up with cardiology outpatient    Acute encephalopathy  With underlying cognitive and memory impairment  High risk of delirium/mortality in setting of acute and chronic medical conditions  Continue delirium precautions  Avoid deliriogenic medications  Patient has private duty caregivers    Coronary artery disease  History of CABG x 3  Asymptomatic  Continue Coreg and statin    BPH (benign prostatic hyperplasia)  With history of urinary urgency and frequency  Continue toileting schedule  Monitor for urinary retention  Continue Proscar   Follow-up with urology outpatient    Physical deconditioning  Multifactorial in setting of recent hospitalization  Continue supportive care at SNF  Continue PT/OT  Continue safety/fall precautions  Ensure adequate hydration and nutrition    This note was completed in part utilizing [employer] direct voice recognition software. Grammatical errors, random word insertion, spelling mistakes, and incomplete sentences may be an occasional consequence of the system secondary to software limitations, ambient noise and hardware issues.   At the time of dictation, efforts were made to edit, clarify and/or correct errors. Please read the chart carefully and recognize, using context, where substitutions have occurred. If you have any questions or concerns about the context, text or information contained within the body of this dictation, please contact myself, the provider, for further clarification.     [address] [place] [address]  46/00/43411:34 PM

## 2023-11-24 NOTE — ASSESSMENT & PLAN NOTE
With history of urinary urgency and frequency  Continue toileting schedule  Monitor for urinary retention  Continue Proscar   Follow-up with urology outpatient

## 2023-11-25 NOTE — ASSESSMENT & PLAN NOTE
Multifactorial in setting of recent hospitalization  Continue supportive care at [facility]  Continue PT/OT  Continue safety/fall precautions  Ensure adequate hydration and nutrition

## 2023-11-27 ENCOUNTER — NURSING HOME VISIT (OUTPATIENT)
Dept: GERIATRICS | Facility: OTHER | Age: 88
End: 2023-11-27
Payer: COMMERCIAL

## 2023-11-27 DIAGNOSIS — R93.89 ABNORMAL CT SCAN: ICD-10-CM

## 2023-11-27 DIAGNOSIS — G93.40 ACUTE ENCEPHALOPATHY: ICD-10-CM

## 2023-11-27 DIAGNOSIS — J44.9 CHRONIC OBSTRUCTIVE PULMONARY DISEASE, UNSPECIFIED COPD TYPE (HCC): ICD-10-CM

## 2023-11-27 DIAGNOSIS — I48.91 ATRIAL FIBRILLATION, UNSPECIFIED TYPE (HCC): ICD-10-CM

## 2023-11-27 DIAGNOSIS — I50.33 ACUTE ON CHRONIC DIASTOLIC (CONGESTIVE) HEART FAILURE (HCC): Primary | ICD-10-CM

## 2023-11-27 DIAGNOSIS — R53.81 PHYSICAL DECONDITIONING: ICD-10-CM

## 2023-11-27 DIAGNOSIS — G47.33 OBSTRUCTIVE SLEEP APNEA: ICD-10-CM

## 2023-11-27 PROCEDURE — 99309 SBSQ NF CARE MODERATE MDM 30: CPT | Performed by: NURSE PRACTITIONER

## 2023-11-28 NOTE — ASSESSMENT & PLAN NOTE
Multifactorial in setting of recent hospitalization  Continue supportive care at [facility]  Continue PT/OT  Continue safety/fall precautions  Ensure adequate hydration and nutrition

## 2023-11-28 NOTE — ASSESSMENT & PLAN NOTE
Currently not requiring oxygen supplementation  Patient completed prednisone course  Continue ipratropium nebulizer treatments  Continue levalbuterol nebulizer treatments  Follow-up with pulmonology outpatient

## 2023-11-28 NOTE — ASSESSMENT & PLAN NOTE
Incidental finding as per CT of abdomen and pelvis on 10/16/2023: There is a 2.0 x 3.5 x 2.1 cm right lung base opacity with spiculated margins and a coarse calcification coronary artery calcifications at the heart base  High-resolution CT of the chest was scheduled for 11/28/2023, but was postponed as per patient's daughter due to transportation issue-needs to be rescheduled by [facility]   Follow-up with pulmonology

## 2023-11-28 NOTE — PROGRESS NOTES
Facility: [facility]  POS: 31 (STR)  Progress Note    Chief Complaint/Reason for visit: STR follow-up visit  Code status: DNR  History of Present Illness: 68-year-old male seen and examined for STR follow-up of acute and chronic medical conditions. Received patient seated in chair and in no distress. Patient is alert and oriented to self and current situation. He is pleasant and cooperative with exam.  Private duty caregiver present in the room. Nursing staff reports that patient is confused but without behavior issues. He was compliant with CPAP most of the night. Easily dyspneic on exertion. Spoke to patient's daughter Juan Churchill regarding current condition and addressed code status-confirmed DNR status. Due to cognitive status and physical deconditioning, would not recommend patient return to his apartment unless there is a 24-hour assistance. Past Medical History: unchanged from history and physical  Past Medical History:   Diagnosis Date    Arithmetic disorder     [id number]    Myocardial infarct [facility])     [id number]  LAST ASSESSED     Family History: unchanged from history and physical  Social History: unchanged from history and physical  Review of systems: As per review of medical illness, all other systems reviewed and negative. Medications: All medication and routine orders were reviewed and updated  Allergies: NKDA  Consults reviewed:PT, OT, and Other  Labs/Diagnostics (reviewed by this provider): Copy in Chart  No labs since last exam  Imaging Reviewed: None today  Physical Exam  Weight: Patient refused to be weighed this a.m; weights have been stable  Temp: 97.8         BP: 129/82   pulse: 70 resp: 22      O2 Sat: 97% on room air  Orientation:Person     Physical Exam  Vitals and nursing note reviewed. Constitutional:       General: He is not in acute distress. Appearance: He is ill-appearing. He is not toxic-appearing or diaphoretic. HENT:      Head: Normocephalic.       Ears: Comments: Bilateral hearing aids are in place. Nose: No congestion. Mouth/Throat:      Mouth: Mucous membranes are moist.   Eyes:      Extraocular Movements: Extraocular movements intact. Conjunctiva/sclera: Conjunctivae normal.   Cardiovascular:      Rate and Rhythm: Normal rate. Pulmonary:      Effort: No respiratory distress. Comments: Chronic labored respirations. Decreased breath sounds throughout bilateral lung fields. Abdominal:      General: Bowel sounds are normal. There is no distension. Tenderness: There is no abdominal tenderness. There is no guarding. Comments: Large abdomen   Musculoskeletal:      Cervical back: No rigidity. Right lower leg: Edema present. Left lower leg: Edema present. Comments: Moves all 4 extremities. Skin:     General: Skin is warm and dry. Capillary Refill: Capillary refill takes less than 2 seconds. Neurological:      Mental Status: He is alert. Motor: Weakness present. Gait: Gait abnormal.      Comments: Alert and oriented to self and current situation. Patient was attentive to this examiner. Psychiatric:         Mood and Affect: Mood normal.      Comments: No behavior issues at time of exam.         Assessment/Plan:  80year-old male with:    Acute on chronic diastolic CHF ([address])  Wt Readings from Last 3 Encounters:   11/21/23 107 kg (236 lb 9.6 oz)   11/21/23 108 kg (238 lb 12.1 oz)   11/14/23 110 kg (242 lb 8.1 oz)   S/p IV bumex inpatient  Weights have been stable at [facility].   Patient refused to be weighed this morning  Decrease in edema noted to bilateral lower extremities  Continue Bumex 2 mg twice daily  Recent serum potassium 4.1  Continue fluid restriction 1800 mL/24 hours  Continue compression stockings to bilateral lower extremities every morning and remove nightly  Follow-up with cardiology outpatient    Acute encephalopathy  With underlying cognitive and memory impairment  High risk of delirium/mortality in setting of recent hospitalization, deconditioning, acute and chronic medical conditions  Continue delirium precautions    Chronic obstructive pulmonary disease (HCC)  Currently not requiring oxygen supplementation  Patient completed prednisone course  Continue ipratropium nebulizer treatments  Continue levalbuterol nebulizer treatments  Follow-up with pulmonology outpatient    Abnormal CT scan  Incidental finding as per CT of abdomen and pelvis on 10/16/2023: There is a 2.0 x 3.5 x 2.1 cm right lung base opacity with spiculated margins and a coarse calcification coronary artery calcifications at the heart base  High-resolution CT of the chest was scheduled for 11/28/2023, but was postponed as per patient's daughter due to transportation issue-needs to be rescheduled by [facility]   Follow-up with pulmonology    Obstructive sleep apnea  Continue CPAP nightly    Atrial fibrillation ([address])  Heart rate trending 50s to 70s  Continue Coreg 25 mg twice daily  Continue Eliquis 5 mg twice daily  No signs or symptoms of active bleeding noted  Most recent CBC stable on 11/24/2023  Follow-up with cardiology outpatient    Physical deconditioning  Multifactorial in setting of recent hospitalization  Continue supportive care at [facility]  Continue PT/OT  Continue safety/fall precautions  Ensure adequate hydration and nutrition     This note was completed in part utilizing [employer] direct voice recognition software. Grammatical errors, random word insertion, spelling mistakes, and incomplete sentences may be an occasional consequence of the system secondary to software limitations, ambient noise and hardware issues. At the time of dictation, efforts were made to edit, clarify and/or correct errors. Please read the chart carefully and recognize, using context, where substitutions have occurred.   If you have any questions or concerns about the context, text or information contained within the body of this dictation, please contact myself, the provider, for further clarification.     [address] [place] [address], [address] [place] [address]  57/38/18863:07 PM

## 2023-11-28 NOTE — ASSESSMENT & PLAN NOTE
Heart rate trending 50s to 70s  Continue Coreg 25 mg twice daily  Continue Eliquis 5 mg twice daily  No signs or symptoms of active bleeding noted  Most recent CBC stable on 11/24/2023  Follow-up with cardiology outpatient

## 2023-11-28 NOTE — ASSESSMENT & PLAN NOTE
Wt Readings from Last 3 Encounters:   11/21/23 107 kg (236 lb 9.6 oz)   11/21/23 108 kg (238 lb 12.1 oz)   11/14/23 110 kg (242 lb 8.1 oz)   S/p IV bumex inpatient  Weights have been stable at [facility].   Patient refused to be weighed this morning  Decrease in edema noted to bilateral lower extremities  Continue Bumex 2 mg twice daily  Recent serum potassium 4.1  Continue fluid restriction 1800 mL/24 hours  Continue compression stockings to bilateral lower extremities every morning and remove nightly  Follow-up with cardiology outpatient

## 2023-11-29 ENCOUNTER — NURSING HOME VISIT (OUTPATIENT)
Dept: GERIATRICS | Facility: OTHER | Age: 88
End: 2023-11-29
Payer: COMMERCIAL

## 2023-11-29 VITALS
SYSTOLIC BLOOD PRESSURE: 121 MMHG | WEIGHT: 240 LBS | DIASTOLIC BLOOD PRESSURE: 62 MMHG | HEART RATE: 68 BPM | BODY MASS INDEX: 35.96 KG/M2 | TEMPERATURE: 98 F

## 2023-11-29 DIAGNOSIS — J44.9 CHRONIC OBSTRUCTIVE PULMONARY DISEASE, UNSPECIFIED COPD TYPE (HCC): ICD-10-CM

## 2023-11-29 DIAGNOSIS — I50.33 ACUTE ON CHRONIC DIASTOLIC (CONGESTIVE) HEART FAILURE (HCC): Primary | ICD-10-CM

## 2023-11-29 DIAGNOSIS — I25.10 CORONARY ARTERY DISEASE INVOLVING NATIVE CORONARY ARTERY OF NATIVE HEART WITHOUT ANGINA PECTORIS: ICD-10-CM

## 2023-11-29 DIAGNOSIS — I48.91 ATRIAL FIBRILLATION, UNSPECIFIED TYPE (HCC): ICD-10-CM

## 2023-11-29 DIAGNOSIS — R26.2 AMBULATORY DYSFUNCTION: ICD-10-CM

## 2023-11-29 DIAGNOSIS — I10 BENIGN ESSENTIAL HYPERTENSION: ICD-10-CM

## 2023-11-29 PROCEDURE — 99309 SBSQ NF CARE MODERATE MDM 30: CPT

## 2023-11-29 NOTE — ASSESSMENT & PLAN NOTE
Blood pressure remains stable  Continue Bumex 2 mg twice daily  Continue carvedilol 25 mg twice daily  Monitor blood pressure Retention Suture Text: Retention sutures were placed to support the closure and prevent dehiscence.

## 2023-11-29 NOTE — ASSESSMENT & PLAN NOTE
Multifactorial  Continue PT/OT  Maintain fall and safety precautions   following for discharge planning

## 2023-11-29 NOTE — ASSESSMENT & PLAN NOTE
Wt Readings from Last 3 Encounters:   11/29/23 109 kg (240 lb)   11/21/23 107 kg (236 lb 9.6 oz)   11/21/23 108 kg (238 lb 12.1 oz)     Weights appear stable  Continues with bilateral lower extremity edema  Continue Bumex 2 mg twice daily  Continue fluid restriction of 1800 mL daily  Encourage compression stockings  Follow-up with cardiology outpatient

## 2023-11-29 NOTE — ASSESSMENT & PLAN NOTE
95% on room air  Completed prednisone course  Continue nebs 3 times a day  Follow-up with pulmonology outpatient

## 2023-11-29 NOTE — PROGRESS NOTES
[address] [place] [address] Rehab: POS 31    NAME: Diane Mullen  AGE: 80 y.o. SEX: male  : 1927     DATE: 2023    CODE STATUS: No CPR     Assessment and Plan:     Problem List Items Addressed This Visit       Atrial fibrillation ([address])     Heart rate stable  Continue Coreg 25 mg twice daily  Continue Eliquis for anticoagulation         Coronary artery disease     Stable, asymptomatic  Continue statin         Essential hypertension     Blood pressure remains stable  Continue Bumex 2 mg twice daily  Continue carvedilol 25 mg twice daily  Monitor blood pressure         Acute on chronic diastolic CHF ([address]) - Primary     Wt Readings from Last 3 Encounters:   23 109 kg (240 lb)   23 107 kg (236 lb 9.6 oz)   23 108 kg (238 lb 12.1 oz)   Weights appear stable  Continues with bilateral lower extremity edema  Continue Bumex 2 mg twice daily  Continue fluid restriction of 1800 mL daily  Encourage compression stockings  Follow-up with cardiology outpatient         Ambulatory dysfunction     Multifactorial  Continue PT/OT  Maintain fall and safety precautions   following for discharge planning           Chronic obstructive pulmonary disease ([address])     95% on room air  Completed prednisone course  Continue nebs 3 times a day  Follow-up with pulmonology outpatient               History of Present Illness:     Patient is a 27-year-old male being seen for follow-up at short-term rehab. He is doing well at present. He has received sitting in the dining room doing a puzzle. He denies any shortness of breath or chest pain. No acute pain at this time. He is in no acute distress. He reports physical therapy is going well. He is walking with a rollator. He denies nausea, vomiting, diarrhea or constipation.         The following portions of the patient's history were reviewed and updated as appropriate: allergies, current medications, past family history, past medical history, past social history, past surgical history and problem list.     Review of Systems:     Review of Systems   Cardiovascular:  Positive for leg swelling. Neurological:  Positive for weakness. All other systems reviewed and are negative. Problem List:     Patient Active Problem List   Diagnosis    Atrial fibrillation ([address])    Coronary artery disease    Essential hypertension    Bicuspid aortic valve    Acute on chronic diastolic CHF (HCC)    Hypothyroidism    Obstructive sleep apnea    Chronic anticoagulation    Mild cognitive impairment with memory loss    Malignant neoplasm of urinary bladder (HCC)    Class 2 severe obesity with serious comorbidity in adult [facility])    Restrictive lung disease    Seasonal allergic rhinitis    Change in mole    Hypernatremia    Ambulatory dysfunction    Sensorineural hearing loss (SNHL) of both ears    Chronic obstructive pulmonary disease (HCC)    CKD (chronic kidney disease) stage 2, GFR 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 Constitutional:       General: He is not in acute distress. Appearance: He is well-developed. HENT:      Head: Normocephalic and atraumatic. Eyes:      Conjunctiva/sclera: Conjunctivae normal.   Cardiovascular:      Rate and Rhythm: Normal rate and regular rhythm. Heart sounds: No murmur heard.   Pulmonary:      Effort: Pulmonary effort is normal. No respiratory distress. Abdominal:      Palpations: Abdomen is soft. Tenderness: There is no abdominal tenderness. Musculoskeletal:         General: No swelling. Cervical back: Neck supple. Right lower leg: Edema present. Left lower leg: Edema present. Skin:     General: Skin is warm and dry. Capillary Refill: Capillary refill takes less than 2 seconds. Neurological:      Mental Status: He is alert. Motor: Weakness present. Gait: Gait abnormal.   Psychiatric:         Mood and Affect: Mood normal.         Pertinent Laboratory/Diagnostic Studies:    Laboratory Results: I have personally reviewed the pertinent laboratory results/reports     Radiology/Other Diagnostic Testing Results: I have personally reviewed pertinent reports.       Lynita Dance, [address] [facility]  Geriatric Medicine

## 2023-12-05 ENCOUNTER — NURSING HOME VISIT (OUTPATIENT)
Dept: GERIATRICS | Facility: OTHER | Age: 88
End: 2023-12-05
Payer: COMMERCIAL

## 2023-12-05 DIAGNOSIS — I48.91 ATRIAL FIBRILLATION, UNSPECIFIED TYPE (HCC): ICD-10-CM

## 2023-12-05 DIAGNOSIS — R93.89 ABNORMAL CT SCAN: ICD-10-CM

## 2023-12-05 DIAGNOSIS — I10 BENIGN ESSENTIAL HYPERTENSION: ICD-10-CM

## 2023-12-05 DIAGNOSIS — N18.2 CKD (CHRONIC KIDNEY DISEASE) STAGE 2, GFR 60-89 ML/MIN: ICD-10-CM

## 2023-12-05 DIAGNOSIS — E87.0 HYPERNATREMIA: Primary | ICD-10-CM

## 2023-12-05 DIAGNOSIS — R53.81 PHYSICAL DECONDITIONING: ICD-10-CM

## 2023-12-05 DIAGNOSIS — G47.33 OBSTRUCTIVE SLEEP APNEA: ICD-10-CM

## 2023-12-05 DIAGNOSIS — J44.9 CHRONIC OBSTRUCTIVE PULMONARY DISEASE, UNSPECIFIED COPD TYPE (HCC): ICD-10-CM

## 2023-12-05 DIAGNOSIS — I50.33 ACUTE ON CHRONIC DIASTOLIC (CONGESTIVE) HEART FAILURE (HCC): ICD-10-CM

## 2023-12-05 DIAGNOSIS — R26.2 AMBULATORY DYSFUNCTION: ICD-10-CM

## 2023-12-05 DIAGNOSIS — G31.84 MILD COGNITIVE IMPAIRMENT WITH MEMORY LOSS: ICD-10-CM

## 2023-12-05 PROCEDURE — 99309 SBSQ NF CARE MODERATE MDM 30: CPT | Performed by: FAMILY MEDICINE

## 2023-12-05 NOTE — PROGRESS NOTES
Facility: [facility]   POS: 31  Progress Note    Chief Complaint/Reason for visit:STR follow up  History of Present Illness: HPI The patient is seen today in his room for STR follow up. He was admitted to  following his hospitalization at [address] [place] [address] for acute in chronic CHF, COPD. During his STR stay, he participates in OT/PT with improvement. Ambulates with walker. Good appetite. Engages in group activities here. He is sitting in recliner, comfortably. He states he feels much better. He denies HA, CP, SOB, or palpitations. Past Medical History: unchanged from history and physical  Past Medical History:   Diagnosis Date    Arithmetic disorder     [id number]    Myocardial infarct [facility])     [id number]  LAST ASSESSED     Family History: unchanged from history and physical  Social History: unchanged from history and physical  Review of systems: As per review of medical illness, all other systems reviewed and negative. Medications: All medication and routine orders were reviewed and updated  Allergies: Reviewed and unchanged  Consults reviewed:PT, OT, Nutrition, and Other  Labs/Diagnostics (reviewed by this provider): Copy in Chart    Imaging Reviewed:no new imaging  Physical Exam  Vitals:    12/05/23 0819   BP: 128/76   Pulse: 61   Resp: 20   Temp: (!) 97.4 °F (36.3 °C)   SpO2: 96%   Weight: 107 kg (236 lb)     Constitutional: Obese  Orientation:Person     Physical Exam  Vitals reviewed. Constitutional:       General: He is not in acute distress. HENT:      Nose: Nose normal.      Mouth/Throat:      Mouth: Mucous membranes are moist.   Cardiovascular:      Rate and Rhythm: Normal rate and regular rhythm. Pulses: Normal pulses. Pulmonary:      Effort: Pulmonary effort is normal.      Breath sounds: Wheezing and rales present. Abdominal:      General: Bowel sounds are normal. There is no distension. Tenderness: There is no abdominal tenderness.    Musculoskeletal:      Right lower leg: Edema (2+) present. Left lower leg: Edema (2+) present. Skin:     General: Skin is warm. Neurological:      Mental Status: He is alert. Comments: Oriented to person and place, not time  Pleasantly confused. Cooperative, follows commands   Psychiatric:         Behavior: Behavior normal.       Assessment/Plan:  Acute on chronic diastolic CHF (HCC)  Wt Readings from Last 3 Encounters:   12/05/23 107 kg (236 lb)   11/29/23 109 kg (240 lb)   11/21/23 107 kg (236 lb 9.6 oz)   Wt been stable since discharge from the hospital  Continue Bumex, potassium, OFR, compression stockings  Monitor vitals, trend BMP        Chronic obstructive pulmonary disease (HCC)  S/p prednisone course  Continue nebs 3 times a day  Follow-up with pulmonology outpatient    Obstructive sleep apnea  Continue CPAP nightly    Atrial fibrillation (HCC)  Heart rate stable  Continue Coreg 25 mg twice daily  Continue Eliquis for anticoagulation    Essential hypertension  Blood pressure remains stable  Continue Bumex 2 mg twice daily  Continue carvedilol 25 mg twice daily  Monitor blood pressure    Mild cognitive impairment with memory loss  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 Chronic microangiopathic changes, chronic bilateral thalamic lacunar infarctions, and chronic right parietal cortical infarction. OT/PT  Support with ADLs.  Frequent reorientation, redirection    CKD (chronic kidney disease) stage 2, GFR 60-89 ml/min  Lab Results   Component Value Date    EGFR 67 11/18/2023    EGFR 64 11/16/2023    EGFR 64 11/15/2023    CREATININE 0.95 11/18/2023    CREATININE 0.99 11/16/2023    CREATININE 0.98 11/15/2023   Stable, recent Cre 0.98, GFR 71    Abnormal CT scan  Incidental finding as per CT of abdomen and pelvis on 10/16/2023: There is a 2.0 x 3.5 x 2.1 cm right lung base opacity with spiculated margins and a coarse calcification coronary artery calcifications at the heart base  High-resolution CT of the chest was performed on 11/28/2023, pending result  Follow-up with pulmonology      Ambulatory dysfunction  Multifactorial  Continue PT/OT  Maintain fall and safety precautions   following for discharge planning      Hypernatremia  Chronic hypernatremia worsening due to aggressive diuresis for CHF. Sodium 146 on BMP today.   Continue OFR, cardiac diet with low sodium 1.5 g  Repeat BMP in 3 days    Physical deconditioning  Multifactorial in setting of recent hospitalization  Continue supportive care at [facility]  Continue PT/OT  Continue safety/fall precautions  Ensure adequate hydration and nutrition    Diane Batista MD  74/2/27771:28 PM

## 2023-12-06 ENCOUNTER — HOSPITAL ENCOUNTER (OUTPATIENT)
Dept: CT IMAGING | Facility: HOSPITAL | Age: 88
Discharge: HOME | End: 2023-12-06
Payer: COMMERCIAL

## 2023-12-06 DIAGNOSIS — R91.8 ABNORMAL CT SCAN OF LUNG: ICD-10-CM

## 2023-12-06 DIAGNOSIS — R93.89 ABNORMAL FINDINGS ON DIAGNOSTIC IMAGING OF OTHER SPECIFIED BODY STRUCTURES: ICD-10-CM

## 2023-12-06 PROCEDURE — 71250 CT THORAX DX C-: CPT

## 2023-12-06 PROCEDURE — G1004 CDSM NDSC: HCPCS

## 2023-12-07 VITALS
BODY MASS INDEX: 35.36 KG/M2 | WEIGHT: 236 LBS | DIASTOLIC BLOOD PRESSURE: 76 MMHG | RESPIRATION RATE: 20 BRPM | OXYGEN SATURATION: 96 % | SYSTOLIC BLOOD PRESSURE: 128 MMHG | HEART RATE: 61 BPM | TEMPERATURE: 97.4 F

## 2023-12-07 PROBLEM — R06.02 SHORTNESS OF BREATH: Status: RESOLVED | Noted: 2023-08-21 | Resolved: 2023-12-07

## 2023-12-07 NOTE — ASSESSMENT & PLAN NOTE
Blood pressure remains stable  Continue Bumex 2 mg twice daily  Continue carvedilol 25 mg twice daily  Monitor blood pressure Patient's belongings returned

## 2023-12-07 NOTE — ASSESSMENT & PLAN NOTE
Lab Results   Component Value Date    EGFR 67 11/18/2023    EGFR 64 11/16/2023    EGFR 64 11/15/2023    CREATININE 0.95 11/18/2023    CREATININE 0.99 11/16/2023    CREATININE 0.98 11/15/2023   Stable, recent Cre 0.98, GFR 71

## 2023-12-07 NOTE — ASSESSMENT & PLAN NOTE
Wt Readings from Last 3 Encounters:   12/05/23 107 kg (236 lb)   11/29/23 109 kg (240 lb)   11/21/23 107 kg (236 lb 9.6 oz)   Wt been stable since discharge from the hospital  Continue Bumex, potassium, OFR, compression stockings  Monitor vitals, trend BMP

## 2023-12-07 NOTE — ASSESSMENT & PLAN NOTE
Incidental finding as per CT of abdomen and pelvis on 10/16/2023: There is a 2.0 x 3.5 x 2.1 cm right lung base opacity with spiculated margins and a coarse calcification coronary artery calcifications at the heart base  High-resolution CT of the chest was performed on 11/28/2023, pending result  Follow-up with pulmonology

## 2023-12-07 NOTE — ASSESSMENT & PLAN NOTE
Chronic hypernatremia worsening due to aggressive diuresis for CHF. Sodium 146 on BMP today.   Continue OFR, cardiac diet with low sodium 1.5 g  Repeat BMP in 3 days

## 2023-12-07 NOTE — ASSESSMENT & PLAN NOTE
Multifactorial in setting of recent hospitalization  Continue supportive care at [facility]  Continue PT/OT  Continue safety/fall precautions  Ensure adequate hydration and nutrition

## 2023-12-08 ENCOUNTER — NURSING HOME VISIT (OUTPATIENT)
Dept: GERIATRICS | Facility: OTHER | Age: 88
End: 2023-12-08
Payer: COMMERCIAL

## 2023-12-08 DIAGNOSIS — I10 BENIGN ESSENTIAL HYPERTENSION: ICD-10-CM

## 2023-12-08 DIAGNOSIS — R93.89 ABNORMAL CT SCAN: ICD-10-CM

## 2023-12-08 DIAGNOSIS — R53.81 PHYSICAL DECONDITIONING: ICD-10-CM

## 2023-12-08 DIAGNOSIS — I50.32 CHRONIC DIASTOLIC (CONGESTIVE) HEART FAILURE (HCC): Primary | ICD-10-CM

## 2023-12-08 DIAGNOSIS — E87.6 CHRONIC HYPOKALEMIA: ICD-10-CM

## 2023-12-08 DIAGNOSIS — I48.91 ATRIAL FIBRILLATION, UNSPECIFIED TYPE (HCC): ICD-10-CM

## 2023-12-08 DIAGNOSIS — J44.9 CHRONIC OBSTRUCTIVE PULMONARY DISEASE, UNSPECIFIED COPD TYPE (HCC): ICD-10-CM

## 2023-12-08 DIAGNOSIS — E87.0 HYPERNATREMIA: ICD-10-CM

## 2023-12-08 PROCEDURE — 99309 SBSQ NF CARE MODERATE MDM 30: CPT | Performed by: NURSE PRACTITIONER

## 2023-12-08 RX ORDER — PSEUDOEPHEDRINE HCL 30 MG
100 TABLET ORAL DAILY PRN
COMMUNITY
Start: 2023-10-03

## 2023-12-08 NOTE — ASSESSMENT & PLAN NOTE
Currently stable without exacerbation  Continue nebulizer treatments as ordered  Routine follow-up with pulmonology outpatient

## 2023-12-08 NOTE — ASSESSMENT & PLAN NOTE
Multifactorial  Continue supportive care at SNF  Continue PT/OT  Continue safety/fall precautions  Ensure adequate hydration and nutrition

## 2023-12-08 NOTE — ASSESSMENT & PLAN NOTE
With chronic hyponatremia worsening in setting of aggressive diuresis needed for congestive heart failure  Most recent sodium level 146 on 12/5/2023  Continue cardiac diet with low sodium  Awaiting BMP results from today

## 2023-12-08 NOTE — ASSESSMENT & PLAN NOTE
Wt Readings from Last 3 Encounters:   12/05/23 107 kg (236 lb)   11/29/23 109 kg (240 lb)   11/21/23 107 kg (236 lb 9.6 oz)   Weights have been stable at [facility]  Continues with pitting edema to bilateral lower extremities +1-2  Continue compression stockings to bilateral lower extremities daily  Continue Bumex 2 mg p.o. twice daily  Continue potassium chloride 20 mEq daily; recently required additional potassium chloride doses due to hypokalemia.   Most recent potassium level 4.2 on 12/5/2023  Awaiting BMP results from today  Continue CHF pathway/daily weights  Outpatient follow-up with cardiology

## 2023-12-08 NOTE — ASSESSMENT & PLAN NOTE
Heart rate stable  Continue Coreg 25 mg twice daily  Continue Eliquis for anticoagulation  Recent CBC stable

## 2023-12-08 NOTE — ASSESSMENT & PLAN NOTE
Due to diuresis  Continue potassium chloride supplement 20 mEq daily  Continue to monitor electrolytes

## 2023-12-08 NOTE — ASSESSMENT & PLAN NOTE
BPs stable  Continue Bumex 2 mg twice daily  Continue carvedilol 25 mg twice daily  Continue to monitor BP trends and adjust medications if clinically indicated

## 2023-12-08 NOTE — ASSESSMENT & PLAN NOTE
Incidental finding as per CT of abdomen and pelvis on 10/16/2023: There is a 2.0 x 3.5 x 2.1 cm right lung base opacity with spiculated margins and a coarse calcification coronary artery calcifications at the heart base  Patient had high-resolution CT of the chest performed on 12/6/2023; awaiting report

## 2023-12-08 NOTE — PROGRESS NOTES
Facility: [facility]  POS: 31 (STR)  Progress Note    Chief Complaint/Reason for visit: STR follow-up visit  Code status: DNR  History of Present Illness: 70-year-old male seen and examined for STR follow-up of acute and chronic medical conditions. Received patient seated in chair and appeared in no distress. Patient was pleasant and cooperative. He answered appropriately to questions. No behavioral issues reported by nursing staff. Respiratory status has been stable on room air. Patient had CT of chest done as scheduled 12/6/2023 and awaiting report. No acute issues reported by nursing staff. See A/P for additional information. Past Medical History: unchanged from history and physical  Past Medical History:   Diagnosis Date    Arithmetic disorder     [id number]    Myocardial infarct [facility])     [id number]  LAST ASSESSED     Family History: unchanged from history and physical  Social History: unchanged from history and physical  Review of systems: Patient with dementia; poor historian. Questionable reliability of information provided by patient. As per review of medical illness, all other systems reviewed and negative. Medications: All medication and routine orders were reviewed and updated  Allergies: NKDA  Consults reviewed:PT, OT, and Other  Labs/Diagnostics (reviewed by this provider): Copy in Chart  Imaging Reviewed: None today  Physical Exam  Weight: 235.6 lb  Temp: 98.2       BP: 123/77  pulse: 66 resp: 20       O2 Sat: 96% on room air  Constitutional: Pallor  Orientation:Person     Physical Exam  Vitals and nursing note reviewed. Constitutional:       General: He is not in acute distress. Appearance: He is ill-appearing. He is not toxic-appearing or diaphoretic. HENT:      Head: Normocephalic. Ears:      Comments: Has hearing aids but did not want to wear at time of exam     Nose: No congestion.       Mouth/Throat:      Mouth: Mucous membranes are moist.      Pharynx: No oropharyngeal exudate. Eyes:      Extraocular Movements: Extraocular movements intact. Conjunctiva/sclera: Conjunctivae normal.      Comments: Wears prescription glasses. Cardiovascular:      Rate and Rhythm: Normal rate and regular rhythm. Pulmonary:      Effort: No respiratory distress. Comments: Chronic labored respirations. With decreased breath sounds bilateral lung fields. Abdominal:      General: Bowel sounds are normal. There is no distension. Tenderness: There is no abdominal tenderness. There is no guarding. Comments: Chronic large firm abdomen. Musculoskeletal:      Cervical back: Neck supple. No rigidity. Right lower leg: Edema present. Left lower leg: Edema present. Comments: Moves all 4 extremities. Skin:     General: Skin is warm and dry. Capillary Refill: Capillary refill takes less than 2 seconds. Neurological:      Mental Status: He is alert. Mental status is at baseline. Gait: Gait abnormal.      Comments: With severe recent memory impairment. Psychiatric:         Mood and Affect: Mood normal.         Behavior: Behavior normal.         Thought Content: Thought content normal.      Comments: Pleasant and cooperative       Assessment/Plan:  43-year-old male with:    Chronic diastolic (congestive) heart failure (HCC)  Wt Readings from Last 3 Encounters:   12/05/23 107 kg (236 lb)   11/29/23 109 kg (240 lb)   11/21/23 107 kg (236 lb 9.6 oz)   Weights have been stable at [facility]  Continues with pitting edema to bilateral lower extremities +1-2  Continue compression stockings to bilateral lower extremities daily  Continue Bumex 2 mg p.o. twice daily  Continue potassium chloride 20 mEq daily; recently required additional potassium chloride doses due to hypokalemia.   Most recent potassium level 4.2 on 12/5/2023  Awaiting BMP results from today  Continue CHF pathway/daily weights  Outpatient follow-up with cardiology    Hypernatremia  With chronic hyponatremia worsening in setting of aggressive diuresis needed for congestive heart failure  Most recent sodium level 146 on 12/5/2023  Continue cardiac diet with low sodium  Awaiting BMP results from today    Chronic hypokalemia  Due to diuresis  Continue potassium chloride supplement 20 mEq daily  Continue to monitor electrolytes    Chronic obstructive pulmonary disease (HCC)  Currently stable without exacerbation  Continue nebulizer treatments as ordered  Routine follow-up with pulmonology outpatient    Abnormal CT scan  Incidental finding as per CT of abdomen and pelvis on 10/16/2023: There is a 2.0 x 3.5 x 2.1 cm right lung base opacity with spiculated margins and a coarse calcification coronary artery calcifications at the heart base  Patient had high-resolution CT of the chest performed on 12/6/2023; awaiting report    Atrial fibrillation ([address])  Heart rate stable  Continue Coreg 25 mg twice daily  Continue Eliquis for anticoagulation  Recent CBC stable    Essential hypertension  BPs stable  Continue Bumex 2 mg twice daily  Continue carvedilol 25 mg twice daily  Continue to monitor BP trends and adjust medications if clinically indicated    Physical deconditioning  Multifactorial  Continue supportive care at SNF  Continue PT/OT  Continue safety/fall precautions  Ensure adequate hydration and nutrition     This note was completed in part utilizing [employer] direct voice recognition software. Grammatical errors, random word insertion, spelling mistakes, and incomplete sentences may be an occasional consequence of the system secondary to software limitations, ambient noise and hardware issues. At the time of dictation, efforts were made to edit, clarify and/or correct errors. Please read the chart carefully and recognize, using context, where substitutions have occurred.   If you have any questions or concerns about the context, text or information contained within the body of this dictation, please contact myself, the provider, for further clarification.     Jose Perez  27/3/[id number]:79 AM

## 2023-12-12 ENCOUNTER — OFFICE VISIT (OUTPATIENT)
Dept: PULMONOLOGY | Facility: CLINIC | Age: 88
End: 2023-12-12
Payer: COMMERCIAL

## 2023-12-12 VITALS
HEIGHT: 68 IN | TEMPERATURE: 97.2 F | OXYGEN SATURATION: 98 % | DIASTOLIC BLOOD PRESSURE: 58 MMHG | HEART RATE: 65 BPM | SYSTOLIC BLOOD PRESSURE: 128 MMHG | BODY MASS INDEX: 35.88 KG/M2

## 2023-12-12 DIAGNOSIS — J44.9 CHRONIC OBSTRUCTIVE PULMONARY DISEASE, UNSPECIFIED COPD TYPE (HCC): ICD-10-CM

## 2023-12-12 DIAGNOSIS — G47.33 OBSTRUCTIVE SLEEP APNEA: Primary | ICD-10-CM

## 2023-12-12 DIAGNOSIS — R91.8 OPACITY OF LUNG ON IMAGING STUDY: ICD-10-CM

## 2023-12-12 PROCEDURE — 99215 OFFICE O/P EST HI 40 MIN: CPT | Performed by: INTERNAL MEDICINE

## 2023-12-12 NOTE — PATIENT INSTRUCTIONS

## 2023-12-12 NOTE — ASSESSMENT & PLAN NOTE
Pulmonary function testing 2017 showed more restrictive lung disease and does not make a diagnosis of COPD. Restrictive physiology is likely due to cardiogenic pulmonary edema. There are no significant emphysema or chronic airway disease, but areas of chronic scarring and atelectasis on his high-resolution CT    He is on Xopenex and Atrovent 3 times a day.   This is likely appropriate at this time and I would not escalate inhaler therapy at this time

## 2023-12-12 NOTE — PROGRESS NOTES
Pulmonology/Sleep Consultation   Merari Cleary 80 y.o. male MRN: [id number]      Reason for consultation: Lung mass, FILOMENA    Requesting physician: LIBORIO Ahuja    Assessment/Plan  Patient is a 79yo M with PMH including chronic diastolic heart failure, CAD, Atrial Fibrillation on Eliquis, HTN, COPD, restrictive lung disease, seasonal allergic rhinitis, mild cognitive impairment, hypothyroidism, BPH, CKD-II, malignant neoplasm of bladder, compression fractures T5 T9 who presents for evaluation of abnormal CT-A/P (demonstrating lung mass) on 10/16/2023, and FILOMENA. Patient underwent CT-A/P on 10/16/2023 for suspected bowel obstruction, incidentally demonstrating 3.5 cm RLL opacity with spiculated margins. Repeat CT-PA Chest on 11/11/2023 for acute respiratory failure did not demonstrate this finding. Patient recently underwent high resolution CT chest on 12/6/2023, results were independently reviewed. Mass is now smaller in diameter, there appear to be associated calcifications within the mass. This likely demonstrates atelectasis or scarring from previous pulmonary infections. Could consider recommend repeat CT-chest in 3 months to re-evaluate, however patient's daughter Marcia Woods wishes to hold off on repeat imaging at this time. Respiratory status has been stable since hospital discharge. Admission events were reviewed. Patient became hypercapneic after receiving Lorazepam and Fentanyl to improve BiPAP tolerability. Goal as to avoid benzodiazepines and opioids moving forward to avoid hypercapnia and potential respiratory depression. To continue with Stiolto at present. Regarding FILOMENA, patient is currently on Bilevel PAP at 15/19cm H20, PS 4. Patient brought his BiPAP device to the office for interrogation, usage >= 4hours is 12/28 days with average use 4.4 hours, air leak 52L/min and residual AHI with C-AHI 2.1. Mask fit was completed in the office using two different nasal masks.  Due to poor mask seal with nasal mask, recommend patient undergo mask fitting for possible full face mask (ordered). Have also adjusted Bilevel PAP pressure on his device from 15/19cm H20 to 8/19cm H20 for improved tolerability. Patient will follow up in approximately 3 months for re-evaluation and compliance review. 1. Obstructive sleep apnea  - Ambulatory Referral to Pulmonology  - Mask fitting only; Future  - PAP DME Resupply/Reorder    2. Chronic obstructive pulmonary disease, unspecified COPD type ([address])  - Ambulatory Referral to Pulmonology    3. Opacity of lung on imaging study       History of Present Illness   HPI:  Merari Cleary is a 80 y.o. male with PMHx including chronic diastolic heart failure, CAD, Atrial Fibrillation on Eliquis, HTN, COPD, restrictive lung disease, seasonal allergic rhinitis, mild cognitive impairment, hypothyroidism, BPH, CKD-II, malignant neoplasm of bladder, compression fractures T5 T9 . Patient presents for evaluation of lung mass and FILOMENA. Patient is accompanied by Marcia Woods, patient's daughter and POA. Patient's daughter Marcia Woods states short term memory has been getting worse. Patient appears very sleepy today in the office. Due to memory impairment, he is unable to answer questions appropriately and history obtained by Marcia Woods. Patient has been residing at [facility] since hospital discharge. Patient has history of COPD with recent hospitalization 11/10/2023-11/21/2023 for acute respiratory failure with hypoxia. hypercapnia requiring BiPAP. Patient became hypercapneic after receiving Lorazepam and Fentanyl in the ED for better tolerability of BiPAP. NIV and supplemental O2 was weaned to room air prior to discharge. Marcia Woods states that patient's respiratory status since discharge has been stable. Denies cough, shortness of breath, wheezing, dyspnea, fevers, chills. He is currently using Stiolto via nebulizer.     Patient underwent CT-A/P on 10/16/2023 for suspected bowel obstruction, incidentally demonstrating 3.5 cm RLL opacity with spiculated margins. Repeat CT-PA Chest on 11/11/2023 for acute respiratory failure did not demonstrate this finding. Patient recently underwent high resolution CT chest on 12/6/2023 (results not yet read by Radiology). This imaging was independently reviewed today. Patient also has a longstanding history of FILOMENA, initial diagnosis at least ?20 years ago. I do not have a prior sleep study for review. His BiPAP device is new, he received the device approximately 1 month ago at the [place]. Daughter states he seems to not want to use the BiPAP recently. He utilizes nasal mask interface and brings two different masks today for mask fitting. Cintia Mcclellan does not know his sleep schedule. He seems very sleepy during the daytime recently. Takes frequent naps. He does not complain of morning dry mouth or morning headaches. No history of RLS or parasomnias. Historical Information   Past Medical History:   Diagnosis Date    Arithmetic disorder     [id number]    Myocardial infarct [facility])     [id number]  LAST ASSESSED     Past Surgical History:   Procedure Laterality Date    BLADDER SURGERY      LAST ASSESSED [id number]      CORONARY ARTERY BYPASS GRAFT      [id number] RESOLVED    SKIN BIOPSY       Family History   Problem Relation Age of Onset    Breast cancer Family         [id number] LAST ASSESSED     Social History     Socioeconomic History    Marital status:       Spouse name: Not on file    Number of children: 3    Years of education: Not on file    Highest education level: Not on file   Occupational History    Not on file   Tobacco Use    Smoking status: Former    Smokeless tobacco: Never   Vaping Use    Vaping Use: Never used   Substance and Sexual Activity    Alcohol use: Yes     Comment: OCCASIONAL    Drug use: Not on file    Sexual activity: Not on file   Other Topics Concern    Not on file   Social History Narrative    [address]  ASSESSED 23 JAN 2018    ALL SCRIPTS SAYS      Social Determinants of Health     Financial Resource Strain: Not on file   Food Insecurity: No Food Insecurity (11/13/2023)    Hunger Vital Sign     Worried About Running Out of Food in the Last Year: Never true     Ran Out of Food in the Last Year: Never true   Transportation Needs: No Transportation Needs (11/13/2023)    PRAPARE - Transportation     Lack of Transportation (Medical): No     Lack of Transportation (Non-Medical): No   Physical Activity: Not on file   Stress: Not on file   Social Connections: Not on file   Intimate Partner Violence: Not on file   Housing Stability: Low Risk  (11/13/2023)    Housing Stability Vital Sign     Unable to Pay for Housing in the Last Year: No     Number of Places Lived in the Last Year: 1     Unstable Housing in the Last Year: No       Meds/Allergies   No Known Allergies    Home medications:  Prior to Admission medications    Medication Sig Start Date End Date Taking?  Authorizing Provider   Docusate Sodium (DSS) 100 MG CAPS Take 100 mg by mouth daily as needed (FOR CONSTIPATION) 10/3/23  Yes Historical Provider, MD   apixaban (Eliquis) 5 mg Take 1 tablet (5 mg total) by mouth 2 (two) times a day 10/2/23   LIBORIO Stokes   atorvastatin (LIPITOR) 40 mg tablet Take 1 tablet (40 mg total) by mouth daily at bedtime 10/2/23   Jatinder Sand LIBORIO Kaiser   benazepril (LOTENSIN) 40 MG tablet Take 1 tablet (40 mg total) by mouth daily 10/2/23   LIBORIO Stokes   bumetanide (BUMEX) 2 mg tablet Take 1 tablet (2 mg total) by mouth 2 (two) times a day 11/21/23   Audrey Sexton MD   carboxymethylcellulose 0.5 % SOLN Administer 1 drop to both eyes 4 (four) times a day 10/2/23   LIBORIO Stokes   carvedilol (COREG) 25 mg tablet Take 1 tablet (25 mg total) by mouth 2 (two) times a day with meals 10/2/23   LIBORIO Stokes   cholecalciferol (VITAMIN D3) 1,000 units tablet Take one tablet by mouth at noon with lunch for vitamin d deficiency 10/2/23   LIBORIO Medrano   finasteride (PROSCAR) 5 mg tablet Take 1 tablet (5 mg total) by mouth daily 10/2/23   LIBORIO Hamilton   fluticasone [facility]) 50 mcg/act nasal spray 1 spray into each nostril daily as needed for rhinitis 10/2/23   LIBORIO Hamilton   ipratropium (ATROVENT) 0.02 % nebulizer solution Take 2.5 mL (0.5 mg total) by nebulization 3 (three) times a day 11/21/23   Candy Freed MD   latanoprost (XALATAN) 0.005 % ophthalmic solution Apply 1 drop to eye daily 10/2/23   LIBORIO Hamilton   levalbuterol (Roxanna ) 1.25 mg/3 mL nebulizer solution Take 3 mL (1.25 mg total) by nebulization 3 (three) times a day 11/21/23   Candy Freed MD   levothyroxine 100 mcg tablet Take one tablet by mouth at bedtime for hypothyroid 10/2/23   LIBORIO Medrano   polyethylene glycol (MIRALAX) 17 g packet Take 17 g by mouth daily Do not start before November 22, 2023. 11/22/23   Candy Freed MD   potassium chloride (K-DUR,KLOR-CON) 20 mEq tablet Take 1 tablet (20 mEq total) by mouth daily 10/2/23   LIBORIO Medrano       Vitals:   Blood pressure 128/58, pulse 65, temperature (!) 97.2 °F (36.2 °C), temperature source Tympanic, height 5' 8" (1.727 m), SpO2 98 %. , Body mass index is 35.88 kg/m². Physical Exam:  General: Sitting in chair, appears sleepy (falling asleep at times), answers appropriately to simple questions   HEENT: PERRL, Nares patent, no craniofacial abnormalities. Mucous membranes, moist, no oral lesions. NECK:  Trachea midline, no accessory muscle use, and no stridor   CARDIAC: Regular rate and rhythm, no murmur appreciated   PULM: CTA bilaterally no wheezing, rhonchi or rales   EXT:  1+ pitting edema RLE, no edema LLE   NEURO: in wheelchair, moves all extremities spontaneously        Labs:  I have personally reviewed pertinent lab results. Lab Results   Component Value Date    WBC 8.57 11/18/2023    HGB 13.8 11/18/2023    HCT 43.3 11/18/2023    MCV 96 11/18/2023     11/18/2023      Lab Results   Component Value Date    GLUCOSE 144 (H) 11/14/2023    CALCIUM 9.5 11/18/2023    K 3.9 11/18/2023    CO2 35 (H) 11/18/2023     11/18/2023    BUN 23 11/18/2023    CREATININE 0.95 11/18/2023     No results found for: "IRON", "TIBC", "FERRITIN"  Lab Results   Component Value Date    [id number] 887 11/11/2023     Lab Results   Component Value Date    FOLATE 13.8 11/11/2023       High Resolution CT Chest completed on 12/6/2023 - not yet read by radiology. Images independently reviewed. RLL opacity is smaller in diameter     CT ABDOMEN AND PELVIS WITH IV CONTRAST     INDICATION:   Bowel obstruction suspected distension. COMPARISON: Abdominal ultrasound 8/26/2011 report of CT abdomen pelvis at [facility] 8/16/2016     TECHNIQUE:  CT examination of the abdomen and pelvis was performed. Multiplanar 2D reformatted images were created from the source data. This examination, like all CT scans performed in the [facility], was performed utilizing techniques to minimize radiation dose exposure, including the use of iterative reconstruction and automated exposure control. Radiation dose length   product (DLP) for this visit:  1104.95 mGy-cm     IV Contrast:  100 mL of iohexol (OMNIPAQUE)  Enteric Contrast:  Enteric contrast was not administered. FINDINGS:     ABDOMEN     LOWER CHEST: There is a 2.0 x 3.5 x 2.1 cm right lung base opacity with spiculated margins and a coarse calcification coronary artery calcifications at the heart base. LIVER/BILIARY TREE: Marked streak artifact through hepatic parenchyma, limiting evaluation. Subcentimeter low-attenuation lesions in the liver, too small to characterize.      GALLBLADDER:  There are gallstone(s) within the gallbladder, without pericholecystic inflammatory changes. SPLEEN: A subcentimeter splenic low-attenuation lesion, too small to characterize. PANCREAS:  Unremarkable. ADRENAL GLANDS: There is a left adrenal mass measuring 5.2 x 4.7 cm. It contains macroscopic fat and coarse calcifications. It was present on 8/16/2016 measuring 4.4 cm as per the report from [facility], suggesting a benign etiology such as   myolipoma. KIDNEYS/URETERS:  Unremarkable. No hydronephrosis. STOMACH AND BOWEL:  There is colonic diverticulosis without evidence of acute diverticulitis. APPENDIX:  A normal appendix was visualized. ABDOMINOPELVIC CAVITY:  No ascites. No pneumoperitoneum. No lymphadenopathy. VESSELS:  Atherosclerotic changes are present. No evidence of aneurysm. PELVIS     REPRODUCTIVE ORGANS:  The prostate is enlarged. URINARY BLADDER:  Unremarkable. ABDOMINAL WALL/INGUINAL REGIONS:  Unremarkable. OSSEOUS STRUCTURES:  No acute fracture or destructive osseous lesion. IMPRESSION:     No abnormal distention of bowel loops. A 3.5 cm right lower lobe opacity with spiculated margins, which can represent rounded atelectasis, focal consolidation or pulmonary neoplasm. Follow-up CT chest in 1 month is recommended to assess for resolution. 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 CTA - CHEST WITH IV CONTRAST - PULMONARY ANGIOGRAM     INDICATION:   RLE swollen, acute respiratory failure. COMPARISON: X-ray and CT 10/16/2023        TECHNIQUE: CTA examination of the chest was performed using angiographic technique according to a protocol specifically tailored to evaluate for pulmonary embolism. Multiplanar 2D reformatted images were created from the source data. In addition,   coronal 3D MIP postprocessing was performed on the acquisition scanner.      Radiation dose length product (DLP) for this visit:  1806.99 mGy-cm . This examination, like all CT scans performed in the [facility], was performed utilizing techniques to minimize radiation dose exposure, including the use of   iterative reconstruction and automated exposure control. IV Contrast:  85 mL of iohexol (OMNIPAQUE)     FINDINGS:     PULMONARY ARTERIAL TREE: Respiratory motion artifact partially limits evaluation within the upper lobes, and to a lesser extent in the bases. No filling defect identified. LUNGS: Hypoinflation     Dependent opacities, favored to represent atelectasis     PLEURA:  Unremarkable. HEART/GREAT VESSELS: Cardiomegaly  Status post CABG  No thoracic aortic aneurysm. MEDIASTINUM AND ASHER:  Unremarkable. CHEST WALL AND LOWER NECK:   Unremarkable. VISUALIZED STRUCTURES IN THE UPPER ABDOMEN: Gallbladder is decompressed, contains a calcium gallstone     Left adrenal myelolipoma contains calcification, similar to prior     Renal focal atrophy     OSSEOUS STRUCTURES: T12 compression, appears chronic  Mild T9 superior endplate compression, stable  Mild T5 superior endplate compression, stable     Sternotomy        IMPRESSION:     1. Limited evaluation without evidence of pulmonary embolism  2. Additional chronic findings as above       TTE 11/12/2023:    Left Ventricle: Left ventricular cavity size is normal. Wall thickness is increased. The left ventricular ejection fraction is 55% by visual estimation. Although no diagnostic regional wall motion abnormality was identified, this possibility cannot be completely excluded on the basis of this study. Right Ventricle: Right ventricular cavity size is mildly dilated. Systolic function is mildly reduced. Left Atrium: The atrium is moderately dilated. Right Atrium: The atrium is moderately dilated. Aortic Valve: There is aortic valve sclerosis. Mitral Valve: There is mild regurgitation.     Tricuspid Valve: There is mild regurgitation. Arterial Blood Gas result: N/A. Sleep studies: N/A     Compliance Data:  Patient's Bilevel PAP was interrogated today.  He comes in with the following studies: Min EPAP 15 PS 4 Max IPAP 19  Days used >= 4hours: 12/28 days  Average use 4.4 hours   Air leak 52L/min  AHI 15.2   Central AHI 2.1       [address] [facility]  Sleep Fellow

## 2023-12-12 NOTE — ASSESSMENT & PLAN NOTE
This patient has a longstanding history of obstructive sleep apnea. He has been on a BPAP for many years. They brought his DreamStation in the office today and we interrogated it manually. He is on bilevel PAP at 19/15 cm H2O. he has used this more than 4 hours 12 out of the last 28 days with average use of 4.4 hours. Average leak is 52 L/min using a  Mirage fracture nasal mask. Residual AHI of 15 with JERMAINE of 2.1. We did some mask fitting in the office today and determined that his mustache may be making it difficult to have a proper fit with the nasal mask. His DME is supplied through the [place] so we provided a paper prescription for mask fitting and resupply for the BiPAP    To make it more comfortable for the patient we have changed the pressures manually to auto BiPAP, max IPAP 19, minimum EPAP 8, pressure support 4    I will follow-up with the patient in 3 months for reevaluation and compliance data reviewed. I encouraged the patient and his daughter to bring the machine in next visit.

## 2023-12-12 NOTE — ASSESSMENT & PLAN NOTE
The opacity in question in the RLL appears smaller on recently acquired HRCT. Most likely represents atelectasis, scarring. I don't think further imaging is necessary given decreasing opacity. The patient would not be a good cancer directed treatment candidate if this represents a malignancy due to his age and comorbidities, functional status. The patient's daughter agrees with this assessment.

## 2023-12-27 ENCOUNTER — TELEPHONE (OUTPATIENT)
Dept: PULMONOLOGY | Facility: CLINIC | Age: 88
End: 2023-12-27

## 2023-12-27 DIAGNOSIS — J44.9 CHRONIC OBSTRUCTIVE PULMONARY DISEASE, UNSPECIFIED COPD TYPE (HCC): Primary | ICD-10-CM

## 2023-12-27 RX ORDER — ALBUTEROL SULFATE 2.5 MG/3ML
2.5 SOLUTION RESPIRATORY (INHALATION) EVERY 6 HOURS PRN
Qty: 360 ML | Refills: 1 | Status: SHIPPED | OUTPATIENT
Start: 2023-12-27

## 2023-12-27 NOTE — TELEPHONE ENCOUNTER
[facility] [phone] xt [phone] called, levalbuterol not covered, alternative Ventolin. Please place an order and to fax to [phone]

## 2024-01-19 ENCOUNTER — TELEPHONE (OUTPATIENT)
Dept: NEUROLOGY | Facility: CLINIC | Age: 89
End: 2024-01-19

## 2024-01-19 ENCOUNTER — NURSING HOME VISIT (OUTPATIENT)
Dept: GERIATRICS | Facility: OTHER | Age: 89
End: 2024-01-19
Payer: COMMERCIAL

## 2024-01-19 DIAGNOSIS — I48.91 ATRIAL FIBRILLATION, UNSPECIFIED TYPE (HCC): ICD-10-CM

## 2024-01-19 DIAGNOSIS — E87.0 HYPERNATREMIA: ICD-10-CM

## 2024-01-19 DIAGNOSIS — J30.2 SEASONAL ALLERGIC RHINITIS, UNSPECIFIED TRIGGER: ICD-10-CM

## 2024-01-19 DIAGNOSIS — E03.8 OTHER SPECIFIED HYPOTHYROIDISM: ICD-10-CM

## 2024-01-19 DIAGNOSIS — I10 BENIGN ESSENTIAL HYPERTENSION: ICD-10-CM

## 2024-01-19 DIAGNOSIS — H90.3 SENSORINEURAL HEARING LOSS (SNHL) OF BOTH EARS: ICD-10-CM

## 2024-01-19 DIAGNOSIS — G47.33 OBSTRUCTIVE SLEEP APNEA: ICD-10-CM

## 2024-01-19 DIAGNOSIS — G31.84 MILD COGNITIVE IMPAIRMENT WITH MEMORY LOSS: Primary | ICD-10-CM

## 2024-01-19 DIAGNOSIS — E55.9 VITAMIN D DEFICIENCY: ICD-10-CM

## 2024-01-19 DIAGNOSIS — N40.1 BENIGN PROSTATIC HYPERPLASIA WITH LOWER URINARY TRACT SYMPTOMS, SYMPTOM DETAILS UNSPECIFIED: ICD-10-CM

## 2024-01-19 DIAGNOSIS — I50.32 CHRONIC DIASTOLIC (CONGESTIVE) HEART FAILURE (HCC): ICD-10-CM

## 2024-01-19 DIAGNOSIS — J44.9 CHRONIC OBSTRUCTIVE PULMONARY DISEASE, UNSPECIFIED COPD TYPE (HCC): ICD-10-CM

## 2024-01-19 PROCEDURE — 99309 SBSQ NF CARE MODERATE MDM 30: CPT | Performed by: FAMILY MEDICINE

## 2024-01-19 NOTE — TELEPHONE ENCOUNTER
1ST ATTEMPT,     Called pt no answer, LMOM.    Thank you,     Tracie AMADOR/ JOSE ROBERTSON/ WES    DC- 11/21/2023- FACILITY    Discharging Facility Name and Phone Number  Flowsheet Row Most Recent Value  Accepting Facility Name, [facility] Myriam Miguel  Receiving Facility/Agency Phone  Number  [phone]      Will UNDERWOOD Sebas will need follow up in in 6 weeks with general neurology attending/AP/resident .  He will not require outpatient neurological testing

## 2024-01-19 NOTE — PROGRESS NOTES
[facility]  [address] [address] [phone]  [facility]  [address]    NAME: Will Mullen  AGE: 89 y.o. 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 Chronic microangiopathic changes, chronic bilateral thalamic lacunar infarctions, and chronic right parietal cortical infarction.  Continue to assist with ADLs/iADLs in memory care unit.   Frequent reorientation, redirection. Encouraged frequent family visits.  Encouraged participation in group activities when appropriate.  Delirium precautions  -Maintain normal sleep/wake cycle, lights and tv on during day with blinds open, lights and tv off at bedtime with blinds closed, minimize unnecessary interruptions  -continue bowel regimen and monitor for adequate urine output as both urine and stool retention may contribute to and worsen delirium    Hypothyroidism  TSH WNL 0.86 (11/12/23)  Continue levothyroxine 100 mcg daily    Chronic obstructive pulmonary disease (HCC)  No exacerbation  Continue ipratropium and albuterol 3 times daily    Obstructive sleep apnea  Continue BiPAP nightly.  Follow-up with pulmonology outpatient  Follow-up with pulmonology outpatient    Seasonal allergic rhinitis  Continue fluticasone As needed    Chronic diastolic (congestive) heart failure (HCC)  No exacerbation  Continue SAMANTHA stockings daily, benazepril, Coreg, Bumex and potassium supplement daily  Monitor weight, BMP.    Atrial fibrillation (HCC)  RRR  Continue Coreg 25 mg twice daily  Continue Eliquis for anticoagulation  Recent CBC stable    Essential hypertension  BPs stable  Continue benazepril 40 mg daily, Bumex 2 mg twice daily in the setting of CHF, and carvedilol 25 mg twice daily in the setting of A-fib  Continue to  monitor BP trends and adjust medications if clinically indicated    Sensorineural hearing loss (SNHL) of both ears  Encourage patient to wear hearing aids at all appropriate time    BPH (benign prostatic hyperplasia)  Continue toileting schedule  Monitor for urinary retention  Continue Proscar   Follow-up with urology outpatient    Hypernatremia  With chronic hypernatremia in setting of aggressive diuresis needed for congestive heart failure  sodium level 146 on 12/5/2023. Repeat BMP.  Continue cardiac diet with low sodium    Vitamin D deficiency  Continue vitamin D supplement daily    - Counseling Documentation: patient's daughter was counseled regarding: diagnostic results, instructions for management, family education, risks and benefits of treatment options, and importance of compliance with treatment    Chief Complaint     Routine Long term follow-up visit.    History of Present Illness     HPI  The patient is seen during routine long-term follow-up visit. He is sitting in recliner, playing puzzles. We played and he laughed when we completed the last piece of the puzzle.  Per nursing report, he does not use call bell, frequently removes CPAP mask during the night, non compliant to OFR, takes his cup into bathroom and get more drink.    The following portions of the patient's history were reviewed and updated as appropriate: allergies, current medications, past family history, past medical history, past social history, past surgical history and problem list.    Review of Systems     Review of Systems   Unable to perform ROS: Dementia   Denies any pain or discomfort.    Active Problem List     Patient Active Problem List   Diagnosis    Atrial fibrillation (HCC)    Coronary artery disease    Essential hypertension    Bicuspid aortic valve    Hypothyroidism    Obstructive sleep apnea    Chronic anticoagulation    Mild cognitive impairment with memory loss    Malignant neoplasm of urinary bladder (HCC)    Class 2  severe obesity with serious comorbidity in adult (HCC)    Restrictive lung disease    Seasonal allergic rhinitis    Change in mole    Hypernatremia    Ambulatory dysfunction    Sensorineural hearing loss (SNHL) of both ears    Chronic obstructive pulmonary disease (HCC)    CKD (chronic kidney disease) stage 2, GFR 60-89 ml/min    Dry eyes    Pigmented skin lesion    Vitamin D deficiency    BPH (benign prostatic hyperplasia)    Abnormal CT scan    History of stroke    Other constipation    Physical deconditioning    Chronic diastolic (congestive) heart failure (HCC)    Chronic hypokalemia    Opacity of lung on imaging study       Objective     Vitals: EHR facility reviewed, stable    Physical Exam  Vitals and nursing note reviewed.   General: no acute distress.  HENT:      Head: Normocephalic.      Nose: Nose normal.      Mouth: Mucous membranes are moist.   Eyes:       Right eye: No discharge.         Left eye: No discharge.      Conjunctivae normal.   Cardiovascular:      Normal rate and regular rhythm.   Pulmonary:      Pulmonary effort is normal. No wheezing, rhonchi or rales. Diminished breath sounds bilaterally  Abdominal:      Bowel sounds are normal. There is no distension.      Abdomen is soft. There is no abdominal tenderness.   Musculoskeletal:      Cervical back: Neck supple.      Right lower le+ edema.      Left lower le+ edema.   Skin:     General: Skin is warm.   Neurological: alert.      Oriented to person only.  Pleasantly confused. Cooperative, follows commands      Behavior: normal.     Pertinent Laboratory/Diagnostic Studies:  Refer to facility chart.    Current Medications   Medications reviewed and updated in facility chart.

## 2024-01-28 PROBLEM — I50.33 ACUTE ON CHRONIC DIASTOLIC (CONGESTIVE) HEART FAILURE (HCC): Status: RESOLVED | Noted: 2017-10-31 | Resolved: 2024-01-28

## 2024-01-28 PROBLEM — S22.000D COMPRESSION FRACTURE OF THORACIC VERTEBRA WITH ROUTINE HEALING: Status: RESOLVED | Noted: 2023-11-12 | Resolved: 2024-01-28

## 2024-01-28 PROBLEM — M25.521 RIGHT ELBOW PAIN: Status: RESOLVED | Noted: 2023-11-12 | Resolved: 2024-01-28

## 2024-01-28 PROBLEM — J96.02 ACUTE RESPIRATORY FAILURE WITH HYPOXIA AND HYPERCAPNIA (HCC): Status: RESOLVED | Noted: 2023-11-11 | Resolved: 2024-01-28

## 2024-01-28 PROBLEM — G93.40 ACUTE ENCEPHALOPATHY: Status: RESOLVED | Noted: 2023-11-11 | Resolved: 2024-01-28

## 2024-01-28 PROBLEM — R35.0 URINARY FREQUENCY: Status: RESOLVED | Noted: 2023-10-02 | Resolved: 2024-01-28

## 2024-01-28 PROBLEM — J96.01 ACUTE RESPIRATORY FAILURE WITH HYPOXIA AND HYPERCAPNIA (HCC): Status: RESOLVED | Noted: 2023-11-11 | Resolved: 2024-01-28

## 2024-01-28 RX ORDER — ALBUTEROL SULFATE 2.5 MG/3ML
2.5 SOLUTION RESPIRATORY (INHALATION) 3 TIMES DAILY
COMMUNITY

## 2024-01-28 RX ORDER — POTASSIUM CHLORIDE 750 MG/1
30 TABLET, EXTENDED RELEASE ORAL DAILY
COMMUNITY

## 2024-01-28 NOTE — ASSESSMENT & PLAN NOTE
Continue toileting schedule  Monitor for urinary retention  Continue Proscar   Follow-up with urology outpatient

## 2024-01-28 NOTE — ASSESSMENT & PLAN NOTE
BPs stable  Continue benazepril 40 mg daily, Bumex 2 mg twice daily in the setting of CHF, and carvedilol 25 mg twice daily in the setting of A-fib  Continue to monitor BP trends and adjust medications if clinically indicated

## 2024-01-28 NOTE — ASSESSMENT & PLAN NOTE
Continue BiPAP nightly.  Follow-up with pulmonology outpatient  Follow-up with pulmonology outpatient

## 2024-01-28 NOTE — ASSESSMENT & PLAN NOTE
With chronic hypernatremia in setting of aggressive diuresis needed for congestive heart failure  sodium level 146 on 12/5/2023. Repeat BMP.  Continue cardiac diet with low sodium

## 2024-01-28 NOTE — ASSESSMENT & PLAN NOTE
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 Chronic microangiopathic changes, chronic bilateral thalamic lacunar infarctions, and chronic right parietal cortical infarction.  Continue to assist with ADLs/iADLs in memory care unit.   Frequent reorientation, redirection. Encouraged frequent family visits.  Encouraged participation in group activities when appropriate.  Delirium precautions  -Maintain normal sleep/wake cycle, lights and tv on during day with blinds open, lights and tv off at bedtime with blinds closed, minimize unnecessary interruptions  -continue bowel regimen and monitor for adequate urine output as both urine and stool retention may contribute to and worsen delirium

## 2024-01-28 NOTE — ASSESSMENT & PLAN NOTE
No exacerbation  Continue SAMANTHA stockings daily, benazepril, Coreg, Bumex and potassium supplement daily  Monitor weight, BMP.

## 2024-01-28 NOTE — ASSESSMENT & PLAN NOTE
TSH WNL 0.86 (11/12/23)  Continue levothyroxine 100 mcg daily   INTERVAL HPI/OVERNIGHT EVENTS:    dark stool overnight  no bm when seen this morning   hgb stable    MEDICATIONS  (STANDING):  allopurinol 50 milliGRAM(s) Oral daily  dextrose 5%. 1000 milliLiter(s) (50 mL/Hr) IV Continuous <Continuous>  dextrose 5%. 1000 milliLiter(s) (100 mL/Hr) IV Continuous <Continuous>  dextrose 50% Injectable 25 Gram(s) IV Push once  dextrose 50% Injectable 12.5 Gram(s) IV Push once  dextrose 50% Injectable 25 Gram(s) IV Push once  doxazosin 4 milliGRAM(s) Oral at bedtime  finasteride 5 milliGRAM(s) Oral daily  glucagon  Injectable 1 milliGRAM(s) IntraMuscular once  hydrALAZINE 25 milliGRAM(s) Oral two times a day  insulin glargine Injectable (LANTUS) 4 Unit(s) SubCutaneous at bedtime  insulin lispro (ADMELOG) corrective regimen sliding scale   SubCutaneous at bedtime  insulin lispro (ADMELOG) corrective regimen sliding scale   SubCutaneous three times a day before meals  isosorbide   dinitrate Tablet (ISORDIL) 20 milliGRAM(s) Oral two times a day  lidocaine   4% Patch 1 Patch Transdermal daily  metoprolol succinate ER 50 milliGRAM(s) Oral daily  pantoprazole  Injectable 40 milliGRAM(s) IV Push two times a day  simvastatin 10 milliGRAM(s) Oral at bedtime  spironolactone 25 milliGRAM(s) Oral daily  sucralfate 1 Gram(s) Oral two times a day  torsemide 60 milliGRAM(s) Oral daily    MEDICATIONS  (PRN):  acetaminophen     Tablet .. 650 milliGRAM(s) Oral every 6 hours PRN Temp greater or equal to 38C (100.4F), Mild Pain (1 - 3)  albuterol    90 MICROgram(s) HFA Inhaler 2 Puff(s) Inhalation every 6 hours PRN Shortness of Breath and/or Wheezing  aluminum hydroxide/magnesium hydroxide/simethicone Suspension 30 milliLiter(s) Oral every 4 hours PRN Dyspepsia  dextrose Oral Gel 15 Gram(s) Oral once PRN Blood Glucose LESS THAN 70 milliGRAM(s)/deciliter  melatonin 3 milliGRAM(s) Oral at bedtime PRN Insomnia  ondansetron Injectable 4 milliGRAM(s) IV Push every 8 hours PRN Nausea and/or Vomiting      Allergies    clindamycin (Other)  fish (Anaphylaxis)  Levaquin (Rash)  Demerol HCl (Rash)  sulfa drugs (Hives)  penicillin (Hives)  shellfish (Anaphylaxis)    Intolerances        Review of Systems:    General:  No wt loss, fevers, chills, night sweats, fatigue   Eyes:  Good vision, no reported pain  ENT:  No sore throat, pain, runny nose, dysphagia  CV:  No pain, palpitations, hypo/hypertension  Resp:  No dyspnea, cough, tachypnea, wheezing  GI:  No pain, No nausea, No vomiting, No diarrhea, No constipation, No weight loss, No fever, No pruritis, No rectal bleeding, No melena, No dysphagia  :  No pain, bleeding, incontinence, nocturia  Muscle:  No pain, weakness  Neuro:  No weakness, tingling, memory problems  Psych:  No fatigue, insomnia, mood problems, depression  Endocrine:  No polyuria, polydypsia, cold/heat intolerance  Heme:  No petechiae, ecchymosis, easy bruisability  Skin:  No rash, tattoos, scars, edema      Vital Signs Last 24 Hrs  T(C): 36.4 (03 Oct 2023 11:06), Max: 36.7 (02 Oct 2023 21:02)  T(F): 97.5 (03 Oct 2023 11:06), Max: 98.1 (02 Oct 2023 21:02)  HR: 62 (03 Oct 2023 11:06) (62 - 79)  BP: 108/65 (03 Oct 2023 11:06) (108/62 - 145/60)  BP(mean): --  RR: 18 (03 Oct 2023 11:06) (17 - 18)  SpO2: 100% (03 Oct 2023 11:06) (90% - 100%)    Parameters below as of 03 Oct 2023 11:06  Patient On (Oxygen Delivery Method): room air        PHYSICAL EXAM:    Constitutional: NAD  HEENT: EOMI, throat clear  Neck: No LAD, supple  Respiratory: CTA and P  Cardiovascular: S1 and S2, RRR, no M  Gastrointestinal: BS+, soft, NT/ND, neg HSM,  Extremities: No peripheral edema, neg clubbing, cyanosis  Vascular: 2+ peripheral pulses  Neurological: A/O x 3, no focal deficits  Psychiatric: Normal mood, normal affect  Skin: No rashes      LABS:                        8.6    8.56  )-----------( 115      ( 03 Oct 2023 09:00 )             27.4     10-03    144  |  108  |  76<H>  ----------------------------<  163<H>  4.0   |  29  |  2.60<H>    Ca    9.3      03 Oct 2023 09:00  Phos  2.7     10-03  Mg     2.8     10-03    TPro  6.4  /  Alb  3.4  /  TBili  1.3<H>  /  DBili  x   /  AST  11<L>  /  ALT  15  /  AlkPhos  80  10-02      Urinalysis Basic - ( 03 Oct 2023 09:00 )    Color: x / Appearance: x / SG: x / pH: x  Gluc: 163 mg/dL / Ketone: x  / Bili: x / Urobili: x   Blood: x / Protein: x / Nitrite: x   Leuk Esterase: x / RBC: x / WBC x   Sq Epi: x / Non Sq Epi: x / Bacteria: x        RADIOLOGY & ADDITIONAL TESTS:

## 2024-03-04 ENCOUNTER — NURSING HOME VISIT (OUTPATIENT)
Dept: GERIATRICS | Facility: OTHER | Age: 89
End: 2024-03-04
Payer: COMMERCIAL

## 2024-03-04 DIAGNOSIS — E66.01 CLASS 2 SEVERE OBESITY WITH SERIOUS COMORBIDITY AND BODY MASS INDEX (BMI) OF 35.0 TO 35.9 IN ADULT, UNSPECIFIED OBESITY TYPE: ICD-10-CM

## 2024-03-04 DIAGNOSIS — E55.9 VITAMIN D DEFICIENCY: ICD-10-CM

## 2024-03-04 DIAGNOSIS — R41.89 COGNITIVE IMPAIRMENT: ICD-10-CM

## 2024-03-04 DIAGNOSIS — G47.33 OBSTRUCTIVE SLEEP APNEA: ICD-10-CM

## 2024-03-04 DIAGNOSIS — I48.91 ATRIAL FIBRILLATION, UNSPECIFIED TYPE (HCC): ICD-10-CM

## 2024-03-04 DIAGNOSIS — I50.32 CHRONIC DIASTOLIC (CONGESTIVE) HEART FAILURE (HCC): ICD-10-CM

## 2024-03-04 DIAGNOSIS — C67.9 MALIGNANT NEOPLASM OF URINARY BLADDER, UNSPECIFIED SITE (HCC): ICD-10-CM

## 2024-03-04 DIAGNOSIS — J44.9 CHRONIC OBSTRUCTIVE PULMONARY DISEASE, UNSPECIFIED COPD TYPE (HCC): Primary | ICD-10-CM

## 2024-03-04 DIAGNOSIS — E03.8 OTHER SPECIFIED HYPOTHYROIDISM: ICD-10-CM

## 2024-03-04 DIAGNOSIS — I10 BENIGN ESSENTIAL HYPERTENSION: ICD-10-CM

## 2024-03-04 DIAGNOSIS — H90.3 SENSORINEURAL HEARING LOSS (SNHL) OF BOTH EARS: ICD-10-CM

## 2024-03-04 PROBLEM — E87.6 CHRONIC HYPOKALEMIA: Status: RESOLVED | Noted: 2023-12-08 | Resolved: 2024-03-04

## 2024-03-04 PROCEDURE — 99310 SBSQ NF CARE HIGH MDM 45: CPT | Performed by: FAMILY MEDICINE

## 2024-03-04 NOTE — ASSESSMENT & PLAN NOTE
No exacerbation  Ordered SAMANTHA stockings or Tubi  daily. Continue benazepril, Coreg, Bumex and potassium supplement daily  Monitor weight, BMP

## 2024-03-04 NOTE — PROGRESS NOTES
[facility]  [address] [address] [phone]  [place], [place] [address]      NAME: Will Mullen  AGE: 89 y.o. SEX: male [id number]    DATE OF ENCOUNTER: 3/4/2024    Assessment and Plan     Hypothyroidism  Stable, TSH WNL 0.86 (11/12/23)  Continue levothyroxine 100 mcg daily    Chronic obstructive pulmonary disease (HCC)  No exacerbation  Continue ipratropium and albuterol nebulizer 3 times daily    Obstructive sleep apnea  Continue BiPAP nightly.    Follow-up with pulmonology outpatient    Atrial fibrillation (HCC)  RRR  Continue Coreg 25 mg twice daily  Continue Eliquis for anticoagulation  Recent CBC stable    Chronic diastolic (congestive) heart failure (HCC)  No exacerbation  Ordered SAMANTHA stockings or Tubi  daily. Continue benazepril, Coreg, Bumex and potassium supplement daily  Monitor weight, BMP    Essential hypertension  BPs stable, except occasions of low BP 97/65 on 2/29 and 106/67 (3/1/24)   Discussed on dose reduction of benazepril 40 mg daily to 20 mg daily. POA in agreement.  Continue Bumex 2 mg twice daily in the setting of CHF, and carvedilol 25 mg twice daily in the setting of A-fib  Continue to monitor BP trends and adjust medications if clinically indicated  Avoid hypotension.    Sensorineural hearing loss (SNHL) of both ears  Encourage patient to wear hearing aids at all appropriate time    Cognitive impairment  Vascular dementia vs mixed type  MoCA 22/30 on 6/6/2022  CT head wo contrast 11/10/23 showed generalized cerebral greater than cerebellar atrophy. Chronic microangiopathic changes, chronic bilateral thalamic lacunar infarctions, and chronic right parietal cortical infarction.  Continue to assist with ADLs/iADLs in memory care unit.   Frequent reorientation, redirection. Encouraged frequent family visits.  Encouraged participation in group activities when appropriate.  Continue delirium precautions  -Maintain normal sleep/wake cycle, lights and tv on  during day with blinds open, lights and tv off at bedtime with blinds closed, minimize unnecessary interruptions  -continue bowel regimen and monitor for adequate urine output as both urine and stool retention may contribute to and worsen delirium    Vitamin D deficiency  Continue vitamin D supplement daily    - Counseling Documentation: patient and daughter were counseled regarding: diagnostic results, instructions for management, risk factor reductions, patient and family education, risks and benefits of treatment options, and importance of compliance with treatment.  Coordination of care with nursing staff, aides.    Chief Complaint     Routine Long term follow-up visit.    History of Present Illness     HPI  The patient is seen in her room during routine LTC follow-up visit.  Daughter at bedside.   The patient is not adherent to oral fluid restriction as per nursing report.   Sitting in recliner, playing games with daughter.  He denies any CP, SOB, or palpitations.  The following portions of the patient's history were reviewed and updated as appropriate: allergies, current medications, past family history, past medical history, past social history, past surgical history and problem list.    Review of Systems     Review of Systems   HENT:  Negative for trouble swallowing.    Cardiovascular:  Positive for leg swelling.   Musculoskeletal:  Positive for gait problem.   Psychiatric/Behavioral:  Positive for confusion.      Active Problem List     Patient Active Problem List   Diagnosis    Atrial fibrillation (HCC)    Coronary artery disease    Essential hypertension    Bicuspid aortic valve    Hypothyroidism    Obstructive sleep apnea    Chronic anticoagulation    Cognitive impairment    Malignant neoplasm of urinary bladder (HCC)    Class 2 severe obesity with serious comorbidity in adult (HCC)    Restrictive lung disease    Seasonal allergic rhinitis    Change in mole    Hypernatremia    Ambulatory dysfunction     Sensorineural hearing loss (SNHL) of both ears    Chronic obstructive pulmonary disease (HCC)    CKD (chronic kidney disease) stage 2, GFR 60-89 ml/min    Dry eyes    Pigmented skin lesion    Vitamin D deficiency    BPH (benign prostatic hyperplasia)    Abnormal CT scan    History of stroke    Other constipation    Physical deconditioning    Chronic diastolic (congestive) heart failure (HCC)    Opacity of lung on imaging study       Objective     Vitals:  /69, HR 61, RR 20, SpO2 92%, Temp 97.6, Wt 243.8 lbs    Physical Exam  General: NAD  Head: normocephalic  Heart: RRR  Lungs: Diminished breath sounds bilaterally. No W/R/R  Abdomen: soft, nondistended, nontender, BS +  Ext: 2+ pitting edema   Neuro: oriented x2, move all 4 ext, no focal deficits.     Pertinent Laboratory/Diagnostic Studies:  Refer to facility chart.  BMP (1/23/24) Na 145, K 4.0, Cre 1.08, GFR 63    Current Medications   Medications reviewed and updated in facility chart.    I have spent a total time of 50 minutes on 03/04/24 in caring for this patient including Diagnostic results, Risks and benefits of tx options, Instructions for management, Patient and family education, Importance of tx compliance, Risk factor reductions, Counseling / Coordination of care, Documenting in the medical record, Reviewing / ordering tests, medicine, procedures  , Obtaining or reviewing history  , and Communicating with other healthcare professionals .

## 2024-03-04 NOTE — ASSESSMENT & PLAN NOTE
Vascular dementia vs mixed type  MoCA 22/30 on 6/6/2022  CT head wo contrast 11/10/23 showed generalized cerebral greater than cerebellar atrophy. Chronic microangiopathic changes, chronic bilateral thalamic lacunar infarctions, and chronic right parietal cortical infarction.  Continue to assist with ADLs/iADLs in memory care unit.   Frequent reorientation, redirection. Encouraged frequent family visits.  Encouraged participation in group activities when appropriate.  Continue delirium precautions  -Maintain normal sleep/wake cycle, lights and tv on during day with blinds open, lights and tv off at bedtime with blinds closed, minimize unnecessary interruptions  -continue bowel regimen and monitor for adequate urine output as both urine and stool retention may contribute to and worsen delirium

## 2024-03-04 NOTE — ASSESSMENT & PLAN NOTE
BPs stable, except occasions of low BP 97/65 on 2/29 and 106/67 (3/1/24)   Discussed on dose reduction of benazepril 40 mg daily to 20 mg daily. POA in agreement.  Continue Bumex 2 mg twice daily in the setting of CHF, and carvedilol 25 mg twice daily in the setting of A-fib  Continue to monitor BP trends and adjust medications if clinically indicated  Avoid hypotension.

## 2024-03-29 ENCOUNTER — TELEPHONE (OUTPATIENT)
Dept: PULMONOLOGY | Facility: CLINIC | Age: 89
End: 2024-03-29

## 2024-03-29 NOTE — TELEPHONE ENCOUNTER
Setting up 3 month f/u. Per daughter, pt is no longer going to MD offices. She is willing to schedule f/u as long as its a virtual. Ok to schedule?

## 2024-03-29 NOTE — TELEPHONE ENCOUNTER
Jatin Booker MD  You12 minutes ago (4:15 PM)       Ok with virtual     L/m for dtr to call office to schedule visit.

## 2024-04-05 ENCOUNTER — TELEPHONE (OUTPATIENT)
Age: 89
End: 2024-04-05

## 2024-04-05 NOTE — TELEPHONE ENCOUNTER
DebiWill's daughter called explaining that she would like to be around when you go to see Will. She is available Monday morning but not afternoon, Thursday is hard for her, but she can make it if possible. Her callback number is [phone]. Thank you!

## 2024-04-11 ENCOUNTER — NURSING HOME VISIT (OUTPATIENT)
Dept: GERIATRICS | Facility: OTHER | Age: 89
End: 2024-04-11
Payer: COMMERCIAL

## 2024-04-11 DIAGNOSIS — J44.9 CHRONIC OBSTRUCTIVE PULMONARY DISEASE, UNSPECIFIED COPD TYPE (HCC): ICD-10-CM

## 2024-04-11 DIAGNOSIS — E87.0 HYPERNATREMIA: ICD-10-CM

## 2024-04-11 DIAGNOSIS — R41.89 COGNITIVE IMPAIRMENT: Primary | ICD-10-CM

## 2024-04-11 DIAGNOSIS — I25.10 CORONARY ARTERY DISEASE INVOLVING NATIVE CORONARY ARTERY OF NATIVE HEART WITHOUT ANGINA PECTORIS: ICD-10-CM

## 2024-04-11 DIAGNOSIS — N40.1 BENIGN PROSTATIC HYPERPLASIA WITH LOWER URINARY TRACT SYMPTOMS, SYMPTOM DETAILS UNSPECIFIED: ICD-10-CM

## 2024-04-11 DIAGNOSIS — I10 BENIGN ESSENTIAL HYPERTENSION: ICD-10-CM

## 2024-04-11 DIAGNOSIS — I50.32 CHRONIC DIASTOLIC (CONGESTIVE) HEART FAILURE (HCC): ICD-10-CM

## 2024-04-11 DIAGNOSIS — E03.8 OTHER SPECIFIED HYPOTHYROIDISM: ICD-10-CM

## 2024-04-11 DIAGNOSIS — E55.9 VITAMIN D DEFICIENCY: ICD-10-CM

## 2024-04-11 DIAGNOSIS — G47.33 OBSTRUCTIVE SLEEP APNEA: ICD-10-CM

## 2024-04-11 DIAGNOSIS — I48.91 ATRIAL FIBRILLATION, UNSPECIFIED TYPE (HCC): ICD-10-CM

## 2024-04-11 DIAGNOSIS — H90.3 SENSORINEURAL HEARING LOSS (SNHL) OF BOTH EARS: ICD-10-CM

## 2024-04-11 DIAGNOSIS — N18.2 CKD (CHRONIC KIDNEY DISEASE) STAGE 2, GFR 60-89 ML/MIN: ICD-10-CM

## 2024-04-11 PROCEDURE — 99310 SBSQ NF CARE HIGH MDM 45: CPT | Performed by: FAMILY MEDICINE

## 2024-04-11 NOTE — PROGRESS NOTES
[facility]  [address] [address] [phone]  [facility] [address]    NAME: Will Mullen  AGE: 89 y.o. SEX: male [id number]    DATE OF ENCOUNTER: 4/11/2024    Assessment and Plan     Atrial fibrillation (HCC)  RRR  Continue Coreg 25 mg twice daily  Continue Eliquis for anticoagulation  CBC 11/2023 reviewed with stable Hgb 13,     Chronic diastolic (congestive) heart failure (HCC)  No exacerbation  Encourage SAMANTHA stockings or Tubi  daily. Continue benazepril, Coreg, Bumex and potassium supplement daily  Monitor weight, BMP    Coronary artery disease  Stable, asymptomatic  Continue statin    Essential hypertension  BP stable, 110s-120s/50s-80s, except elevated 158/80 today  Continue benazepril 20 mg daily, Bumex 2 mg BID, and Coreg 25 mg BID  Monitor BP.    Chronic obstructive pulmonary disease (HCC)  Stable, no exacerbation  Continue ipratropium and albuterol nebulizer 3 times daily    Obstructive sleep apnea  Continue BiPAP nightly.    Follow-up with pulmonology outpatient    Hypothyroidism  Stable, TSH WNL 0.86 (11/12/23)  Continue levothyroxine 100 mcg daily  Monitor TSH.    Sensorineural hearing loss (SNHL) of both ears  Encourage patient to wear hearing aids at all appropriate time    BPH (benign prostatic hyperplasia)  Maintain on finasteride 5 mg daily    CKD (chronic kidney disease) stage 2, GFR 60-89 ml/min  Stable, Creatinine 1.08, eGFR 63 (1/23/24)  Avoid hypotension  Avoid NSAID    Cognitive impairment  Vascular dementia vs mixed type  MoCA 22/30 on 6/6/2022  CT head wo contrast 11/10/23 showed Chronic microangiopathic changes, chronic bilateral thalamic lacunar infarctions, and chronic right parietal cortical infarction.  Continue to assist with ADLs/iADLs in memory care unit.   Frequent reorientation, redirection. Encouraged frequent family visits.  Encouraged participation in group activities when appropriate.  Continue delirium  precautions    Hypernatremia  With chronic hypernatremia in setting of aggressive diuresis needed for congestive heart failure  sodium level 145 on 1/23/24  Continue cardiac diet with low sodium    Vitamin D deficiency  Continue vitamin D supplement daily      - Counseling Documentation: patient was counseled regarding: diagnostic results, instructions for management, risk factor reductions, patient and family education, risks and benefits of treatment options, and importance of compliance with treatment    Chief Complaint     Routine Long term follow-up visit.    History of Present Illness     Patient is seen in his room during routine LTC follow-up visit.  He denies any pain or discomfort.  Denies pain in right elbow.  Denies congestion, rhinorrhea. No steroids nasal spray indicated at this time due to no allergy Sx reported.  Forgetfulness reported from nursing staff. He is not adherent to fluid restriction, taking water bottles off other residents' meal trays or from the food truck.    The following portions of the patient's history were reviewed and updated as appropriate: allergies, current medications, past family history, past medical history, past social history, past surgical history and problem list.    Review of Systems     Review of Systems   HENT:  Negative for congestion and rhinorrhea.    Cardiovascular:  Positive for leg swelling.   Musculoskeletal:  Positive for gait problem.   All other systems reviewed and are negative.    Objective     Vitals: EHR at facility reviewed, stable.    Physical Exam  Vitals reviewed.   Constitutional:       General: He is not in acute distress.     Appearance: He is obese.   HENT:      Head: Normocephalic.      Right Ear: Tympanic membrane, ear canal and external ear normal.      Left Ear: Tympanic membrane, ear canal and external ear normal.      Nose: Nose normal. No rhinorrhea.      Mouth/Throat:      Mouth: Mucous membranes are moist.   Eyes:       Conjunctiva/sclera: Conjunctivae normal.   Cardiovascular:      Rate and Rhythm: Normal rate and regular rhythm.      Pulses: Normal pulses.      Heart sounds: No murmur heard.  Pulmonary:      Effort: Pulmonary effort is normal.      Breath sounds: No wheezing or rhonchi.      Comments: Diminished breath sounds bilaterally  Abdominal:      General: Bowel sounds are normal. There is no distension.      Tenderness: There is no abdominal tenderness.   Musculoskeletal:      Right lower leg: Edema (2+) present.      Left lower leg: Edema (2+) present.   Skin:     General: Skin is warm.   Neurological:      Mental Status: He is alert and oriented to person, place, and time.   Psychiatric:         Behavior: Behavior normal.       Pertinent Laboratory/Diagnostic Studies:  Refer to facility chart.  COMP METAB PANEL  1/23/24      GLUCOSE 94 65 - 99 mg/dL      BUN 25 7 - 28 mg/dL      CREATININE 1.08 0.53 - 1.30 mg/dL      SODIUM 145 135 - 145 mmol/L      POTASSIUM 4.0 3.5 - 5.2 mmol/L      CHLORIDE 107 100 - 109 mmol/L      CARBON DIOXIDE 27 21 - 31 mmol/L      CALCIUM 9.3 8.5 - 10.1 mg/dL      ALKALINE PHOSPHATASE 76 35 - 120 U/L      ALBUMIN 3.6 3.5 - 5.7 g/dL      BILIRUBIN,TOTAL 1.0 0.2 - 1.0 mg/dL         Eltrombopag and its metabolites may interfere with this assay causing         erroneously high patient results.     PROTEIN, TOTAL 5.6  L 6.3 - 8.3 g/dL      AST 14 <41 U/L      ALT 12 <56 U/L      ANION GAP 11 3 - 11       eGFRcr 63 >59  Current Medications   Medications reviewed and updated in facility chart.    I have spent a total time of 45 minutes on 04/11/24 in caring for this patient including Diagnostic results, Prognosis, Risks and benefits of tx options, Instructions for management, Patient and family education, Counseling / Coordination of care, Documenting in the medical record, Reviewing / ordering tests, medicine, procedures  , Obtaining or reviewing history  , and Communicating with other healthcare  professionals .

## 2024-04-21 PROBLEM — R26.2 AMBULATORY DYSFUNCTION: Status: RESOLVED | Noted: 2022-06-06 | Resolved: 2024-04-21

## 2024-04-21 PROBLEM — R53.81 PHYSICAL DECONDITIONING: Status: RESOLVED | Noted: 2023-11-24 | Resolved: 2024-04-21

## 2024-04-21 RX ORDER — BENAZEPRIL HYDROCHLORIDE 40 MG/1
20 TABLET ORAL DAILY
Qty: 90 TABLET | Refills: 1 | Status: SHIPPED | OUTPATIENT
Start: 2024-04-21

## 2024-04-22 ENCOUNTER — TELEPHONE (OUTPATIENT)
Dept: PULMONOLOGY | Facility: CLINIC | Age: 89
End: 2024-04-22

## 2024-04-22 NOTE — ASSESSMENT & PLAN NOTE
RRR  Continue Coreg 25 mg twice daily  Continue Eliquis for anticoagulation  CBC 11/2023 reviewed with stable Hgb 13,

## 2024-04-22 NOTE — ASSESSMENT & PLAN NOTE
No exacerbation  Encourage SAMANTHA stockings or Tubi  daily. Continue benazepril, Coreg, Bumex and potassium supplement daily  Monitor weight, BMP

## 2024-04-22 NOTE — ASSESSMENT & PLAN NOTE
With chronic hypernatremia in setting of aggressive diuresis needed for congestive heart failure  sodium level 145 on 1/23/24  Continue cardiac diet with low sodium   son

## 2024-04-22 NOTE — ASSESSMENT & PLAN NOTE
BP stable, 110s-120s/50s-80s, except elevated 158/80 today  Continue benazepril 20 mg daily, Bumex 2 mg BID, and Coreg 25 mg BID  Monitor BP.

## 2024-04-22 NOTE — ASSESSMENT & PLAN NOTE
Vascular dementia vs mixed type  MoCA 22/30 on 6/6/2022  CT head wo contrast 11/10/23 showed Chronic microangiopathic changes, chronic bilateral thalamic lacunar infarctions, and chronic right parietal cortical infarction.  Continue to assist with ADLs/iADLs in memory care unit.   Frequent reorientation, redirection. Encouraged frequent family visits.  Encouraged participation in group activities when appropriate.  Continue delirium precautions

## 2024-05-14 ENCOUNTER — NURSING HOME VISIT (OUTPATIENT)
Dept: GERIATRICS | Facility: OTHER | Age: 89
End: 2024-05-14
Payer: COMMERCIAL

## 2024-05-14 VITALS
RESPIRATION RATE: 20 BRPM | DIASTOLIC BLOOD PRESSURE: 81 MMHG | HEART RATE: 60 BPM | OXYGEN SATURATION: 94 % | TEMPERATURE: 97.2 F | BODY MASS INDEX: 36.64 KG/M2 | WEIGHT: 241 LBS | SYSTOLIC BLOOD PRESSURE: 154 MMHG

## 2024-05-14 DIAGNOSIS — G47.33 OBSTRUCTIVE SLEEP APNEA: ICD-10-CM

## 2024-05-14 DIAGNOSIS — R41.89 COGNITIVE IMPAIRMENT: ICD-10-CM

## 2024-05-14 DIAGNOSIS — N40.1 BENIGN PROSTATIC HYPERPLASIA WITH LOWER URINARY TRACT SYMPTOMS, SYMPTOM DETAILS UNSPECIFIED: ICD-10-CM

## 2024-05-14 DIAGNOSIS — I10 BENIGN ESSENTIAL HYPERTENSION: ICD-10-CM

## 2024-05-14 DIAGNOSIS — E03.8 OTHER SPECIFIED HYPOTHYROIDISM: ICD-10-CM

## 2024-05-14 DIAGNOSIS — I48.91 ATRIAL FIBRILLATION, UNSPECIFIED TYPE (HCC): ICD-10-CM

## 2024-05-14 DIAGNOSIS — I25.10 CORONARY ARTERY DISEASE INVOLVING NATIVE CORONARY ARTERY OF NATIVE HEART WITHOUT ANGINA PECTORIS: ICD-10-CM

## 2024-05-14 DIAGNOSIS — J44.9 CHRONIC OBSTRUCTIVE PULMONARY DISEASE, UNSPECIFIED COPD TYPE (HCC): ICD-10-CM

## 2024-05-14 DIAGNOSIS — I50.32 CHRONIC DIASTOLIC (CONGESTIVE) HEART FAILURE (HCC): Primary | ICD-10-CM

## 2024-05-14 DIAGNOSIS — H90.3 SENSORINEURAL HEARING LOSS (SNHL) OF BOTH EARS: ICD-10-CM

## 2024-05-14 PROCEDURE — 99310 SBSQ NF CARE HIGH MDM 45: CPT | Performed by: FAMILY MEDICINE

## 2024-05-14 NOTE — PROGRESS NOTES
[facility]  [address] [address] [phone]  [facility]  [address]    NAME: Will Mullen  AGE: 89 y.o. SEX: male [id number]    DATE OF ENCOUNTER: 5/14/2024    Assessment and Plan     Chronic diastolic (congestive) heart failure (HCC)  No exacerbation  Encourage SAMANTHA stockings or Tubi  daily. Continue benazepril, Coreg, Bumex and potassium supplement daily  Monitor weight, BMP ordered.    Atrial fibrillation (HCC)  RRR  Continue Coreg 25 mg twice daily  Continue Eliquis for anticoagulation  CBC 11/2023 reviewed with stable Hgb 13,   Monitor CBC. Order placed today    Coronary artery disease  Stable, asymptomatic  Continue statin    Essential hypertension  BP stable, at goal, except elevated 154/81 today  Continue benazepril 20 mg daily, Bumex 2 mg BID, and Coreg 25 mg BID  Monitor BP.    Chronic obstructive pulmonary disease (HCC)  Stable, no exacerbation  Continue ipratropium and albuterol nebulizer 3 times daily    Obstructive sleep apnea  Continue BiPAP nightly.    Follow-up with pulmonology outpatient    Hypothyroidism  Stable, TSH WNL 0.86 (11/12/23)  Continue levothyroxine 100 mcg daily  Monitor TSH. Order placed    Sensorineural hearing loss (SNHL) of both ears  Encourage patient to wear hearing aids at all appropriate time    BPH (benign prostatic hyperplasia)  Maintain on finasteride 5 mg daily    Cognitive impairment  Vascular dementia vs mixed type  MoCA 22/30 on 6/6/2022  CT head wo contrast 11/10/23 showed Chronic microangiopathic changes, chronic bilateral thalamic lacunar infarctions, and chronic right parietal cortical infarction.  Continue to assist with ADLs/iADLs in memory care unit.   Frequent reorientation, redirection. Encouraged frequent family visits.  Encouraged participation in group activities when appropriate.  Continue delirium precautions      - Counseling Documentation: patient was counseled regarding: diagnostic results, instructions for  management, prognosis, patient and family education, risks and benefits of treatment options, and importance of compliance with treatment    Chief Complaint     Routine Long term follow-up visit.    History of Present Illness     The patient is seen in his room in memory care unit today for LTC f/u visit. He is sitting in recliner, daughter and grandson at bed side. He is playing chess with his grandson. Completed meals with no nausea, vomiting. He denies CP, SOB, or palpitations. He refuses group activities in the unit. No concern from nursing report.     The following portions of the patient's history were reviewed and updated as appropriate: allergies, current medications, past family history, past medical history, past social history, past surgical history and problem list.    Review of Systems   As per HPI, otherwise negative  Objective     Vitals:    24 0819   BP: 154/81   Pulse: 60   Resp: 20   Temp: (!) 97.2 °F (36.2 °C)   SpO2: 94%   Weight: 109 kg (241 lb)     Physical Exam  Vitals and nursing note reviewed.   General: no acute distress. Obese  HENT:      Head: Normocephalic.      Nose: Nose normal.      Mouth: Mucous membranes are moist.   Eyes:       Right eye: No discharge.         Left eye: No discharge.      Conjunctivae normal.   Cardiovascular:      Normal rate and regular rhythm. No murmur heard.  Pulmonary:      Pulmonary effort is normal. Diminished breath sounds b/l. No wheezing, rhonchi. Soft bibasilar rales.  Abdominal:      Bowel sounds are normal. There is no distension.      Abdomen is soft. There is no abdominal tenderness.   Musculoskeletal:      Cervical back: Neck supple.      Right lower le+ edema.      Left lower le+ edema.   Skin:     General: Skin is warm.   Neurological: alert.      Oriented to person, and place. Pleasant, cooperative, follow commands      Behavior: normal.     Pertinent Laboratory/Diagnostic Studies:  Refer to facility chart.    Current Medications    Medications reviewed and updated in facility chart.  I have spent a total time of 45 minutes on 05/14/24 in caring for this patient including Diagnostic results, Prognosis, Risks and benefits of tx options, Instructions for management, Patient and family education, Importance of tx compliance, Counseling / Coordination of care, Documenting in the medical record, Reviewing / ordering tests, medicine, procedures  , Obtaining or reviewing history  , and Communicating with other healthcare professionals .

## 2024-05-15 NOTE — ASSESSMENT & PLAN NOTE
No exacerbation  Encourage SAMATNHA stockings or Tubi  daily. Continue benazepril, Coreg, Bumex and potassium supplement daily  Monitor weight, BMP ordered.

## 2024-05-15 NOTE — ASSESSMENT & PLAN NOTE
RRR  Continue Coreg 25 mg twice daily  Continue Eliquis for anticoagulation  CBC 11/2023 reviewed with stable Hgb 13,   Monitor CBC. Order placed today

## 2024-05-15 NOTE — ASSESSMENT & PLAN NOTE
BP stable, at goal, except elevated 154/81 today  Continue benazepril 20 mg daily, Bumex 2 mg BID, and Coreg 25 mg BID  Monitor BP.

## 2024-06-03 NOTE — ASSESSMENT & PLAN NOTE
Rate controlled on Coreg  Continue Eliquis for anticoagulation Have Your Skin Lesions Been Treated?: not been treated Is This A New Presentation, Or A Follow-Up?: Skin Lesions

## 2024-06-10 PROBLEM — I13.0 HYPERTENSIVE HEART AND RENAL DISEASE WITH CONGESTIVE HEART FAILURE (HCC): Status: ACTIVE | Noted: 2024-06-10

## 2024-06-10 PROBLEM — R93.89 ABNORMAL CT SCAN: Status: RESOLVED | Noted: 2023-10-23 | Resolved: 2024-06-10

## 2024-06-10 PROBLEM — R91.8 OPACITY OF LUNG ON IMAGING STUDY: Status: RESOLVED | Noted: 2023-12-12 | Resolved: 2024-06-10

## 2024-06-17 ENCOUNTER — NURSING HOME VISIT (OUTPATIENT)
Dept: GERIATRICS | Facility: OTHER | Age: 89
End: 2024-06-17
Payer: COMMERCIAL

## 2024-06-17 DIAGNOSIS — N40.1 BENIGN PROSTATIC HYPERPLASIA WITH LOWER URINARY TRACT SYMPTOMS, SYMPTOM DETAILS UNSPECIFIED: ICD-10-CM

## 2024-06-17 DIAGNOSIS — I50.32 CHRONIC DIASTOLIC (CONGESTIVE) HEART FAILURE (HCC): ICD-10-CM

## 2024-06-17 DIAGNOSIS — I25.10 CORONARY ARTERY DISEASE INVOLVING NATIVE CORONARY ARTERY OF NATIVE HEART WITHOUT ANGINA PECTORIS: ICD-10-CM

## 2024-06-17 DIAGNOSIS — I48.91 ATRIAL FIBRILLATION, UNSPECIFIED TYPE (HCC): ICD-10-CM

## 2024-06-17 DIAGNOSIS — J44.9 CHRONIC OBSTRUCTIVE PULMONARY DISEASE, UNSPECIFIED COPD TYPE (HCC): ICD-10-CM

## 2024-06-17 DIAGNOSIS — I10 BENIGN ESSENTIAL HYPERTENSION: ICD-10-CM

## 2024-06-17 DIAGNOSIS — G47.33 OBSTRUCTIVE SLEEP APNEA: ICD-10-CM

## 2024-06-17 DIAGNOSIS — E03.8 OTHER SPECIFIED HYPOTHYROIDISM: ICD-10-CM

## 2024-06-17 DIAGNOSIS — R41.89 COGNITIVE IMPAIRMENT: Primary | ICD-10-CM

## 2024-06-17 PROCEDURE — 99310 SBSQ NF CARE HIGH MDM 45: CPT | Performed by: FAMILY MEDICINE

## 2024-06-17 NOTE — PROGRESS NOTES
[facility]  [address] [address] [phone]  [facility]  POS [address]    NAME: Will Mullen  AGE: 89 y.o. SEX: male [id number]    DATE OF ENCOUNTER: 6/17/2024    Assessment and Plan     Cognitive impairment  Vascular dementia vs mixed type  MoCA 22/30 on 6/6/2022  CT head wo contrast 11/10/23 showed Chronic microangiopathic changes, chronic bilateral thalamic lacunar infarctions, and chronic right parietal cortical infarction.  Continue to assist with ADLs/iADLs in memory care unit.   Frequent reorientation, redirection. Continue frequent family visits.  Encouraged participation in group activities when appropriate.  Continue delirium precautions    Atrial fibrillation (HCC)  RRR  Continue Coreg 25 mg twice daily  Continue Eliquis for anticoagulation  CBC 5/2024 reviewed with stable Hgb 12.5,   Monitor CBC.     Chronic diastolic (congestive) heart failure (HCC)  No exacerbation  Continue Tubi  daily. Continue benazepril, Coreg, Bumex and potassium supplement daily  Monitor weight, BMP reviewed.    Coronary artery disease  Stable, asymptomatic  Continue statin    Essential hypertension  BP stable, at goal, except soft BP 99/56 today  Continue benazepril 20 mg daily, Bumex 2 mg BID, and Coreg 25 mg BID  Monitor BP and adjust the dose of benazepril accordingly    Chronic obstructive pulmonary disease (HCC)  Stable, no exacerbation  Continue ipratropium and albuterol nebulizer 3 times daily    Obstructive sleep apnea  Continue BiPAP nightly.    Follow-up with pulmonology outpatient    Hypothyroidism  Stable, TSH WNL 2.3 (5/15/24)  Continue levothyroxine 100 mcg daily  Monitor TSH    BPH (benign prostatic hyperplasia)  Maintain on finasteride 5 mg daily    - Counseling Documentation: patient and family was counseled regarding: diagnostic results, instructions for management, risk factor reductions, patient and family education, risks and benefits of treatment options, and  importance of compliance with treatment    Chief Complaint     Routine Long term follow-up visit.    History of Present Illness   The patient is seen in his room today for routine LTC f/u visit. Daughter at bed side. He denies any pain or discomfort. Daughter reports patient has engaged in playing cards, games, etc. Per nursing report, his BP was soft this morning, 99/56. We will keep the same dose of medications and monitor BP every shift. Lab results discussed with family.    The following portions of the patient's history were reviewed and updated as appropriate: allergies, current medications, past family history, past medical history, past social history, past surgical history and problem list.    Review of Systems     Review of Systems   Eyes:  Positive for visual disturbance (wears glasses).   Cardiovascular:  Negative for chest pain.   Gastrointestinal:  Negative for abdominal pain.   Neurological:  Negative for headaches.     Active Problem List     Patient Active Problem List   Diagnosis    Atrial fibrillation (HCC)    Coronary artery disease    Essential hypertension    Hypothyroidism    Obstructive sleep apnea    Chronic anticoagulation    Cognitive impairment    Malignant neoplasm of urinary bladder (HCC)    Class 2 severe obesity with serious comorbidity in adult (HCC)    Restrictive lung disease    Seasonal allergic rhinitis    Change in mole    Hypernatremia    Sensorineural hearing loss (SNHL) of both ears    Chronic obstructive pulmonary disease (HCC)    CKD (chronic kidney disease) stage 2, GFR 60-89 ml/min    Dry eyes    Pigmented skin lesion    Vitamin D deficiency    BPH (benign prostatic hyperplasia)    History of stroke    Other constipation    Chronic diastolic (congestive) heart failure (HCC)    Hypertensive heart and renal disease with congestive heart failure (HCC)     Objective     Vitals:EHR at facility reviewed, stable.     Physical Exam  Vitals and nursing note reviewed.   General: no  acute distress.  HENT:      Head: Normocephalic.      Nose: Nose normal.      Mouth: Mucous membranes are moist.   Eyes:       Right eye: No discharge.         Left eye: No discharge.      Conjunctivae normal.   Cardiovascular:      Normal rate and regular rhythm.   Pulmonary:      Pulmonary effort is normal. Wheezing +, no rhonchi or rales.   Abdominal:      Bowel sounds are normal. There is no distension.      Abdomen is soft. There is no abdominal tenderness.   Musculoskeletal:      Right lower le+ edema.      Left lower le+ edema.   Skin:     General: Skin is warm.   Neurological: alert.      Oriented to person, place, and time. Cooperative, follow commands      Behavior: normal.     Pertinent Laboratory/Diagnostic Studies:  Refer to facility chart.  CBC NO DIFF  5/15/2024    HEMOGLOBIN 12.5 12.5 - 17.0 g/dL      HEMATOCRIT 36.6  L 37.0 - 48.0 %      WBC 5.6 4.0 - 10.5 thou/cmm      RBC 4.06 4.00 - 5.40 mill/cmm      PLATELET COUNT 240 140 - 350 thou/cmm      MPV 8.8 7.5 - 11.3 fL      MCV 90 80 - 100 fL      MCH 30.8 27.0 - 36.0 pg      MCHC 34.1 32.0 - 37.0 g/dL      RDW 15.7 12.0 - 16.0 %         COMP METAB PANEL  5/15/2024    GLUCOSE 117  H 65 - 99 mg/dL      BUN 29  H 7 - 28 mg/dL      CREATININE 1.08 0.53 - 1.30 mg/dL      SODIUM 145 135 - 145 mmol/L      POTASSIUM 3.9 3.5 - 5.2 mmol/L      CHLORIDE 107 100 - 109 mmol/L      CARBON DIOXIDE 30 21 - 31 mmol/L      CALCIUM 9.1 8.5 - 10.1 mg/dL      ALKALINE PHOSPHATASE 91 35 - 120 U/L      ALBUMIN 3.7 3.5 - 5.7 g/dL      BILIRUBIN,TOTAL 1.0 0.2 - 1.0 mg/dL         Eltrombopag and its metabolites may interfere with this assay causing         erroneously high patient results.     PROTEIN, TOTAL 6.2  L 6.3 - 8.3 g/dL      AST 13 <41 U/L      ALT 12 <56 U/L      ANION GAP 8 3 - 11       eGFRcr 63    TSH 2.3 (5/15/2024)    Current Medications   Medications reviewed and updated in facility chart.  Current Outpatient Medications   Medication Instructions     acetaminophen (TYLENOL) 650 mg, Oral, Every 4 hours PRN    albuterol 2.5 mg, Nebulization, 3 times daily    apixaban (ELIQUIS) 5 mg, Oral, 2 times daily    atorvastatin (LIPITOR) 40 mg, Oral, Daily at bedtime    benazepril (LOTENSIN) 20 mg, Oral, Daily    bisacodyl (FLEET) 10 mg, Rectal, Daily PRN    bumetanide (BUMEX) 2 mg, Oral, 2 times daily    carboxymethylcellulose 0.5 % SOLN 1 drop, Both Eyes, 4 times daily    carvedilol (COREG) 25 mg, Oral, 2 times daily with meals    cholecalciferol (VITAMIN D3) 1,000 units tablet Take one tablet by mouth at noon with lunch for vitamin d deficiency     mg, Oral, Daily PRN    finasteride (PROSCAR) 5 mg, Oral, Daily    fluticasone (FLONASE) 50 mcg/act nasal spray 1 spray, Nasal, Daily    ipratropium (ATROVENT) 0.5 mg, Nebulization, 3 times daily (RESP)    latanoprost (XALATAN) 0.005 % ophthalmic solution 1 drop, Ophthalmic, Daily    levothyroxine 100 mcg tablet Take one tablet by mouth at bedtime for hypothyroid    polyethylene glycol (MIRALAX) 17 g, Oral, Daily    potassium chloride (Klor-Con M10) 10 mEq tablet 30 mEq, Oral, Daily   I have spent a total time of 45 minutes on 06/17/24 in caring for this patient including Diagnostic results, Risks and benefits of tx options, Instructions for management, Patient and family education, Importance of tx compliance, Counseling / Coordination of care, Documenting in the medical record, Reviewing / ordering tests, medicine, procedures  , Obtaining or reviewing history  , and Communicating with other healthcare professionals .

## 2024-06-23 RX ORDER — FLUTICASONE PROPIONATE 50 MCG
1 SPRAY, SUSPENSION (ML) NASAL DAILY
COMMUNITY

## 2024-06-23 RX ORDER — ACETAMINOPHEN 325 MG/1
650 TABLET ORAL EVERY 4 HOURS PRN
COMMUNITY

## 2024-06-24 NOTE — ASSESSMENT & PLAN NOTE
No exacerbation  Continue Tubi  daily. Continue benazepril, Coreg, Bumex and potassium supplement daily  Monitor weight, BMP reviewed.

## 2024-06-24 NOTE — ASSESSMENT & PLAN NOTE
BP stable, at goal, except soft BP 99/56 today  Continue benazepril 20 mg daily, Bumex 2 mg BID, and Coreg 25 mg BID  Monitor BP and adjust the dose of benazepril accordingly

## 2024-06-24 NOTE — ASSESSMENT & PLAN NOTE
RRR  Continue Coreg 25 mg twice daily  Continue Eliquis for anticoagulation  CBC 5/2024 reviewed with stable Hgb 12.5,   Monitor CBC.

## 2024-06-24 NOTE — ASSESSMENT & PLAN NOTE
Vascular dementia vs mixed type  MoCA 22/30 on 6/6/2022  CT head wo contrast 11/10/23 showed Chronic microangiopathic changes, chronic bilateral thalamic lacunar infarctions, and chronic right parietal cortical infarction.  Continue to assist with ADLs/iADLs in memory care unit.   Frequent reorientation, redirection. Continue frequent family visits.  Encouraged participation in group activities when appropriate.  Continue delirium precautions

## 2024-07-05 ENCOUNTER — NURSING HOME VISIT (OUTPATIENT)
Dept: GERIATRICS | Facility: OTHER | Age: 89
End: 2024-07-05
Payer: COMMERCIAL

## 2024-07-05 DIAGNOSIS — J44.9 CHRONIC OBSTRUCTIVE PULMONARY DISEASE, UNSPECIFIED COPD TYPE (HCC): ICD-10-CM

## 2024-07-05 DIAGNOSIS — I50.32 CHRONIC DIASTOLIC (CONGESTIVE) HEART FAILURE (HCC): Primary | ICD-10-CM

## 2024-07-05 PROCEDURE — 99309 SBSQ NF CARE MODERATE MDM 30: CPT | Performed by: NURSE PRACTITIONER

## 2024-07-05 NOTE — ASSESSMENT & PLAN NOTE
Wt Readings from Last 3 Encounters:   05/14/24 109 kg (241 lb)   12/05/23 107 kg (236 lb)   11/29/23 109 kg (240 lb)   Today's weight 241.4 pounds<<250.8 pounds 6/19/2024<<247 pounds 5/15/2024<<241 pounds 4/17/2024  Exam not suggestive of fluid overload  Continue compression stockings to bilateral lower extremities daily  Continue benazepril, Coreg, Bumex, and potassium supplement  Cardiology follow-up?  [facility]  will check with patient's daughter whether she wants him to have a follow-up appointment with cardiology office

## 2024-07-05 NOTE — ASSESSMENT & PLAN NOTE
Stable without exacerbation  O2 sat stable on room air  Continue ipratropium inhaler and albuterol nebulizer treatments  [facility]  will check with patient's daughter whether she wants patient to have a follow-up appointment with pulmonology

## 2024-07-05 NOTE — PROGRESS NOTES
Facility: [facility]  POS: 31  Acute Med Note  Code status: DNR     Assessment/Plan:  89-year-old with:     Chronic diastolic (congestive) heart failure (HCC)  Wt Readings from Last 3 Encounters:   05/14/24 109 kg (241 lb)   12/05/23 107 kg (236 lb)   11/29/23 109 kg (240 lb)   Today's weight 241.4 pounds<<250.8 pounds 6/19/2024<<247 pounds 5/15/2024<<241 pounds 4/17/2024.  Patient actually lost weight from June 2024  Exam not suggestive of fluid overload  Patient noncompliant with fluid restriction and found times in the bathroom drinking water from faucet.  He has dementia and therefore forgets that he is on a fluid restriction  Continue compression stockings to bilateral lower extremities daily  Continue benazepril, Coreg, Bumex, and potassium supplement  Cardiology follow-up?  [facility]  will check with patient's daughter whether she wants him to have a follow-up appointment with cardiology office    Chronic obstructive pulmonary disease (HCC)  Stable without exacerbation  O2 sat stable on room air  Continue ipratropium inhaler and albuterol nebulizer treatments  [facility]  will check with patient's daughter whether she wants patient to have a follow-up appointment with pulmonology  Subjective: No complaints     Patient ID: Will Mullen is a 89 y.o. male.    89-year-old male seen and examined at the request of patient's daughter for concern of weight gain and edema.  Last weights noted from 6/19/2024 250.8 pounds which is a 9.8 pound weight gain from April 2024.  Requested that nursing weigh patient today for comparison.  Patient is awake 241.4 pounds today.  Received patient seated in chair and he appeared in no distress.  He has conversational dyspnea which is not new.  He has trace pitting edema to bilateral extremities.  Exam not suggestive of fluid overload.  Left voice message for patient's daughter regarding clinical assessment findings and for her to call me  if she has any further questions.    The following portions of the patient's history were reviewed and updated as appropriate:  Allergies, current medications, Past Family history, past medical history, past social history, past surgical history, and problem list.    Review of Systems   Constitutional:  Negative for chills and diaphoresis.   HENT:  Positive for hearing loss.    Respiratory:  Positive for shortness of breath (On exertion). Negative for cough.    Cardiovascular:  Positive for leg swelling. Negative for chest pain and palpitations.   Gastrointestinal:  Negative for abdominal pain.   Endocrine: Negative.    Musculoskeletal:  Positive for gait problem.   Neurological:  Negative for dizziness, syncope, speech difficulty, light-headedness, numbness and headaches.   Psychiatric/Behavioral:  Negative for behavioral problems, dysphoric mood and hallucinations.    All other systems reviewed and are negative.        Objective:  Weight: 241.4 pounds   BP: 123/73   heart rate: 64    Resp: 18     Physical Exam  Vitals and nursing note reviewed.   Constitutional:       General: He is not in acute distress.     Appearance: He is not toxic-appearing or diaphoretic.      Comments: Elderly male who appears with chronic illness.  In no distress.   HENT:      Head: Normocephalic.      Nose: No congestion.      Mouth/Throat:      Mouth: Mucous membranes are moist.      Pharynx: No oropharyngeal exudate.   Eyes:      General:         Right eye: No discharge.         Left eye: No discharge.      Extraocular Movements: Extraocular movements intact.      Conjunctiva/sclera: Conjunctivae normal.      Pupils: Pupils are equal, round, and reactive to light.   Neck:      Vascular: No carotid bruit.   Cardiovascular:      Rate and Rhythm: Normal rate.      Pulses: Normal pulses.   Pulmonary:      Effort: Pulmonary effort is normal. No respiratory distress.      Breath sounds: No rhonchi.      Comments: Decreased breath sounds  bibasilar.  Conversational dyspnea.  Dyspneic on exertion.  Abdominal:      General: Bowel sounds are normal.      Palpations: Abdomen is soft.   Musculoskeletal:      Right lower leg: Edema (Trace pitting) present.      Left lower leg: Edema (Trace pitting) present.      Comments: Moves all 4 extremities.  Compression stockings are in place.   Skin:     General: Skin is warm and dry.      Capillary Refill: Capillary refill takes less than 2 seconds.   Neurological:      Mental Status: He is alert. Mental status is at baseline.      Motor: Weakness present.      Gait: Gait abnormal.   Psychiatric:         Mood and Affect: Mood normal.         Behavior: Behavior normal.         Thought Content: Thought content normal.           This note was completed in part utilizing with Dragon medical one voice recognition software.  Grammatical errors, random word insertion, spelling mistakes, and incomplete sentences may be an occasional consequence of the system secondary to software limitations, ambient noise and hardware issues.  At the time of dictation, efforts were made to edit, clarify and/or correct errors.  Please read the chart carefully and recognize, using context, where substitutions have occurred.  If you have any questions or concerns about the context, text or information contained within the body of this dictation, please contact myself, the provider, for further clarification.

## 2024-07-22 ENCOUNTER — NURSING HOME VISIT (OUTPATIENT)
Dept: GERIATRICS | Facility: OTHER | Age: 89
End: 2024-07-22
Payer: COMMERCIAL

## 2024-07-22 VITALS
OXYGEN SATURATION: 96 % | BODY MASS INDEX: 36.7 KG/M2 | RESPIRATION RATE: 18 BRPM | SYSTOLIC BLOOD PRESSURE: 146 MMHG | TEMPERATURE: 97 F | HEART RATE: 62 BPM | WEIGHT: 241.4 LBS | DIASTOLIC BLOOD PRESSURE: 68 MMHG

## 2024-07-22 DIAGNOSIS — E03.8 OTHER SPECIFIED HYPOTHYROIDISM: ICD-10-CM

## 2024-07-22 DIAGNOSIS — C67.9 MALIGNANT NEOPLASM OF URINARY BLADDER, UNSPECIFIED SITE (HCC): ICD-10-CM

## 2024-07-22 DIAGNOSIS — N40.1 BENIGN PROSTATIC HYPERPLASIA WITH LOWER URINARY TRACT SYMPTOMS, SYMPTOM DETAILS UNSPECIFIED: ICD-10-CM

## 2024-07-22 DIAGNOSIS — H61.23 IMPACTED CERUMEN OF BOTH EARS: ICD-10-CM

## 2024-07-22 DIAGNOSIS — I10 BENIGN ESSENTIAL HYPERTENSION: ICD-10-CM

## 2024-07-22 DIAGNOSIS — J44.9 CHRONIC OBSTRUCTIVE PULMONARY DISEASE, UNSPECIFIED COPD TYPE (HCC): ICD-10-CM

## 2024-07-22 DIAGNOSIS — G47.33 OBSTRUCTIVE SLEEP APNEA: ICD-10-CM

## 2024-07-22 DIAGNOSIS — H90.3 SENSORINEURAL HEARING LOSS (SNHL) OF BOTH EARS: ICD-10-CM

## 2024-07-22 DIAGNOSIS — I50.32 CHRONIC DIASTOLIC (CONGESTIVE) HEART FAILURE (HCC): Primary | ICD-10-CM

## 2024-07-22 DIAGNOSIS — I25.10 CORONARY ARTERY DISEASE INVOLVING NATIVE CORONARY ARTERY OF NATIVE HEART WITHOUT ANGINA PECTORIS: ICD-10-CM

## 2024-07-22 DIAGNOSIS — I48.91 ATRIAL FIBRILLATION, UNSPECIFIED TYPE (HCC): ICD-10-CM

## 2024-07-22 DIAGNOSIS — R41.89 COGNITIVE IMPAIRMENT: ICD-10-CM

## 2024-07-22 PROCEDURE — 99310 SBSQ NF CARE HIGH MDM 45: CPT | Performed by: FAMILY MEDICINE

## 2024-07-22 NOTE — PROGRESS NOTES
[facility]  [address] [address]  [phone]  [facility]  [address]    NAME: Will Mullen  AGE: 89 y.o. SEX: male [phone]    DATE OF ENCOUNTER: 7/22/2024    Assessment and Plan     Chronic diastolic (congestive) heart failure (HCC)  Wt Readings from Last 3 Encounters:   07/22/24 109 kg (241 lb 6.4 oz)   05/14/24 109 kg (241 lb)   12/05/23 107 kg (236 lb)   Weight stable  Euvolemic on exam  Continue benazepril 40 mg daily, carvedilol 25 mg twice daily, bumetanide 2 mg twice daily, and potassium chloride 20 mEq daily. BMP 5/15 with normal potassium 3.9  Continue fluid restriction 1800 ml as per daughter request  Monitor weight    Atrial fibrillation (HCC)  RRR  Continue Coreg 25 mg twice daily  Continue Eliquis for anticoagulation  CBC 5/2024 reviewed with stable Hgb 12.5,   Monitor CBC, due in 3 months    Coronary artery disease  Stable, asymptomatic  Continue statin    Essential hypertension  Will reduce the dose of benazepril from 20 mg to 10 mg due to low BP 90s/60s  Continue carvedilol and bumetanide  Continue monitoring BP    Chronic obstructive pulmonary disease (HCC)  Stable without exacerbation  O2 sat stable on room air  Continue ipratropium inhaler and albuterol nebulizer treatments    Obstructive sleep apnea  Continue BiPAP nightly.      Hypothyroidism  Stable, TSH WNL 2.3 (5/15/24)  Continue levothyroxine 100 mcg daily  Monitor TSH Q6months    Sensorineural hearing loss (SNHL) of both ears  Encourage patient to wear hearing aids at all appropriate time    BPH (benign prostatic hyperplasia)  Maintain on finasteride 5 mg daily    Cognitive impairment  Vascular dementia vs mixed type  MoCA 22/30 on 6/6/2022  CT head wo contrast 11/10/23 showed Chronic microangiopathic changes, chronic bilateral thalamic lacunar infarctions, and chronic right parietal cortical infarction.  Continue to assist with ADLs/iADLs in memory care unit.   Frequent reorientation,  redirection. Continue frequent family visits.  Encouraged participation in group activities when appropriate.  Continue delirium precautions  Stable, no behavior disturbances reported    Impacted cerumen of both ears  Debrox otic solution 5 drops for 5 days  Will recheck and irrigation with curette     - Counseling Documentation: patient's caregiver was counseled regarding: diagnostic results, instructions for management, patient and family education, and importance of compliance with treatment    Chief Complaint     Routine Long term follow-up visit.    History of Present Illness   The patient is seen in the room during routine LTC follow up visit. He is sitting in recliner, playing magnetic games with his caregiver. He denies CP, SOB, or palpitations. He refused taking his biotene mucosal spray at times. He states he doesn't need it. Per nursing report, he often drinks tap water in the bathroom. Per caregiver, he was confused last weekend when he forgot who he is, where he is. Caregiver was able to redirect. No agitation or behavior disturbances reported.     The following portions of the patient's history were reviewed and updated as appropriate: allergies, current medications, past family history, past medical history, past social history, past surgical history and problem list.    Review of Systems   As per HPI, otherwise negative  Objective     Vitals: EHR at facility reviewed, stable.  Nursing note reviewed.   General: no acute distress.  HENT:      Head: Normocephalic.      Cerumen + in both ears     Nose: Nose normal.      Mouth: Mucous membranes are moist.   Eyes:       Right eye: No discharge.         Left eye: No discharge.      Conjunctivae normal.   Cardiovascular:      Normal rate and regular rhythm. No murmur heard.  Pulmonary:      Pulmonary effort is normal. No wheezing, rhonchi or rales.   Abdominal:      Bowel sounds are normal. There is no distension.      Abdomen is soft. There is no abdominal  tenderness.   Musculoskeletal:      Cervical back: Neck supple.      Right lower le+ edema.      Left lower le+ edema.   Skin:     General: Skin is warm.   Neurological: alert.      Oriented to person only. Pleasantly confused. Cooperative, follow simple commands      Behavior: normal.     Pertinent Laboratory/Diagnostic Studies:  Refer to facility chart.    Current Medications   Medications reviewed and updated in facility chart.  I have spent a total time of 60 minutes in caring for this patient on the day of the visit/encounter including Diagnostic results, Prognosis, Instructions for management, Patient and family education, Counseling / Coordination of care, Documenting in the medical record, Reviewing / ordering tests, medicine, procedures  , Obtaining or reviewing history  , and Communicating with other healthcare professionals .

## 2024-07-25 RX ORDER — BENAZEPRIL HYDROCHLORIDE 10 MG/1
10 TABLET ORAL DAILY
Start: 2024-07-25

## 2024-07-25 NOTE — ASSESSMENT & PLAN NOTE
Wt Readings from Last 3 Encounters:   07/22/24 109 kg (241 lb 6.4 oz)   05/14/24 109 kg (241 lb)   12/05/23 107 kg (236 lb)   Weight stable  Euvolemic on exam  Continue benazepril 40 mg daily, carvedilol 25 mg twice daily, bumetanide 2 mg twice daily, and potassium chloride 20 mEq daily. BMP 5/15 with normal potassium 3.9  Continue fluid restriction 1800 ml as per daughter request  Monitor weight

## 2024-07-25 NOTE — ASSESSMENT & PLAN NOTE
Stable without exacerbation  O2 sat stable on room air  Continue ipratropium inhaler and albuterol nebulizer treatments

## 2024-07-25 NOTE — ASSESSMENT & PLAN NOTE
Will reduce the dose of benazepril from 20 mg to 10 mg due to low BP 90s/60s  Continue carvedilol and bumetanide  Continue monitoring BP

## 2024-07-25 NOTE — ASSESSMENT & PLAN NOTE
Vascular dementia vs mixed type  MoCA 22/30 on 6/6/2022  CT head wo contrast 11/10/23 showed Chronic microangiopathic changes, chronic bilateral thalamic lacunar infarctions, and chronic right parietal cortical infarction.  Continue to assist with ADLs/iADLs in memory care unit.   Frequent reorientation, redirection. Continue frequent family visits.  Encouraged participation in group activities when appropriate.  Continue delirium precautions  Stable, no behavior disturbances reported

## 2024-07-25 NOTE — ASSESSMENT & PLAN NOTE
RRR  Continue Coreg 25 mg twice daily  Continue Eliquis for anticoagulation  CBC 5/2024 reviewed with stable Hgb 12.5,   Monitor CBC, due in 3 months

## 2024-08-23 ENCOUNTER — NURSING HOME VISIT (OUTPATIENT)
Dept: GERIATRICS | Facility: OTHER | Age: 89
End: 2024-08-23
Payer: COMMERCIAL

## 2024-08-23 DIAGNOSIS — I48.91 ATRIAL FIBRILLATION, UNSPECIFIED TYPE (HCC): ICD-10-CM

## 2024-08-23 DIAGNOSIS — J44.9 CHRONIC OBSTRUCTIVE PULMONARY DISEASE, UNSPECIFIED COPD TYPE (HCC): ICD-10-CM

## 2024-08-23 DIAGNOSIS — I10 BENIGN ESSENTIAL HYPERTENSION: ICD-10-CM

## 2024-08-23 DIAGNOSIS — G47.33 OBSTRUCTIVE SLEEP APNEA: ICD-10-CM

## 2024-08-23 DIAGNOSIS — F01.B2 MODERATE VASCULAR DEMENTIA WITH PSYCHOTIC DISTURBANCE (HCC): Primary | ICD-10-CM

## 2024-08-23 DIAGNOSIS — N40.1 BENIGN PROSTATIC HYPERPLASIA WITH LOWER URINARY TRACT SYMPTOMS, SYMPTOM DETAILS UNSPECIFIED: ICD-10-CM

## 2024-08-23 DIAGNOSIS — E87.0 HYPERNATREMIA: ICD-10-CM

## 2024-08-23 DIAGNOSIS — E03.8 OTHER SPECIFIED HYPOTHYROIDISM: ICD-10-CM

## 2024-08-23 DIAGNOSIS — I25.10 CORONARY ARTERY DISEASE INVOLVING NATIVE CORONARY ARTERY OF NATIVE HEART WITHOUT ANGINA PECTORIS: ICD-10-CM

## 2024-08-23 DIAGNOSIS — H90.3 SENSORINEURAL HEARING LOSS (SNHL) OF BOTH EARS: ICD-10-CM

## 2024-08-23 DIAGNOSIS — I50.32 CHRONIC DIASTOLIC (CONGESTIVE) HEART FAILURE (HCC): ICD-10-CM

## 2024-08-23 PROCEDURE — 99309 SBSQ NF CARE MODERATE MDM 30: CPT | Performed by: FAMILY MEDICINE

## 2024-08-23 NOTE — PROGRESS NOTES
[facility]  [address] [address] [phone]  [facility]  [address]    NAME: Will Mullen  AGE: 89 y.o. 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  Continue to assist with ADLs/iADLs in memory care unit.   Frequent reorientation, redirection. Continue frequent family visits.  Encouraged participation in group activities when appropriate.  Continue delirium precautions    Chronic diastolic (congestive) heart failure (HCC)  Euvolemic on exam  Continue benazepril 40 mg daily, carvedilol 25 mg twice daily, bumetanide 2 mg twice daily, and potassium chloride 20 mEq daily. BMP 5/15 with normal potassium 3.9  Continue fluid restriction 1800 ml as per daughter request  Monitor weight  Plan to repeat BMP in 3 months    Atrial fibrillation (HCC)  RRR, 70s  Continue Coreg 25 mg twice daily  Continue Eliquis for anticoagulation  CBC 5/2024 reviewed with stable Hgb 12.5,   Monitor CBC, due in 3 months    Coronary artery disease  Stable, asymptomatic  Continue statin    Essential hypertension  Benazepril was decreased from 20 mg to 10 mg last visit due to low BP  Continue carvedilol in the setting of Afib and bumetanide in the setting of CHF  Monitor BP      Chronic obstructive pulmonary disease (HCC)  Stable without exacerbation  O2 sat stable on room air  Continue ipratropium inhaler and albuterol nebulizer treatments daily    Obstructive sleep apnea  Continue BiPAP nightly.      Hypothyroidism  Stable, TSH WNL 2.3 (5/15/24)  Continue levothyroxine 100 mcg daily  Monitor TSH Q6months    Sensorineural hearing loss (SNHL) of both ears  Encourage patient to wear  hearing aids at all appropriate time    BPH (benign prostatic hyperplasia)  Maintain on finasteride 5 mg daily    Hypernatremia  With chronic hypernatremia in setting of aggressive diuresis needed for congestive heart failure  sodium level stable at 145 in 5/2024  Continue cardiac diet with low sodium, OFR 1800 ml per day  Chief Complaint   Routine Long term follow-up visit.  History of Present Illness   The patient is seen in the room during routine LTC follow up visit. He is sitting in recliner, asking about his water cup. Per nursing report, his appetite is fair, feeds himself with set up. He usually gets water from the sink in the bathroom requiring redirection from nursing. Episodes of confusion reported.   The following portions of the patient's history were reviewed and updated as appropriate: allergies, current medications, past family history, past medical history, past social history, past surgical history and problem list.    Review of Systems     Review of Systems   HENT:  Negative for trouble swallowing.    Respiratory:  Negative for shortness of breath.    Cardiovascular:  Negative for chest pain and palpitations.   Gastrointestinal:  Negative for abdominal pain.   Genitourinary:  Negative for dysuria.   Neurological:  Negative for headaches.     Active Problem List     Patient Active Problem List   Diagnosis    Atrial fibrillation (HCC)    Coronary artery disease    Essential hypertension    Hypothyroidism    Obstructive sleep apnea    Chronic anticoagulation    Cognitive impairment    Malignant neoplasm of urinary bladder (HCC)    Class 2 severe obesity with serious comorbidity in adult (HCC)    Restrictive lung disease    Seasonal allergic rhinitis    Change in mole    Hypernatremia    Sensorineural hearing loss (SNHL) of both ears    Chronic obstructive pulmonary disease (HCC)    CKD (chronic kidney disease) stage 2, GFR 60-89 ml/min    Impacted cerumen of both ears    Dry eyes    Pigmented skin  lesion    Vitamin D deficiency    BPH (benign prostatic hyperplasia)    History of stroke    Other constipation    Chronic diastolic (congestive) heart failure (HCC)    Hypertensive heart and renal disease with congestive heart failure (HCC)       Objective     Vitals: EHR at facility reviewed, stable.  Nursing note reviewed.   General: no acute distress.  HENT:      Head: Normocephalic.      Nose: Nose normal.      Mouth: Mucous membranes are moist.   Eyes:       Right eye: No discharge.         Left eye: No discharge.      Conjunctivae normal.   Cardiovascular:      Normal rate and regular rhythm.   Pulmonary:      Pulmonary effort is normal. No wheezing, rhonchi or rales.   Abdominal:      Bowel sounds are normal. There is no distension.      Abdomen is soft. There is no abdominal tenderness.   Musculoskeletal:      Cervical back: Neck supple.      Right lower le+ edema.      Left lower le+ edema.   Skin:     General: Skin is warm.   Neurological: alert.      Oriented to person, and place. Cooperative, follow commands      Behavior: normal.     Pertinent Laboratory/Diagnostic Studies:  Refer to facility chart.    Current Medications   Medications reviewed and updated in facility chart.

## 2024-08-24 PROBLEM — H61.23 IMPACTED CERUMEN OF BOTH EARS: Status: RESOLVED | Noted: 2023-06-12 | Resolved: 2024-08-24

## 2024-09-08 PROBLEM — F01.B2 MODERATE VASCULAR DEMENTIA WITH PSYCHOTIC DISTURBANCE (HCC): Status: ACTIVE | Noted: 2020-09-15

## 2024-09-09 NOTE — ASSESSMENT & PLAN NOTE
Progressing, vascular dementia vs mixed type  MoCA 22/30 on 6/6/2022  CT head wo contrast 11/10/23 showed Chronic microangiopathic changes, chronic bilateral thalamic lacunar infarctions, and chronic right parietal cortical infarction.  Continue to assist with ADLs/iADLs in memory care unit.   Frequent reorientation, redirection. Continue frequent family visits.  Encouraged participation in group activities when appropriate.  Continue delirium precautions

## 2024-09-09 NOTE — ASSESSMENT & PLAN NOTE
Euvolemic on exam  Continue benazepril 40 mg daily, carvedilol 25 mg twice daily, bumetanide 2 mg twice daily, and potassium chloride 20 mEq daily. BMP 5/15 with normal potassium 3.9  Continue fluid restriction 1800 ml as per daughter request  Monitor weight  Plan to repeat BMP in 3 months

## 2024-09-09 NOTE — ASSESSMENT & PLAN NOTE
Benazepril was decreased from 20 mg to 10 mg last visit due to low BP  Continue carvedilol in the setting of Afib and bumetanide in the setting of CHF  Monitor BP

## 2024-09-09 NOTE — ASSESSMENT & PLAN NOTE
With chronic hypernatremia in setting of aggressive diuresis needed for congestive heart failure  sodium level stable at 145 in 5/2024  Continue cardiac diet with low sodium, OFR 1800 ml per day

## 2024-09-09 NOTE — ASSESSMENT & PLAN NOTE
RRR, 70s  Continue Coreg 25 mg twice daily  Continue Eliquis for anticoagulation  CBC 5/2024 reviewed with stable Hgb 12.5,   Monitor CBC, due in 3 months

## 2024-09-09 NOTE — ASSESSMENT & PLAN NOTE
Stable without exacerbation  O2 sat stable on room air  Continue ipratropium inhaler and albuterol nebulizer treatments daily

## 2024-09-30 ENCOUNTER — NURSING HOME VISIT (OUTPATIENT)
Dept: GERIATRICS | Facility: OTHER | Age: 89
End: 2024-09-30
Payer: COMMERCIAL

## 2024-09-30 VITALS
BODY MASS INDEX: 37.89 KG/M2 | OXYGEN SATURATION: 95 % | HEART RATE: 62 BPM | SYSTOLIC BLOOD PRESSURE: 119 MMHG | WEIGHT: 249.2 LBS | RESPIRATION RATE: 20 BRPM | DIASTOLIC BLOOD PRESSURE: 73 MMHG | TEMPERATURE: 98 F

## 2024-09-30 DIAGNOSIS — N40.1 BENIGN PROSTATIC HYPERPLASIA WITH LOWER URINARY TRACT SYMPTOMS, SYMPTOM DETAILS UNSPECIFIED: ICD-10-CM

## 2024-09-30 DIAGNOSIS — F01.B2 MODERATE VASCULAR DEMENTIA WITH PSYCHOTIC DISTURBANCE (HCC): ICD-10-CM

## 2024-09-30 DIAGNOSIS — G47.33 OBSTRUCTIVE SLEEP APNEA: ICD-10-CM

## 2024-09-30 DIAGNOSIS — E03.8 OTHER SPECIFIED HYPOTHYROIDISM: ICD-10-CM

## 2024-09-30 DIAGNOSIS — I25.10 CORONARY ARTERY DISEASE INVOLVING NATIVE CORONARY ARTERY OF NATIVE HEART WITHOUT ANGINA PECTORIS: ICD-10-CM

## 2024-09-30 DIAGNOSIS — I50.32 CHRONIC DIASTOLIC (CONGESTIVE) HEART FAILURE (HCC): Primary | ICD-10-CM

## 2024-09-30 DIAGNOSIS — I10 BENIGN ESSENTIAL HYPERTENSION: ICD-10-CM

## 2024-09-30 DIAGNOSIS — I50.33 ACUTE ON CHRONIC DIASTOLIC (CONGESTIVE) HEART FAILURE (HCC): ICD-10-CM

## 2024-09-30 DIAGNOSIS — H90.3 SENSORINEURAL HEARING LOSS (SNHL) OF BOTH EARS: ICD-10-CM

## 2024-09-30 DIAGNOSIS — I48.91 ATRIAL FIBRILLATION, UNSPECIFIED TYPE (HCC): ICD-10-CM

## 2024-09-30 DIAGNOSIS — J44.9 CHRONIC OBSTRUCTIVE PULMONARY DISEASE, UNSPECIFIED COPD TYPE (HCC): ICD-10-CM

## 2024-09-30 PROCEDURE — 99310 SBSQ NF CARE HIGH MDM 45: CPT | Performed by: FAMILY MEDICINE

## 2024-09-30 RX ORDER — BUMETANIDE 2 MG/1
2 TABLET ORAL DAILY
Start: 2024-09-30

## 2024-09-30 NOTE — PROGRESS NOTES
[facility]  [address] [address] [phone]  [facility]  [address]  NAME: Will Mullen  AGE: 89 y.o. SEX: male [phone]  DATE OF ENCOUNTER: 9/30/2024  Assessment and Plan   Chronic diastolic (congestive) heart failure (HCC)  Wt Readings from Last 3 Encounters:   09/30/24 113 kg (249 lb 3.2 oz)   07/22/24 109 kg (241 lb 6.4 oz)   05/14/24 109 kg (241 lb)   Will decrease bumex to 2mg daily given worsening kidney function on recent BMP  Continue carvedilol 25 mg twice daily, benazepril 10 mg daily  Fluid restriction 1800 cc per day, cardiac diet  Monitor weight, BMP. Order scheduled 10/11/24    Atrial fibrillation (HCC)  RRR, HR in the range 60s  Continue Coreg 25 mg twice daily  Continue Eliquis 5 mg twice daily for anticoagulation.  No adverse bleeding reported  CBC 9/10/2024 reviewed with stable Hgb 12.3,     Coronary artery disease  Stable, asymptomatic  Continue atorvastatin 40 mg daily.    Essential hypertension  Soft BP recently  Continue low dose of benazepril 10 mg daily. Consider d/c if BP persistently low  Bumetanide was decreased to 2mg once a day today.  Continue carvedilol 25 mg twice daily  Monitor BP    Chronic obstructive pulmonary disease (HCC)  Stable without exacerbation  O2 sat stable on room air  Continue ipratropium and albuterol nebulizer treatments 3 times daily    Obstructive sleep apnea  Continue BiPAP nightly.      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  Continue to assist with ADLs/iADLs in memory care unit.   Frequent reorientation, redirection. Continue frequent family  visits.  Encouraged participation in group activities when appropriate.  Continue delirium precautions    Sensorineural hearing loss (SNHL) of both ears  Encourage patient to wear hearing aids at all appropriate time    BPH (benign prostatic hyperplasia)  Maintain on finasteride 5 mg daily    - Counseling Documentation: patient was counseled regarding: instructions for management and patient and family education    Chief Complaint   Routine Long term follow-up visit.    History of Present Illness   The patient is seen in the room during routine LTC follow up visit. He is sitting in recliner, daughter and Will at bed side. He has no complaints. Daughter concerns about his black patches on his abdomen. Explained to her that are benign keratoses all over his back and abdomen. Per nursing report, the patient is non-compliant of fluid restriction and becomes agitated when aide redirects him on fluid restriction. Also made inappropriate sexual comments at times.     The following portions of the patient's history were reviewed and updated as appropriate: allergies, current medications, past family history, past medical history, past social history, past surgical history and problem list.    Review of Systems     Review of Systems   Constitutional:  Negative for fever.   HENT:  Positive for hearing loss (wears hearing aids). Negative for trouble swallowing.    Eyes:  Positive for visual disturbance (wears glasses).   Respiratory:  Negative for cough, shortness of breath and wheezing.    Cardiovascular:  Positive for leg swelling. Negative for chest pain and palpitations.   Gastrointestinal:  Negative for abdominal pain.   Musculoskeletal:  Negative for back pain.   Neurological:  Negative for headaches.   Hematological:  Does not bruise/bleed easily.   Psychiatric/Behavioral:  Positive for agitation.      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)    Malignant neoplasm of urinary bladder (HCC)    Class 2 severe obesity with serious comorbidity in adult (HCC)    Restrictive lung disease    Seasonal allergic rhinitis    Change in mole    Hypernatremia    Sensorineural hearing loss (SNHL) of both ears    Chronic obstructive pulmonary disease (HCC)    CKD (chronic kidney disease) stage 2, GFR 60-89 ml/min    Dry eyes    Pigmented skin lesion    Vitamin D deficiency    BPH (benign prostatic hyperplasia)    History of stroke    Other constipation    Chronic diastolic (congestive) heart failure (HCC)    Hypertensive heart and renal disease with congestive heart failure (HCC)     Objective   Vitals: EHR at facility reviewed, stable.  Vitals:    24 0944   BP: 119/73   Pulse: 62   Resp: 20   Temp: 98 °F (36.7 °C)   SpO2: 95%   Weight: 113 kg (249 lb 3.2 oz)   Nursing note reviewed.   General: no acute distress.  HENT:      Head: Normocephalic.      Nose: Nose normal.      Mouth: Mucous membranes are moist.   Eyes:       Right eye: No discharge.         Left eye: No discharge.      Conjunctivae normal.   Cardiovascular:      Normal rate and regular rhythm.   Pulmonary:      Pulmonary effort is normal. No wheezing, rhonchi or rales. Diminished breath sounds b/l  Abdominal:      Bowel sounds are normal. There is no distension.      Abdomen is soft. There is no abdominal tenderness.   Musculoskeletal:      Right lower le+ edema.      Left lower le+ edema.   Skin:     General: Skin is warm. Dry     Keratoses back and abdomen  Neurological: alert.      Oriented to person, place, not time. Cooperative, follow simple commands      Behavior: normal.     Pertinent Laboratory/Diagnostic Studies:  Refer to facility chart.  CBC on 9/10/2024 with WBC 5.2, hemoglobin 12.3, HCT 36.9,   CMP Sodium 144,  potassium 4.0, chloride 107, calcium 9.2, AST 13, ALT 10  Creatinine 1.35, EGFR 48  Current Medications   Medications reviewed and updated in facility chart.  I have spent a total time of 50 minutes in caring for this patient on the day of the visit/encounter including Diagnostic results, Instructions for management, Patient and family education, Counseling / Coordination of care, Documenting in the medical record, Reviewing / ordering tests, medicine, procedures  , Obtaining or reviewing history  , and Communicating with other healthcare professionals .

## 2024-09-30 NOTE — ASSESSMENT & PLAN NOTE
Wt Readings from Last 3 Encounters:   09/30/24 113 kg (249 lb 3.2 oz)   07/22/24 109 kg (241 lb 6.4 oz)   05/14/24 109 kg (241 lb)   Will decrease bumex to 2mg daily given worsening kidney function on recent BMP  Continue carvedilol 25 mg twice daily, benazepril 10 mg daily  Fluid restriction 1800 cc per day, cardiac diet  Monitor weight, BMP. Order scheduled 10/11/24

## 2024-10-01 NOTE — ASSESSMENT & PLAN NOTE
Stable without exacerbation  O2 sat stable on room air  Continue ipratropium and albuterol nebulizer treatments 3 times daily

## 2024-10-01 NOTE — ASSESSMENT & PLAN NOTE
Soft BP recently  Continue low dose of benazepril 10 mg daily. Consider d/c if BP persistently low  Bumetanide was decreased to 2mg once a day today.  Continue carvedilol 25 mg twice daily  Monitor BP

## 2024-10-01 NOTE — ASSESSMENT & PLAN NOTE
Caroline Scott presents to Rivendell Behavioral Health Services RRR, HR in the range 60s  Continue Coreg 25 mg twice daily  Continue Eliquis 5 mg twice daily for anticoagulation.  No adverse bleeding reported  CBC 9/10/2024 reviewed with stable Hgb 12.3,    

## 2024-11-08 ENCOUNTER — NURSING HOME VISIT (OUTPATIENT)
Dept: GERIATRICS | Facility: OTHER | Age: 89
End: 2024-11-08
Payer: COMMERCIAL

## 2024-11-08 DIAGNOSIS — J44.9 CHRONIC OBSTRUCTIVE PULMONARY DISEASE, UNSPECIFIED COPD TYPE (HCC): ICD-10-CM

## 2024-11-08 DIAGNOSIS — H90.3 SENSORINEURAL HEARING LOSS (SNHL) OF BOTH EARS: ICD-10-CM

## 2024-11-08 DIAGNOSIS — E03.8 OTHER SPECIFIED HYPOTHYROIDISM: ICD-10-CM

## 2024-11-08 DIAGNOSIS — I50.32 CHRONIC DIASTOLIC (CONGESTIVE) HEART FAILURE (HCC): Primary | ICD-10-CM

## 2024-11-08 DIAGNOSIS — Z86.73 HISTORY OF CVA IN ADULTHOOD: ICD-10-CM

## 2024-11-08 DIAGNOSIS — G47.33 OBSTRUCTIVE SLEEP APNEA: ICD-10-CM

## 2024-11-08 DIAGNOSIS — F01.B2 MODERATE VASCULAR DEMENTIA WITH PSYCHOTIC DISTURBANCE (HCC): ICD-10-CM

## 2024-11-08 DIAGNOSIS — I25.10 CORONARY ARTERY DISEASE INVOLVING NATIVE CORONARY ARTERY OF NATIVE HEART WITHOUT ANGINA PECTORIS: ICD-10-CM

## 2024-11-08 DIAGNOSIS — I48.0 PAROXYSMAL ATRIAL FIBRILLATION (HCC): ICD-10-CM

## 2024-11-08 DIAGNOSIS — I10 BENIGN ESSENTIAL HYPERTENSION: ICD-10-CM

## 2024-11-08 DIAGNOSIS — N40.1 BENIGN PROSTATIC HYPERPLASIA WITH LOWER URINARY TRACT SYMPTOMS, SYMPTOM DETAILS UNSPECIFIED: ICD-10-CM

## 2024-11-08 DIAGNOSIS — E87.0 HYPERNATREMIA: ICD-10-CM

## 2024-11-08 PROCEDURE — 99309 SBSQ NF CARE MODERATE MDM 30: CPT | Performed by: FAMILY MEDICINE

## 2024-11-08 NOTE — ASSESSMENT & PLAN NOTE
Continue bumex 2 mg daily  Repeat BMP on 10/11 with slightly improvement Creatinine 1.32. Unclear this is the new baseline. Will monitor BMP, order scheduled 11/11/24     Melolabial Interpolation Flap Text: A decision was made to reconstruct the defect utilizing an interpolation axial flap and a staged reconstruction.  A telfa template was made of the defect.  This telfa template was then used to outline the melolabial interpolation flap.  The donor area for the pedicle flap was then injected with anesthesia.  The flap was excised through the skin and subcutaneous tissue down to the layer of the underlying musculature.  The pedicle flap was carefully excised within this deep plane to maintain its blood supply.  The edges of the donor site were undermined.   The donor site was closed in a primary fashion.  The pedicle was then rotated into position and sutured.  Once the tube was sutured into place, adequate blood supply was confirmed with blanching and refill.  The pedicle was then wrapped with xeroform gauze and dressed appropriately with a telfa and gauze bandage to ensure continued blood supply and protect the attached pedicle.

## 2024-11-08 NOTE — PROGRESS NOTES
[facility]  [address] [address] [phone]  Facility:  [facility]  POS 32    NAME: Will Mullen  AGE: 89 y.o. SEX: male [id number]  DATE OF ENCOUNTER: 11/8/2024    Assessment and Plan   Chronic diastolic (congestive) heart failure (HCC)  Continue bumex 2 mg daily  Repeat BMP on 10/11 with slightly improvement Creatinine 1.32. Unclear this is the new baseline. Will monitor BMP, order scheduled 11/11/24      Atrial fibrillation (HCC)  RRR, 60 bpm  Continue Coreg 25 mg twice daily  Continue Eliquis 5 mg twice daily for anticoagulation.  No adverse bleeding reported  CBC 9/10/2024 reviewed with stable Hgb 12.3,     Coronary artery disease  Stable, no CP or SOB  Continue atorvastatin 40 mg daily.    Essential hypertension  Soft BP 100s/70s  Continue low dose of benazepril 10 mg daily.   Bumetanide was decreased to 2mg daily last visit.  Continue carvedilol 25 mg twice daily  Monitor BP  BMP reviewed.    Chronic obstructive pulmonary disease (HCC)  Stable without exacerbation  SpO2 stable on room air, no oxygen therapy needed  Continue ipratropium and albuterol nebulizer treatments 3 times daily. Consider changing albuterol to Q4H as-needed.    Obstructive sleep apnea  Continue BiPAP nightly.    Mask fitting was on 12/2023. Need to f/u with Sleep Medicine for device check.    Hypothyroidism  Stable, TSH WNL 2.3 (5/15/24)  Continue levothyroxine 100 mcg daily  Monitor TSH. 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   Frequent reorientation, redirection. Continue frequent family visits.  Continue delirium precautions    Sensorineural hearing loss (SNHL) of both ears  Encourage patient to wear hearing aids at all appropriate time    Hypernatremia  With chronic hypernatremia in setting of aggressive diuresis needed for congestive heart  failure  sodium level stable at 143-145  Continue cardiac diet with low sodium, OFR 1800 ml per day    - Counseling Documentation: patient was counseled regarding: instructions for management    Chief Complaint   Routine Long term follow-up visit.  History of Present Illness   The patient is seen in the room during routine LTC follow up visit. He is sitting in recliner. Denies SOB, or CP. He's going to have his scheduled nebulizer treatment.     The following portions of the patient's history were reviewed and updated as appropriate: allergies, current medications, past family history, past medical history, past social history, past surgical history and problem list.    Review of Systems     Review of Systems   Constitutional:  Negative for fever.   HENT:  Negative for trouble swallowing.    Respiratory:  Negative for shortness of breath.    Cardiovascular:  Positive for leg swelling. Negative for chest pain.   Gastrointestinal:  Negative for abdominal pain.   Genitourinary:  Negative for dysuria.   Neurological:  Negative for headaches.     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)    Malignant neoplasm of urinary bladder (HCC)    Class 2 severe obesity with serious comorbidity in adult (HCC)    Restrictive lung disease    Seasonal allergic rhinitis    Change in mole    Hypernatremia    Sensorineural hearing loss (SNHL) of both ears    Chronic obstructive pulmonary disease (HCC)    CKD (chronic kidney disease) stage 2, GFR 60-89 ml/min    Dry eyes    Pigmented skin lesion    Vitamin D deficiency    BPH (benign prostatic hyperplasia)    History of stroke    Other constipation    Chronic diastolic (congestive) heart failure (HCC)    Hypertensive heart and renal disease with  congestive heart failure (HCC)    Unresponsive    Unwitnessed fall    Fall       Objective   Vitals: EHR at facility reviewed, stable.         BP: 112/69   Pulse: 64   Resp: 20   Temp: 98 °F (36.7 °C)   SpO2: 95%   Weight: 112 kg (246 lb 9.6 oz)     Nursing note reviewed.   General: no acute distress.  HENT:      Head: Normocephalic.      Nose: Nose normal.      Mouth: Mucous membranes are moist.   Eyes:       Right eye: No discharge.         Left eye: No discharge.      Conjunctivae normal.   Cardiovascular:      Normal rate and regular rhythm. No murmur heard.  Pulmonary:      Pulmonary effort is normal. No wheezing, rhonchi or rales.   Abdominal:      Bowel sounds are normal. There is no distension.      Abdomen is soft. There is no abdominal tenderness.   Musculoskeletal:      Right lower leg: edema.      Left lower leg: edema.   Skin:     General: Skin is warm.   Neurological: alert.      Oriented to person. Cooperative, follow commands      Behavior: normal.     Pertinent Laboratory/Diagnostic Studies:  Refer to facility chart.    Current Medications   Medications reviewed and updated in facility chart.

## 2024-11-29 ENCOUNTER — APPOINTMENT (EMERGENCY)
Dept: RADIOLOGY | Facility: HOSPITAL | Age: 88
End: 2024-11-29
Payer: COMMERCIAL

## 2024-11-29 ENCOUNTER — NURSING HOME VISIT (OUTPATIENT)
Dept: GERIATRICS | Facility: OTHER | Age: 89
End: 2024-11-29
Payer: COMMERCIAL

## 2024-11-29 ENCOUNTER — HOSPITAL ENCOUNTER (EMERGENCY)
Facility: HOSPITAL | Age: 88
Discharge: NURSING, REHAB OR LONG-TERM CARE | End: 2024-11-29
Attending: SURGERY
Payer: COMMERCIAL

## 2024-11-29 VITALS
HEART RATE: 60 BPM | DIASTOLIC BLOOD PRESSURE: 60 MMHG | WEIGHT: 249.12 LBS | OXYGEN SATURATION: 97 % | HEART RATE: 60 BPM | SYSTOLIC BLOOD PRESSURE: 126 MMHG | OXYGEN SATURATION: 97 % | WEIGHT: 249.12 LBS | RESPIRATION RATE: 24 BRPM | TEMPERATURE: 97.1 F | TEMPERATURE: 97.1 F | RESPIRATION RATE: 24 BRPM | SYSTOLIC BLOOD PRESSURE: 126 MMHG | DIASTOLIC BLOOD PRESSURE: 60 MMHG
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
## 2024-11-29 DIAGNOSIS — R41.89 UNRESPONSIVE: ICD-10-CM

## 2024-11-29 DIAGNOSIS — R29.6 UNWITNESSED FALL: Primary | ICD-10-CM

## 2024-11-29 LAB
ATRIAL RATE: 250 BPM
ATRIAL RATE: 250 BPM
BASE EXCESS BLDA CALC-SCNC: 3 MMOL/L (ref -2–3)
BASE EXCESS BLDA CALC-SCNC: 3 MMOL/L (ref -2–3)
CA-I BLD-SCNC: 1.16 MMOL/L (ref 1.12–1.32)
CA-I BLD-SCNC: 1.16 MMOL/L (ref 1.12–1.32)
GLUCOSE SERPL-MCNC: 125 MG/DL (ref 65–140)
GLUCOSE SERPL-MCNC: 125 MG/DL (ref 65–140)
HCO3 BLDA-SCNC: 28.9 MMOL/L (ref 24–30)
HCO3 BLDA-SCNC: 28.9 MMOL/L (ref 24–30)
HCT VFR BLD CALC: 39 % (ref 36.5–49.3)
HCT VFR BLD CALC: 39 % (ref 36.5–49.3)
HGB BLDA-MCNC: 13.3 G/DL (ref 12–17)
HGB BLDA-MCNC: 13.3 G/DL (ref 12–17)
PCO2 BLD: 30 MMOL/L (ref 21–32)
PCO2 BLD: 30 MMOL/L (ref 21–32)
PCO2 BLD: 50.7 MM HG (ref 42–50)
PCO2 BLD: 50.7 MM HG (ref 42–50)
PH BLD: 7.36 [PH] (ref 7.3–7.4)
PH BLD: 7.36 [PH] (ref 7.3–7.4)
PO2 BLD: 31 MM HG (ref 35–45)
PO2 BLD: 31 MM HG (ref 35–45)
POTASSIUM BLD-SCNC: 4.7 MMOL/L (ref 3.5–5.3)
POTASSIUM BLD-SCNC: 4.7 MMOL/L (ref 3.5–5.3)
QRS AXIS: -9 DEGREES
QRS AXIS: -9 DEGREES
QRSD INTERVAL: 124 MS
QRSD INTERVAL: 124 MS
QT INTERVAL: 434 MS
QT INTERVAL: 434 MS
QTC INTERVAL: 403 MS
QTC INTERVAL: 403 MS
SAO2 % BLD FROM PO2: 56 % (ref 60–85)
SAO2 % BLD FROM PO2: 56 % (ref 60–85)
SODIUM BLD-SCNC: 143 MMOL/L (ref 136–145)
SODIUM BLD-SCNC: 143 MMOL/L (ref 136–145)
SPECIMEN SOURCE: ABNORMAL
SPECIMEN SOURCE: ABNORMAL
T WAVE AXIS: -54 DEGREES
T WAVE AXIS: -54 DEGREES
VENTRICULAR RATE: 52 BPM
VENTRICULAR RATE: 52 BPM

## 2024-11-29 PROCEDURE — 84295 ASSAY OF SERUM SODIUM: CPT

## 2024-11-29 PROCEDURE — 93308 TTE F-UP OR LMTD: CPT | Performed by: SURGERY

## 2024-11-29 PROCEDURE — 70450 CT HEAD/BRAIN W/O DYE: CPT

## 2024-11-29 PROCEDURE — 76705 ECHO EXAM OF ABDOMEN: CPT | Performed by: SURGERY

## 2024-11-29 PROCEDURE — 99310 SBSQ NF CARE HIGH MDM 45: CPT | Performed by: NURSE PRACTITIONER

## 2024-11-29 PROCEDURE — 82947 ASSAY GLUCOSE BLOOD QUANT: CPT

## 2024-11-29 PROCEDURE — 82803 BLOOD GASES ANY COMBINATION: CPT

## 2024-11-29 PROCEDURE — 82330 ASSAY OF CALCIUM: CPT

## 2024-11-29 PROCEDURE — 84132 ASSAY OF SERUM POTASSIUM: CPT

## 2024-11-29 PROCEDURE — EDAIR PR ED AIR: Performed by: EMERGENCY MEDICINE

## 2024-11-29 PROCEDURE — 72125 CT NECK SPINE W/O DYE: CPT

## 2024-11-29 PROCEDURE — 93005 ELECTROCARDIOGRAM TRACING: CPT

## 2024-11-29 PROCEDURE — 71045 X-RAY EXAM CHEST 1 VIEW: CPT

## 2024-11-29 PROCEDURE — 85014 HEMATOCRIT: CPT

## 2024-11-29 PROCEDURE — 93010 ELECTROCARDIOGRAM REPORT: CPT | Performed by: INTERNAL MEDICINE

## 2024-11-29 PROCEDURE — 99285 EMERGENCY DEPT VISIT HI MDM: CPT

## 2024-11-29 RX ORDER — ACETAMINOPHEN 325 MG/1
650 TABLET ORAL EVERY 6 HOURS PRN
Status: DISCONTINUED | OUTPATIENT
Start: 2024-11-29 | End: 2024-11-29 | Stop reason: HOSPADM

## 2024-11-29 NOTE — ASSESSMENT & PLAN NOTE
Unresponsive to verbal and tactile stimulation approximately 5 minutes after his head was examined.  Nursing reports that this is not patient's baseline

## 2024-11-29 NOTE — ASSESSMENT & PLAN NOTE
Nursing reports unwitnessed fall, patient found lying on right side in the dining room in the memory care unit where he resides.  Suspect that he hit the back of his head as he was yelling out in pain during examination of that area.  Due to patient being on Eliquis twice daily for atrial fibrillation and unresponsiveness, recommended emergent send out for evaluation and treatment at [facility] to exclude intracranial bleeding  Unable to reach patient's daughter at the time of EMS call, but daughter returned call and agrees to send her father out to the emergency room

## 2024-11-29 NOTE — ED NOTES
Spoke with staff from [facility] that patient was on his way back and would need a breathing treatment upon arrival     Claudia Godinez RN  11/29/24 1359

## 2024-11-29 NOTE — PROGRESS NOTES
Facility: [facility]  POS: 32  Acute Med Note  Code status: DNR    Assessment/Plan:  89-year-old male with:    Unwitnessed fall  Nursing reports unwitnessed fall, patient found lying on right side in the dining room in the memory care unit where he resides.  Suspect that he hit the back of his head as he was yelling out in pain during examination of that area.  Due to patient being on Eliquis twice daily for atrial fibrillation and unresponsiveness, recommended emergent send out for evaluation and treatment at [facility] to exclude intracranial bleeding  Unable to reach patient's daughter at the time of EMS call, but daughter returned call and agrees to send her father out to the emergency room    Unresponsive  Unresponsive to verbal and tactile stimulation approximately 5 minutes after his head was examined.  Nursing reports that this is not patient's baseline    Moderate vascular dementia with psychotic disturbance    Chronic diastolic heart failure    Atrial fibrillation    Chronic anticoagulation    Essential hypertension    Sensorineural hearing loss of both ears    Benign prostatic hyperplasia with lower urinary tract symptoms    Coronary artery disease    Subjective: Unresponsive     Patient ID: Will Mullen is a 89 y.o. male.    89-year-old male seen and examined at the request of nursing staff.  Patient was found lying on right side in the dining room by nursing staff at approximately 1 PM.  At time of examination, found patient seated in wheelchair with his eyes closed.  Patient yelled out in pain when the back of his head was palpated for examination.  Suspect that patient hit the back of his head when he fell.  Patient then became unresponsive to verbal and tactile stimuli approximately 5 minutes after examination.  Patient is on chronic Eliquis twice daily for atrial fibrillation.  EMS was notified by charge nurse.  [facility] alerted via [facility].    Patient's daughter Debi Denis made aware of above and agrees with plan of care.    The following portions of the patient's history were reviewed and updated as appropriate:  Allergies, current medications, Past Family history, past medical history, past social history, past surgical history, and problem list.    Review of Systems   Unable to perform ROS: Patient unresponsive         Objective:  BP: 102/68   heart rate: 63      respiration: 20      O2 sat: 94% on room air     Physical Exam  Vitals and nursing note reviewed.   Constitutional:       General: He is not in acute distress.     Appearance: He is ill-appearing. He is not toxic-appearing or diaphoretic.   HENT:      Head:      Comments: Swelling back of head  Eyes:      Comments: Has eyes closed   Cardiovascular:      Rate and Rhythm: Normal rate.   Pulmonary:      Effort: Pulmonary effort is normal. No respiratory distress.   Abdominal:      Comments: Large abdomen.   Musculoskeletal:      Right lower leg: Edema present.      Left lower leg: Edema present.      Comments: Moves all 4 extremities.   Skin:     General: Skin is warm and dry.      Capillary Refill: Capillary refill takes less than 2 seconds.   Neurological:      Mental Status: He is alert.      Comments: Unresponsive           I have spent a total time of 45 minutes in caring for this patient on the day of the visit/encounter including Impressions, Counseling / Coordination of care, Documenting in the medical record, Reviewing / ordering tests, medicine, procedures  , Obtaining or reviewing history  , and Communicating with other healthcare professionals .     This note was completed in part utilizing with Dragon medical one voice recognition software.  Grammatical errors, random word insertion, spelling mistakes, and incomplete sentences may be an occasional consequence of the system secondary to software limitations, ambient noise and hardware issues.  At the time of dictation,  efforts were made to edit, clarify and/or correct errors.  Please read the chart carefully and recognize, using context, where substitutions have occurred.  If you have any questions or concerns about the context, text or information contained within the body of this dictation, please contact myself, the provider, for further clarification.

## 2024-11-29 NOTE — PROCEDURES
POC FAST US    Date/Time: 11/29/2024 2:43 PM    Performed by: Asim Richardson MD  Authorized by: Asim Richardson MD    Patient location:  Trauma  Other Assisting Provider: Yes (comment)    Procedure details:     Exam Type:  Diagnostic    Indications: blunt abdominal trauma      Assess for:  Intra-abdominal fluid and pericardial effusion    Technique: FAST      Views obtained:  Heart - Pericardial sac, LUQ - Splenorenal space, Suprapubic - Pouch of Adrian and RUQ - Galvez's Pouch    Image quality: diagnostic      Image availability:  Images available in PACS  FAST Findings:     RUQ (Hepatorenal) free fluid: absent      LUQ (Splenorenal) free fluid: absent      Suprapubic free fluid: absent      Cardiac wall motion: identified      Pericardial effusion: absent    Interpretation:     Impressions: negative

## 2024-11-29 NOTE — QUICK NOTE
Cervical Collar Clearance:    The patient had a CT scan of the cervical spine demonstrating no acute injury. On exam, the patient had no midline point tenderness or paresthesias/numbness/weakness in the extremities. The patient had full range of motion (was then able to flex, extend, and rotate head laterally) without pain. There were no distracting injuries and the patient was not intoxicated.      The patient's cervical spine was cleared radiologically and clinically. Cervical collar removed at this time.     Asim Richardson MD  11/29/2024 3:14 PM

## 2024-11-29 NOTE — H&P
H&P - Trauma   Name: Hollis Graves 89 y.o. male I MRN: [id number]  Unit/Bed#: ED 20 I Date of Admission: 11/29/2024   Date of Service: 11/29/2024 I Hospital Day: 0     Assessment & Plan  Fall  -Fall from wheelchair at nursing facility, head strike, no loss of consciousness, takes Eliquis  -Trauma workup including CTh, CT C-spine, CXR all negative for acute traumatic injury  -ok for discharge back to nursing facility    Trauma Alert: Level B   Model of Arrival: Ambulance    Trauma Team: Attending Edvin, Residents Jasvir, Fellow Hayley, and PATRICE Yarbrough  Consultants:     None     History of Present Illness   Chief Complaint: no complaints   Mechanism:Fall     Hollis Graves is a 89 y.o. male w PMH of HTN, A-fib on Eliquis, CAD, diastolic CHF, CKD, HLD, CAM[place], COPD, hypothyroidism, dementia, BPH, presenting to [facility] as a level B trauma after a fall from his wheelchair at patient's nursing facility, positive head strike, denies LOC, on Eliquis for A-fib.  Patient was reportedly at his nursing home in the cafeteria in his wheelchair when he fell out of his wheelchair striking his head, no loss of consciousness.  Patient has dementia at baseline and disoriented, intermittently confused.  Hemodynamically stable on evaluation, GCS 14.    Review of Systems   Unable to perform ROS: Dementia     I have reviewed the patient's PMH, PSH, Social History, Family History, Meds, and Allergies    There is no immunization history on file for this patient.  Last Tetanus: unknown    1. Before the illness or injury that brought you to the Emergency, did you need someone to help you on a regular basis? 1=Yes   2. Since the illness or injury that brought you to the Emergency, have you needed more help than usual to take care of yourself? 1=Yes   3. Have you been hospitalized for one or more nights during the past 6 months (excluding a stay in the Emergency Department)? 0=No   4. In general, do you see well? 0=Yes   5. In general,  do you have serious problems with your memory? 1=Yes   6. Do you take more than three different medications everyday? 1=Yes   TOTAL   4     Did you order a geriatric consult if the score was 2 or greater?: n/a       Objective :  Temp:  [97.1 °F (36.2 °C)] 97.1 °F (36.2 °C)  HR:  [46] 46  BP: (131)/(63) 131/63  Resp:  [16] 16  SpO2:  [98 %] 98 %  O2 Device: None (Room air)    Initial Vitals:   Temperature: (!) 97.1 °F (36.2 °C) (11/29/24 1426)  Pulse: (!) 46 (11/29/24 1426)  Respirations: 16 (11/29/24 1426)  Blood Pressure: 131/63 (11/29/24 1426)    Primary Survey:   Airway:        Status: patent;        Pre-hospital Interventions: none        Hospital Interventions: none  Breathing:        Pre-hospital Interventions: none       Effort: normal       Right breath sounds: normal       Left breath sounds: normal  Circulation:        Rhythm: regular       Rate: regular   Right Pulses Left Pulses    R radial: 2+    R pedal: 2+     L radial: 2+    L pedal: 2+       Disability:        GCS: Eye: 4; Verbal: 4 Motor: 6 Total: 14       Right Pupil: round;  reactive         Left Pupil:  round;  reactive      R Motor Strength L Motor Strength    R : 5/5  R dorsiflex: 5/5  R plantarflex: 5/5 L : 5/5  L dorsiflex: 5/5  L plantarflex: 5/5        Sensory:  No sensory deficit  Exposure:       Completed: Yes      Secondary Survey:  Physical Exam  Vitals reviewed.   Constitutional:       General: He is not in acute distress.     Appearance: He is not ill-appearing or toxic-appearing.   HENT:      Head: Normocephalic and atraumatic.      Nose: Nose normal.      Mouth/Throat:      Mouth: Mucous membranes are moist.   Eyes:      Extraocular Movements: Extraocular movements intact.   Cardiovascular:      Rate and Rhythm: Normal rate and regular rhythm.      Pulses: Normal pulses.   Pulmonary:      Effort: Pulmonary effort is normal. No respiratory distress.   Abdominal:      General: There is distension.      Palpations: Abdomen is  soft.      Tenderness: There is no abdominal tenderness.   Musculoskeletal:         General: No swelling, tenderness, deformity or signs of injury. Normal range of motion.      Cervical back: Normal range of motion. No tenderness.   Skin:     General: Skin is warm.      Capillary Refill: Capillary refill takes less than 2 seconds.      Coloration: Skin is not jaundiced or pale.   Neurological:      Mental Status: He is alert and oriented to person, place, and time.   Psychiatric:         Mood and Affect: Mood normal.             Lab Results: I have reviewed the following results:  Recent Labs     11/29/24  1431   HGB 13.3   HCT 39   CO2 30   CAIONIZED 1.16       Imaging Results: I have personally reviewed pertinent images saved in PACS. CT scan findings (and other pertinent positive findings on images) were discussed with radiology. My interpretation of the images/reports are as follows:  Chest Xray(s): negative for acute findings   FAST exam(s): negative for acute findings   CT Scan(s): negative for acute findings   Additional Xray(s): N/A     Other Studies:

## 2024-11-29 NOTE — ED NOTES
Requested breathing treatment from trauma resident. Resident reports patient is on albuterol at [facility] and he can get it when he gets home     Claudia Godinez RN  11/29/24 7500

## 2024-11-29 NOTE — ED NOTES
Requested breathing treatment from trauma doctor for audible wheeze and tachypnea     Claudia Godinez RN  11/29/24 9342

## 2024-11-29 NOTE — H&P
"Trauma Alert: Level B   Model of Arrival: Ambulance    Trauma Team: Attending eliezer and Resident: Debra, fellow: Hayley  Consultants: {SL IP Trauma Consultants:[id number]}     History of Present Illness   Chief Complaint:   Mechanism:Fall     Irwin Sebastian is a 89 y.o. adult on Eliquis who presents following a fall from a wheelchair onto his left side, unwitnessed, EMS states patient did have head strike but no LOC. Patient has baseline dementia    Review of Systems   Reason unable to perform ROS: dementia.     {Select to insert medical history sections:12082::\"I have reviewed the patient's PMH, PSH, Social History, Family History, Meds, and Allergies\"}    There is no immunization history on file for this patient.  Last Tetanus: ***       Objective :{?Quick Links I ICU Summary I Vitals I I/Os I LDAs I Mobility (PT/OT) I Code Status / ACP   ?Quick Links I Active Meds I Pain Meds I Antibiotics I Anticoagulants:11704}       Initial Vitals:        Primary Survey:   Airway:        Status: patent;        Pre-hospital Interventions: none        Hospital Interventions: none  Breathing:        Pre-hospital Interventions: none       Effort: normal       Right breath sounds: normal       Left breath sounds: normal  Circulation:        Rhythm: regular       Rate: regular   Right Pulses Left Pulses    R radial: 2+    R pedal: 2+     L radial: 2+    L pedal: 2+       Disability:        GCS: Eye: 4; Verbal: 4 Motor: 6 Total: 14       Right Pupil: round;  reactive         Left Pupil:  round;  reactive      R Motor Strength L Motor Strength    R : 5/5  R dorsiflex: 5/5  R plantarflex: 5/5 L : 5/5  L dorsiflex: 5/5  L plantarflex: 5/5        Sensory:  No sensory deficit  Exposure:       Completed: Yes      Secondary Survey:  Physical Exam  Constitutional:       General: Irwin is not in acute distress.     Appearance: Irwin is not toxic-appearing.   HENT:      Head: Atraumatic.   Eyes:      Pupils: " "Pupils are equal, round, and reactive to light.   Neck:      Comments: C-collar intact  Cardiovascular:      Rate and Rhythm: Rhythm irregular.      Pulses: Normal pulses.   Pulmonary:      Effort: Pulmonary effort is normal. No respiratory distress.      Breath sounds: Normal breath sounds. No wheezing or rales.   Abdominal:      General: There is distension.      Palpations: Abdomen is soft.      Tenderness: There is no abdominal tenderness.   Musculoskeletal:      Right lower leg: No edema.      Left lower leg: No edema.      Comments: NO spinal tenderness, deformities or step offs  Moves BUE, BLE's    Skin:     Capillary Refill: Capillary refill takes less than 2 seconds.   Neurological:      Mental Status: Irwin is alert. Mental status is at baseline.   Psychiatric:         Behavior: Behavior normal.           {?Quick Links I Lab Review I Micro Results I Radiology I Cardiology:28308}  Lab Results: I have reviewed the following results:  No results for input(s): \"WBC\", \"HGB\", \"HCT\", \"PLT\", \"BANDSPCT\", \"SODIUM\", \"K\", \"CL\", \"CO2\", \"BUN\", \"CREATININE\", \"GLUC\", \"CAIONIZED\", \"MG\", \"PHOS\", \"AST\", \"ALT\", \"ALB\", \"TBILI\", \"DBILI\", \"ALKPHOS\", \"PTT\", \"INR\", \"HSTNI0\", \"HSTNI2\", \"BNP\", \"LACTICACID\" in the last 72 hours.    Imaging Results: I have personally reviewed pertinent images saved in PACS. CT scan findings (and other pertinent positive findings on images) were discussed with radiology. My interpretation of the images/reports are as follows:  Chest Xray(s): {Results:21378}   FAST exam(s): {Results:19536}   CT Scan(s): {Results:17046}   Additional Xray(s): {Results:25060}     Other Studies: {Other Study Results Review:41098::\"No additional pertinent studies reviewed.\"}  "

## 2024-11-29 NOTE — ASSESSMENT & PLAN NOTE
-Fall from wheelchair at nursing facility, head strike, no loss of consciousness, takes Eliquis  -Trauma workup including CTh, CT C-spine, CXR all negative for acute traumatic injury  -ok for discharge back to nursing facility

## 2024-11-29 NOTE — ED PROVIDER NOTES
Emergency Department Airway Evaluation and Management Form    History  Obtained from: patient and ems   Review of patient's allergies indicates no known allergies.    Chief Complaint:  Trauma Alert    HPI: Pt is a 89 y.o. adult presents s/p fall on eliquis out of wheelchair       I have reviewed and agree with the history as documented.      Physical Exam    There were no vitals filed for this visit.  Supplemental Oxygen: NC     GCS: 14    Neuro: Alert and oriented  Psych: not combative, not anxious, cooperative for exam  Neck: In collar, No JVD, No midline tenderness  Cardio:  Normal  Respiratory: Normal  Mouth:  Normal  Pharynx: Normal    Monitor:  afib       ED Medications    No current facility-administered medications for this encounter.  No current outpatient medications on file.      Intubation    No intubation required    Final Diagnosis:  Head injury   Anticoagulation     ED Provider  Electronically Signed by         Giorgio Woods DO  11/29/24 4384

## 2024-12-07 VITALS
OXYGEN SATURATION: 95 % | TEMPERATURE: 98 F | SYSTOLIC BLOOD PRESSURE: 112 MMHG | RESPIRATION RATE: 20 BRPM | DIASTOLIC BLOOD PRESSURE: 69 MMHG | WEIGHT: 246.6 LBS | HEART RATE: 64 BPM | BODY MASS INDEX: 37.5 KG/M2

## 2024-12-07 NOTE — ASSESSMENT & PLAN NOTE
Soft BP 100s-110s/70s  Continue low dose of benazepril 10 mg daily.   Bumetanide was decreased to 2mg daily last visit.  Continue carvedilol 25 mg twice daily  Monitor BP  BMP reviewed.

## 2024-12-07 NOTE — ASSESSMENT & PLAN NOTE
RRR, 60 bpm  Continue Coreg 25 mg twice daily  Continue Eliquis 5 mg twice daily for anticoagulation.  No adverse bleeding reported  CBC 9/10/2024 reviewed with stable Hgb 12.3,

## 2024-12-07 NOTE — ASSESSMENT & PLAN NOTE
Stable without exacerbation  SpO2 stable on room air, no oxygen therapy needed  Continue ipratropium and albuterol nebulizer treatments 3 times daily. Consider changing albuterol to Q4H as-needed.

## 2024-12-07 NOTE — ASSESSMENT & PLAN NOTE
Continue BiPAP nightly.    Mask fitting was on 12/2023. Need to f/u with Sleep Medicine for device check.

## 2024-12-07 NOTE — ASSESSMENT & PLAN NOTE
Progressing, vascular dementia vs mixed type  Continue to assist with ADLs/iADLs in memory care unit.   Frequent reorientation, redirection. Continue frequent family visits.  Continue delirium precautions

## 2024-12-07 NOTE — ASSESSMENT & PLAN NOTE
With chronic hypernatremia in setting of aggressive diuresis needed for congestive heart failure  sodium level stable at 143-145  Continue cardiac diet with low sodium, OFR 1800 ml per day

## 2025-01-07 ENCOUNTER — NURSING HOME VISIT (OUTPATIENT)
Dept: GERIATRICS | Facility: OTHER | Age: OVER 89
End: 2025-01-07
Payer: COMMERCIAL

## 2025-01-07 DIAGNOSIS — J44.9 CHRONIC OBSTRUCTIVE PULMONARY DISEASE, UNSPECIFIED COPD TYPE (HCC): ICD-10-CM

## 2025-01-07 DIAGNOSIS — I10 BENIGN ESSENTIAL HYPERTENSION: ICD-10-CM

## 2025-01-07 DIAGNOSIS — I25.10 CORONARY ARTERY DISEASE INVOLVING NATIVE CORONARY ARTERY OF NATIVE HEART WITHOUT ANGINA PECTORIS: ICD-10-CM

## 2025-01-07 DIAGNOSIS — H90.3 SENSORINEURAL HEARING LOSS (SNHL) OF BOTH EARS: ICD-10-CM

## 2025-01-07 DIAGNOSIS — C67.9 MALIGNANT NEOPLASM OF URINARY BLADDER, UNSPECIFIED SITE (HCC): ICD-10-CM

## 2025-01-07 DIAGNOSIS — I48.0 PAROXYSMAL ATRIAL FIBRILLATION (HCC): ICD-10-CM

## 2025-01-07 DIAGNOSIS — G47.33 OBSTRUCTIVE SLEEP APNEA: ICD-10-CM

## 2025-01-07 DIAGNOSIS — F01.B2 MODERATE VASCULAR DEMENTIA WITH PSYCHOTIC DISTURBANCE (HCC): Primary | ICD-10-CM

## 2025-01-07 DIAGNOSIS — E03.8 OTHER SPECIFIED HYPOTHYROIDISM: ICD-10-CM

## 2025-01-07 DIAGNOSIS — I50.32 CHRONIC DIASTOLIC (CONGESTIVE) HEART FAILURE (HCC): ICD-10-CM

## 2025-01-07 DIAGNOSIS — E87.0 HYPERNATREMIA: ICD-10-CM

## 2025-01-07 DIAGNOSIS — N40.1 BENIGN PROSTATIC HYPERPLASIA WITH LOWER URINARY TRACT SYMPTOMS, SYMPTOM DETAILS UNSPECIFIED: ICD-10-CM

## 2025-01-07 PROCEDURE — 99310 SBSQ NF CARE HIGH MDM 45: CPT | Performed by: FAMILY MEDICINE

## 2025-01-07 NOTE — PROGRESS NOTES
[facility]  [address] [facility] [address]  [phone]  Facility: [facility]  POS 32    NAME: Will Mullen  AGE: 89 y.o. SEX: male [id number]  DATE OF ENCOUNTER: 1/7/2025. Case discussed with daughter on 1/14/25.  Assessment and Plan   Atrial fibrillation (HCC)  HR stable in the 60s  Continue Coreg 25 mg twice daily  Continue Eliquis 5 mg twice daily for anticoagulation.  No adverse bleeding reported  CBC 9/10/2024 reviewed with stable Hgb 12.3,     Chronic diastolic (congestive) heart failure (HCC)  Wt Readings from Last 3 Encounters:   11/29/24 113 kg (249 lb 1.9 oz)   12/07/24 112 kg (246 lb 9.6 oz)   09/30/24 113 kg (249 lb 3.2 oz)   Wt 242 (12/10/24)  No acute exacerbation  Continue Bumex 2 mg daily  Creatinine 1.23 (12/12/24)  Cardiac diet, OFR 1800 cc per day. Will increase fluid restriction to 2L 24H. POA in agreement    Coronary artery disease  Stable, no CP or SOB  Continue atorvastatin 40 mg daily.    Essential hypertension  Stable BP in the range 100s/60s - 130s/80s  Continue benazepril 10 mg daily, Coreg 25 mg twice daily in setting of A-fib, Bumex 2 mg daily in setting of CHF  Monitor BP    Chronic obstructive pulmonary disease (HCC)  Stable without exacerbation  SpO2 stable on room air, no oxygen therapy needed  Continue ipratropium and albuterol nebulizer treatments 3 times daily.   Monitor respiratory status    Obstructive sleep apnea  Continue BiPAP nightly.    Mask fitting was on 12/2023. [facility]    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   Frequent reorientation, redirection. Continue frequent family visits.  Continue delirium  precautions    Sensorineural hearing loss (SNHL) of both ears  Continue hearing aids at all appropriate time. Unfortunately, one HA was lost. [facility].    BPH (benign prostatic hyperplasia)  Maintain on finasteride 5 mg daily    Hypernatremia  With chronic hypernatremia in setting of aggressive diuresis needed for congestive heart failure  Recent sodium level stable at 142 (12/12/24)  Continue cardiac diet with low sodium, OFR 2000 ml per day    - Counseling Documentation: patient and daughter were counseled regarding: diagnostic results, instructions for management, and patient and family education.    Chief Complaint   Routine Long term follow-up visit.  History of Present Illness   The patient is seen in the room during routine LTC follow up visit. He had unwitnessed fall on 11/29 when he presented to ED. Trauma workups negative for acute pathology.Per nursing report, he usually gets water from his sink. Intermittent inappropriate sexual comments toward female staff required redirection.      The following portions of the patient's history were reviewed and updated as appropriate: allergies, current medications, past family history, past medical history, past social history, past surgical history and problem list.    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)    Malignant neoplasm of urinary bladder (HCC)    Class 2 severe obesity with serious comorbidity in adult (HCC)    Restrictive lung disease    Seasonal allergic rhinitis    Change in mole    Hypernatremia    Sensorineural hearing loss (SNHL) of both ears    Chronic obstructive pulmonary disease (HCC)    CKD (chronic kidney disease) stage 2, GFR  60-89 ml/min    Dry eyes    Pigmented skin lesion    Vitamin D deficiency    BPH (benign prostatic hyperplasia)    History of stroke    Other constipation    Chronic diastolic (congestive) heart failure (HCC)    Hypertensive heart and renal disease with congestive heart failure (HCC)    Unresponsive    Unwitnessed fall    Fall       Objective     Vitals: EHR at facility reviewed, stable.  Nursing note reviewed.   General: no acute distress. Obese  HENT:      Head: Normocephalic.      Nose: Nose normal.      Mouth: Mucous membranes are moist.   Eyes:       Right eye: No discharge.         Left eye: No discharge.      Conjunctivae normal.   Cardiovascular:      Normal rate and regular rhythm. No murmur heard.  Pulmonary:      Pulmonary effort is normal. No wheezing, rhonchi or rales.   Abdominal:      Bowel sounds are normal. There is no distension.      Abdomen is soft. There is no abdominal tenderness.   Musculoskeletal:      Right lower le+ edema.      Left lower le+ edema.   Skin:     General: Skin is warm.   Neurological: alert.      Oriented to person and place. Cooperative, follow commands      Behavior: normal.     Pertinent Laboratory/Diagnostic Studies:  Refer to facility chart.    Current Medications   Medications reviewed and updated in facility chart.  I have spent a total time of 50 minutes in caring for this patient on the day of the visit/encounter including Diagnostic results, Risks and benefits of tx options, Instructions for management, Patient and family education, Counseling / Coordination of care, Documenting in the medical record, Reviewing / ordering tests, medicine, procedures  , Obtaining or reviewing history  , and Communicating with other healthcare professionals .

## 2025-01-14 NOTE — ASSESSMENT & PLAN NOTE
Progressing, vascular dementia vs mixed type with behavior disturbances  Continue to assist with ADLs/iADLs in memory care unit.   Frequent reorientation, redirection. Continue frequent family visits.  Continue delirium precautions

## 2025-01-14 NOTE — ASSESSMENT & PLAN NOTE
TSH WNL 2.5 (12/12/24)  Continue current dose of levothyroxine 100 mcg daily  Monitor TSH annually or sooner if symptoms arise

## 2025-01-14 NOTE — ASSESSMENT & PLAN NOTE
Stable without exacerbation  SpO2 stable on room air, no oxygen therapy needed  Continue ipratropium and albuterol nebulizer treatments 3 times daily.   Monitor respiratory status

## 2025-01-14 NOTE — ASSESSMENT & PLAN NOTE
HR stable in the 60s  Continue Coreg 25 mg twice daily  Continue Eliquis 5 mg twice daily for anticoagulation.  No adverse bleeding reported  CBC 9/10/2024 reviewed with stable Hgb 12.3,

## 2025-01-14 NOTE — ASSESSMENT & PLAN NOTE
Continue BiPAP nightly.    Mask fitting was on 12/2023. [facility] fit.

## 2025-01-14 NOTE — ASSESSMENT & PLAN NOTE
With chronic hypernatremia in setting of aggressive diuresis needed for congestive heart failure  Recent sodium level stable at 142 (12/12/24)  Continue cardiac diet with low sodium, OFR 2000 ml per day

## 2025-01-14 NOTE — ASSESSMENT & PLAN NOTE
Stable BP in the range 100s/60s - 130s/80s  Continue benazepril 10 mg daily, Coreg 25 mg twice daily in setting of A-fib, Bumex 2 mg daily in setting of CHF  Monitor BP

## 2025-01-14 NOTE — ASSESSMENT & PLAN NOTE
Wt Readings from Last 3 Encounters:   11/29/24 113 kg (249 lb 1.9 oz)   12/07/24 112 kg (246 lb 9.6 oz)   09/30/24 113 kg (249 lb 3.2 oz)   Wt 242 (12/10/24)  No acute exacerbation  Continue Bumex 2 mg daily  Creatinine 1.23 (12/12/24)  Cardiac diet, OFR 1800 cc per day. Will increase fluid restriction to 2L 24H. POA in agreement

## 2025-01-14 NOTE — ASSESSMENT & PLAN NOTE
Continue hearing aids at all appropriate time. Unfortunately, one HA was lost. [facility].

## 2025-03-01 ENCOUNTER — NURSE TRIAGE (OUTPATIENT)
Dept: OTHER | Facility: OTHER | Age: OVER 89
End: 2025-03-01

## 2025-03-01 NOTE — TELEPHONE ENCOUNTER
Jemma, supervisor, calling in from [facility] to notify on call provider Dr. Courtney  of change in skin integrity of patient.  On call provider Dr. Courtney contacted via epic secure chat.            Reason for Disposition   [1] Caller requests to speak ONLY to PCP AND [2] URGENT question    Protocols used: PCP Call - No Triage-Adult-

## 2025-03-02 ENCOUNTER — TELEPHONE (OUTPATIENT)
Dept: OTHER | Facility: OTHER | Age: OVER 89
End: 2025-03-02

## 2025-03-02 NOTE — TELEPHONE ENCOUNTER
Gricelda from [facility] called needing to report a fall.    On call notified via epic chat

## 2025-03-03 ENCOUNTER — NURSING HOME VISIT (OUTPATIENT)
Dept: GERIATRICS | Facility: OTHER | Age: OVER 89
End: 2025-03-03
Payer: COMMERCIAL

## 2025-03-03 DIAGNOSIS — R29.6 RECURRENT FALLS: ICD-10-CM

## 2025-03-03 DIAGNOSIS — S22.070D CLOSED WEDGE COMPRESSION FRACTURE OF T10 VERTEBRA WITH ROUTINE HEALING, SUBSEQUENT ENCOUNTER: ICD-10-CM

## 2025-03-03 DIAGNOSIS — S32.010D COMPRESSION FRACTURE OF L1 VERTEBRA WITH ROUTINE HEALING: ICD-10-CM

## 2025-03-03 DIAGNOSIS — M54.50 ACUTE MIDLINE LOW BACK PAIN WITHOUT SCIATICA: Primary | ICD-10-CM

## 2025-03-03 PROBLEM — S22.070A CLOSED WEDGE COMPRESSION FRACTURE OF T10 VERTEBRA (HCC): Status: ACTIVE | Noted: 2025-03-03

## 2025-03-03 PROCEDURE — 99309 SBSQ NF CARE MODERATE MDM 30: CPT | Performed by: NURSE PRACTITIONER

## 2025-03-03 RX ORDER — METHOCARBAMOL 500 MG/1
500 TABLET, FILM COATED ORAL 3 TIMES DAILY
COMMUNITY

## 2025-03-03 RX ORDER — ACETAMINOPHEN 500 MG
1000 TABLET ORAL 3 TIMES DAILY
COMMUNITY

## 2025-03-03 NOTE — ASSESSMENT & PLAN NOTE
With history of falls.  Most recent unwitnessed fall 3/2/2025.  He remains at high risk for falls due to impulsive behavior and challenges alarms as reported by nursing staff in setting of dementia  See above plan  Continue fall precautions  Consider PT/OT

## 2025-03-03 NOTE — ASSESSMENT & PLAN NOTE
S/p unwitnessed fall in the bathroom on 3/2/2025 sustaining acute back pain.  Stat back x-ray showed osteoporotic compressions involving the T10, L1, L2, L3, and L5 vertebra.  The age of the compressions is old or indeterminate.  Patient with severe low back pain which is affecting his mobility.  He normally ambulates with a walker and unable to ambulate this morning due to severe pain  Plan:  Order scheduled Tylenol 1000 mg 3 times daily, methocarbamol 500 mg 3 times daily as needed, and prednisone taper  Spoke to patient's daughter regarding referral to neurosurgery or spine/pain management if above interventions not effective  Consider PT/OT

## 2025-03-03 NOTE — PROGRESS NOTES
Facility: [facility]  POS: 32  Acute Med Note  Code status: DNR    Assessment/Plan:  89-year-old male with:    Acute midline low back pain without sciatica  S/p unwitnessed fall in the bathroom on 3/2/2025 sustaining acute back pain.  Stat back x-ray showed osteoporotic compressions involving the T10, L1, L2, L3, and L5 vertebra.  The age of the compressions is old or indeterminate.  Patient with severe low back pain which is affecting his mobility.  He normally ambulates with a walker and unable to ambulate this morning due to severe pain  Back pain likely exacerbated after fall  Plan:  Order scheduled Tylenol 1000 mg 3 times daily, methocarbamol 500 mg 3 times daily as needed, and prednisone taper  Spoke to patient's daughter regarding referral to neurosurgery or spine/pain management if above interventions not effective  Consider PT/OT    Compression fracture of L1 vertebra with routine healing  Status post unwitnessed fall.  See above plan  Now with acute low back pain.  Patient with history of compression fractures as per MRI report from 5/28/2015  New back x-ray from yesterday revealed osteoporotic compressions involving the T10, L1, L2, L3, and L5 vertebra.  The age of the compressions is old or indeterminate  MRI report from 5/28/2015:  1. Multiple acute compression fractures involving L1, L2, L4 and   minimally at L5 as described below.   2. Scoliosis and severe degenerative disease which is most pronounced at   L3-L4.     Closed wedge compression fracture of T10 vertebra (HCC)  Status post unwitnessed fall 3/2/2025   See above plan    Recurrent falls  With history of falls.  Most recent unwitnessed fall 3/2/2025.  He remains at high risk for falls due to impulsive behavior and challenges alarms as reported by nursing staff in setting of dementia  See above plan  Continue fall precautions  Consider PT/OT    Past Medical History:   Diagnosis Date    Abnormal CT scan 10/23/2023    Arithmetic disorder      [id number]    Myocardial infarct (HCC)     [id number]  LAST ASSESSED    Opacity of lung on imaging study 12/12/2023      Subjective: Back pain     Patient ID: Will Mullen is a 89 y.o. male.    89-year-old male seen and examined for evaluation of back pain following unwitnessed fall.  Nursing reports that patient was found on the bathroom floor on 3/2/2025 and developed severe back pain.  Patient is currently residing in the locked memory care unit at [facility].  At time of examination, patient found seated at side of bed with nursing staff present assisting him with routine ADLs.  Observed patient grimacing and moaning due to severe back pain.  He would moan more with movement.  He was able to stand up with walker and assist of 1, took a couple steps and pivoted into his wheelchair.  He denies having tingling or numbness to lower extremities.  No change in bowel or bladder pattern.  Back x-ray 3/2/2025 did not reveal any new fractures.  Spoke to patient's daughter Debi Denis via phone, reviewed back x-ray report, and plan of care which included medications prednisone, scheduled Tylenol, and methocarbamol.  Also discussed neurosurgery referral if back pain to continue with supportive interventions.  Patient's daughter verbalized an understanding.    The following portions of the patient's history were reviewed and updated as appropriate:  Allergies, current medications, Past Family history, past medical history, past social history, past surgical history, and problem list.    Review of Systems   Unable to perform ROS: Dementia   HENT:  Positive for hearing loss.    Respiratory:  Negative for shortness of breath.    Musculoskeletal:  Positive for back pain.   Skin:  Positive for wound (Right forearm).   Neurological:  Negative for dizziness, light-headedness and headaches.   Psychiatric/Behavioral:  Negative for hallucinations.          Objective:  Weight: 245.8 pounds     BP: 128/84     Temp: 80   Resp; 18        Physical Exam  Vitals and nursing note reviewed.   Constitutional:       General: He is not in acute distress.     Appearance: He is not toxic-appearing or diaphoretic.   HENT:      Head: Normocephalic.      Ears:      Comments: Hard of hearing.  Wears hearing aids.     Nose: No congestion.      Mouth/Throat:      Mouth: Mucous membranes are moist.      Pharynx: No oropharyngeal exudate.   Eyes:      Conjunctiva/sclera: Conjunctivae normal.      Comments: Wears prescription glasses.   Cardiovascular:      Rate and Rhythm: Normal rate.   Pulmonary:      Effort: Pulmonary effort is normal. No respiratory distress.   Abdominal:      General: Bowel sounds are normal. There is no distension.      Palpations: Abdomen is soft.      Tenderness: There is no abdominal tenderness. There is no guarding.   Musculoskeletal:      Cervical back: Neck supple.      Comments: Moves all 4 extremities.   Skin:     General: Skin is warm and dry.      Capillary Refill: Capillary refill takes less than 2 seconds.   Neurological:      Mental Status: He is alert. Mental status is at baseline.      Comments: Alert and oriented to self.   Psychiatric:         Behavior: Behavior normal.         This note was completed in part utilizing with Dragon [employer] one voice recognition software.  Grammatical errors, random word insertion, spelling mistakes, and incomplete sentences may be an occasional consequence of the system secondary to software limitations, ambient noise and hardware issues.  At the time of dictation, efforts were made to edit, clarify and/or correct errors.  Please read the chart carefully and recognize, using context, where substitutions have occurred.  If you have any questions or concerns about the context, text or information contained within the body of this dictation, please contact myself, the provider, for further clarification.

## 2025-03-03 NOTE — ASSESSMENT & PLAN NOTE
Status post unwitnessed fall.  See above plan  Now with acute low back pain.  Patient with history of compression fractures as per MRI report from 5/28/2015  New back x-ray from yesterday revealed osteoporotic compressions involving the T10, L1, L2, L3, and L5 vertebra.  The age of the compressions is older indeterminate  MRI report from 5/28/2015:  1. Multiple acute compression fractures involving L1, L2, L4 and   minimally at L5 as described below.   2. Scoliosis and severe degenerative disease which is most pronounced at   L3-L4.

## 2025-03-14 ENCOUNTER — NURSING HOME VISIT (OUTPATIENT)
Dept: GERIATRICS | Facility: OTHER | Age: OVER 89
End: 2025-03-14
Payer: COMMERCIAL

## 2025-03-14 DIAGNOSIS — E03.9 ACQUIRED HYPOTHYROIDISM: ICD-10-CM

## 2025-03-14 DIAGNOSIS — M54.50 ACUTE MIDLINE LOW BACK PAIN WITHOUT SCIATICA: ICD-10-CM

## 2025-03-14 DIAGNOSIS — J44.9 CHRONIC OBSTRUCTIVE PULMONARY DISEASE, UNSPECIFIED COPD TYPE (HCC): ICD-10-CM

## 2025-03-14 DIAGNOSIS — R53.81 DEBILITY: ICD-10-CM

## 2025-03-14 DIAGNOSIS — F01.B2 MODERATE VASCULAR DEMENTIA WITH PSYCHOTIC DISTURBANCE (HCC): ICD-10-CM

## 2025-03-14 DIAGNOSIS — S22.070S CLOSED WEDGE COMPRESSION FRACTURE OF T10 VERTEBRA, SEQUELA: ICD-10-CM

## 2025-03-14 DIAGNOSIS — I48.0 PAROXYSMAL ATRIAL FIBRILLATION (HCC): ICD-10-CM

## 2025-03-14 DIAGNOSIS — I10 BENIGN ESSENTIAL HYPERTENSION: ICD-10-CM

## 2025-03-14 DIAGNOSIS — G47.33 OBSTRUCTIVE SLEEP APNEA: ICD-10-CM

## 2025-03-14 DIAGNOSIS — N40.0 BENIGN PROSTATIC HYPERPLASIA, UNSPECIFIED WHETHER LOWER URINARY TRACT SYMPTOMS PRESENT: ICD-10-CM

## 2025-03-14 DIAGNOSIS — S32.010D COMPRESSION FRACTURE OF L1 VERTEBRA WITH ROUTINE HEALING: Primary | ICD-10-CM

## 2025-03-14 DIAGNOSIS — I25.10 CORONARY ARTERY DISEASE INVOLVING NATIVE CORONARY ARTERY OF NATIVE HEART WITHOUT ANGINA PECTORIS: ICD-10-CM

## 2025-03-14 DIAGNOSIS — N18.31 STAGE 3A CHRONIC KIDNEY DISEASE (HCC): ICD-10-CM

## 2025-03-14 DIAGNOSIS — I50.32 CHRONIC DIASTOLIC (CONGESTIVE) HEART FAILURE (HCC): ICD-10-CM

## 2025-03-14 PROBLEM — R41.89 UNRESPONSIVE: Status: RESOLVED | Noted: 2024-11-29 | Resolved: 2025-03-14

## 2025-03-14 PROCEDURE — 99309 SBSQ NF CARE MODERATE MDM 30: CPT | Performed by: NURSE PRACTITIONER

## 2025-03-14 NOTE — ASSESSMENT & PLAN NOTE
Lab Results   Component Value Date    EGFR 67 11/18/2023    EGFR 64 11/16/2023    EGFR 64 11/15/2023    CREATININE 0.95 11/18/2023    CREATININE 0.99 11/16/2023    CREATININE 0.98 11/15/2023   Creatinine has been trending up with most recent creatinine 1.43 on 3/4/2025 with estimated creatinine clearance of 46 mL/min.  Patient requires Bumex in setting of CHF and he is drinking fluids without difficulty. Currently not on a fluid restriction  Will decrease benazapril from 10 mg to 5 mg daily  Recheck BMP 3/17  Will recheck BMP on 3/17   Ensure adequate hydration and avoid nephrotoxic medications

## 2025-03-14 NOTE — ASSESSMENT & PLAN NOTE
Stable without exacerbation  O2 sats stable on room air  Continue ipratropium and albuterol nebulizer treatments as ordered  Continue to monitor respiratory status

## 2025-03-14 NOTE — ASSESSMENT & PLAN NOTE
Multifactorial  Continue 24/7 care and support at long-term care facility  Continue RNP program to prevent deconditioning  PT/OT/ST if needed  Ensure adequate hydration and nutrition  Continue fall precautions

## 2025-03-14 NOTE — ASSESSMENT & PLAN NOTE
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

## 2025-03-14 NOTE — ASSESSMENT & PLAN NOTE
Most recent TSH 2.51 on 12/2/2024  Currently on levothyroxine 100 mcg daily  Routine monitoring of TSH annually or sooner if symptoms arise

## 2025-03-14 NOTE — ASSESSMENT & PLAN NOTE
Wt Readings from Last 3 Encounters:   11/29/24 113 kg (249 lb 1.9 oz)   12/07/24 112 kg (246 lb 9.6 oz)   09/30/24 113 kg (249 lb 3.2 oz)   Echo 11/12/2023: Left Ventricle: Left ventricular cavity size is normal. Wall thickness is increased. The left ventricular ejection fraction is 55% by visual estimation. Although no diagnostic regional wall motion abnormality was identified, this possibility cannot be completely excluded on the basis of this study. Right Ventricle: Right ventricular cavity size is mildly dilated. Systolic function is mildly reduced. Left Atrium: The atrium is moderately dilated. Right Atrium: The atrium is moderately dilated.  Aortic Valve: There is aortic valve sclerosis. Mitral Valve: There is mild regurgitation. Tricuspid Valve: There is mild regurgitation.  Currently not on a fluid restriction restriction and weights have been stable   With minimal edema bilateral ankles  Patient with COPD and becomes dyspneic on exertion unrelated to cardiac  Continue bumex 2 mg daily   Continue CHF pathway/ daily weights

## 2025-03-14 NOTE — ASSESSMENT & PLAN NOTE
Goal -150/90 in setting of advanced age and comorbidities  With isolated soft BPs noted at times. /70 TODAY  Will decrease benazepril from 10 mg daily to 5 mg daily with hold parameter  Patient is on Coreg twice daily in setting of A-fib and Bumex 2 mg daily in setting of CHF  Continue to monitor and adjust medications accordingly

## 2025-03-14 NOTE — ASSESSMENT & PLAN NOTE
Heart rate trending 60s-70s  Continue coreg 25 mg BID, Eliquis 5 mg BID for anticoagulation  Most recent Hgb

## 2025-03-14 NOTE — ASSESSMENT & PLAN NOTE
S/p unwitnessed fall on 3/2/2025 and developed severe acute low back pain.  Spinal x-rays revealed osteoporotic compressions involving the T10, L1, L2, L3, and L5 vertebra.  The age of the compressions is old and indeterminate.  With history of compression fractures involving L1, L2, L4, and minimally at L5, scoliosis and severe degenerative disease which is most pronounced at L3 and L4 as per MRI imaging 5/28/2015 currently Being treated with multimodal pain medication regimen and steroid taper course  Spoke to patient's daughter regarding consult to neurosurgery as an option if back pain continues after steroid taper course has completed and pain affects quality of life  Consider PT eval to prevent deconditioning from pain causing decreased movement

## 2025-03-14 NOTE — PROGRESS NOTES
Facility: [facility]  POS: 32  Progress Note    Chief Complaint/Reason for visit: Long-term care follow-up visit  Code status: DNR  History of Present Illness: 89-year-old male seen and examined for LTC follow up of acute and chronic medical conditions.  Patient resides in the memory care unit at [facility] in [place]. S/p unwitnessed fall on 3/2/2025 with acute low back pain which spinal x-rays were done which revealed osteoporotic compression fractures and age of compressions is older indeterminate.  Patient with history of compression fractures as per MRI report 5/28/2015.  Patient treated with prednisone taper and multimodal pain medication regimen with improvement in functional status. He continues with back pain with repositioning and standing up, otherwise no complaints of pain.  Patient is at risk for falls due to history of falls, dementia with impulsive behavior, and unsteady gait.  Found patient seated in chair working on Dely.  Patient was pleasant and cooperative with exam, answered all questions appropriately.  He does have short-term memory loss.  Patient's nurse reports that he requires assistance with ADLs.  He is able to feed himself after staff sets up his meals.  Able to ambulate with walker.  Will recommend PT due to recent fall with acute back pain. His vital signs and weights have been stable. Spoke to patient's daughter Debi on 3/11/2025 while she was visiting her dad in the dining room.  Discussed his recent fall, chronic compression fractures, x-ray results, steroid course and pain meds, and recommendations if back pain should continue after steroid course.  See A/P for additional information.  Past Medical History: unchanged from history and physical  Past Medical History:   Diagnosis Date    Abnormal CT scan 10/23/2023    Arithmetic disorder     07OCT2016    Myocardial infarct (HCC)     07OCT2016  LAST ASSESSED    Opacity of lung on imaging study 12/12/2023      Family History: Unchanged from history and physical  Social History: Unchanged from history and physical  Review of systems: As per review of medical illness, all other systems reviewed and negative.  Medications: All medication and routine orders were reviewed and updated  Allergies: NKDA  Consults reviewed:Nutrition and Other  Labs/Diagnostics (reviewed by this provider): Copy in Chart  Imaging Reviewed:  Physical Exam  Weight: 244.6 pounds temp: 98.3         BP: 110/70    pulse: 72 resp: 16       O2 Sat: 92% to 98% on room air  Orientation:Person and Place forgetful of present events    Physical Exam  Vitals and nursing note reviewed.   Constitutional:       General: He is not in acute distress.     Appearance: He is not toxic-appearing or diaphoretic.   HENT:      Head: Normocephalic.      Ears:      Comments: Hard of hearing.  Wears bilateral hearing aids.     Nose: No congestion.      Mouth/Throat:      Mouth: Mucous membranes are moist.      Pharynx: No oropharyngeal exudate.   Eyes:      Conjunctiva/sclera: Conjunctivae normal.      Comments: Wears prescription glasses.   Cardiovascular:      Rate and Rhythm: Normal rate and regular rhythm.   Pulmonary:      Effort: Pulmonary effort is normal. No respiratory distress.      Comments: Decreased breath sounds bilateral lung fields.  Pursed lip breathing noted at times.  Dyspneic on exertion  Abdominal:      General: Bowel sounds are normal. There is no distension.      Palpations: Abdomen is soft.      Tenderness: There is no abdominal tenderness. There is no guarding.   Musculoskeletal:      Cervical back: Neck supple. No rigidity.      Right lower leg: Edema (Trace to +1 ankle edema) present.      Left lower leg: Edema (Trace ankle edema) present.      Comments: Moves all 4 extremities.   Skin:     General: Skin is warm and dry.      Capillary Refill: Capillary refill takes less than 2 seconds.   Neurological:      Mental Status: He is alert. Mental  status is at baseline.      Gait: Gait abnormal.      Comments: With cognitive and memory impairment   Psychiatric:         Mood and Affect: Mood normal.         Behavior: Behavior normal.         Thought Content: Thought content normal.       Assessment/Plan:  89-year-old male with:    Compression fracture of L1 vertebra with routine healing  S/p unwitnessed fall on 3/2/2025 and developed severe acute low back pain.  Spinal x-rays revealed osteoporotic compressions involving the T10, L1, L2, L3, and L5 vertebra.  The age of the compressions is old and indeterminate.  With history of compression fractures involving L1, L2, L4, and minimally at L5, scoliosis and severe degenerative disease which is most pronounced at L3 and L4 as per MRI imaging 5/28/2015 currently Being treated with multimodal pain medication regimen and steroid taper course  Spoke to patient's daughter regarding consult to neurosurgery as an option if back pain continues after steroid taper course has completed and pain affects quality of life  Consider PT eval to prevent deconditioning from pain causing decreased movement    Closed wedge compression fracture of T10 vertebra (HCC)  See above plan    Acute midline low back pain without sciatica  S/p unwitnessed fall in the bathroom on 3/2/2025 sustaining acute back pain  Patient complains of back pain with movement, able to ambulate with walker  Continue multimodal pain medication regimen with scheduled Tylenol, topical pain patch, methocarbamol as needed  Blood glucose elevated to 153 on 3/4 due to prednisone. 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)  Vascular dementia versus mixed type with behavior disturbances  Continue 24/7 care and support in the locked memory care unit at [facility]   Continue reorientation and redirection as  needed  Patient's family is very supportive  Continue delirium precautions  Avoid deliriogenic medications  Monitor for pain and ensure that pain is adequately controlled as this can cause delirium  Avoid constipation and urinary retention  Encourage patient to participate in activities provided at facility    Chronic diastolic (congestive) heart failure (HCC)  Wt Readings from Last 3 Encounters:   11/29/24 113 kg (249 lb 1.9 oz)   12/07/24 112 kg (246 lb 9.6 oz)   09/30/24 113 kg (249 lb 3.2 oz)   Echo 11/12/2023: Left Ventricle: Left ventricular cavity size is normal. Wall thickness is increased. The left ventricular ejection fraction is 55% by visual estimation. Although no diagnostic regional wall motion abnormality was identified, this possibility cannot be completely excluded on the basis of this study. Right Ventricle: Right ventricular cavity size is mildly dilated. Systolic function is mildly reduced. Left Atrium: The atrium is moderately dilated. Right Atrium: The atrium is moderately dilated.  Aortic Valve: There is aortic valve sclerosis. Mitral Valve: There is mild regurgitation. Tricuspid Valve: There is mild regurgitation.  Currently not on a fluid restriction restriction and weights have been stable   With minimal edema bilateral ankles  Patient with COPD and becomes dyspneic on exertion unrelated to cardiac  Continue bumex 2 mg daily   Continue CHF pathway/ daily weights    Atrial fibrillation (HCC)  Heart rate trending 60s-70s  Continue coreg 25 mg BID, Eliquis 5 mg BID for anticoagulation  Most recent Hgb     Chronic obstructive pulmonary disease (HCC)  Stable without exacerbation  O2 sats stable on room air  Continue ipratropium and albuterol nebulizer treatments as ordered  Continue to monitor respiratory status    Obstructive sleep apnea  Continue BiPAP every night    Hypothyroidism  Most recent TSH 2.51 on 12/2/2024  Currently on levothyroxine 100 mcg daily  Routine monitoring of TSH annually  or sooner if symptoms arise    Coronary artery disease  Stable without symptoms  Continue atorvastatin 40 mg daily  Continue carvedilol    Stage 3a chronic kidney disease (HCC)  Lab Results   Component Value Date    EGFR 67 11/18/2023    EGFR 64 11/16/2023    EGFR 64 11/15/2023    CREATININE 0.95 11/18/2023    CREATININE 0.99 11/16/2023    CREATININE 0.98 11/15/2023   Creatinine has been trending up with most recent creatinine 1.43 on 3/4/2025 with estimated creatinine clearance of 46 mL/min.  Patient requires Bumex in setting of CHF and he is drinking fluids without difficulty. Currently not on a fluid restriction  Will decrease benazapril from 10 mg to 5 mg daily  Recheck BMP 3/17  Will recheck BMP on 3/17   Ensure adequate hydration and avoid nephrotoxic medications    Essential hypertension  Goal -150/90 in setting of advanced age and comorbidities  With isolated soft BPs noted at times. /70 TODAY  Will decrease benazepril from 10 mg daily to 5 mg daily with hold parameter  Patient is on Coreg twice daily in setting of A-fib and Bumex 2 mg daily in setting of CHF  Continue to monitor and adjust medications accordingly    BPH (benign prostatic hyperplasia)  No issues reported by patient or from nursing staff  Continue finasteride 5 mg daily    Debility  Multifactorial  Continue 24/7 care and support at long-term care facility  Continue RNP program to prevent deconditioning  PT/OT/ST if needed  Ensure adequate hydration and nutrition  Continue fall precautions     This note was completed in part utilizing [employer] direct voice recognition software.  Grammatical errors, random word insertion, spelling mistakes, and incomplete sentences may be an occasional consequence of the system secondary to software limitations, ambient noise and hardware issues.  At the time of dictation, efforts were made to edit, clarify and/or correct errors.  Please read the chart carefully and recognize, using  context, where substitutions have occurred.  If you have any questions or concerns about the context, text or information contained within the body of this dictation, please contact myself, the provider, for further clarification.    LIBORIO Perez  3/14/360458:54 AM

## 2025-03-14 NOTE — ASSESSMENT & PLAN NOTE
S/p unwitnessed fall in the bathroom on 3/2/2025 sustaining acute back pain  Patient complains of back pain with movement, able to ambulate with walker  Continue multimodal pain medication regimen with scheduled Tylenol, topical pain patch, methocarbamol as needed  Blood glucose elevated to 153 on 3/4 due to prednisone. Will order accuchecks daily for one week with parameter to call  Continues steroid taper course which will complete on 3/17/2025

## 2025-03-28 DIAGNOSIS — I50.32 CHRONIC DIASTOLIC (CONGESTIVE) HEART FAILURE (HCC): Primary | ICD-10-CM

## 2025-03-28 DIAGNOSIS — K59.09 OTHER CONSTIPATION: ICD-10-CM

## 2025-03-28 DIAGNOSIS — I10 BENIGN ESSENTIAL HYPERTENSION: ICD-10-CM

## 2025-03-28 DIAGNOSIS — I25.10 CORONARY ARTERY DISEASE INVOLVING NATIVE CORONARY ARTERY OF NATIVE HEART WITHOUT ANGINA PECTORIS: ICD-10-CM

## 2025-03-28 RX ORDER — POTASSIUM CHLORIDE 750 MG/1
10 TABLET, EXTENDED RELEASE ORAL DAILY
Start: 2025-03-28

## 2025-03-28 RX ORDER — POLYETHYLENE GLYCOL 3350 17 G/17G
17 POWDER, FOR SOLUTION ORAL DAILY PRN
Start: 2025-03-28

## 2025-03-28 RX ORDER — BENAZEPRIL HYDROCHLORIDE 5 MG/1
5 TABLET ORAL DAILY
Start: 2025-03-28

## 2025-03-28 RX ORDER — CARVEDILOL 12.5 MG/1
12.5 TABLET ORAL 2 TIMES DAILY WITH MEALS
Start: 2025-03-28

## 2025-04-14 ENCOUNTER — NURSING HOME VISIT (OUTPATIENT)
Dept: GERIATRICS | Facility: OTHER | Age: OVER 89
End: 2025-04-14
Payer: COMMERCIAL

## 2025-04-14 DIAGNOSIS — E03.8 OTHER SPECIFIED HYPOTHYROIDISM: ICD-10-CM

## 2025-04-14 DIAGNOSIS — I50.32 CHRONIC DIASTOLIC (CONGESTIVE) HEART FAILURE (HCC): Primary | ICD-10-CM

## 2025-04-14 DIAGNOSIS — I10 BENIGN ESSENTIAL HYPERTENSION: ICD-10-CM

## 2025-04-14 DIAGNOSIS — I48.0 PAROXYSMAL ATRIAL FIBRILLATION (HCC): ICD-10-CM

## 2025-04-14 DIAGNOSIS — F01.B2 MODERATE VASCULAR DEMENTIA WITH PSYCHOTIC DISTURBANCE (HCC): ICD-10-CM

## 2025-04-14 PROCEDURE — 99310 SBSQ NF CARE HIGH MDM 45: CPT | Performed by: FAMILY MEDICINE

## 2025-04-14 NOTE — PROGRESS NOTES
[facility]  [address] [address]  [phone]  Facility: [facility]  POS 32    NAME: Will Mullen  AGE: 89 y.o. SEX: male [id number]  DATE OF ENCOUNTER: 4/14/2025    Assessment and Plan   Chronic diastolic (congestive) heart failure (HCC)  No acute exacerbation  Continue Bumex 2 mg daily, potassium chloride 30 mEq daily. Recent K WNL 4.6 on BMP 3/17/25.  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  History of Present Illness   The patient is seen in the room during routine LTC follow up visit.   He fell in the bathroom last month, 3/2/25, sustaining osteoporotic compression fractures  in T10, L1,2, 3, and 5 vertebra. His pain has improved with Tylenol scheduled, Robaxin, and a short-course of Methylprednisolone.  He denies any pain or discomfort this morning.  Per daughter, she inquires about compression stockings, ear wax, blood pressure, and his weight. We discussed on his benazepril was reduced to 5 mg daily due to low BP. His fluid restriction was d/c last visit. His weight has been stable on  Bumex 2 mg daily which we will continue the same dose.   Our CRNP will examine his ears next f/u visit with her.   We will monitor BMP Q 3 mon.   As per nursing report, he's forgetful and removes his hearing aids. He feeds self with set up, appetite is good.     The following portions of the patient's history were reviewed and updated as appropriate: allergies, current medications, past family history, past medical history, past social history, past surgical history and problem list.    Review of Systems   As per HPI, otherwise negative.  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)    Malignant neoplasm of urinary bladder (HCC)    Class 2 severe obesity with serious comorbidity in adult (HCC)    Restrictive lung disease    Seasonal allergic rhinitis    Change in mole    Hypernatremia    Sensorineural hearing loss (SNHL) of both ears    Chronic obstructive pulmonary disease (HCC)    CKD (chronic kidney disease) stage 2, GFR 60-89 ml/min    Dry eyes    Pigmented skin lesion    Vitamin D deficiency    BPH (benign prostatic hyperplasia)    History of stroke    Other constipation    Chronic diastolic (congestive) heart failure (HCC)    Hypertensive heart and renal disease with congestive heart failure (HCC)    Recurrent falls    Fall    Closed wedge compression fracture of T10 vertebra (HCC)    Compression fracture of L1 vertebra with routine healing    Acute midline low back pain without sciatica    Stage 3a chronic kidney disease (HCC)    Debility       Objective     Vitals: EHR at facility reviewed, stable.  Nursing note reviewed.   General: no acute distress.  HENT:      Head: Normocephalic.      Nose: Nose normal.      Mouth: Mucous membranes are moist.   Eyes:       Right eye: No  discharge.         Left eye: No discharge.      Conjunctivae normal.   Cardiovascular:      Normal rate and regular rhythm.   Pulmonary:      Pulmonary effort is normal. No wheezing, rhonchi. Bilateral rales.   Abdominal:      Bowel sounds are normal. There is no distension.      Abdomen is soft. There is no abdominal tenderness.   Musculoskeletal:      Right lower le+ edema.      Left lower le+ edema.   Skin:     General: Skin is warm.   Neurological: alert.      Oriented to person only. Pleasant, cooperative, follow simple commands      Behavior: normal.     Pertinent Laboratory/Diagnostic Studies:  Refer to facility chart.    Current Medications   Medications reviewed and updated in facility chart.  I have spent a total time of 50 minutes in caring for this patient on the day of the visit/encounter including Diagnostic results, Instructions for management, Patient and family education, Counseling / Coordination of care, Documenting in the medical record, Reviewing/placing orders in the medical record (including tests, medications, and/or procedures), Obtaining or reviewing history  , and Communicating with other healthcare professionals .

## 2025-04-24 RX ORDER — POTASSIUM CHLORIDE 750 MG/1
30 TABLET, EXTENDED RELEASE ORAL DAILY
Start: 2025-04-24

## 2025-04-25 NOTE — ASSESSMENT & PLAN NOTE
Vascular dementia versus mixed type with behavior disturbances  Continue 24/7 care and support in the locked memory care unit at [facility]   Continue reorientation and redirection as needed  Avoid constipation and urinary retention  Encourage patient to participate in activities provided at facility

## 2025-04-25 NOTE — ASSESSMENT & PLAN NOTE
No acute exacerbation  Continue Bumex 2 mg daily, potassium chloride 30 mEq daily. Recent K WNL 4.6 on BMP 3/17/25.  Currently not on a fluid restriction restriction and weights have been stable

## 2025-04-25 NOTE — ASSESSMENT & PLAN NOTE
Heart rate trending 60s-70s  Continue coreg 12.5 mg BID, Eliquis 5 mg BID for anticoagulation  No adverse bleeding reported  Hgb 13.5 on CBC 3/17/25

## 2025-04-29 NOTE — ASSESSMENT & PLAN NOTE
HR 57-60 in office today  Patient is currently on Coreg 25 mg BID at noon and bedtime  Consider decreasing Coreg dose to 12 5 mg twice daily if HR consistently<60   Continue Eliquis for anticoagulation/stroke prevention  We will reassess heart rate at 6/26 appt Afib

## 2025-05-07 ENCOUNTER — NURSING HOME VISIT (OUTPATIENT)
Dept: GERIATRICS | Facility: OTHER | Age: OVER 89
End: 2025-05-07
Payer: COMMERCIAL

## 2025-05-07 DIAGNOSIS — H61.23 BILATERAL IMPACTED CERUMEN: Primary | ICD-10-CM

## 2025-05-07 PROCEDURE — 69210 REMOVE IMPACTED EAR WAX UNI: CPT | Performed by: NURSE PRACTITIONER

## 2025-05-07 PROCEDURE — 99309 SBSQ NF CARE MODERATE MDM 30: CPT | Performed by: NURSE PRACTITIONER

## 2025-05-07 NOTE — PROGRESS NOTES
[facility]   POS:32   Procedure note  Will Mullen  : 1927    Ear cerumen removal    Date/Time: 2025 3:41 PM    Performed by: LIBORIO Perez  Authorized by: LIBORIO Perez    Universal Protocol:  procedure performed by consultantConsent: Verbal consent obtained. Written consent not obtained.  Consent given by: power of   Patient understanding: patient states understanding of the procedure being performed  Patient identity confirmed: verbally with patient and provided demographic data    Patient location:  Bedside  Procedure details:     Local anesthetic:  None    Location:  Both ears    Procedure type: irrigation with instrumentation      Approach:  External  Post-procedure details:     Complication:  None    Hearing quality:  Improved    Patient tolerance of procedure:  Tolerated well, no immediate complications  Comments:      Debrox eardrops ordered last week in preparation for ear cleaning.  Cerumen noted to be soft.  Complete removal of ear wax from both ear canals. Patient had minimal discomfort during procedure of right ear.Left ear had more cerumen as compared to right ear.  Patient stated that his hearing improved after procedure.  Bilateral hearing aids reapplied to both ears after procedure.  Report provided to patient's nurse.     I have spent a total time of 30 minutes in caring for this patient on the day of the visit/encounter including Risks and benefits of tx options, Documenting in the medical record, Obtaining or reviewing history  , and Communicating with other healthcare professionals .  Assessing patient, preparation for procedure, ear cleaning procedure, and cleaning up.    LIBORIO Perez

## 2025-05-12 ENCOUNTER — NURSING HOME VISIT (OUTPATIENT)
Dept: GERIATRICS | Facility: OTHER | Age: OVER 89
End: 2025-05-12
Payer: COMMERCIAL

## 2025-05-12 DIAGNOSIS — I50.32 CHRONIC DIASTOLIC (CONGESTIVE) HEART FAILURE (HCC): ICD-10-CM

## 2025-05-12 DIAGNOSIS — R06.02 SHORTNESS OF BREATH: Primary | ICD-10-CM

## 2025-05-12 DIAGNOSIS — J44.9 CHRONIC OBSTRUCTIVE PULMONARY DISEASE, UNSPECIFIED COPD TYPE (HCC): ICD-10-CM

## 2025-05-12 PROCEDURE — 99309 SBSQ NF CARE MODERATE MDM 30: CPT | Performed by: NURSE PRACTITIONER

## 2025-05-12 NOTE — ASSESSMENT & PLAN NOTE
stat chest x-ray ordered today due to increased work of breathing, shortness of breath noted at rest, and rales bilateral lung fields  Apply oxygen support via nasal cannula if needed for O2 sats <90%  Continue ipratropium and albuterol nebulizer treatments  Will continue to monitor respiratory status

## 2025-05-12 NOTE — ASSESSMENT & PLAN NOTE
See above plan  Most recent weight 246 pounds on 5/3/2025<<<243.8 pounds on 5/1/2025<<<250.6 pounds on 4/29/2025<<<242 pounds on 4/8/2025.  Patient was given additional doses of torsemide on 4/29/2025 and 4/30/2025 for weight gain  Patient's dry weight without fluids likely 242 pounds  Lasix 80 mg ordered x 1 dose today along with stat chest x-ray  Currently not on a fluid restriction, but may need to resume if patient consistently unstable with weight gains due to fluid overload

## 2025-05-12 NOTE — PROGRESS NOTES
Facility: [facility]  POS: 32  Acute Med Note  Code status: DNR    Assessment/Plan:  89-year-old male with:    Shortness of breath  Labored respirations and shortness of breath noted by nursing staff at rest  Agree with findings.  Patient found seated in chair and respirations appeared more labored than usual along with Rales bilateral lung fields.  Patient denied feeling short of breath at time of exam.  With underlying CHF and COPD diagnoses  Plan:  Due to insufficient amount of Bumex at LTC[facility], will order Lasix 80 mg x 1 dose now and nursing staff made aware plan  Stat chest x-ray    Chronic diastolic (congestive) heart failure (HCC)  See above plan  Most recent weight 246 pounds on 5/3/2025<<<243.8 pounds on 5/1/2025<<<250.6 pounds on 4/29/2025<<<242 pounds on 4/8/2025.  Patient was given additional doses of torsemide on 4/29/2025 and 4/30/2025 for weight gain  Patient's dry weight without fluids likely 242 pounds  Lasix 80 mg ordered x 1 dose today along with stat chest x-ray  Currently not on a fluid restriction, but may need to resume if patient consistently unstable with weight gains due to fluid overload    Chronic obstructive pulmonary disease (HCC)   stat chest x-ray ordered today due to increased work of breathing, shortness of breath noted at rest, and rales bilateral lung fields  Apply oxygen support via nasal cannula if needed for O2 sats <90%  Continue ipratropium and albuterol nebulizer treatments  Will continue to monitor respiratory status    Subjective: No complaints     Patient ID: Will Mullen is a 89 y.o. male.    89-year-old male seen and examined this morning at the request of nursing staff for evaluation of labored breathing.  Found patient seated in his recliner chair, alert and oriented to self, and current situation.  He denies feeling short of breath.  No complaints of chest pain, dizziness, or headache.  O2 sat was stable at 92% on room air.  Respirations more labored than  usual and tachypneic with rate of 24.  He also has Rales bibasilar and +2 pitting edema to bilateral lower extremities.  He has SAMANTHA stockings in place.  Spoke to patient's daughter Debi Denis via phone regarding nurse's concern, clinical assessment findings, and plan of care.  She verbalized understanding.  See A/P for additional information.    The following portions of the patient's history were reviewed and updated as appropriate:  Allergies, current medications, Past Family history, past medical history, past social history, past surgical history, and problem list.    Review of Systems   Constitutional:  Negative for appetite change, chills, diaphoresis and fever.   HENT:  Negative for ear pain and sore throat.         Hard of hearing.   Eyes:  Negative for pain and visual disturbance.   Respiratory:  Negative for cough and shortness of breath.    Cardiovascular:  Negative for chest pain and palpitations.   Gastrointestinal:  Negative for abdominal pain and vomiting.   Genitourinary:  Negative for dysuria.   Skin:  Negative for color change and rash.   Neurological:  Negative for speech difficulty and light-headedness.   Psychiatric/Behavioral:  Negative for agitation, behavioral problems and hallucinations.    All other systems reviewed and are negative.        Objective:  Weight: 246 pounds   BP: 151/86     temp: 98.4    heart rate: 80     resp: 24    O2 sat: 92% on room air at time of exam     Physical Exam  Vitals and nursing note reviewed.   Constitutional:       General: He is not in acute distress.     Appearance: He is not toxic-appearing or diaphoretic.      Comments: Elderly male who appears with chronic illness.  Patient does not appear toxic.   HENT:      Head: Normocephalic.      Nose: No congestion.      Mouth/Throat:      Mouth: Mucous membranes are moist.      Pharynx: No oropharyngeal exudate.   Eyes:      Conjunctiva/sclera: Conjunctivae normal.   Cardiovascular:      Rate and Rhythm:  Normal rate and regular rhythm.   Pulmonary:      Effort: No respiratory distress.      Breath sounds: Wheezing and rales present.      Comments: Patient with chronic labored breathing, but more effort needed with abdominal breathing  Abdominal:      General: Bowel sounds are normal. There is no distension.      Palpations: Abdomen is soft.      Tenderness: There is no abdominal tenderness. There is no guarding.   Musculoskeletal:      Cervical back: Neck supple.      Right lower leg: Edema (+2 pitting edema) present.      Left lower leg: Edema (+2 pitting edema) present.      Comments: Moves all 4 extremities.   Skin:     General: Skin is warm and dry.      Capillary Refill: Capillary refill takes less than 2 seconds.   Neurological:      Mental Status: He is alert. Mental status is at baseline.      Gait: Gait abnormal.   Psychiatric:         Mood and Affect: Mood normal.         Behavior: Behavior normal.         Thought Content: Thought content normal.           This note was completed in part utilizing with Dragon medical one voice recognition software.  Grammatical errors, random word insertion, spelling mistakes, and incomplete sentences may be an occasional consequence of the system secondary to software limitations, ambient noise and hardware issues.  At the time of dictation, efforts were made to edit, clarify and/or correct errors.  Please read the chart carefully and recognize, using context, where substitutions have occurred.  If you have any questions or concerns about the context, text or information contained within the body of this dictation, please contact myself, the provider, for further clarification.

## 2025-05-12 NOTE — ASSESSMENT & PLAN NOTE
Labored respirations and shortness of breath noted by nursing staff at rest  Agree with findings.  Patient found seated in chair and respirations appeared more labored than usual along with Rales bilateral lung fields.  Patient denied feeling short of breath at time of exam.  With underlying CHF and COPD diagnoses  Plan:  Due to insufficient amount of Bumex at [facility], will order Lasix 80 mg x 1 dose now and nursing staff made aware plan  Stat chest x-ray

## 2025-05-27 ENCOUNTER — NURSING HOME VISIT (OUTPATIENT)
Dept: GERIATRICS | Facility: OTHER | Age: OVER 89
End: 2025-05-27
Payer: COMMERCIAL

## 2025-05-27 DIAGNOSIS — I50.32 CHRONIC DIASTOLIC (CONGESTIVE) HEART FAILURE (HCC): Primary | ICD-10-CM

## 2025-05-27 DIAGNOSIS — F01.B2 MODERATE VASCULAR DEMENTIA WITH PSYCHOTIC DISTURBANCE (HCC): ICD-10-CM

## 2025-05-27 DIAGNOSIS — J44.9 CHRONIC OBSTRUCTIVE PULMONARY DISEASE, UNSPECIFIED COPD TYPE (HCC): ICD-10-CM

## 2025-05-27 DIAGNOSIS — S32.010D COMPRESSION FRACTURE OF L1 VERTEBRA WITH ROUTINE HEALING: ICD-10-CM

## 2025-05-27 DIAGNOSIS — G47.33 OBSTRUCTIVE SLEEP APNEA: ICD-10-CM

## 2025-05-27 DIAGNOSIS — I48.0 PAROXYSMAL ATRIAL FIBRILLATION (HCC): ICD-10-CM

## 2025-05-27 DIAGNOSIS — H90.3 SENSORINEURAL HEARING LOSS (SNHL) OF BOTH EARS: ICD-10-CM

## 2025-05-27 DIAGNOSIS — E03.9 ACQUIRED HYPOTHYROIDISM: ICD-10-CM

## 2025-05-27 DIAGNOSIS — N18.31 STAGE 3A CHRONIC KIDNEY DISEASE (HCC): ICD-10-CM

## 2025-05-27 DIAGNOSIS — I10 BENIGN ESSENTIAL HYPERTENSION: ICD-10-CM

## 2025-05-27 PROCEDURE — 99309 SBSQ NF CARE MODERATE MDM 30: CPT | Performed by: NURSE PRACTITIONER

## 2025-05-27 NOTE — PROGRESS NOTES
Facility: [facility]  POS: 32  Progress Note    Chief Complaint/Reason for visit: LTC follow-up visit  Code status: DNR  History of Present Illness: 89-year-old male seen and examined for LTC follow-up of acute and chronic medical conditions.  Patient is currently residing in the locked memory care unit at [facility].  As per memory care director, patient is dependent on staff for a.m. and p.m. care, he uses his walker at baseline, he feeds himself after set up.  He attempts to use his walker like a wheelchair at times and needs to be reminded that it is a walker.  He is maintained on Bumex 2 mg daily for CHF and his weight is currently stable at 250.6 pounds.  His blood pressures have been stable on current dose of Bumex and carvedilol.  His respirations are chronically labored in setting of COPD.  Becomes tachypneic and dyspneic after ambulating from the bathroom to his recliner chair.  Patient's daughter was present in the room and was concerned about his breathing before he even ambulated to the bathroom.  He was due for his breathing treatment and nurse was coming in to give him his breathing treatment.  He [facility] CPAP [facility]  Will order routine labs CBC, BMP, and TSH with free T4 June 16, 2025.  See A/P for additional information.  Past Medical History: unchanged from history and physical  Family History: Unchanged from history and physical  Social History: Unchanged from history and physical  Review of systems: As per review of medical illness, all other systems reviewed and negative.  Medications: All medication and routine orders were reviewed and updated  Allergies: NKDA  Consults reviewed:PT, OT, Nutrition, and Other  Labs/Diagnostics (reviewed by this provider): Copy in Chart  Imaging Reviewed: Recent chest x-ray negative for acute findings  Physical Exam  Weight: 250.6 pounds temp: 98.2          BP: 137/78    pulse: 68 resp: 24      O2 Sat: 96% on room air at  time of exam  Orientation:Person and Place     Physical Exam  Vitals and nursing note reviewed.   Constitutional:       General: He is not in acute distress.     Appearance: He is obese. He is not toxic-appearing or diaphoretic.      Comments: Elderly male who appears with chronic illness.   HENT:      Head: Normocephalic.      Ears:      Comments: Wears bilateral hearing aids.     Nose: No congestion.      Mouth/Throat:      Mouth: Mucous membranes are moist.      Pharynx: No oropharyngeal exudate.     Eyes:      General: No scleral icterus.     Extraocular Movements: Extraocular movements intact.      Conjunctiva/sclera: Conjunctivae normal.      Comments: Wears prescription glasses.     Cardiovascular:      Rate and Rhythm: Normal rate.   Pulmonary:      Effort: Pulmonary effort is normal. No respiratory distress.      Comments: Decreased breath sounds bilateral lung fields.  Tachypneic and dyspneic after ambulating from the bathroom.  Respirations at baseline after a few minutes.  Abdominal:      General: Bowel sounds are normal. There is no distension.      Palpations: Abdomen is soft.      Tenderness: There is no abdominal tenderness. There is no guarding.      Comments: Chronic large round abdomen.     Musculoskeletal:      Cervical back: Neck supple.      Right lower leg: Edema (Trace to +1 pitting) present.      Left lower leg: Edema (Trace to +1 pitting) present.      Comments: Moves all 4 extremities.     Skin:     General: Skin is warm and dry.      Capillary Refill: Capillary refill takes less than 2 seconds.      Comments: Legs are sensitive to touch.     Neurological:      Mental Status: He is alert. Mental status is at baseline.      Gait: Gait abnormal.     Psychiatric:         Mood and Affect: Mood normal.         Behavior: Behavior normal.         Thought Content: Thought content normal.       Assessment/Plan:  89-year-old male with:    Chronic diastolic (congestive) heart failure (HCC)  Wt  Readings from Last 3 Encounters:   11/29/24 113 kg (249 lb 1.9 oz)   12/07/24 112 kg (246 lb 9.6 oz)   09/30/24 113 kg (249 lb 3.2 oz)   Patient's weight has been fluctuating from 247 pounds to 250 pounds.  He appears stable with chronic labored respirations in setting of COPD.  He received extra dose of Lasix 80 mg x 1 dose on 5/12/2025 (Bumex was not available) due to increased work of breathing and weight gain.  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  Continue to encourage patient to participate in activities provided at facility  Monitor for pain and ensure that pain is adequately controlled  Avoid constipation and monitor for urinary retention as these conditions can cause delirium  Order PT/OT/ST as needed    Stage 3a chronic kidney disease (HCC)  Lab Results   Component Value Date    EGFR 67 11/18/2023    EGFR 64 11/16/2023    EGFR 64 11/15/2023    CREATININE 0.95 11/18/2023     CREATININE 0.99 11/16/2023    CREATININE 0.98 11/15/2023   Most recent creatinine 1.09 on 3/17/2025  Patient requires Bumex in setting of CHF and he is currently not on a fluid restriction  Scheduled BMP for June 16, 2025  Avoid nephrotoxic medications such as NSAIDs  Ensure adequate hydration   Renal dose medications    Compression fracture of L1 vertebra with routine healing  With history of compression fractures involving L1, L2, L4, and minimally at L5, scoliosis and severe degenerative disease which is most pronounced at L3 and L4 as per MRI imaging 5/28/2015  Spinal XR March 2025 (done after fall) revealed osteoporotic compressions involving the T10, L1, L2, L3, and L5 vertebra.  The age of the compressions is old and indeterminate.  Patient received a course of steroids along with multimodal pain medication regimen with improvement.  He continues to have intermittent low back pain with change in positions especially when bending forward  Continue scheduled methocarbamol, tylenol, and lidocaine topical patch  Patient also received therapy and currently on restorative program to prevent deconditioning    Obstructive sleep apnea  Continue BiPAP every night    Chronic obstructive pulmonary disease (HCC)  O2 sat 96% on room air at time of exam today  Continue ipratropium and albuterol nebulizer treatments  [facility]    Sensorineural hearing loss (SNHL) of both ears  Encouraged use of hearing aids when awake  [facility] with [facility] if [facility]     This note was completed in part utilizing NEX[employer] direct voice recognition software.  Grammatical errors, random word insertion, spelling mistakes, and incomplete sentences may be an occasional consequence of the system secondary to software limitations, ambient noise and hardware issues.  At the time of dictation, efforts were made to edit, clarify and/or correct errors.  Please read the chart carefully and recognize, using context, where  substitutions have occurred.  If you have any questions or concerns about the context, text or information contained within the body of this dictation, please contact myself, the provider, for further clarification.    LIBORIO Perez  5/27/20253:40 PM

## 2025-05-27 NOTE — ASSESSMENT & PLAN NOTE
Heart rate trending high 50s to 70s  No complaints of palpitations  Continue Coreg 12.5 mg twice daily and Eliquis 5 mg twice daily for anticoagulation  Routine labs in March 2025 stable for patient

## 2025-05-27 NOTE — ASSESSMENT & PLAN NOTE
Recent TSH in December 2024 2.51  Currently on levothyroxine 100 mcg daily  Check TSH with free T4 June 2025

## 2025-05-27 NOTE — ASSESSMENT & PLAN NOTE
O2 sat 96% on room air at time of exam today  Continue ipratropium and albuterol nebulizer treatments  [facility]

## 2025-05-27 NOTE — ASSESSMENT & PLAN NOTE
With vascular dementia versus mixed type with behavior disturbances at times as reported by nursing staff  Continue 24/7 care and support in the locked memory care unit at [facility]  Continue reorientation and redirection as needed  Continue RNP program to prevent deconditioning  Avoid deliriogenic medications  Continue delirium precautions  He enjoys participating in activities as reported by staff.  Continue to encourage patient to participate in activities provided at facility  Monitor for pain and ensure that pain is adequately controlled  Avoid constipation and monitor for urinary retention as these conditions can cause delirium  Order PT/OT/ST as needed

## 2025-05-27 NOTE — ASSESSMENT & PLAN NOTE
Lab Results   Component Value Date    EGFR 67 11/18/2023    EGFR 64 11/16/2023    EGFR 64 11/15/2023    CREATININE 0.95 11/18/2023    CREATININE 0.99 11/16/2023    CREATININE 0.98 11/15/2023   Most recent creatinine 1.09 on 3/17/2025  Patient requires Bumex in setting of CHF and he is currently not on a fluid restriction  Scheduled BMP for June 16, 2025  Avoid nephrotoxic medications such as NSAIDs  Ensure adequate hydration   Renal dose medications

## 2025-05-27 NOTE — ASSESSMENT & PLAN NOTE
Wt Readings from Last 3 Encounters:   11/29/24 113 kg (249 lb 1.9 oz)   12/07/24 112 kg (246 lb 9.6 oz)   09/30/24 113 kg (249 lb 3.2 oz)   Patient's weight has been fluctuating from 247 pounds to 250 pounds.  He appears stable with chronic labored respirations in setting of COPD.  He received extra dose of Lasix 80 mg x 1 dose on 5/12/2025 (Bumex was not available) due to increased work of breathing and weight gain.  Chest x-ray revealed no acute findings  Continue Bumex 2 mg daily and carvedilol 12.5 mg twice daily   Currently not on a fluid restriction  Continue to monitor

## 2025-05-27 NOTE — ASSESSMENT & PLAN NOTE
With history of compression fractures involving L1, L2, L4, and minimally at L5, scoliosis and severe degenerative disease which is most pronounced at L3 and L4 as per MRI imaging 5/28/2015  Spinal XR March 2025 (done after fall) revealed osteoporotic compressions involving the T10, L1, L2, L3, and L5 vertebra.  The age of the compressions is old and indeterminate.  Patient received a course of steroids along with multimodal pain medication regimen with improvement.  He continues to have intermittent low back pain with change in positions especially when bending forward  Continue scheduled methocarbamol, tylenol, and lidocaine topical patch  Patient also received therapy and currently on restorative program to prevent deconditioning

## 2025-06-04 ENCOUNTER — NURSING HOME VISIT (OUTPATIENT)
Dept: GERIATRICS | Facility: OTHER | Age: OVER 89
End: 2025-06-04
Payer: COMMERCIAL

## 2025-06-04 DIAGNOSIS — J44.1 ACUTE EXACERBATION OF CHRONIC OBSTRUCTIVE PULMONARY DISEASE (COPD) (HCC): Primary | ICD-10-CM

## 2025-06-04 DIAGNOSIS — J44.9 CHRONIC OBSTRUCTIVE PULMONARY DISEASE, UNSPECIFIED COPD TYPE (HCC): ICD-10-CM

## 2025-06-04 DIAGNOSIS — G47.33 OBSTRUCTIVE SLEEP APNEA: ICD-10-CM

## 2025-06-04 PROCEDURE — 99309 SBSQ NF CARE MODERATE MDM 30: CPT | Performed by: NURSE PRACTITIONER

## 2025-06-04 NOTE — PROGRESS NOTES
Facility: [facility]  POS: 32  Acute Med Note  Code status: DNR    Assessment/Plan:  89-year-old male with:    Acute exacerbation of chronic obstructive pulmonary disease (COPD) (HCC)  With increased work of breathing and audible wheezes.  Symptoms started yesterday as noted by   Afebrile and hemodynamically stable  Clinical presentation suggestive of exacerbation of COPD and imaging not warranted at this time unless patient develops a fever or cough/ congestion  Patient denies having sore throat or cough  Will order short burst of prednisone 40 mg daily for 5 days and budesonide nebulizer treatments twice a day through 6/17/2025  Continue scheduled albuterol nebulizer treatments 3 times daily  Oxygen as needed to maintain O2 sat >88%  Will continue to monitor respiratory status    Chronic obstructive pulmonary disease (HCC)  See above plan  [facility]    Obstructive sleep apnea  Continue BiPAP every night  [facility]    Subjective: Patient's answers do not correlate with questions being asked in setting of dementia     Patient ID: Will Mullen is a 89 y.o. male.    89-year-old male seen and examined at the request of nursing staff for evaluation of shortness of breath, wheezing, and restlessness.  At time of examination, received patient seated in chair, and his respirations appeared more labored than his baseline.  He has wheezing throughout lung fields and audible wheezes also noted.  His vital signs are stable and he does not appear toxic.  He has chronic edema to bilateral lower extremities but does not appear to be in fluid overload.  Discussed plan of care with patient's daughter which included prednisone and budesonide nebulizer treatments.  Charge nurse also made aware of plan of care.  See A/P for additional information.    The following portions of the patient's history were reviewed and updated as appropriate:  Allergies, current  medications, Past Family history, past medical history, past social history, past surgical history, and problem list.    Review of Systems   Unable to perform ROS: Dementia         Objective:  Weight: 248.2 pounds  BP: 145/76   temp: 98.7    HR: 72    Resp: 22   O2 sat: 93%     Physical Exam  Vitals and nursing note reviewed.   Constitutional:       General: He is not in acute distress.     Appearance: He is ill-appearing. He is not toxic-appearing or diaphoretic.   HENT:      Head: Normocephalic.      Ears:      Comments: Hard of hearing and wears hearing aids.     Nose: No congestion or rhinorrhea.      Mouth/Throat:      Pharynx: No oropharyngeal exudate.     Eyes:      General:         Right eye: No discharge.         Left eye: No discharge.      Extraocular Movements: Extraocular movements intact.      Conjunctiva/sclera: Conjunctivae normal.      Comments: Wears prescription glasses.     Cardiovascular:      Rate and Rhythm: Normal rate.      Pulses: Normal pulses.   Pulmonary:      Effort: No respiratory distress.      Breath sounds: Wheezing present. No rhonchi.      Comments: Dyspneic on exertion.  Chronic labored respirations.  Abdominal:      General: Bowel sounds are normal. There is no distension.      Palpations: Abdomen is soft.      Tenderness: There is no abdominal tenderness. There is no guarding.      Comments: Large round abdomen.     Musculoskeletal:      Cervical back: Neck supple.      Right lower leg: Edema present.      Left lower leg: Edema present.      Comments: Moves all 4 extremities.     Skin:     General: Skin is warm and dry.      Capillary Refill: Capillary refill takes less than 2 seconds.     Neurological:      Mental Status: He is alert. Mental status is at baseline.      Gait: Gait abnormal.     Psychiatric:         Mood and Affect: Mood normal.         Behavior: Behavior normal.         Thought Content: Thought content normal.         I have spent a total time of 30 minutes in  caring for this patient on the day of the visit/encounter including Diagnostic results, Risks and benefits of tx options, Patient and family education, Importance of tx compliance, Impressions, Documenting in the medical record, Reviewing/placing orders in the medical record (including tests, medications, and/or procedures), Obtaining or reviewing history  , and Communicating with other healthcare professionals .     This note was completed in part utilizing with Dragon medical one voice recognition software.  Grammatical errors, random word insertion, spelling mistakes, and incomplete sentences may be an occasional consequence of the system secondary to software limitations, ambient noise and hardware issues.  At the time of dictation, efforts were made to edit, clarify and/or correct errors.  Please read the chart carefully and recognize, using context, where substitutions have occurred.  If you have any questions or concerns about the context, text or information contained within the body of this dictation, please contact myself, the provider, for further clarification.

## 2025-06-04 NOTE — ASSESSMENT & PLAN NOTE
With increased work of breathing and audible wheezes.  Symptoms started yesterday as noted by   Afebrile and hemodynamically stable  Patient denies having sore throat or cough  Will order short burst of prednisone 40 mg daily for 5 days and budesonide nebulizer treatments twice a day through 6/17/2025  Continue scheduled albuterol nebulizer treatments 3 times daily  Will continue to monitor respiratory status

## 2025-06-05 ENCOUNTER — NURSING HOME VISIT (OUTPATIENT)
Dept: GERIATRICS | Facility: OTHER | Age: OVER 89
End: 2025-06-05
Payer: COMMERCIAL

## 2025-06-05 DIAGNOSIS — J96.01 ACUTE RESPIRATORY FAILURE WITH HYPOXIA (HCC): Primary | ICD-10-CM

## 2025-06-05 DIAGNOSIS — J44.1 ACUTE EXACERBATION OF CHRONIC OBSTRUCTIVE PULMONARY DISEASE (COPD) (HCC): ICD-10-CM

## 2025-06-05 DIAGNOSIS — I50.32 CHRONIC DIASTOLIC (CONGESTIVE) HEART FAILURE (HCC): ICD-10-CM

## 2025-06-05 PROCEDURE — 99309 SBSQ NF CARE MODERATE MDM 30: CPT | Performed by: NURSE PRACTITIONER

## 2025-06-05 NOTE — PROGRESS NOTES
Facility: [facility]  POS: 32  Acute Med Note  Code status: DNR    Assessment/Plan:  89-year-old male with:    Acute respiratory failure with hypoxia (HCC)  New onset acute respiratory failure with hypoxia this morning.  O2 sat 83% on room air requiring oxygen supplementation via nasal cannula which brought O2 sat up to 97%  Patient with chronic edema to bilateral lower extremities and weights appear to be stable  Patient's daughter expressed that she would like to keep him out of the hospital if at all possible and agreed to current treatment orders  Patient was started on budesonide nebulizer treatments and short burst of prednisone course yesterday due to suspected exacerbation of COPD due to clinical symptoms  Afebrile and hemodynamically stable  Plan:  Order stat chest x-ray and rapid COVID study  Continue budesonide nebulizer treatments twice daily and prednisone course as ordered  Will increase albuterol nebulizer treatments from 3 times daily to 4 times daily and every 4 hours as needed for shortness of breath  Continue oxygen supplementation to maintain O2 sat above 88%  Order CBC and BMP in a.m.    Acute exacerbation of chronic obstructive pulmonary disease (COPD) (HCC)  Budesonide nebulizer treatments and a short burst of prednisone course for 5 days ordered yesterday due to suspected acute exacerbation of COPD, viral versus environmental factors  See above plan    Chronic diastolic (congestive) heart failure (HCC)  Wt Readings from Last 3 Encounters:   11/29/24 113 kg (249 lb 1.9 oz)   12/07/24 112 kg (246 lb 9.6 oz)   09/30/24 113 kg (249 lb 3.2 oz)   With new onset acute respiratory failure with hypoxia requiring oxygen supplementation.  Patient does not appear to be in fluid overload.  Chest x-ray ordered  Currently on Bumex 2 mg daily and carvedilol 12.5 mg twice daily  Currently not on any fluid restriction  Chest x-ray was ordered  CBC and BMP in a.m.  Continue CHF pathway/daily  "weights    Subjective: \"I don't feel good\"     Patient ID: Will Mullen is a 89 y.o. male.    89-year-old male seen and examined due to reported change in condition.  Upon entering patient's room, found patient seated in his usual chair, and he appeared in distress.  With increased work of breathing and decreased breath sounds throughout bilateral lung fields.  Patient's nurse applied oxygen via nasal cannula.  O2 sat was 83% on room air.  Patient stated that he felt better after oxygen applied.  O2 sat increased to 97%.  Spoke to patient's daughter Debi via phone regarding change in condition and plan of care.  She verbalized an understanding.  Daughter expressed that she would like to keep him out of the hospital if at all possible.  Charge nurse and his nurse made aware of daughter's wishes.    The following portions of the patient's history were reviewed and updated as appropriate:  Allergies, current medications, Past Family history, past medical history, past social history, past surgical history, and problem list.    Review of Systems   Unable to perform ROS: Dementia   Respiratory:  Positive for shortness of breath.    Psychiatric/Behavioral:  Positive for agitation and behavioral problems.          Objective:  Reviewed vital signs log at facility EMR; no acute findings     Physical Exam  Vitals and nursing note reviewed.   Constitutional:       General: He is not in acute distress.     Appearance: He is ill-appearing. He is not toxic-appearing or diaphoretic.   HENT:      Head: Normocephalic.      Mouth/Throat:      Mouth: Mucous membranes are moist.     Eyes:      Extraocular Movements: Extraocular movements intact.      Conjunctiva/sclera: Conjunctivae normal.       Cardiovascular:      Rate and Rhythm: Tachycardia present.   Pulmonary:      Effort: Respiratory distress present.      Breath sounds: Wheezing present.      Comments: Decreased breath sounds bilateral lung fields.  Abdominal:     "  General: There is no distension.      Palpations: Abdomen is soft.      Tenderness: There is no abdominal tenderness. There is no guarding.      Comments: Chronic large round abdomen.     Musculoskeletal:      Right lower leg: Edema present.      Left lower leg: Edema present.     Skin:     General: Skin is warm and dry.     Neurological:      General: No focal deficit present.      Mental Status: He is alert. Mental status is at baseline.     Psychiatric:      Comments: Anxious         I have spent a total time of 30 minutes in caring for this patient on the day of the visit/encounter including Diagnostic results, Risks and benefits of tx options, Patient and family education, Importance of tx compliance, Impressions, Counseling / Coordination of care, Documenting in the medical record, Reviewing/placing orders in the medical record (including tests, medications, and/or procedures), Obtaining or reviewing history  , and Communicating with other healthcare professionals .     This note was completed in part utilizing with Dragon medical one voice recognition software.  Grammatical errors, random word insertion, spelling mistakes, and incomplete sentences may be an occasional consequence of the system secondary to software limitations, ambient noise and hardware issues.  At the time of dictation, efforts were made to edit, clarify and/or correct errors.  Please read the chart carefully and recognize, using context, where substitutions have occurred.  If you have any questions or concerns about the context, text or information contained within the body of this dictation, please contact myself, the provider, for further clarification.

## 2025-06-05 NOTE — ASSESSMENT & PLAN NOTE
Budesonide nebulizer treatments and a short burst of prednisone course for 5 days ordered yesterday due to suspected acute exacerbation of COPD, viral versus environmental factors  See above plan

## 2025-06-05 NOTE — ASSESSMENT & PLAN NOTE
Wt Readings from Last 3 Encounters:   11/29/24 113 kg (249 lb 1.9 oz)   12/07/24 112 kg (246 lb 9.6 oz)   09/30/24 113 kg (249 lb 3.2 oz)   With new onset acute respiratory failure with hypoxia requiring oxygen supplementation.  Patient does not appear to be in fluid overload.  Chest x-ray ordered  Currently on Bumex 2 mg daily and carvedilol 12.5 mg twice daily  Currently not on any fluid restriction  Chest x-ray was ordered  CBC and BMP in a.m.  Continue CHF pathway/daily weights

## 2025-06-05 NOTE — ASSESSMENT & PLAN NOTE
New onset acute respiratory failure with hypoxia this morning.  O2 sat 83% on room air requiring oxygen supplementation via nasal cannula which brought O2 sat up to 97%  Patient with chronic edema to bilateral lower extremities and weights appear to be stable  Patient's daughter expressed that she would like to keep him out of the hospital if at all possible and agreed to current treatment orders  Patient was started on budesonide nebulizer treatments and short burst of prednisone course yesterday due to suspected exacerbation of COPD due to clinical symptoms  Afebrile and hemodynamically stable  Plan:  Order stat chest x-ray and rapid COVID study  Continue budesonide nebulizer treatments twice daily and prednisone course as ordered  Will increase albuterol nebulizer treatments from 3 times daily to 4 times daily and every 4 hours as needed for shortness of breath  Continue oxygen supplementation to maintain O2 sat above 88%  Order CBC and BMP in a.m.

## 2025-06-06 ENCOUNTER — NURSING HOME VISIT (OUTPATIENT)
Dept: GERIATRICS | Facility: OTHER | Age: OVER 89
End: 2025-06-06

## 2025-06-06 DIAGNOSIS — J96.01 ACUTE RESPIRATORY FAILURE WITH HYPOXIA (HCC): ICD-10-CM

## 2025-06-06 DIAGNOSIS — J44.1 ACUTE EXACERBATION OF CHRONIC OBSTRUCTIVE PULMONARY DISEASE (COPD) (HCC): Primary | ICD-10-CM

## 2025-06-06 DIAGNOSIS — G47.33 OBSTRUCTIVE SLEEP APNEA: ICD-10-CM

## 2025-06-06 NOTE — ASSESSMENT & PLAN NOTE
O2 sat 93% this morning on room air.  Patient does not like to keep his O2 cannula on and removes it  Currently on short burst of prednisone course x 5 days which will complete on 6/8/2025, budesonide nebulizer treatments twice daily through 6/17/2025   Stat chest x-ray on 6/5/2025 revealed no acute cardiopulmonary disease  Continue albuterol and ipratropium nebulizer treatments 4 times daily  Continue albuterol nebulizer treatments every 4 hours as needed for shortness of breath  Continue oxygen supplementation via nasal cannula to maintain O2 sat above 88%  [facility] current condition and would like to collaborate plan of care that will benefit patient due to more frequent exacerbations

## 2025-06-06 NOTE — ASSESSMENT & PLAN NOTE
In setting of acute exacerbation of COPD  Continue oxygen supplementation via nasal cannula to maintain O2 sat above 88% (patient removing oxygen cannula frequently as reported by nursing staff)

## 2025-06-06 NOTE — PROGRESS NOTES
Facility: [facility]  POS: 32  Acute Med Note  Code status: DNR    Assessment/Plan:  89-year-old male with:    Acute exacerbation of chronic obstructive pulmonary disease (COPD) (HCC)  O2 sat 93% this morning on room air.  Patient does not like to keep his O2 cannula on and removes it  Currently on short burst of prednisone course x 5 days which will complete on 6/8/2025, budesonide nebulizer treatments twice daily through 6/17/2025   Stat chest x-ray on 6/5/2025 revealed no acute cardiopulmonary disease  Continue albuterol and ipratropium nebulizer treatments 4 times daily  Continue albuterol nebulizer treatments every 4 hours as needed for shortness of breath  Continue oxygen supplementation via nasal cannula to maintain O2 sat above 88%  [facility] was [facility] regarding current condition and would like to collaborate plan of care that will benefit patient due to more frequent exacerbations    Acute respiratory failure with hypoxia (HCC)  In setting of acute exacerbation of COPD  Continue oxygen supplementation via nasal cannula to maintain O2 sat above 88% (patient removing oxygen cannula frequently as reported by nursing staff)    Obstructive sleep apnea  Continue BiPAP every night    Subjective: No complaints     Patient ID: Will Mullen is a 89 y.o. male.    89-year-old male seen and examined for follow-up of acute exacerbation of COPD and acute respiratory failure with hypoxia.  Patient is a long-term care resident in the memory care unit at [facility] in [place].  Patient found in acute respiratory distress yesterday morning requiring oxygen supplementation via nasal cannula.  Short burst of prednisone course started on 6/4/2025 along with budesonide nebulizer treatments due to exacerbation of COPD.  Stat chest x-ray ordered on 6/5/2025 due to acute respiratory distress; chest x-ray showed no acute cardiopulmonary disease.  Will collaborate with [facility]'s pul[facility]ologist ([facility])  regarding plan of care for his COPD since he is having more frequent exacerbations.  Current maintenance treatment is albuterol and ipratropium nebulizer treatments.  Patient is at high risk for rapid deterioration in setting of chronic medical conditions.  Methocarbamol dose (back pain) was decreased from 3 times daily to twice daily    The following portions of the patient's history were reviewed and updated as appropriate:  Allergies, current medications, Past Family history, past medical history, past social history, past surgical history, and problem list.    Review of Systems   Unable to perform ROS: Dementia   Constitutional:  Negative for chills.   HENT:  Positive for hearing loss.    Respiratory:  Positive for shortness of breath (Shortness of breath with slightest exertion.).    Cardiovascular:  Negative for chest pain and palpitations.   Neurological:  Negative for light-headedness.         Objective:  Reviewed vital signs log at facility EMR; stable     Physical Exam  Vitals and nursing note reviewed.   Constitutional:       General: He is not in acute distress.     Appearance: He is ill-appearing. He is not toxic-appearing or diaphoretic.   HENT:      Head: Normocephalic.      Ears:      Comments: Wears hearing aids.     Nose: No congestion.     Eyes:      Extraocular Movements: Extraocular movements intact.      Conjunctiva/sclera: Conjunctivae normal.      Comments: Wears prescription glasses.     Cardiovascular:      Rate and Rhythm: Normal rate.   Pulmonary:      Effort: No respiratory distress.      Breath sounds: Wheezing present.      Comments: Chronic labored respirations.  Audible wheezes noted.  Dyspneic on exertion.  With decreased breath sounds throughout bilateral lung fields.  Abdominal:      General: There is no distension.      Palpations: Abdomen is soft.      Tenderness: There is no abdominal tenderness. There is no guarding.     Musculoskeletal:      Right lower leg: Edema present.       Left lower leg: Edema present.     Skin:     General: Skin is warm and dry.     Neurological:      Mental Status: He is alert. Mental status is at baseline.     Psychiatric:         Mood and Affect: Mood normal.         Behavior: Behavior normal.         Thought Content: Thought content normal.         I have spent a total time of 30 minutes in caring for this patient on the day of the visit/encounter including Diagnostic results, Importance of tx compliance, Impressions, Counseling / Coordination of care, Documenting in the medical record, Reviewing/placing orders in the medical record (including tests, medications, and/or procedures), Obtaining or reviewing history  , and Communicating with other healthcare professionals .  [facility] out to [facility] regarding plan of care for patient's COPD.    This note was completed in part utilizing with Dragon medical one voice recognition software.  Grammatical errors, random word insertion, spelling mistakes, and incomplete sentences may be an occasional consequence of the system secondary to software limitations, ambient noise and hardware issues.  At the time of dictation, efforts were made to edit, clarify and/or correct errors.  Please read the chart carefully and recognize, using context, where substitutions have occurred.  If you have any questions or concerns about the context, text or information contained within the body of this dictation, please contact myself, the provider, for further clarification.

## 2025-06-13 ENCOUNTER — NURSING HOME VISIT (OUTPATIENT)
Dept: GERIATRICS | Facility: OTHER | Age: OVER 89
End: 2025-06-13
Payer: COMMERCIAL

## 2025-06-13 DIAGNOSIS — I10 BENIGN ESSENTIAL HYPERTENSION: ICD-10-CM

## 2025-06-13 DIAGNOSIS — I25.10 CORONARY ARTERY DISEASE INVOLVING NATIVE CORONARY ARTERY OF NATIVE HEART WITHOUT ANGINA PECTORIS: ICD-10-CM

## 2025-06-13 DIAGNOSIS — I48.0 PAROXYSMAL ATRIAL FIBRILLATION (HCC): ICD-10-CM

## 2025-06-13 DIAGNOSIS — F01.B2 MODERATE VASCULAR DEMENTIA WITH PSYCHOTIC DISTURBANCE (HCC): Primary | ICD-10-CM

## 2025-06-13 DIAGNOSIS — J44.1 ACUTE EXACERBATION OF CHRONIC OBSTRUCTIVE PULMONARY DISEASE (COPD) (HCC): ICD-10-CM

## 2025-06-13 DIAGNOSIS — J44.9 CHRONIC OBSTRUCTIVE PULMONARY DISEASE, UNSPECIFIED COPD TYPE (HCC): ICD-10-CM

## 2025-06-13 DIAGNOSIS — E03.8 OTHER SPECIFIED HYPOTHYROIDISM: ICD-10-CM

## 2025-06-13 DIAGNOSIS — I50.32 CHRONIC DIASTOLIC (CONGESTIVE) HEART FAILURE (HCC): ICD-10-CM

## 2025-06-13 DIAGNOSIS — G47.33 OBSTRUCTIVE SLEEP APNEA: ICD-10-CM

## 2025-06-13 PROCEDURE — 99310 SBSQ NF CARE HIGH MDM 45: CPT | Performed by: FAMILY MEDICINE

## 2025-06-13 NOTE — PROGRESS NOTES
[facility]  [address] [address]  [phone]  Facility: [facility]  POS 32  NAME: Will Mullen  AGE: 89 y.o. 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 Chest XR unremarkable. WBC normal.  Continue nebulizer treatment as scheduled. Oxygen supplement as needed to maintain SpO2 88% - 92%  Encourage mobilization in the unit    Atrial fibrillation (HCC)  HR stable  Continue coreg 12.5 mg BID, Eliquis 5 mg BID for anticoagulation  No adverse bleeding reported  Hgb stable 14.3 on recent CBC     Chronic diastolic (congestive) heart failure (HCC)  Wt Readings from Last 3 Encounters:   06/13/25 108 kg (238 lb 3.2 oz)   11/29/24 113 kg (249 lb 1.9 oz)   12/07/24 112 kg (246 lb 9.6 oz)   Euvolemic on exam  Continue bumex 2 mg daily cand coreg 12.5 mg BID  Sodium 145, Potassium level normal  4.1 on recent BMP    Coronary artery disease  Stable without symptoms  Continue atorvastatin 40 mg daily  Continue carvedilol    Essential hypertension  Stable BP. One reading 183/98 on 6/5 likely in the setting of acute COPD exacerbation  Continue Coreg 25 mg twice daily in setting of A-fib, Bumex 2 mg daily in setting of CHF  Monitor BP    Obstructive sleep apnea  Continue BiPAP every night    Hypothyroidism  TSH WNL 3.2 (6/16/25)  Continue current dose of levothyroxine 100 mcg daily.     Chief Complaint   Routine Long term follow-up  visit.  History of Present Illness   The patient is seen in the room during routine LTC follow up visit.   His SpO2 was low at 89% this morning when nursing applied oxygen and gave nebulizer treatment but he refused oxygen, pulling NC thongs per nursing report. However, he completed neb treatment with nursing assistance to keep mask in place during the neb treatment. Upon my exam, he was sitting in the dining room, having his meal comfortably. He denies CP, or abdominal pain.  He was seen for acute COPD exacerbation last week when he was placed on 5-day course of Prednisone, completed on June 8. Chest Xray was performed with no acute cardiopulmonary disease reported.    The following portions of the patient's history were reviewed and updated as appropriate: allergies, current medications, past family history, past medical history, past social history, past surgical history and problem list.    Review of Systems   As per HPI, otherwise negative.  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)    Malignant neoplasm of urinary bladder (HCC)    Class 2 severe obesity with serious comorbidity in adult (HCC)    Restrictive lung disease    Seasonal allergic rhinitis    Change in mole    Hypernatremia    Sensorineural hearing loss (SNHL) of both ears    Chronic obstructive pulmonary disease (HCC)    CKD (chronic kidney disease) stage 2, GFR 60-89 ml/min    Dry eyes    Shortness of breath    Pigmented skin lesion    Vitamin D deficiency    BPH (benign prostatic hyperplasia)    Acute respiratory failure with hypoxia (HCC)    History of stroke    Other constipation    Chronic diastolic (congestive) heart failure (HCC)    Hypertensive heart and renal disease with congestive heart failure (HCC)     Recurrent falls    Fall    Closed wedge compression fracture of T10 vertebra (HCC)    Compression fracture of L1 vertebra with routine healing    Acute midline low back pain without sciatica    Stage 3a chronic kidney disease (HCC)    Debility    Acute exacerbation of chronic obstructive pulmonary disease (COPD) ([facility])       Objective     Vitals:    25 1451   BP: 157/77   Pulse: 65   Resp: (!) 30   Temp: 98.2 °F (36.8 °C)   SpO2: (!) 89%   Weight: 108 kg (238 lb 3.2 oz)   Vitals: EHR at facility reviewed, stable.  Nursing note reviewed.   General: no acute distress.  HENT:      Head: Normocephalic.      Nose: Nose normal.      Mouth: Mucous membranes are moist.   Eyes:       Right eye: No discharge.         Left eye: No discharge.      Conjunctivae normal.   Cardiovascular:      Normal rate and regular rhythm.   Pulmonary:      Pulmonary effort is normal. No wheezing. Diminished breath sounds. Rhonchi and soft rales bilaterally.   Abdominal:      Bowel sounds are normal. There is no distension.      Abdomen is soft. There is no abdominal tenderness.   Musculoskeletal:      Right lower le+ edema.      Left lower le+ edema.   Skin:     General: Skin is warm.   Neurological: alert.      Oriented to person only. Pleasantly confused. Cooperative, follow simple commands      Behavior: normal.     Pertinent Laboratory/Diagnostic Studies:  Refer to facility chart.  Note updated on  for lab results:  Normal WBC 10.4, Hgb stable 14.3,   TSH 3.2, slightly elevated free T4 1.3  , Sodium 145, Potassium 4.1, Creatinine stable 0.91  Current Medications   Medications reviewed and updated in facility chart.  I have spent a total time of 45 minutes in caring for this patient on the day of the visit/encounter including Diagnostic results, Impressions, Counseling / Coordination of care, Documenting in the medical record, Reviewing orders in the medical record (including tests, medications), Obtaining or  reviewing history  , and Communicating with other healthcare professionals, Karen ROBIN in the memory unit, and LIBORIO Freed .

## 2025-06-18 VITALS
TEMPERATURE: 98.2 F | HEART RATE: 65 BPM | BODY MASS INDEX: 36.22 KG/M2 | RESPIRATION RATE: 30 BRPM | DIASTOLIC BLOOD PRESSURE: 77 MMHG | SYSTOLIC BLOOD PRESSURE: 157 MMHG | OXYGEN SATURATION: 89 % | WEIGHT: 238.2 LBS

## 2025-06-18 NOTE — ASSESSMENT & PLAN NOTE
HR stable  Continue coreg 12.5 mg BID, Eliquis 5 mg BID for anticoagulation  No adverse bleeding reported  Hgb stable 14.3 on recent CBC

## 2025-06-18 NOTE — ASSESSMENT & PLAN NOTE
Wt Readings from Last 3 Encounters:   06/13/25 108 kg (238 lb 3.2 oz)   11/29/24 113 kg (249 lb 1.9 oz)   12/07/24 112 kg (246 lb 9.6 oz)   Euvolemic on exam  Continue bumex 2 mg daily cand coreg 12.5 mg BID  Sodium 145, Potassium level normal  4.1 on recent BMP

## 2025-06-18 NOTE — ASSESSMENT & PLAN NOTE
Completed 5-day course of prednisone on June 8. Chest XR unremarkable. WBC normal.  Continue nebulizer treatment as scheduled. Oxygen supplement as needed to maintain SpO2 88% - 92%  Encourage mobilization in the unit

## 2025-06-18 NOTE — ASSESSMENT & PLAN NOTE
Stable BP. One reading 183/98 on 6/5 likely in the setting of acute COPD exacerbation  Continue Coreg 25 mg twice daily in setting of A-fib, Bumex 2 mg daily in setting of CHF  Monitor BP

## 2025-06-18 NOTE — ASSESSMENT & PLAN NOTE
With vascular dementia versus mixed type with behavior disturbances   Continue 24/7 care and support in the memory care unit at [facility]  Continue reorientation and redirection as needed  Continue RNP program to prevent deconditioning  Avoid constipation and monitor for urinary retention as these conditions can cause delirium

## 2025-07-12 ENCOUNTER — TELEPHONE (OUTPATIENT)
Dept: OTHER | Facility: OTHER | Age: OVER 89
End: 2025-07-12

## 2025-07-14 ENCOUNTER — NURSING HOME VISIT (OUTPATIENT)
Dept: GERIATRICS | Facility: OTHER | Age: OVER 89
End: 2025-07-14
Payer: COMMERCIAL

## 2025-07-14 DIAGNOSIS — R26.2 AMBULATORY DYSFUNCTION: Primary | ICD-10-CM

## 2025-07-14 DIAGNOSIS — J44.9 CHRONIC OBSTRUCTIVE PULMONARY DISEASE, UNSPECIFIED COPD TYPE (HCC): ICD-10-CM

## 2025-07-14 DIAGNOSIS — I50.32 CHRONIC DIASTOLIC (CONGESTIVE) HEART FAILURE (HCC): ICD-10-CM

## 2025-07-14 PROCEDURE — 99309 SBSQ NF CARE MODERATE MDM 30: CPT | Performed by: FAMILY MEDICINE

## 2025-07-14 NOTE — PROGRESS NOTES
"[facility]  [address] [address]  [phone]  Facility: [facility]  POS 32  Acute visit after fall  NAME: Will Mullen  AGE: 89 y.o. SEX: male [id number]  Code status: DNR  DATE OF ENCOUNTER: 7/14/2025  Assessment and Plan   Ambulatory dysfunction  Ambulatory dysfunction using walker at baseline with recent unwitnessed fall, sustaining abrasions on forehead.   He remains at high risk for falls due to impulsive behavior and challenges alarms as reported by nursing staff in setting of dementia   Fall precautions: bed/chair alarm, encourage use of walker all the time.    Chronic diastolic (congestive) heart failure (HCC)  Euvolemic on exam  Continue bumex 2 mg daily and coreg 12.5 mg BID    Chronic obstructive pulmonary disease (HCC)  Continue neb treatment for wheezing  History of Present Illness   The patient is seen in the room today. Per nursing report, he had a fall, hitting his forehead on 7/12.    He is sitting in recliner, watching TV. He denies HA, CP, or any pain in arms or legs.   His diet has changed to dental soft texture as per SLP. Per nursing report, patient refused having lunch in dining room on 7/7, stating \"I can not get up\". 3-staff assistance for transfer sit-to-stand. Unwitnessed fall on 7/12 with abrasions on forehead. Vitals and neuro check were applied per fall protocol.   The following portions of the patient's history were reviewed and updated as appropriate: allergies, current medications, past family history, past medical history, past social history, past surgical history and problem list.  Review of Systems   As per HPI, otherwise negative.  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)    Malignant neoplasm of urinary " bladder ([facility])    Class 2 severe obesity with serious comorbidity in adult ([facility])    Restrictive lung disease    Seasonal allergic rhinitis    Change in mole    Hypernatremia    Sensorineural hearing loss (SNHL) of both ears    Chronic obstructive pulmonary disease (HCC)    CKD (chronic kidney disease) stage 2, GFR 60-89 ml/min    Dry eyes    Shortness of breath    Pigmented skin lesion    Vitamin D deficiency    BPH (benign prostatic hyperplasia)    Acute respiratory failure with hypoxia ([facility])    History of stroke    Other constipation    Chronic diastolic (congestive) heart failure ([facility])    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  Nursing note reviewed.   General: no acute distress. Obese  HENT:      Head: Normocephalic.      Nose: Nose normal.      Mouth: Mucous membranes are moist.   Eyes:       Right eye: No discharge.         Left eye: No discharge.      Conjunctivae normal.   Cardiovascular:      Normal rate and regular rhythm.   Pulmonary:      Pulmonary effort is normal. No rhonchi or rales. Wheezing +  Abdominal:      Bowel sounds are normal. There is no distension.      Abdomen is soft. There is no abdominal tenderness.   Musculoskeletal:      Right lower le+ edema.      Left lower le+ edema.      Move 4 ext. Ambulates with walker.   Skin:     General: Skin is warm. 2 abrasions on forehead. No edema, erythema, nor tenderness mason wounds.  Neurological: alert.      Oriented to person only. Cooperative, follow simple commands      Behavior: normal.   Pertinent Laboratory/Diagnostic  Studies:  Refer to facility chart.  Current Medications   Medications reviewed and updated in facility chart.

## 2025-07-20 NOTE — ASSESSMENT & PLAN NOTE
Ambulatory dysfunction using walker at baseline with recent unwitnessed fall, sustaining abrasions on forehead.   He remains at high risk for falls due to impulsive behavior and challenges alarms as reported by nursing staff in setting of dementia   Fall precautions: bed/chair alarm, encourage use of walker all the time.

## 2025-08-08 ENCOUNTER — NURSING HOME VISIT (OUTPATIENT)
Dept: GERIATRICS | Facility: OTHER | Age: OVER 89
End: 2025-08-08
Payer: COMMERCIAL

## 2025-08-08 DIAGNOSIS — E03.9 ACQUIRED HYPOTHYROIDISM: ICD-10-CM

## 2025-08-08 DIAGNOSIS — J44.9 CHRONIC OBSTRUCTIVE PULMONARY DISEASE, UNSPECIFIED COPD TYPE (HCC): ICD-10-CM

## 2025-08-08 DIAGNOSIS — I50.32 CHRONIC DIASTOLIC (CONGESTIVE) HEART FAILURE (HCC): ICD-10-CM

## 2025-08-08 DIAGNOSIS — S32.010D COMPRESSION FRACTURE OF L1 VERTEBRA WITH ROUTINE HEALING: ICD-10-CM

## 2025-08-08 DIAGNOSIS — I25.10 CORONARY ARTERY DISEASE INVOLVING NATIVE CORONARY ARTERY WITHOUT ANGINA PECTORIS, UNSPECIFIED WHETHER NATIVE OR TRANSPLANTED HEART: ICD-10-CM

## 2025-08-08 DIAGNOSIS — G47.33 OBSTRUCTIVE SLEEP APNEA: ICD-10-CM

## 2025-08-08 DIAGNOSIS — F01.B2 MODERATE VASCULAR DEMENTIA WITH PSYCHOTIC DISTURBANCE (HCC): Primary | ICD-10-CM

## 2025-08-08 DIAGNOSIS — R53.81 DEBILITY: ICD-10-CM

## 2025-08-08 DIAGNOSIS — I10 BENIGN ESSENTIAL HYPERTENSION: ICD-10-CM

## 2025-08-08 DIAGNOSIS — I48.91 ATRIAL FIBRILLATION, UNSPECIFIED TYPE (HCC): ICD-10-CM

## 2025-08-08 DIAGNOSIS — N18.31 STAGE 3A CHRONIC KIDNEY DISEASE (HCC): ICD-10-CM

## 2025-08-08 PROBLEM — J96.01 ACUTE RESPIRATORY FAILURE WITH HYPOXIA (HCC): Status: RESOLVED | Noted: 2023-11-11 | Resolved: 2025-08-08

## 2025-08-08 PROCEDURE — 99309 SBSQ NF CARE MODERATE MDM 30: CPT | Performed by: NURSE PRACTITIONER

## 2025-08-08 RX ORDER — BUDESONIDE 0.25 MG/2ML
0.25 INHALANT ORAL 2 TIMES DAILY
COMMUNITY